# Patient Record
Sex: MALE | Race: WHITE | NOT HISPANIC OR LATINO | Employment: UNEMPLOYED | URBAN - NONMETROPOLITAN AREA
[De-identification: names, ages, dates, MRNs, and addresses within clinical notes are randomized per-mention and may not be internally consistent; named-entity substitution may affect disease eponyms.]

---

## 2023-09-10 ENCOUNTER — HOSPITAL ENCOUNTER (EMERGENCY)
Facility: HOSPITAL | Age: 58
Discharge: HOME | End: 2023-09-10
Attending: EMERGENCY MEDICINE | Admitting: EMERGENCY MEDICINE
Payer: MEDICARE

## 2023-09-10 VITALS
DIASTOLIC BLOOD PRESSURE: 67 MMHG | OXYGEN SATURATION: 98 % | SYSTOLIC BLOOD PRESSURE: 149 MMHG | BODY MASS INDEX: 24.33 KG/M2 | HEART RATE: 79 BPM | WEIGHT: 155 LBS | TEMPERATURE: 98 F | HEIGHT: 67 IN | RESPIRATION RATE: 16 BRPM

## 2023-09-10 DIAGNOSIS — K04.7 DENTAL INFECTION: Primary | ICD-10-CM

## 2023-09-10 PROCEDURE — 96372 THER/PROPH/DIAG INJ SC/IM: CPT

## 2023-09-10 PROCEDURE — 99282 EMERGENCY DEPT VISIT SF MDM: CPT

## 2023-09-10 PROCEDURE — 99284 EMERGENCY DEPT VISIT MOD MDM: CPT | Performed by: PHYSICIAN ASSISTANT

## 2023-09-10 RX ORDER — KETOROLAC TROMETHAMINE 30 MG/ML
15 INJECTION, SOLUTION INTRAMUSCULAR; INTRAVENOUS ONCE
Status: COMPLETED | OUTPATIENT
Start: 2023-09-10 | End: 2023-09-10

## 2023-09-10 RX ORDER — PENICILLIN V POTASSIUM 250 MG/1
500 TABLET ORAL ONCE
Status: COMPLETED | OUTPATIENT
Start: 2023-09-10 | End: 2023-09-10

## 2023-09-10 RX ORDER — PENICILLIN V POTASSIUM 500 MG/1
500 TABLET ORAL 4 TIMES DAILY
Qty: 28 TABLET | Refills: 0 | Status: SHIPPED | OUTPATIENT
Start: 2023-09-10 | End: 2023-09-17

## 2023-09-10 RX ADMIN — KETOROLAC TROMETHAMINE 15 MG: 30 INJECTION, SOLUTION INTRAMUSCULAR at 11:51

## 2023-09-10 RX ADMIN — PENICILLIN V POTASSIUM 500 MG: 250 TABLET, FILM COATED ORAL at 11:51

## 2023-09-10 NOTE — DISCHARGE INSTRUCTIONS
Please take antibiotics as prescribed. Please follow-up with dentistry  Here is a list of  dental clinics that may be able to help you. Keep in mind that these clinics do not have to see you or any other patient. Also, these clinics are not connected to the [facility] or [facility] but if they agree to see you as a patient it is easy for them to call  Medical Records Department to have your records faxed to them. [facility]:  [address] [facility]   [address] [facility]   [id number]:   [address]   201 [facility], [address]   [phone]     [facility]:  [address]  [place] [address] [facility] [address]  [phone]    [address]:  11[address] [facility], [address]  [phone]    [address] [facility] [address] [facility] [address]:  [address] [place] [address] [address]  ([address]:  [address] [facility], [address]  [phone]    [facility]:  201 [facility], [address]  [phone]    [address]:  600 [facility] [address] [facility]  [phone]    [address] [facility] [address] [facility] [address]:  [address]  [phone] [address]  [address]  [address], [address]  [phone]    [address]:  2190 [facility] [address], [address] [place] [address]  Associates:  90 [facility], [address]  [phone]

## 2023-09-10 NOTE — ED PROVIDER NOTES
History  Chief Complaint   Patient presents with   • Dental Pain     51-year-old male presents the emergency department for evaluation of left upper dental pain for the last few weeks. Worsening. Reports history of same and dental abscess. No fevers or chills. No difficulty swallowing. No shortness of breath. Lives in [place] but here for several months does not follow with dentistry regularly          None       Past Medical History:   Diagnosis Date   • Anxiety        History reviewed. No pertinent surgical history. History reviewed. No pertinent family history. I have reviewed and agree with the history as documented. E-Cigarette/Vaping     E-Cigarette/Vaping Substances     Social History     Tobacco Use   • Smoking status: Never   • Smokeless tobacco: Never   Substance Use Topics   • Alcohol use: Not Currently   • Drug use: Not Currently     Types: Marijuana       Review of Systems   Constitutional: Negative for fever. HENT: Positive for dental problem. Respiratory: Negative for shortness of breath. Cardiovascular: Negative. Musculoskeletal: Negative. All other systems reviewed and are negative. Physical Exam  Physical Exam  Vitals and nursing note reviewed. Constitutional:       General: He is not in acute distress. Appearance: Normal appearance. He is not ill-appearing, toxic-appearing or diaphoretic. HENT:      Head: Normocephalic. Mouth/Throat:      Dentition: Dental tenderness and dental caries present. Comments: Multiple dental caries with previous dental extractions. Tenderness over the left upper gum with no palpable area of fluctuance. No posterior oropharynx swelling or abscess. Eyes:      Conjunctiva/sclera: Conjunctivae normal.   Pulmonary:      Effort: Pulmonary effort is normal.   Skin:     General: Skin is warm and dry. Findings: No erythema. Neurological:      General: No focal deficit present. Mental Status: He is alert. Vital Signs  ED Triage Vitals [09/10/23 1140]   Temperature Pulse Respirations Blood Pressure SpO2   98 °F (36.7 °C) 79 16 149/67 98 %      Temp Source Heart Rate Source Patient Position - Orthostatic VS BP Location FiO2 (%)   Tympanic Monitor Sitting Right arm --      Pain Score       9           Vitals:    09/10/23 1140   BP: 149/67   Pulse: 79   Patient Position - Orthostatic VS: Sitting         Visual Acuity      ED Medications  Medications   penicillin V potassium (VEETID) tablet 500 mg (500 mg Oral Given 9/10/23 1151)   ketorolac (TORADOL) injection 15 mg (15 mg Intramuscular Given 9/10/23 1151)       Diagnostic Studies  Results Reviewed     None                 No orders to display              Procedures  Procedures         ED Course                 SBIRT 20yo+    Flowsheet Row Most Recent Value   Initial Alcohol Screen: US AUDIT-C     1. How often do you have a drink containing alcohol? 0 Filed at: 09/10/2023 1140   2. How many drinks containing alcohol do you have on a typical day you are drinking? 0 Filed at: 09/10/2023 1140   3a. Male UNDER 65: How often do you have five or more drinks on one occasion? 0 Filed at: 09/10/2023 1140   3b. FEMALE Any Age, or MALE 65+: How often do you have 4 or more drinks on one occassion? 0 Filed at: 09/10/2023 1140   Audit-C Score 0 Filed at: 09/10/2023 1140   KATIE: How many times in the past year have you. .. Used an illegal drug or used a prescription medication for non-medical reasons? Never Filed at: 09/10/2023 1140                    Medical Decision Making  61-year-old male presented to the emergency department for evaluation of dental pain. Vitals and medical record reviewed. Patient at risk for dental abscess, dental carry, dental fracture, peritonsillar abscess. He is breathing without difficulty. No drooling. Speaking in full sentences. Tenderness over the left upper gum. No palpable area of fluctuance. No peritonsillar abscess identified. Multiple dental caries. Was started on Pen-VK. Recommend follow-up with dentistry. Return precautions discussed and verbalized understanding. Patient was clinically and hemodynamically stable for discharge    Dental infection: acute illness or injury  Risk  Prescription drug management. Disposition  Final diagnoses:   Dental infection     Time reflects when diagnosis was documented in both MDM as applicable and the Disposition within this note     Time User Action Codes Description Comment    9/10/2023 11:47 AM Dannie Isabel Add [K04.7] Dental infection       ED Disposition     ED Disposition   Discharge    Condition   Stable    Date/Time   Sun Sep 10, 2023 11:47 AM    Comment   Ephraim Gamboa discharge to home/self care. Follow-up Information     Follow up With Specialties Details Why [address]  [phone]            Discharge Medication List as of 9/10/2023 11:48 AM      START taking these medications    Details   penicillin V potassium (VEETID) 500 mg tablet Take 1 tablet (500 mg total) by mouth 4 (four) times a day for 7 days, Starting Sun 9/10/2023, Until Sun 9/17/2023, Normal             No discharge procedures on file.     PDMP Review     None          ED Provider  Electronically Signed by           Dannie Isabel PA-C  09/10/23 3712

## 2023-10-02 ENCOUNTER — HOSPITAL ENCOUNTER (EMERGENCY)
Facility: HOSPITAL | Age: 58
Discharge: HOME | End: 2023-10-02
Attending: EMERGENCY MEDICINE | Admitting: EMERGENCY MEDICINE
Payer: MEDICARE

## 2023-10-02 ENCOUNTER — APPOINTMENT (EMERGENCY)
Dept: RADIOLOGY | Facility: HOSPITAL | Age: 58
End: 2023-10-02
Payer: MEDICARE

## 2023-10-02 VITALS
HEIGHT: 67 IN | BODY MASS INDEX: 25.11 KG/M2 | WEIGHT: 160 LBS | OXYGEN SATURATION: 94 % | DIASTOLIC BLOOD PRESSURE: 69 MMHG | SYSTOLIC BLOOD PRESSURE: 136 MMHG | HEART RATE: 73 BPM | RESPIRATION RATE: 16 BRPM | TEMPERATURE: 96.8 F

## 2023-10-02 DIAGNOSIS — R07.9 CHEST PAIN: Primary | ICD-10-CM

## 2023-10-02 DIAGNOSIS — R73.9 HYPERGLYCEMIA: ICD-10-CM

## 2023-10-02 LAB
2HR DELTA HS TROPONIN: -1 NG/L
ALBUMIN SERPL BCP-MCNC: 4.5 G/DL (ref 3.5–5)
ALP SERPL-CCNC: 68 U/L (ref 34–104)
ALT SERPL W P-5'-P-CCNC: 21 U/L (ref 7–52)
ANION GAP SERPL CALCULATED.3IONS-SCNC: 12 MMOL/L
AST SERPL W P-5'-P-CCNC: 17 U/L (ref 13–39)
ATRIAL RATE: 75 BPM
BASOPHILS # BLD AUTO: 0.11 THOUSANDS/ÂΜL (ref 0–0.1)
BASOPHILS NFR BLD AUTO: 1 % (ref 0–1)
BILIRUB SERPL-MCNC: 0.46 MG/DL (ref 0.2–1)
BNP SERPL-MCNC: 31 PG/ML (ref 0–100)
BUN SERPL-MCNC: 13 MG/DL (ref 5–25)
CALCIUM SERPL-MCNC: 9.9 MG/DL (ref 8.4–10.2)
CARDIAC TROPONIN I PNL SERPL HS: 6 NG/L
CARDIAC TROPONIN I PNL SERPL HS: 7 NG/L
CHLORIDE SERPL-SCNC: 102 MMOL/L (ref 96–108)
CO2 SERPL-SCNC: 23 MMOL/L (ref 21–32)
CREAT SERPL-MCNC: 1 MG/DL (ref 0.6–1.3)
D DIMER PPP FEU-MCNC: <0.27 UG/ML FEU
EOSINOPHIL # BLD AUTO: 0.38 THOUSAND/ÂΜL (ref 0–0.61)
EOSINOPHIL NFR BLD AUTO: 4 % (ref 0–6)
ERYTHROCYTE [DISTWIDTH] IN BLOOD BY AUTOMATED COUNT: 13.1 % (ref 11.6–15.1)
GFR SERPL CREATININE-BSD FRML MDRD: 83 ML/MIN/1.73SQ M
GLUCOSE SERPL-MCNC: 196 MG/DL (ref 65–140)
GLUCOSE SERPL-MCNC: 364 MG/DL (ref 65–140)
HCT VFR BLD AUTO: 48.8 % (ref 36.5–49.3)
HGB BLD-MCNC: 16.4 G/DL (ref 12–17)
IMM GRANULOCYTES # BLD AUTO: 0.04 THOUSAND/UL (ref 0–0.2)
IMM GRANULOCYTES NFR BLD AUTO: 0 % (ref 0–2)
LYMPHOCYTES # BLD AUTO: 2.24 THOUSANDS/ÂΜL (ref 0.6–4.47)
LYMPHOCYTES NFR BLD AUTO: 25 % (ref 14–44)
MCH RBC QN AUTO: 27.8 PG (ref 26.8–34.3)
MCHC RBC AUTO-ENTMCNC: 33.6 G/DL (ref 31.4–37.4)
MCV RBC AUTO: 83 FL (ref 82–98)
MONOCYTES # BLD AUTO: 0.5 THOUSAND/ÂΜL (ref 0.17–1.22)
MONOCYTES NFR BLD AUTO: 6 % (ref 4–12)
NEUTROPHILS # BLD AUTO: 5.76 THOUSANDS/ÂΜL (ref 1.85–7.62)
NEUTS SEG NFR BLD AUTO: 64 % (ref 43–75)
NRBC BLD AUTO-RTO: 0 /100 WBCS
P AXIS: 77 DEGREES
PLATELET # BLD AUTO: 284 THOUSANDS/UL (ref 149–390)
PMV BLD AUTO: 11.2 FL (ref 8.9–12.7)
POTASSIUM SERPL-SCNC: 4 MMOL/L (ref 3.5–5.3)
PR INTERVAL: 150 MS
PROT SERPL-MCNC: 7.4 G/DL (ref 6.4–8.4)
QRS AXIS: 77 DEGREES
QRSD INTERVAL: 88 MS
QT INTERVAL: 362 MS
QTC INTERVAL: 404 MS
RBC # BLD AUTO: 5.89 MILLION/UL (ref 3.88–5.62)
SODIUM SERPL-SCNC: 137 MMOL/L (ref 135–147)
T WAVE AXIS: 15 DEGREES
VENTRICULAR RATE: 75 BPM
WBC # BLD AUTO: 9.03 THOUSAND/UL (ref 4.31–10.16)

## 2023-10-02 PROCEDURE — 71045 X-RAY EXAM CHEST 1 VIEW: CPT

## 2023-10-02 PROCEDURE — 96361 HYDRATE IV INFUSION ADD-ON: CPT

## 2023-10-02 PROCEDURE — 80053 COMPREHEN METABOLIC PANEL: CPT | Performed by: EMERGENCY MEDICINE

## 2023-10-02 PROCEDURE — 82948 REAGENT STRIP/BLOOD GLUCOSE: CPT

## 2023-10-02 PROCEDURE — 36415 COLL VENOUS BLD VENIPUNCTURE: CPT | Performed by: EMERGENCY MEDICINE

## 2023-10-02 PROCEDURE — 96374 THER/PROPH/DIAG INJ IV PUSH: CPT

## 2023-10-02 PROCEDURE — 83880 ASSAY OF NATRIURETIC PEPTIDE: CPT | Performed by: EMERGENCY MEDICINE

## 2023-10-02 PROCEDURE — 96375 TX/PRO/DX INJ NEW DRUG ADDON: CPT

## 2023-10-02 PROCEDURE — 85379 FIBRIN DEGRADATION QUANT: CPT | Performed by: EMERGENCY MEDICINE

## 2023-10-02 PROCEDURE — 84484 ASSAY OF TROPONIN QUANT: CPT | Performed by: EMERGENCY MEDICINE

## 2023-10-02 PROCEDURE — 93005 ELECTROCARDIOGRAM TRACING: CPT

## 2023-10-02 PROCEDURE — 99285 EMERGENCY DEPT VISIT HI MDM: CPT

## 2023-10-02 PROCEDURE — 99285 EMERGENCY DEPT VISIT HI MDM: CPT | Performed by: EMERGENCY MEDICINE

## 2023-10-02 PROCEDURE — 85025 COMPLETE CBC W/AUTO DIFF WBC: CPT | Performed by: EMERGENCY MEDICINE

## 2023-10-02 RX ORDER — LOVASTATIN 20 MG/1
20 TABLET ORAL DAILY
COMMUNITY

## 2023-10-02 RX ORDER — TRAZODONE HYDROCHLORIDE 50 MG/1
50 TABLET ORAL
COMMUNITY

## 2023-10-02 RX ORDER — FLUOXETINE HYDROCHLORIDE 40 MG/1
40 CAPSULE ORAL DAILY
COMMUNITY

## 2023-10-02 RX ORDER — NITROGLYCERIN 0.4 MG/1
0.4 TABLET SUBLINGUAL
Status: DISCONTINUED | OUTPATIENT
Start: 2023-10-02 | End: 2023-10-02 | Stop reason: HOSPADM

## 2023-10-02 RX ORDER — IBUPROFEN 400 MG/1
400 TABLET ORAL EVERY 8 HOURS PRN
COMMUNITY

## 2023-10-02 RX ORDER — INSULIN GLARGINE 100 [IU]/ML
35 INJECTION, SOLUTION SUBCUTANEOUS
COMMUNITY

## 2023-10-02 RX ORDER — ASPIRIN 81 MG/1
81 TABLET ORAL DAILY
COMMUNITY

## 2023-10-02 RX ORDER — ALBUTEROL SULFATE 90 UG/1
2 AEROSOL, METERED RESPIRATORY (INHALATION)
COMMUNITY

## 2023-10-02 RX ORDER — FLUTICASONE PROPIONATE 50 MCG
1 SPRAY, SUSPENSION (ML) NASAL DAILY
COMMUNITY

## 2023-10-02 RX ORDER — LISINOPRIL 2.5 MG/1
2.5 TABLET ORAL DAILY
COMMUNITY

## 2023-10-02 RX ORDER — ONDANSETRON 2 MG/ML
4 INJECTION INTRAMUSCULAR; INTRAVENOUS ONCE
Status: COMPLETED | OUTPATIENT
Start: 2023-10-02 | End: 2023-10-02

## 2023-10-02 RX ORDER — UMECLIDINIUM 62.5 UG/1
1 AEROSOL, POWDER ORAL DAILY
COMMUNITY

## 2023-10-02 RX ADMIN — INSULIN HUMAN 5 UNITS: 100 INJECTION, SOLUTION PARENTERAL at 13:23

## 2023-10-02 RX ADMIN — SODIUM CHLORIDE 1000 ML: 0.9 INJECTION, SOLUTION INTRAVENOUS at 12:53

## 2023-10-02 RX ADMIN — NITROGLYCERIN 0.4 MG: 0.4 TABLET SUBLINGUAL at 12:48

## 2023-10-02 RX ADMIN — ONDANSETRON 4 MG: 2 INJECTION INTRAMUSCULAR; INTRAVENOUS at 12:48

## 2023-10-02 NOTE — ED PROVIDER NOTES
History  Chief Complaint   Patient presents with   • Chest Pain     Mid sternal chest pain radiating across the top of his chest, hx of MI with stent placement, pain is constant pressure, pain to b/l arms and hands, c/o sob and diaphoresis with pain, c/o nausea, denies vomiting, pt. Using nitro for mild pain relief, denies injury      82-year-old male with past medical history pertinent for anxiety, history of MI with stent placement, Wannah user who presents to the emergency department for midsternal chest pain that radiates across the top of his chest.  Reports pain is constant with radiation to the bilateral arms and hands. Reports shortness of breath and diaphoresis as well with nausea. Denies any vomiting. Did use some nitro for mild pain relief. No injuries. States he took 2 nitro without any improvement. Reports pain is 8 out of 10 at this time. States that he had a stent put in his RCA in . Denies any previous thrombus otherwise. No PE or DVT history. No pain with breathing. No other concerns. Prior to Admission Medications   Prescriptions Last Dose Informant Patient Reported? Taking?    Empagliflozin (Jardiance) 10 MG TABS tablet   Yes Yes   FLUoxetine (PROzac) 40 MG capsule   Yes No   Sig: Take 40 mg by mouth daily   albuterol (PROVENTIL HFA,VENTOLIN HFA) 90 mcg/act inhaler   Yes No   Sig: Inhale 2 puffs   aspirin (ECOTRIN LOW STRENGTH) 81 mg EC tablet   Yes No   Sig: Take 81 mg by mouth daily   fluticasone (FLONASE) 50 mcg/act nasal spray   Yes No   Si spray daily   ibuprofen (MOTRIN) 400 mg tablet   Yes No   Sig: Take 400 mg by mouth every 8 (eight) hours as needed   insulin glargine (LANTUS) 100 units/mL subcutaneous injection   Yes No   Sig: Inject 35 Units under the skin   lisinopril (ZESTRIL) 2.5 mg tablet   Yes No   Sig: Take 2.5 mg by mouth daily   lovastatin (MEVACOR) 20 mg tablet   Yes No   Sig: Take 20 mg by mouth daily   metFORMIN (GLUCOPHAGE) 1000 MG tablet   Yes No Sig: Take 1,000 mg by mouth daily   traZODone (DESYREL) 50 mg tablet   Yes No   Sig: Take 50 mg by mouth   umeclidinium (Incruse Ellipta) 62.5 mcg/actuation AEPB inhaler   Yes No   Sig: Inhale 1 puff daily      Facility-Administered Medications: None       Past Medical History:   Diagnosis Date   • Anxiety    • Depression    • Diabetes mellitus (HCC)    • MI, old    • PTSD (post-traumatic stress disorder)        Past Surgical History:   Procedure Laterality Date   • CARPAL TUNNEL RELEASE Right    • CORONARY ANGIOPLASTY WITH STENT PLACEMENT     • ROTATOR CUFF REPAIR Right        History reviewed. No pertinent family history. I have reviewed and agree with the history as documented. E-Cigarette/Vaping     E-Cigarette/Vaping Substances   • Nicotine No    • THC No    • CBD No    • Flavoring No      Social History     Tobacco Use   • Smoking status: Never     Passive exposure: Never   • Smokeless tobacco: Never   Substance Use Topics   • Alcohol use: Not Currently   • Drug use: Not Currently     Types: Marijuana       Review of Systems   Constitutional: Negative for activity change, appetite change, chills, diaphoresis and fever. HENT: Negative for congestion, rhinorrhea and sore throat. Eyes: Negative for visual disturbance. Respiratory: Negative for chest tightness and shortness of breath. Cardiovascular: Positive for chest pain. Negative for palpitations and leg swelling. Gastrointestinal: Negative for abdominal pain, constipation, diarrhea, nausea and vomiting. Genitourinary: Negative for difficulty urinating and hematuria. Musculoskeletal: Negative for back pain and neck pain. Skin: Negative for color change and rash. Neurological: Negative for dizziness, weakness and headaches. Psychiatric/Behavioral: Negative for behavioral problems. Physical Exam  Physical Exam  Vitals and nursing note reviewed. Constitutional:       General: He is not in acute distress.      Appearance: He is well-developed. He is not diaphoretic. HENT:      Head: Normocephalic and atraumatic. Right Ear: External ear normal.      Left Ear: External ear normal.      Nose: Nose normal.   Eyes:      Pupils: Pupils are equal, round, and reactive to light. Cardiovascular:      Rate and Rhythm: Normal rate and regular rhythm. Heart sounds: Normal heart sounds. Pulmonary:      Effort: Pulmonary effort is normal. No respiratory distress. Breath sounds: Normal breath sounds. No wheezing or rales. Abdominal:      General: Bowel sounds are normal.      Palpations: Abdomen is soft. There is no mass. Tenderness: There is no abdominal tenderness. Musculoskeletal:         General: No tenderness or deformity. Normal range of motion. Cervical back: Normal range of motion and neck supple. Skin:     General: Skin is warm and dry. Capillary Refill: Capillary refill takes less than 2 seconds. Findings: No erythema or rash. Neurological:      Mental Status: He is alert. Motor: No abnormal muscle tone.    Psychiatric:         Behavior: Behavior normal.         Vital Signs  ED Triage Vitals [10/02/23 1215]   Temperature Pulse Respirations Blood Pressure SpO2   (!) 96.8 °F (36 °C) 79 20 136/67 98 %      Temp Source Heart Rate Source Patient Position - Orthostatic VS BP Location FiO2 (%)   Temporal Monitor Sitting Right arm --      Pain Score       8           Vitals:    10/02/23 1330 10/02/23 1415 10/02/23 1430 10/02/23 1515   BP: 122/67 130/73 119/60 116/60   Pulse: 72 80 70 75   Patient Position - Orthostatic VS:             Visual Acuity      ED Medications  Medications   nitroglycerin (NITROSTAT) SL tablet 0.4 mg (0.4 mg Sublingual Given 10/2/23 1248)   sodium chloride 0.9 % bolus 1,000 mL (1,000 mL Intravenous New Bag 10/2/23 1253)   ondansetron (ZOFRAN) injection 4 mg (4 mg Intravenous Given 10/2/23 1248)   insulin regular (HumuLIN R,NovoLIN R) injection 5 Units (5 Units Intravenous Given 10/2/23 1323)       Diagnostic Studies  Results Reviewed     Procedure Component Value Units Date/Time    HS Troponin I 2hr [[id number]]  (Normal) Collected: 10/02/23 1508    Lab Status: Final result Specimen: Blood from Arm, Left Updated: 10/02/23 1534     hs TnI 2hr 6 ng/L      Delta 2hr hsTnI -1 ng/L     HS Troponin I 4hr [[id number]]     Lab Status: No result Specimen: Blood     Fingerstick Glucose (POCT) [[id number]]  (Abnormal) Collected: 10/02/23 1355    Lab Status: Final result Updated: 10/02/23 1356     POC Glucose 196 mg/dl     D-dimer, quantitative [[id number]]  (Normal) Collected: 10/02/23 1239    Lab Status: Final result Specimen: Blood from Arm, Left Updated: 10/02/23 1318     D-Dimer, Quant <0.27 ug/ml FEU     Narrative: In the evaluation for possible pulmonary embolism, in the appropriate (Well's Score of 4 or less) patient, the age adjusted d-dimer cutoff for this patient can be calculated as:    Age x 0.01 (in ug/mL) for Age-adjusted D-dimer exclusion threshold for a patient over 50 years.     HS Troponin 0hr (reflex protocol) [[id number]]  (Normal) Collected: 10/02/23 1239    Lab Status: Final result Specimen: Blood from Arm, Left Updated: 10/02/23 1308     hs TnI 0hr 7 ng/L     B-Type Natriuretic Peptide(BNP) [[id number]]  (Normal) Collected: 10/02/23 1239    Lab Status: Final result Specimen: Blood from Arm, Left Updated: 10/02/23 1308     BNP 31 pg/mL     Comprehensive metabolic panel [[id number]]  (Abnormal) Collected: 10/02/23 1239    Lab Status: Final result Specimen: Blood from Arm, Left Updated: 10/02/23 1301     Sodium 137 mmol/L      Potassium 4.0 mmol/L      Chloride 102 mmol/L      CO2 23 mmol/L      ANION GAP 12 mmol/L      BUN 13 mg/dL      Creatinine 1.00 mg/dL      Glucose 364 mg/dL      Calcium 9.9 mg/dL      AST 17 U/L      ALT 21 U/L      Alkaline Phosphatase 68 U/L      Total Protein 7.4 g/dL      Albumin 4.5 g/dL      Total Bilirubin 0.46 mg/dL      eGFR 83 ml/min/1.73sq m Narrative:      National Kid[employer] Disease [employer] guidelines for Chronic Kidney Disease (CKD):   •  Stage 1 with normal or high GFR (GFR > 90 mL/min/1.73 square meters)  •  Stage 2 Mild CKD (GFR = 60-89 mL/min/1.73 square meters)  •  Stage 3A Moderate CKD (GFR = 45-59 mL/min/1.73 square meters)  •  Stage 3B Moderate CKD (GFR = 30-44 mL/min/1.73 square meters)  •  Stage 4 Severe CKD (GFR = 15-29 mL/min/1.73 square meters)  •  Stage 5 End Stage CKD (GFR <15 mL/min/1.73 square meters)  Note: GFR calculation is accurate only with a steady state creatinine    CBC and differential [[id number]]  (Abnormal) Collected: 10/02/23 1239    Lab Status: Final result Specimen: Blood from Arm, Left Updated: 10/02/23 1245     WBC 9.03 Thousand/uL      RBC 5.89 Million/uL      Hemoglobin 16.4 g/dL      Hematocrit 48.8 %      MCV 83 fL      MCH 27.8 pg      MCHC 33.6 g/dL      RDW 13.1 %      MPV 11.2 fL      Platelets 002 Thousands/uL      nRBC 0 /100 WBCs      Neutrophils Relative 64 %      Immat GRANS % 0 %      Lymphocytes Relative 25 %      Monocytes Relative 6 %      Eosinophils Relative 4 %      Basophils Relative 1 %      Neutrophils Absolute 5.76 Thousands/µL      Immature Grans Absolute 0.04 Thousand/uL      Lymphocytes Absolute 2.24 Thousands/µL      Monocytes Absolute 0.50 Thousand/µL      Eosinophils Absolute 0.38 Thousand/µL      Basophils Absolute 0.11 Thousands/µL                  XR chest 1 view portable   Final Result by Iris Garcia MD (10/02 1302)      No acute cardiopulmonary disease.                Workstation performed: [id number]                    Procedures  ECG 12 Lead Documentation Only    Date/Time: 10/2/2023 12:16 PM    Performed by: Jacqueline Martinez MD  Authorized by: Jacqueline Martinez MD    ECG reviewed by me, the ED Provider: yes    Patient location:  ED  Previous ECG:     Previous ECG:  Compared to current    Similarity:  No change  Interpretation:     Interpretation: normal    Rate:     ECG rate: 75    ECG rate assessment: normal    Rhythm:     Rhythm: sinus rhythm    Ectopy:     Ectopy: none    QRS:     QRS axis:  Normal  Conduction:     Conduction: normal    ST segments:     ST segments:  Normal  T waves:     T waves: normal               ED Course            RESULTS:  Results Reviewed     Procedure Component Value Units Date/Time    HS Troponin I 2hr [[id number]]  (Normal) Collected: 10/02/23 1508    Lab Status: Final result Specimen: Blood from Arm, Left Updated: 10/02/23 1534     hs TnI 2hr 6 ng/L      Delta 2hr hsTnI -1 ng/L     HS Troponin I 4hr [[id number]]     Lab Status: No result Specimen: Blood     Fingerstick Glucose (POCT) [[id number]]  (Abnormal) Collected: 10/02/23 1355    Lab Status: Final result Updated: 10/02/23 1356     POC Glucose 196 mg/dl     D-dimer, quantitative [[id number]]  (Normal) Collected: 10/02/23 1239    Lab Status: Final result Specimen: Blood from Arm, Left Updated: 10/02/23 1318     D-Dimer, Quant <0.27 ug/ml FEU     Narrative: In the evaluation for possible pulmonary embolism, in the appropriate (Well's Score of 4 or less) patient, the age adjusted d-dimer cutoff for this patient can be calculated as:    Age x 0.01 (in ug/mL) for Age-adjusted D-dimer exclusion threshold for a patient over 50 years.     HS Troponin 0hr (reflex protocol) [[id number]]  (Normal) Collected: 10/02/23 1239    Lab Status: Final result Specimen: Blood from Arm, Left Updated: 10/02/23 1308     hs TnI 0hr 7 ng/L     B-Type Natriuretic Peptide(BNP) [[id number]]  (Normal) Collected: 10/02/23 1239    Lab Status: Final result Specimen: Blood from Arm, Left Updated: 10/02/23 1308     BNP 31 pg/mL     Comprehensive metabolic panel [[id number]]  (Abnormal) Collected: 10/02/23 1239    Lab Status: Final result Specimen: Blood from Arm, Left Updated: 10/02/23 1301     Sodium 137 mmol/L      Potassium 4.0 mmol/L      Chloride 102 mmol/L      CO2 23 mmol/L      ANION GAP 12 mmol/L      BUN 13 mg/dL Creatinine 1.00 mg/dL      Glucose 364 mg/dL      Calcium 9.9 mg/dL      AST 17 U/L      ALT 21 U/L      Alkaline Phosphatase 68 U/L      Total Protein 7.4 g/dL      Albumin 4.5 g/dL      Total Bilirubin 0.46 mg/dL      eGFR 83 ml/min/1.73sq m     Narrative:      [facility] guidelines for Chronic Kidney Disease (CKD):   •  Stage 1 with normal or high GFR (GFR > 90 mL/min/1.73 square meters)  •  Stage 2 Mild CKD (GFR = 60-89 mL/min/1.73 square meters)  •  Stage 3A Moderate CKD (GFR = 45-59 mL/min/1.73 square meters)  •  Stage 3B Moderate CKD (GFR = 30-44 mL/min/1.73 square meters)  •  Stage 4 Severe CKD (GFR = 15-29 mL/min/1.73 square meters)  •  Stage 5 End Stage CKD (GFR <15 mL/min/1.73 square meters)  Note: GFR calculation is accurate only with a steady state creatinine    CBC and differential [[id number]]  (Abnormal) Collected: 10/02/23 1239    Lab Status: Final result Specimen: Blood from Arm, Left Updated: 10/02/23 1245     WBC 9.03 Thousand/uL      RBC 5.89 Million/uL      Hemoglobin 16.4 g/dL      Hematocrit 48.8 %      MCV 83 fL      MCH 27.8 pg      MCHC 33.6 g/dL      RDW 13.1 %      MPV 11.2 fL      Platelets 138 Thousands/uL      nRBC 0 /100 WBCs      Neutrophils Relative 64 %      Immat GRANS % 0 %      Lymphocytes Relative 25 %      Monocytes Relative 6 %      Eosinophils Relative 4 %      Basophils Relative 1 %      Neutrophils Absolute 5.76 Thousands/µL      Immature Grans Absolute 0.04 Thousand/uL      Lymphocytes Absolute 2.24 Thousands/µL      Monocytes Absolute 0.50 Thousand/µL      Eosinophils Absolute 0.38 Thousand/µL      Basophils Absolute 0.11 Thousands/µL           XR chest 1 view portable   Final Result      No acute cardiopulmonary disease.                Workstation performed: [id number]             Vitals:    10/02/23 1330 10/02/23 1415 10/02/23 1430 10/02/23 1515   BP: 122/67 130/73 119/60 116/60   TempSrc:       Pulse: 72 80 70 75   Resp:       Patient Position - Orthostatic VS:       Temp:             Medical Decision Making  82-year-old male with past medical history pertinent for anxiety, history of MI with stent placement, Wannah user who presents to the emergency department for midsternal chest pain that radiates across the top of his chest.  Reports pain is constant with radiation to the bilateral arms and hands. Reports shortness of breath and diaphoresis as well with nausea. Denies any vomiting. Did use some nitro for mild pain relief. No injuries. Vital signs are nonconcerning. Patient had exam as above. EKG obtained on arrival showed no acute signs of ischemia. Lab work obtained along with chest x-ray. Results returned showing troponin of 7, second troponin of 6, BNP of 31, and D-dimer of <0.27. Otherwise lab work was unremarkable except for mildly elevated glucose which was treated with insulin and IV fluids. Improved on reassessment. Chest x-ray returned showing no acute concerns. Patient's heart score was noted to be 3. Patient did report increased stress lately and may have anxiety causing his symptoms as well. Patient was chest pain-free and advised to follow-up with his cardiologist/primary care physician. Patient was agreeable to outpatient care. Understands return precautions. Amount and/or Complexity of Data Reviewed  Labs: ordered. Decision-making details documented in ED Course. Radiology: ordered and independent interpretation performed. Decision-making details documented in ED Course. ECG/medicine tests: ordered and independent interpretation performed. Decision-making details documented in ED Course. Risk  OTC drugs. Prescription drug management.           Disposition  Final diagnoses:   Chest pain   Hyperglycemia     Time reflects when diagnosis was documented in both MDM as applicable and the Disposition within this note     Time User Action Codes Description Comment    10/2/2023 12:17 PM Kathleen Villalobos Add [R07.9] Chest pain     10/2/2023  1:05 PM Charan Martinez Add [R73.9] Hyperglycemia       ED Disposition     ED Disposition   Discharge    Condition   Stable    Date/Time   Mon Oct 2, 2023 12:17 PM    Comment   Royal Mcghee discharge to home/self care. Follow-up Information     Follow up With Specialties Details Why [address] [facility], MD Internal Medicine Call   [address] [place] [address] [address]      Anai Chowdhury DO Family Medicine Call   [address]  [phone]            Patient's Medications   Discharge Prescriptions    No medications on file       No discharge procedures on file.     PDMP Review     None          ED Provider  Electronically Signed by           Charan Martinez MD  10/02/23 3174

## 2023-10-14 ENCOUNTER — HOSPITAL ENCOUNTER (EMERGENCY)
Facility: HOSPITAL | Age: 58
Discharge: HOME | End: 2023-10-14
Attending: STUDENT IN AN ORGANIZED HEALTH CARE EDUCATION/TRAINING PROGRAM
Payer: MEDICARE

## 2023-10-14 ENCOUNTER — APPOINTMENT (EMERGENCY)
Dept: RADIOLOGY | Facility: HOSPITAL | Age: 58
End: 2023-10-14
Payer: MEDICARE

## 2023-10-14 VITALS
OXYGEN SATURATION: 96 % | TEMPERATURE: 97.9 F | HEART RATE: 77 BPM | RESPIRATION RATE: 18 BRPM | SYSTOLIC BLOOD PRESSURE: 137 MMHG | DIASTOLIC BLOOD PRESSURE: 64 MMHG

## 2023-10-14 DIAGNOSIS — M25.512 ACUTE PAIN OF LEFT SHOULDER: Primary | ICD-10-CM

## 2023-10-14 DIAGNOSIS — M62.838 TRAPEZIUS MUSCLE SPASM: ICD-10-CM

## 2023-10-14 PROCEDURE — 99284 EMERGENCY DEPT VISIT MOD MDM: CPT | Performed by: STUDENT IN AN ORGANIZED HEALTH CARE EDUCATION/TRAINING PROGRAM

## 2023-10-14 PROCEDURE — 73030 X-RAY EXAM OF SHOULDER: CPT

## 2023-10-14 RX ORDER — KETOROLAC TROMETHAMINE 30 MG/ML
30 INJECTION, SOLUTION INTRAMUSCULAR; INTRAVENOUS ONCE
Status: COMPLETED | OUTPATIENT
Start: 2023-10-14 | End: 2023-10-14

## 2023-10-14 RX ORDER — ACETAMINOPHEN 325 MG/1
975 TABLET ORAL ONCE
Status: COMPLETED | OUTPATIENT
Start: 2023-10-14 | End: 2023-10-14

## 2023-10-14 RX ORDER — DIAZEPAM 5 MG/1
5 TABLET ORAL EVERY 6 HOURS PRN
Qty: 6 TABLET | Refills: 0 | Status: SHIPPED | OUTPATIENT
Start: 2023-10-14

## 2023-10-14 RX ORDER — DIAZEPAM 5 MG/1
5 TABLET ORAL ONCE
Status: COMPLETED | OUTPATIENT
Start: 2023-10-14 | End: 2023-10-14

## 2023-10-14 RX ORDER — LIDOCAINE 50 MG/G
1 PATCH TOPICAL ONCE
Status: DISCONTINUED | OUTPATIENT
Start: 2023-10-14 | End: 2023-10-14 | Stop reason: HOSPADM

## 2023-10-14 RX ADMIN — ACETAMINOPHEN 975 MG: 325 TABLET, FILM COATED ORAL at 12:47

## 2023-10-14 RX ADMIN — KETOROLAC TROMETHAMINE 30 MG: 30 INJECTION, SOLUTION INTRAMUSCULAR at 12:47

## 2023-10-14 RX ADMIN — LIDOCAINE 5% 1 PATCH: 700 PATCH TOPICAL at 12:46

## 2023-10-14 RX ADMIN — DIAZEPAM 5 MG: 5 TABLET ORAL at 12:56

## 2023-10-14 NOTE — ED PROVIDER NOTES
History  Chief Complaint   Patient presents with    Shoulder Injury     Pt states he fell down a hill yesterday and hurt left shoulder. Decreased ROM due to pain. States tingling down left arm. Denies hitting head or LOC. On aspirin. History provided by:  Patient  Shoulder Injury  Location:  Shoulder  Shoulder location:  L shoulder  Injury: yes    Time since incident:  1 day  Mechanism of injury: fall    Mechanism of injury comment:  S/p fall while walking on the [address] [place] [address]. Denies head strike. No LOC. Isolated injury to the left shoulder/trapezius. Taking NSAIDs without relief. Pain details:     Quality:  Burning    Radiates to:  L shoulder and L upper arm    Severity:  Moderate    Onset quality:  Gradual    Duration:  1 day    Timing:  Constant    Progression:  Unchanged  Dislocation: no    Prior injury to area:  No  Relieved by:  Nothing  Worsened by: Movement  Ineffective treatments:  NSAIDs  Associated symptoms: decreased range of motion, numbness and tingling    Associated symptoms: no back pain, no fatigue, no muscle weakness, no neck pain, no stiffness and no swelling        Prior to Admission Medications   Prescriptions Last Dose Informant Patient Reported? Taking?    Empagliflozin (Jardiance) 10 MG TABS tablet   Yes No   FLUoxetine (PROzac) 40 MG capsule   Yes No   Sig: Take 40 mg by mouth daily   albuterol (PROVENTIL HFA,VENTOLIN HFA) 90 mcg/act inhaler   Yes No   Sig: Inhale 2 puffs   aspirin (ECOTRIN LOW STRENGTH) 81 mg EC tablet   Yes No   Sig: Take 81 mg by mouth daily   fluticasone (FLONASE) 50 mcg/act nasal spray   Yes No   Si spray daily   ibuprofen (MOTRIN) 400 mg tablet   Yes No   Sig: Take 400 mg by mouth every 8 (eight) hours as needed   insulin glargine (LANTUS) 100 units/mL subcutaneous injection   Yes No   Sig: Inject 35 Units under the skin   lisinopril (ZESTRIL) 2.5 mg tablet   Yes No   Sig: Take 2.5 mg by mouth daily   lovastatin (MEVACOR) 20 mg tablet   Yes No   Sig: Take 20 mg by mouth daily   metFORMIN (GLUCOPHAGE) 1000 MG tablet   Yes No   Sig: Take 1,000 mg by mouth daily   traZODone (DESYREL) 50 mg tablet   Yes No   Sig: Take 50 mg by mouth   umeclidinium (Incruse Ellipta) 62.5 mcg/actuation AEPB inhaler   Yes No   Sig: Inhale 1 puff daily      Facility-Administered Medications: None       Past Medical History:   Diagnosis Date    Anxiety     Coronary artery disease     Depression     Diabetes mellitus (HCC)     MI, old     PTSD (post-traumatic stress disorder)        Past Surgical History:   Procedure Laterality Date    CARPAL TUNNEL RELEASE Right     CORONARY ANGIOPLASTY WITH STENT PLACEMENT      ROTATOR CUFF REPAIR Right        History reviewed. No pertinent family history. I have reviewed and agree with the history as documented. E-Cigarette/Vaping    E-Cigarette Use Never User      E-Cigarette/Vaping Substances    Nicotine No     THC No     CBD No     Flavoring No      Social History     Tobacco Use    Smoking status: Never     Passive exposure: Never    Smokeless tobacco: Never   Vaping Use    Vaping Use: Never used   Substance Use Topics    Alcohol use: Not Currently    Drug use: Not Currently     Types: Marijuana       Review of Systems   Constitutional:  Negative for activity change, appetite change and fatigue. HENT:  Negative for dental problem, ear pain, facial swelling and nosebleeds. Eyes:  Negative for photophobia, pain, redness and visual disturbance. Respiratory:  Negative for cough, chest tightness and shortness of breath. Cardiovascular:  Negative for chest pain and palpitations. Gastrointestinal:  Negative for abdominal pain, nausea and vomiting. Genitourinary:  Negative for flank pain. Musculoskeletal:  Positive for arthralgias. Negative for back pain, joint swelling, neck pain and stiffness. Skin:  Negative for color change, pallor, rash and wound. Neurological:  Positive for numbness.  Negative for dizziness, syncope, weakness, light-headedness and headaches. All other systems reviewed and are negative. Physical Exam  Physical Exam  Vitals and nursing note reviewed. Constitutional:       General: He is not in acute distress. Appearance: He is not ill-appearing or toxic-appearing. HENT:      Head: Normocephalic and atraumatic. Right Ear: External ear normal.      Left Ear: External ear normal.   Eyes:      General: No scleral icterus. Right eye: No discharge. Left eye: No discharge. Extraocular Movements: Extraocular movements intact. Conjunctiva/sclera: Conjunctivae normal.   Neck:      Comments: No midline cervical tenderness. Normal range of motion of the neck. There is tenderness to palpation/hypertonicity of the left trapezius. Cardiovascular:      Rate and Rhythm: Normal rate and regular rhythm. Pulses: Normal pulses. Heart sounds: Normal heart sounds. No murmur heard. Pulmonary:      Effort: Pulmonary effort is normal. No respiratory distress. Breath sounds: Normal breath sounds. No stridor. No wheezing, rhonchi or rales. Chest:      Chest wall: No tenderness. Abdominal:      General: Bowel sounds are normal.      Palpations: Abdomen is soft. Tenderness: There is no abdominal tenderness. There is no guarding or rebound. Musculoskeletal:         General: Tenderness present. No swelling. Cervical back: Normal range of motion and neck supple. Tenderness present. Comments: Tenderness to palpation/hypertonicity along the left trapezius. Decreased range of motion of the left glenohumeral joint secondary to pain. No gross deformity noted. Skin:     General: Skin is warm and dry. Capillary Refill: Capillary refill takes less than 2 seconds. Coloration: Skin is not jaundiced or pale. Findings: No bruising, erythema, lesion or rash. Neurological:      General: No focal deficit present.       Mental Status: He is alert and oriented to person, place, and time. Cranial Nerves: No cranial nerve deficit. Sensory: No sensory deficit. Motor: No weakness. Psychiatric:         Mood and Affect: Mood normal.         Behavior: Behavior normal.         Thought Content: Thought content normal.         Judgment: Judgment normal.       Vital Signs  ED Triage Vitals [10/14/23 1202]   Temperature Pulse Respirations Blood Pressure SpO2   97.9 °F (36.6 °C) 77 18 137/64 96 %      Temp Source Heart Rate Source Patient Position - Orthostatic VS BP Location FiO2 (%)   Temporal Monitor -- -- --      Pain Score       10 - Worst Possible Pain           Vitals:    10/14/23 1202   BP: 137/64   Pulse: 77         Visual Acuity      ED Medications  Medications   lidocaine (LIDODERM) 5 % patch 1 patch (1 patch Topical Medication Applied 10/14/23 1246)   ketorolac (TORADOL) injection 30 mg (30 mg Intramuscular Given 10/14/23 1247)   acetaminophen (TYLENOL) tablet 975 mg (975 mg Oral Given 10/14/23 1247)   diazepam (VALIUM) tablet 5 mg (5 mg Oral Given 10/14/23 1256)       Diagnostic Studies  Results Reviewed       None                   XR shoulder 2+ views LEFT   ED Interpretation by Paul Bobby DO (10/14 1246)   No acute osseous abnormalities noted                 Procedures  Procedures         ED Course  ED Course as of 10/14/23 1311   Sat Oct 14, 2023   1246 XR without acute osseous abnormalities. SBIRT 22yo+      Flowsheet Row Most Recent Value   Initial Alcohol Screen: US AUDIT-C     1. How often do you have a drink containing alcohol? 0 Filed at: 10/14/2023 1204   2. How many drinks containing alcohol do you have on a typical day you are drinking? 0 Filed at: 10/14/2023 1204   3a. Male UNDER 65: How often do you have five or more drinks on one occasion? 0 Filed at: 10/14/2023 1204   Audit-C Score 0 Filed at: 10/14/2023 1204   KATIE: How many times in the past year have you. ..     Used an illegal drug or used a prescription medication for non-medical reasons? Never Filed at: 10/14/2023 1204                      Medical Decision Making  The differential diagnoses include but are not limited to shoulder dislocation, scapula fracture, rotator cuff injury, proximal humerus fracture  Vital signs reviewed. Shoulder XRs without acute findings. Pain improved with ED treatments. Sling provided for comfort. PRN Valium prescribed for muscle spasms. Recommendations discussed with the patient. All questions addressed. Stable for discharge. Problems Addressed:  Acute pain of left shoulder: acute illness or injury  Trapezius muscle spasm: acute illness or injury    Amount and/or Complexity of Data Reviewed  Radiology: ordered and independent interpretation performed. Decision-making details documented in ED Course. Risk  OTC drugs. Prescription drug management. Disposition  Final diagnoses:   Acute pain of left shoulder   Trapezius muscle spasm     Time reflects when diagnosis was documented in both MDM as applicable and the Disposition within this note       Time User Action Codes Description Comment    10/14/2023  1:03 PM Nupur Briseno [M25.512] Acute pain of left shoulder     10/14/2023  1:03 PM Nupur Briseno [H97.082] Trapezius muscle spasm           ED Disposition       ED Disposition   Discharge    Condition   Stable    Date/Time   Sat Oct 14, 2023 1308    [address] [place] [address] discharge to home/self care. Follow-up Information    None         Patient's Medications   Discharge Prescriptions    DIAZEPAM (VALIUM) 5 MG TABLET    Take 1 tablet (5 mg total) by mouth every 6 (six) hours as needed for muscle spasms       Start Date: 10/14/2023End Date: --       Order Dose: 5 mg       Quantity: 6 tablet    Refills: 0       No discharge procedures on file.     PDMP Review       None            ED Provider  Electronically Signed by             Dickie Hamman, DO  10/14/23 7298

## 2023-10-14 NOTE — DISCHARGE INSTRUCTIONS
The Xray that was obtained did not show any significant abnormalities. Ibuprofen 600 mg every 6 hours and Tylenol 1000 mg every 6 hours recommended for pain. Keep the lidocaine patch applied for total of 12 hours then remove. Do not reapply another lidocaine patch for another 12 hours. Salonpas or Aspercreme lidocaine patches are the recommended brands. Wear the sling for comfort. Do not constantly wear sling as prolonged use will increase your risk of developing a frozen shoulder. You are being prescribed a short course of Valium. Please take as directed. Do not drive/drink alcohol/operate heavy machinery while taking this medication as it may cause drowsiness. Follow-up with your primary care provider. Return to the emergency department for any concerning signs or symptoms.

## 2023-12-11 DIAGNOSIS — F32.5 MAJOR DEPRESSIVE DISORDER, SINGLE EPISODE, IN FULL REMISSION (HCC): ICD-10-CM

## 2023-12-11 DIAGNOSIS — I35.0 NONRHEUMATIC AORTIC (VALVE) STENOSIS: ICD-10-CM

## 2023-12-11 DIAGNOSIS — I25.10 ATHEROSCLEROTIC HEART DISEASE OF NATIVE CORONARY ARTERY WITHOUT ANGINA PECTORIS: ICD-10-CM

## 2023-12-11 DIAGNOSIS — E78.5 HYPERLIPIDEMIA, UNSPECIFIED: ICD-10-CM

## 2023-12-11 DIAGNOSIS — F43.10 POST-TRAUMATIC STRESS DISORDER, UNSPECIFIED: ICD-10-CM

## 2023-12-11 DIAGNOSIS — I10 ESSENTIAL (PRIMARY) HYPERTENSION: ICD-10-CM

## 2024-01-05 ENCOUNTER — HOSPITAL ENCOUNTER (OUTPATIENT)
Dept: NON INVASIVE DIAGNOSTICS | Facility: HOSPITAL | Age: 59
Discharge: HOME | End: 2024-01-05
Attending: INTERNAL MEDICINE
Payer: COMMERCIAL

## 2024-01-05 VITALS
HEIGHT: 67 IN | WEIGHT: 160 LBS | BODY MASS INDEX: 25.11 KG/M2 | HEART RATE: 70 BPM | SYSTOLIC BLOOD PRESSURE: 137 MMHG | DIASTOLIC BLOOD PRESSURE: 64 MMHG

## 2024-01-05 DIAGNOSIS — I35.0 NONRHEUMATIC AORTIC (VALVE) STENOSIS: ICD-10-CM

## 2024-01-05 DIAGNOSIS — E78.5 HYPERLIPIDEMIA, UNSPECIFIED: ICD-10-CM

## 2024-01-05 DIAGNOSIS — F32.5 MAJOR DEPRESSIVE DISORDER, SINGLE EPISODE, IN FULL REMISSION (HCC): ICD-10-CM

## 2024-01-05 DIAGNOSIS — I25.10 ATHEROSCLEROTIC HEART DISEASE OF NATIVE CORONARY ARTERY WITHOUT ANGINA PECTORIS: ICD-10-CM

## 2024-01-05 DIAGNOSIS — I10 ESSENTIAL (PRIMARY) HYPERTENSION: ICD-10-CM

## 2024-01-05 DIAGNOSIS — F43.10 POST-TRAUMATIC STRESS DISORDER, UNSPECIFIED: ICD-10-CM

## 2024-01-05 PROCEDURE — 93306 TTE W/DOPPLER COMPLETE: CPT

## 2024-01-05 PROCEDURE — 93306 TTE W/DOPPLER COMPLETE: CPT | Performed by: STUDENT IN AN ORGANIZED HEALTH CARE EDUCATION/TRAINING PROGRAM

## 2024-01-06 LAB
AORTIC ROOT: 3.5 CM
AORTIC VALVE MEAN VELOCITY: 26.6 M/S
APICAL FOUR CHAMBER EJECTION FRACTION: 80 %
AV AREA BY CONTINUOUS VTI: 0.7 CM2
AV AREA PEAK VELOCITY: 0.7 CM2
AV LVOT MEAN GRADIENT: 2 MMHG
AV LVOT PEAK GRADIENT: 3 MMHG
AV MEAN GRADIENT: 29 MMHG
AV PEAK GRADIENT: 46 MMHG
AV VALVE AREA: 0.75 CM2
AV VELOCITY RATIO: 0.25
DOP CALC AO PEAK VEL: 3.4 M/S
DOP CALC AO VTI: 74 CM
DOP CALC LVOT AREA: 2.83 CM2
DOP CALC LVOT CARDIAC INDEX: 2.48 L/MIN/M2
DOP CALC LVOT CARDIAC OUTPUT: 4.56 L/MIN
DOP CALC LVOT DIAMETER: 1.9 CM
DOP CALC LVOT PEAK VEL VTI: 19.62 CM
DOP CALC LVOT PEAK VEL: 0.85 M/S
DOP CALC LVOT STROKE INDEX: 31 ML/M2
DOP CALC LVOT STROKE VOLUME: 55.6 CM3
E WAVE DECELERATION TIME: 186 MS
E/A RATIO: 0.81
FRACTIONAL SHORTENING: 31 (ref 28–44)
INTERVENTRICULAR SEPTUM IN DIASTOLE (PARASTERNAL SHORT AXIS VIEW): 1.1 CM
INTERVENTRICULAR SEPTUM: 1.1 CM (ref 0.6–1.1)
LAAS-AP2: 13.1 CM2
LAAS-AP4: 9.2 CM2
LEFT ATRIUM SIZE: 2.9 CM
LEFT ATRIUM VOLUME (MOD BIPLANE): 22 ML
LEFT ATRIUM VOLUME INDEX (MOD BIPLANE): 12 ML/M2
LEFT INTERNAL DIMENSION IN SYSTOLE: 2.7 CM (ref 2.1–4)
LEFT VENTRICLE DIASTOLIC VOLUME (MOD BIPLANE): 60 ML
LEFT VENTRICLE SYSTOLIC VOLUME (MOD BIPLANE): 14 ML
LEFT VENTRICULAR INTERNAL DIMENSION IN DIASTOLE: 3.9 CM (ref 3.5–6)
LEFT VENTRICULAR POSTERIOR WALL IN END DIASTOLE: 1.1 CM
LEFT VENTRICULAR STROKE VOLUME: 38 ML
LV EF: 76 %
LVSV (TEICH): 38 ML
MV E'TISSUE VEL-LAT: 11 CM/S
MV E'TISSUE VEL-SEP: 7 CM/S
MV PEAK A VEL: 1.08 M/S
MV PEAK E VEL: 87 CM/S
MV STENOSIS PRESSURE HALF TIME: 54 MS
MV VALVE AREA P 1/2 METHOD: 4.07
RIGHT ATRIUM AREA SYSTOLE A4C: 6.6 CM2
RIGHT VENTRICLE ID DIMENSION: 3.3 CM
SL CV LEFT ATRIUM LENGTH A2C: 4.8 CM
SL CV LV EF: 65
SL CV PED ECHO LEFT VENTRICLE DIASTOLIC VOLUME (MOD BIPLANE) 2D: 66 ML
SL CV PED ECHO LEFT VENTRICLE SYSTOLIC VOLUME (MOD BIPLANE) 2D: 28 ML
TRICUSPID ANNULAR PLANE SYSTOLIC EXCURSION: 2.1 CM

## 2024-05-08 ENCOUNTER — HOSPITAL ENCOUNTER (EMERGENCY)
Facility: HOSPITAL | Age: 59
Discharge: HOME | End: 2024-05-08
Attending: EMERGENCY MEDICINE
Payer: COMMERCIAL

## 2024-05-08 ENCOUNTER — APPOINTMENT (EMERGENCY)
Dept: RADIOLOGY | Facility: HOSPITAL | Age: 59
End: 2024-05-08
Payer: COMMERCIAL

## 2024-05-08 VITALS
OXYGEN SATURATION: 95 % | HEART RATE: 69 BPM | DIASTOLIC BLOOD PRESSURE: 62 MMHG | RESPIRATION RATE: 22 BRPM | SYSTOLIC BLOOD PRESSURE: 108 MMHG | TEMPERATURE: 95.6 F

## 2024-05-08 DIAGNOSIS — R07.89 ATYPICAL CHEST PAIN: Primary | ICD-10-CM

## 2024-05-08 LAB
2HR DELTA HS TROPONIN: 1 NG/L
ALBUMIN SERPL BCP-MCNC: 4.7 G/DL (ref 3.5–5)
ALP SERPL-CCNC: 60 U/L (ref 34–104)
ALT SERPL W P-5'-P-CCNC: 33 U/L (ref 7–52)
ANION GAP SERPL CALCULATED.3IONS-SCNC: 10 MMOL/L (ref 4–13)
AST SERPL W P-5'-P-CCNC: 29 U/L (ref 13–39)
ATRIAL RATE: 83 BPM
BASOPHILS # BLD AUTO: 0.13 THOUSANDS/ÂΜL (ref 0–0.1)
BASOPHILS NFR BLD AUTO: 1 % (ref 0–1)
BILIRUB SERPL-MCNC: 0.34 MG/DL (ref 0.2–1)
BNP SERPL-MCNC: 67 PG/ML (ref 0–100)
BUN SERPL-MCNC: 16 MG/DL (ref 5–25)
CALCIUM SERPL-MCNC: 10 MG/DL (ref 8.4–10.2)
CARDIAC TROPONIN I PNL SERPL HS: 5 NG/L
CARDIAC TROPONIN I PNL SERPL HS: 6 NG/L
CHLORIDE SERPL-SCNC: 101 MMOL/L (ref 96–108)
CO2 SERPL-SCNC: 25 MMOL/L (ref 21–32)
CREAT SERPL-MCNC: 0.97 MG/DL (ref 0.6–1.3)
EOSINOPHIL # BLD AUTO: 0.66 THOUSAND/ÂΜL (ref 0–0.61)
EOSINOPHIL NFR BLD AUTO: 6 % (ref 0–6)
ERYTHROCYTE [DISTWIDTH] IN BLOOD BY AUTOMATED COUNT: 14.4 % (ref 11.6–15.1)
GFR SERPL CREATININE-BSD FRML MDRD: 86 ML/MIN/1.73SQ M
GLUCOSE SERPL-MCNC: 123 MG/DL (ref 65–140)
HCT VFR BLD AUTO: 49.7 % (ref 36.5–49.3)
HGB BLD-MCNC: 16 G/DL (ref 12–17)
IMM GRANULOCYTES # BLD AUTO: 0.13 THOUSAND/UL (ref 0–0.2)
IMM GRANULOCYTES NFR BLD AUTO: 1 % (ref 0–2)
LIPASE SERPL-CCNC: 15 U/L (ref 11–82)
LYMPHOCYTES # BLD AUTO: 3.26 THOUSANDS/ÂΜL (ref 0.6–4.47)
LYMPHOCYTES NFR BLD AUTO: 29 % (ref 14–44)
MCH RBC QN AUTO: 26.5 PG (ref 26.8–34.3)
MCHC RBC AUTO-ENTMCNC: 32.2 G/DL (ref 31.4–37.4)
MCV RBC AUTO: 82 FL (ref 82–98)
MONOCYTES # BLD AUTO: 0.76 THOUSAND/ÂΜL (ref 0.17–1.22)
MONOCYTES NFR BLD AUTO: 7 % (ref 4–12)
NEUTROPHILS # BLD AUTO: 6.26 THOUSANDS/ÂΜL (ref 1.85–7.62)
NEUTS SEG NFR BLD AUTO: 56 % (ref 43–75)
NRBC BLD AUTO-RTO: 0 /100 WBCS
P AXIS: 61 DEGREES
PLATELET # BLD AUTO: 350 THOUSANDS/UL (ref 149–390)
PMV BLD AUTO: 10.8 FL (ref 8.9–12.7)
POTASSIUM SERPL-SCNC: 4.6 MMOL/L (ref 3.5–5.3)
PR INTERVAL: 156 MS
PROT SERPL-MCNC: 7.9 G/DL (ref 6.4–8.4)
QRS AXIS: 53 DEGREES
QRSD INTERVAL: 88 MS
QT INTERVAL: 356 MS
QTC INTERVAL: 418 MS
RBC # BLD AUTO: 6.03 MILLION/UL (ref 3.88–5.62)
SODIUM SERPL-SCNC: 136 MMOL/L (ref 135–147)
T WAVE AXIS: 39 DEGREES
VENTRICULAR RATE: 83 BPM
WBC # BLD AUTO: 11.2 THOUSAND/UL (ref 4.31–10.16)

## 2024-05-08 PROCEDURE — 80053 COMPREHEN METABOLIC PANEL: CPT | Performed by: EMERGENCY MEDICINE

## 2024-05-08 PROCEDURE — 71046 X-RAY EXAM CHEST 2 VIEWS: CPT

## 2024-05-08 PROCEDURE — 93005 ELECTROCARDIOGRAM TRACING: CPT

## 2024-05-08 PROCEDURE — 93010 ELECTROCARDIOGRAM REPORT: CPT | Performed by: INTERNAL MEDICINE

## 2024-05-08 PROCEDURE — 96372 THER/PROPH/DIAG INJ SC/IM: CPT

## 2024-05-08 PROCEDURE — 36415 COLL VENOUS BLD VENIPUNCTURE: CPT | Performed by: EMERGENCY MEDICINE

## 2024-05-08 PROCEDURE — 99285 EMERGENCY DEPT VISIT HI MDM: CPT | Performed by: EMERGENCY MEDICINE

## 2024-05-08 PROCEDURE — 83880 ASSAY OF NATRIURETIC PEPTIDE: CPT | Performed by: EMERGENCY MEDICINE

## 2024-05-08 PROCEDURE — 84484 ASSAY OF TROPONIN QUANT: CPT | Performed by: EMERGENCY MEDICINE

## 2024-05-08 PROCEDURE — 99285 EMERGENCY DEPT VISIT HI MDM: CPT

## 2024-05-08 PROCEDURE — 85025 COMPLETE CBC W/AUTO DIFF WBC: CPT | Performed by: EMERGENCY MEDICINE

## 2024-05-08 PROCEDURE — 83690 ASSAY OF LIPASE: CPT | Performed by: EMERGENCY MEDICINE

## 2024-05-08 RX ORDER — FENTANYL CITRATE 50 UG/ML
50 INJECTION, SOLUTION INTRAMUSCULAR; INTRAVENOUS ONCE
Status: COMPLETED | OUTPATIENT
Start: 2024-05-08 | End: 2024-05-08

## 2024-05-08 RX ADMIN — FENTANYL CITRATE 50 MCG: 50 INJECTION INTRAMUSCULAR; INTRAVENOUS at 13:23

## 2024-05-08 NOTE — ED PROVIDER NOTES
History  Chief Complaint   Patient presents with    Chest Pain     Patient c/o chest pain since last night. Patient has emotional support animal with him.      Patient is a 59-year-old male presenting to the emergency department complaining of chest pain that started yesterday, states anytime he exerts himself even just a small amount he gets some chest pain, he took 3 nitro yesterday for the pain and that resolved, he has a known history of CAD with previous stent placement, he did take full dose aspirin today and additional nitroglycerin, reports some associated shortness of breath, no cough or congestion        Prior to Admission Medications   Prescriptions Last Dose Informant Patient Reported? Taking?   Empagliflozin (Jardiance) 10 MG TABS tablet   Yes No   FLUoxetine (PROzac) 40 MG capsule   Yes No   Sig: Take 40 mg by mouth daily   albuterol (PROVENTIL HFA,VENTOLIN HFA) 90 mcg/act inhaler   Yes No   Sig: Inhale 2 puffs   aspirin (ECOTRIN LOW STRENGTH) 81 mg EC tablet   Yes No   Sig: Take 81 mg by mouth daily   diazepam (VALIUM) 5 mg tablet   No No   Sig: Take 1 tablet (5 mg total) by mouth every 6 (six) hours as needed for muscle spasms   fluticasone (FLONASE) 50 mcg/act nasal spray   Yes No   Si spray daily   ibuprofen (MOTRIN) 400 mg tablet   Yes No   Sig: Take 400 mg by mouth every 8 (eight) hours as needed   insulin glargine (LANTUS) 100 units/mL subcutaneous injection   Yes No   Sig: Inject 35 Units under the skin   lisinopril (ZESTRIL) 2.5 mg tablet   Yes No   Sig: Take 2.5 mg by mouth daily   lovastatin (MEVACOR) 20 mg tablet   Yes No   Sig: Take 20 mg by mouth daily   metFORMIN (GLUCOPHAGE) 1000 MG tablet   Yes No   Sig: Take 1,000 mg by mouth daily   traZODone (DESYREL) 50 mg tablet   Yes No   Sig: Take 50 mg by mouth   umeclidinium (Incruse Ellipta) 62.5 mcg/actuation AEPB inhaler   Yes No   Sig: Inhale 1 puff daily      Facility-Administered Medications: None       Past Medical History:    Diagnosis Date    Anxiety     Coronary artery disease     Depression     Diabetes mellitus (HCC)     MI, old     PTSD (post-traumatic stress disorder)        Past Surgical History:   Procedure Laterality Date    CARPAL TUNNEL RELEASE Right     CORONARY ANGIOPLASTY WITH STENT PLACEMENT      ROTATOR CUFF REPAIR Right        History reviewed. No pertinent family history.  I have reviewed and agree with the history as documented.    E-Cigarette/Vaping    E-Cigarette Use Never User      E-Cigarette/Vaping Substances    Nicotine No     THC No     CBD No     Flavoring No      Social History     Tobacco Use    Smoking status: Never     Passive exposure: Never    Smokeless tobacco: Never   Vaping Use    Vaping status: Never Used   Substance Use Topics    Alcohol use: Not Currently    Drug use: Not Currently     Types: Marijuana       Review of Systems   Constitutional: Negative.    HENT: Negative.     Eyes: Negative.    Respiratory: Negative.     Cardiovascular:  Positive for chest pain.   Gastrointestinal: Negative.    Endocrine: Negative.    Genitourinary: Negative.    Musculoskeletal: Negative.    Skin: Negative.    Allergic/Immunologic: Negative.    Neurological: Negative.    Hematological: Negative.    Psychiatric/Behavioral: Negative.         Physical Exam  Physical Exam  Constitutional:       Appearance: He is well-developed.   HENT:      Head: Normocephalic and atraumatic.   Eyes:      Conjunctiva/sclera: Conjunctivae normal.      Pupils: Pupils are equal, round, and reactive to light.   Cardiovascular:      Rate and Rhythm: Normal rate and regular rhythm.      Heart sounds: Normal heart sounds.   Pulmonary:      Effort: Pulmonary effort is normal.      Breath sounds: Normal breath sounds.   Abdominal:      Palpations: Abdomen is soft.   Musculoskeletal:         General: Normal range of motion.      Cervical back: Normal range of motion and neck supple.   Skin:     General: Skin is warm and dry.   Neurological:       Mental Status: He is alert and oriented to person, place, and time.         Vital Signs  ED Triage Vitals   Temperature Pulse Respirations Blood Pressure SpO2   05/08/24 1222 05/08/24 1222 05/08/24 1222 05/08/24 1222 05/08/24 1222   (!) 95.6 °F (35.3 °C) 77 18 145/75 95 %      Temp Source Heart Rate Source Patient Position - Orthostatic VS BP Location FiO2 (%)   05/08/24 1222 05/08/24 1222 05/08/24 1222 05/08/24 1222 --   Temporal Monitor Lying Left arm       Pain Score       05/08/24 1323       8           Vitals:    05/08/24 1400 05/08/24 1430 05/08/24 1500 05/08/24 1541   BP: 130/74 117/68 114/68 108/62   Pulse: 75 76 72 69   Patient Position - Orthostatic VS:    Lying         Visual Acuity      ED Medications  Medications   fentaNYL injection 50 mcg (50 mcg Intramuscular Given 5/8/24 1323)       Diagnostic Studies  Results Reviewed       Procedure Component Value Units Date/Time    HS Troponin I 2hr [[id number]]  (Normal) Collected: 05/08/24 1455    Lab Status: Final result Specimen: Blood from Arm, Left Updated: 05/08/24 1527     hs TnI 2hr 6 ng/L      Delta 2hr hsTnI 1 ng/L     B-Type Natriuretic Peptide(BNP) [[id number]]  (Normal) Collected: 05/08/24 1300    Lab Status: Final result Specimen: Blood from Arm, Right Updated: 05/08/24 1340     BNP 67 pg/mL     HS Troponin 0hr (reflex protocol) [[id number]]  (Normal) Collected: 05/08/24 1300    Lab Status: Final result Specimen: Blood from Arm, Right Updated: 05/08/24 1338     hs TnI 0hr 5 ng/L     Comprehensive metabolic panel [[id number]] Collected: 05/08/24 1300    Lab Status: Final result Specimen: Blood from Arm, Right Updated: 05/08/24 1331     Sodium 136 mmol/L      Potassium 4.6 mmol/L      Chloride 101 mmol/L      CO2 25 mmol/L      ANION GAP 10 mmol/L      BUN 16 mg/dL      Creatinine 0.97 mg/dL      Glucose 123 mg/dL      Calcium 10.0 mg/dL      AST 29 U/L      ALT 33 U/L      Alkaline Phosphatase 60 U/L      Total Protein 7.9 g/dL      Albumin 4.7 g/dL       Total Bilirubin 0.34 mg/dL      eGFR 86 ml/min/1.73sq m     Narrative:      National Kid[employer] Foundation guidelines for Chronic Kidney Disease (CKD):     Stage 1 with normal or high GFR (GFR > 90 mL/min/1.73 square meters)    Stage 2 Mild CKD (GFR = 60-89 mL/min/1.73 square meters)    Stage 3A Moderate CKD (GFR = 45-59 mL/min/1.73 square meters)    Stage 3B Moderate CKD (GFR = 30-44 mL/min/1.73 square meters)    Stage 4 Severe CKD (GFR = 15-29 mL/min/1.73 square meters)    Stage 5 End Stage CKD (GFR <15 mL/min/1.73 square meters)  Note: GFR calculation is accurate only with a steady state creatinine    Lipase [[id number]]  (Normal) Collected: 05/08/24 1300    Lab Status: Final result Specimen: Blood from Arm, Right Updated: 05/08/24 1331     Lipase 15 u/L     CBC and differential [[id number]]  (Abnormal) Collected: 05/08/24 1300    Lab Status: Final result Specimen: Blood from Arm, Right Updated: 05/08/24 1310     WBC 11.20 Thousand/uL      RBC 6.03 Million/uL      Hemoglobin 16.0 g/dL      Hematocrit 49.7 %      MCV 82 fL      MCH 26.5 pg      MCHC 32.2 g/dL      RDW 14.4 %      MPV 10.8 fL      Platelets 350 Thousands/uL      nRBC 0 /100 WBCs      Segmented % 56 %      Immature Grans % 1 %      Lymphocytes % 29 %      Monocytes % 7 %      Eosinophils Relative 6 %      Basophils Relative 1 %      Absolute Neutrophils 6.26 Thousands/µL      Absolute Immature Grans 0.13 Thousand/uL      Absolute Lymphocytes 3.26 Thousands/µL      Absolute Monocytes 0.76 Thousand/µL      Eosinophils Absolute 0.66 Thousand/µL      Basophils Absolute 0.13 Thousands/µL                    XR chest 2 views   ED Interpretation by Araceli Ruiz DO (05/08 1842)   No acute findings                 Procedures  ECG 12 Lead Documentation Only    Date/Time: 5/8/2024 1:37 PM    Performed by: Araceli Ruiz DO  Authorized by: Araceli Ruiz DO    Indications / Diagnosis:  Chest pain  ECG reviewed by me, the ED Provider: yes    Patient  location:  ED  Previous ECG:     Comparison to cardiac monitor: Yes    Interpretation:     Interpretation: normal    Rate:     ECG rate:  83    ECG rate assessment: normal    Rhythm:     Rhythm: sinus rhythm    Ectopy:     Ectopy: none    QRS:     QRS intervals:  Normal  Conduction:     Conduction: normal    ST segments:     ST segments:  Normal  T waves:     T waves: normal    Other findings:     Other findings: LAE             ED Course  ED Course as of 05/08/24 1842   Wed May 08, 2024   1842 HS Troponin I 2hr   1842 Lipase   1842 CBC and differential(!)   1842 B-Type Natriuretic Peptide(BNP)   1842 HS Troponin 0hr (reflex protocol)   1842 Comprehensive metabolic panel   1842 ECG 12 lead   1842 XR chest 2 views             HEART Risk Score      Flowsheet Row Most Recent Value   Heart Score Risk Calculator    History 1 Filed at: 05/08/2024 1338   ECG 0 Filed at: 05/08/2024 1338   Age 1 Filed at: 05/08/2024 1338   Risk Factors 2 Filed at: 05/08/2024 1338   Troponin 0 Filed at: 05/08/2024 1338   HEART Score 4 Filed at: 05/08/2024 1338                                        Medical Decision Making  Exam without evidence of volume overload so doubt heart failure.  EKG without signs of active ischemia.  Given the timing of pain to ER presentation single/delta troponin negative so doubt NSTEMI.  Presentation not consistent with acute PE, pneumothorax (CXR negative), thoracic aortic dissection, pericarditis, tamponade, pneumonia (no signs of infection, CXR negative), myocarditis.  Heart score low so plan to discharge patient home with PMD follow-up. (no recent illness, Trop negative)    Problems Addressed:  Atypical chest pain: acute illness or injury    Amount and/or Complexity of Data Reviewed  Labs: ordered.  Radiology: ordered and independent interpretation performed. Decision-making details documented in ED Course.  ECG/medicine tests: ordered and independent interpretation performed. Decision-making details  documented in ED Course.    Risk  Prescription drug management.             Disposition  Final diagnoses:   Atypical chest pain     Time reflects when diagnosis was documented in both MDM as applicable and the Disposition within this note       Time User Action Codes Description Comment    5/8/2024  3:36 PM Araceli Ruiz [R07.89] Atypical chest pain           ED Disposition       ED Disposition   Discharge    Condition   Stable    Date/Time   Wed May 8, 2024 1536    Comment   Larry Harper discharge to home/self care.                   Follow-up Information       Follow up With Specialties Details Why Contact Info    Efe Portillo, DO Internal Medicine In 2 days  [address] [facility] [address]  [phone]      Efe Portillo, DO Internal Medicine In 1 week  [address] [facility] [address]  [phone]              Discharge Medication List as of 5/8/2024  3:36 PM        CONTINUE these medications which have NOT CHANGED    Details   albuterol (PROVENTIL HFA,VENTOLIN HFA) 90 mcg/act inhaler Inhale 2 puffs, Historical Med      aspirin (ECOTRIN LOW STRENGTH) 81 mg EC tablet Take 81 mg by mouth daily, Historical Med      diazepam (VALIUM) 5 mg tablet Take 1 tablet (5 mg total) by mouth every 6 (six) hours as needed for muscle spasms, Starting Sat 10/14/2023, Normal      Empagliflozin (Jardiance) 10 MG TABS tablet Starting Wed 4/26/2023, Historical Med      FLUoxetine (PROzac) 40 MG capsule Take 40 mg by mouth daily, Historical Med      fluticasone (FLONASE) 50 mcg/act nasal spray 1 spray daily, Historical Med      ibuprofen (MOTRIN) 400 mg tablet Take 400 mg by mouth every 8 (eight) hours as needed, Historical Med      insulin glargine (LANTUS) 100 units/mL subcutaneous injection Inject 35 Units under the skin, Historical Med      lisinopril (ZESTRIL) 2.5 mg tablet Take 2.5 mg by mouth daily, Historical Med      lovastatin (MEVACOR) 20 mg tablet Take 20 mg by mouth daily, Historical Med       metFORMIN (GLUCOPHAGE) 1000 MG tablet Take 1,000 mg by mouth daily, Historical Med      traZODone (DESYREL) 50 mg tablet Take 50 mg by mouth, Historical Med      umeclidinium (Incruse Ellipta) 62.5 mcg/actuation AEPB inhaler Inhale 1 puff daily, Historical Med                 PDMP Review       None            ED Provider  Electronically Signed by             Araceli Ruiz DO  05/08/24 1782

## 2024-05-08 NOTE — ED NOTES
"While attempting to obtain EKG and vital during triage pt's service dog became excited and rowdy and almost tripped this RN and triage RN with fernanda. Pt was escorted to [facility] bed to be evaluated by ED provider due to high ED census. While attempting to obtain blood work pt's dog continued to be rowdy jumping up and down on bed. Pt's dog postured and barked at this RN multiple times. At this time the patient decided to return his dog to his car \"due to dog being rowdy.\" Nancy from infection control and [facility], ED APCM at bedside and speaking with patient. Pt verbalizes that should he be admitted he has family that can watch his dog. Pt made aware if he should need to step out to check on his service dog he can make his primary RN aware.     Naye Russo, JAMEY  05/08/24 1903    "

## 2024-05-13 ENCOUNTER — TELEPHONE (OUTPATIENT)
Dept: CARDIOLOGY CLINIC | Facility: CLINIC | Age: 59
End: 2024-05-13

## 2024-09-24 ENCOUNTER — HOSPITAL ENCOUNTER (EMERGENCY)
Facility: HOSPITAL | Age: 59
Discharge: HOME | End: 2024-09-24
Attending: EMERGENCY MEDICINE
Payer: COMMERCIAL

## 2024-09-24 VITALS
OXYGEN SATURATION: 96 % | RESPIRATION RATE: 18 BRPM | SYSTOLIC BLOOD PRESSURE: 121 MMHG | DIASTOLIC BLOOD PRESSURE: 72 MMHG | HEART RATE: 81 BPM | TEMPERATURE: 97.1 F

## 2024-09-24 DIAGNOSIS — R19.7 DIARRHEA: ICD-10-CM

## 2024-09-24 DIAGNOSIS — R73.9 HYPERGLYCEMIA: Primary | ICD-10-CM

## 2024-09-24 LAB
ALBUMIN SERPL BCG-MCNC: 4.6 G/DL (ref 3.5–5)
ALP SERPL-CCNC: 56 U/L (ref 34–104)
ALT SERPL W P-5'-P-CCNC: 18 U/L (ref 7–52)
ANION GAP SERPL CALCULATED.3IONS-SCNC: 10 MMOL/L (ref 4–13)
AST SERPL W P-5'-P-CCNC: 15 U/L (ref 13–39)
B-OH-BUTYR SERPL-MCNC: 0.12 MMOL/L (ref 0.02–0.27)
BACTERIA UR QL AUTO: NORMAL /HPF
BASE EX.OXY STD BLDV CALC-SCNC: 83 % (ref 60–80)
BASE EXCESS BLDV CALC-SCNC: -2.1 MMOL/L
BASOPHILS # BLD AUTO: 0.1 THOUSANDS/ΜL (ref 0–0.1)
BASOPHILS NFR BLD AUTO: 1 % (ref 0–1)
BILIRUB SERPL-MCNC: 0.35 MG/DL (ref 0.2–1)
BILIRUB UR QL STRIP: NEGATIVE
BUN SERPL-MCNC: 13 MG/DL (ref 5–25)
CALCIUM SERPL-MCNC: 9.5 MG/DL (ref 8.4–10.2)
CHLORIDE SERPL-SCNC: 102 MMOL/L (ref 96–108)
CLARITY UR: CLEAR
CO2 SERPL-SCNC: 22 MMOL/L (ref 21–32)
COLOR UR: YELLOW
CREAT SERPL-MCNC: 0.97 MG/DL (ref 0.6–1.3)
EOSINOPHIL # BLD AUTO: 0.22 THOUSAND/ΜL (ref 0–0.61)
EOSINOPHIL NFR BLD AUTO: 2 % (ref 0–6)
ERYTHROCYTE [DISTWIDTH] IN BLOOD BY AUTOMATED COUNT: 14.8 % (ref 11.6–15.1)
FLUAV AG UPPER RESP QL IA.RAPID: NEGATIVE
FLUBV AG UPPER RESP QL IA.RAPID: NEGATIVE
GFR SERPL CREATININE-BSD FRML MDRD: 86 ML/MIN/1.73SQ M
GLUCOSE SERPL-MCNC: 239 MG/DL (ref 65–140)
GLUCOSE SERPL-MCNC: 261 MG/DL (ref 65–140)
GLUCOSE UR STRIP-MCNC: ABNORMAL MG/DL
HCO3 BLDV-SCNC: 22.6 MMOL/L (ref 24–30)
HCT VFR BLD AUTO: 47.5 % (ref 36.5–49.3)
HGB BLD-MCNC: 15.5 G/DL (ref 12–17)
HGB UR QL STRIP.AUTO: NEGATIVE
IMM GRANULOCYTES # BLD AUTO: 0.06 THOUSAND/UL (ref 0–0.2)
IMM GRANULOCYTES NFR BLD AUTO: 1 % (ref 0–2)
KETONES UR STRIP-MCNC: NEGATIVE MG/DL
LEUKOCYTE ESTERASE UR QL STRIP: ABNORMAL
LYMPHOCYTES # BLD AUTO: 2.28 THOUSANDS/ΜL (ref 0.6–4.47)
LYMPHOCYTES NFR BLD AUTO: 21 % (ref 14–44)
MAGNESIUM SERPL-MCNC: 1.7 MG/DL (ref 1.9–2.7)
MCH RBC QN AUTO: 26.3 PG (ref 26.8–34.3)
MCHC RBC AUTO-ENTMCNC: 32.6 G/DL (ref 31.4–37.4)
MCV RBC AUTO: 81 FL (ref 82–98)
MONOCYTES # BLD AUTO: 0.67 THOUSAND/ΜL (ref 0.17–1.22)
MONOCYTES NFR BLD AUTO: 6 % (ref 4–12)
NEUTROPHILS # BLD AUTO: 7.72 THOUSANDS/ΜL (ref 1.85–7.62)
NEUTS SEG NFR BLD AUTO: 69 % (ref 43–75)
NITRITE UR QL STRIP: NEGATIVE
NON-SQ EPI CELLS URNS QL MICRO: NORMAL /HPF
NRBC BLD AUTO-RTO: 0 /100 WBCS
O2 CT BLDV-SCNC: 19.4 ML/DL
PCO2 BLDV: 39 MM HG (ref 42–50)
PH BLDV: 7.38 [PH] (ref 7.3–7.4)
PH UR STRIP.AUTO: 6 [PH]
PLATELET # BLD AUTO: 327 THOUSANDS/UL (ref 149–390)
PMV BLD AUTO: 10.9 FL (ref 8.9–12.7)
PO2 BLDV: 50.4 MM HG (ref 35–45)
POTASSIUM SERPL-SCNC: 4 MMOL/L (ref 3.5–5.3)
PROT SERPL-MCNC: 7.4 G/DL (ref 6.4–8.4)
PROT UR STRIP-MCNC: NEGATIVE MG/DL
RBC # BLD AUTO: 5.9 MILLION/UL (ref 3.88–5.62)
RBC #/AREA URNS AUTO: NORMAL /HPF
SARS-COV+SARS-COV-2 AG RESP QL IA.RAPID: NEGATIVE
SODIUM SERPL-SCNC: 134 MMOL/L (ref 135–147)
SP GR UR STRIP.AUTO: 1.01 (ref 1–1.03)
UROBILINOGEN UR QL STRIP.AUTO: 0.2 E.U./DL
WBC # BLD AUTO: 11.05 THOUSAND/UL (ref 4.31–10.16)
WBC #/AREA URNS AUTO: NORMAL /HPF

## 2024-09-24 PROCEDURE — 36415 COLL VENOUS BLD VENIPUNCTURE: CPT | Performed by: EMERGENCY MEDICINE

## 2024-09-24 PROCEDURE — 96375 TX/PRO/DX INJ NEW DRUG ADDON: CPT

## 2024-09-24 PROCEDURE — 81001 URINALYSIS AUTO W/SCOPE: CPT | Performed by: EMERGENCY MEDICINE

## 2024-09-24 PROCEDURE — 99284 EMERGENCY DEPT VISIT MOD MDM: CPT

## 2024-09-24 PROCEDURE — 80053 COMPREHEN METABOLIC PANEL: CPT | Performed by: EMERGENCY MEDICINE

## 2024-09-24 PROCEDURE — 83735 ASSAY OF MAGNESIUM: CPT | Performed by: EMERGENCY MEDICINE

## 2024-09-24 PROCEDURE — 87811 SARS-COV-2 COVID19 W/OPTIC: CPT | Performed by: EMERGENCY MEDICINE

## 2024-09-24 PROCEDURE — 82948 REAGENT STRIP/BLOOD GLUCOSE: CPT

## 2024-09-24 PROCEDURE — 85025 COMPLETE CBC W/AUTO DIFF WBC: CPT | Performed by: EMERGENCY MEDICINE

## 2024-09-24 PROCEDURE — 99284 EMERGENCY DEPT VISIT MOD MDM: CPT | Performed by: EMERGENCY MEDICINE

## 2024-09-24 PROCEDURE — 87804 INFLUENZA ASSAY W/OPTIC: CPT | Performed by: EMERGENCY MEDICINE

## 2024-09-24 PROCEDURE — 96374 THER/PROPH/DIAG INJ IV PUSH: CPT

## 2024-09-24 PROCEDURE — 96361 HYDRATE IV INFUSION ADD-ON: CPT

## 2024-09-24 PROCEDURE — 82805 BLOOD GASES W/O2 SATURATION: CPT | Performed by: EMERGENCY MEDICINE

## 2024-09-24 PROCEDURE — 82010 KETONE BODYS QUAN: CPT | Performed by: EMERGENCY MEDICINE

## 2024-09-24 RX ORDER — ONDANSETRON 2 MG/ML
4 INJECTION INTRAMUSCULAR; INTRAVENOUS ONCE
Status: COMPLETED | OUTPATIENT
Start: 2024-09-24 | End: 2024-09-24

## 2024-09-24 RX ORDER — FAMOTIDINE 10 MG/ML
20 INJECTION, SOLUTION INTRAVENOUS ONCE
Status: COMPLETED | OUTPATIENT
Start: 2024-09-24 | End: 2024-09-24

## 2024-09-24 RX ADMIN — SODIUM CHLORIDE 1000 ML: 0.9 INJECTION, SOLUTION INTRAVENOUS at 14:17

## 2024-09-24 RX ADMIN — ONDANSETRON 4 MG: 2 INJECTION INTRAMUSCULAR; INTRAVENOUS at 14:21

## 2024-09-24 RX ADMIN — FAMOTIDINE 20 MG: 10 INJECTION, SOLUTION INTRAVENOUS at 14:21

## 2024-09-24 NOTE — ED PROVIDER NOTES
1. Hyperglycemia    2. Diarrhea      ED Disposition       ED Disposition   Discharge    Condition   Stable    Date/Time   Tue Sep 24, 2024  3:00 PM    Comment   Larry Harper discharge to home/self care.                   Assessment & Plan       Medical Decision Making  Patient presents for acute hyperglycemia.  Considered DKA versus HHS, sepsis is possible etiologies of the patient's current presentation.  However given the current history and physical, including current glucose level and other lab values, presentation is most consistent with acute asymptomatic hyperglycemia.  Plan to treat supportively.  No indication for further work-up at this time.  Discharge home with diabetes education and recommendation for close follow-up with PCP.    Amount and/or Complexity of Data Reviewed  Labs: ordered.    Risk  Prescription drug management.                     Medications   sodium chloride 0.9 % bolus 1,000 mL (0 mL Intravenous Stopped 9/24/24 1504)   ondansetron (ZOFRAN) injection 4 mg (4 mg Intravenous Given 9/24/24 1421)   Famotidine (PF) (PEPCID) injection 20 mg (20 mg Intravenous Given 9/24/24 1421)       History of Present Illness       Patient is a 59-year-old male presenting to the emergency department complaining of hyperglycemia with glucometer reading high this morning, he drank some water prior to coming to the ED but did not take any other treatment, he reports having some diarrhea and nausea over the past few days as well, no fevers, no abdominal pain, no vomiting, no sick contacts, no questionable food intake        Review of Systems   Constitutional: Negative.    HENT: Negative.     Eyes: Negative.    Respiratory: Negative.     Cardiovascular: Negative.    Gastrointestinal:  Positive for diarrhea and nausea.   Endocrine: Negative.    Genitourinary: Negative.    Musculoskeletal: Negative.    Skin: Negative.    Allergic/Immunologic: Negative.    Neurological: Negative.    Hematological: Negative.     Psychiatric/Behavioral: Negative.             Objective     ED Triage Vitals [09/24/24 1355]   Temperature Pulse Blood Pressure Respirations SpO2 Patient Position - Orthostatic VS   (!) 97.1 °F (36.2 °C) 81 121/72 18 96 % Sitting      Temp Source Heart Rate Source BP Location FiO2 (%) Pain Score    Temporal Monitor Left arm -- --        Physical Exam  Constitutional:       Appearance: Normal appearance. He is well-developed.   HENT:      Head: Normocephalic and atraumatic.      Nose: Nose normal.      Mouth/Throat:      Mouth: Mucous membranes are moist.   Eyes:      Conjunctiva/sclera: Conjunctivae normal.      Pupils: Pupils are equal, round, and reactive to light.   Cardiovascular:      Rate and Rhythm: Normal rate.   Pulmonary:      Effort: Pulmonary effort is normal.   Abdominal:      Palpations: Abdomen is soft.      Tenderness: There is no abdominal tenderness.   Musculoskeletal:         General: Normal range of motion.      Cervical back: Normal range of motion and neck supple.   Skin:     General: Skin is warm and dry.   Neurological:      Mental Status: He is alert and oriented to person, place, and time.         Labs Reviewed   CBC AND DIFFERENTIAL - Abnormal       Result Value    WBC 11.05 (*)     RBC 5.90 (*)     Hemoglobin 15.5      Hematocrit 47.5      MCV 81 (*)     MCH 26.3 (*)     MCHC 32.6      RDW 14.8      MPV 10.9      Platelets 327      nRBC 0      Segmented % 69      Immature Grans % 1      Lymphocytes % 21      Monocytes % 6      Eosinophils Relative 2      Basophils Relative 1      Absolute Neutrophils 7.72 (*)     Absolute Immature Grans 0.06      Absolute Lymphocytes 2.28      Absolute Monocytes 0.67      Eosinophils Absolute 0.22      Basophils Absolute 0.10     COMPREHENSIVE METABOLIC PANEL - Abnormal    Sodium 134 (*)     Potassium 4.0      Chloride 102      CO2 22      ANION GAP 10      BUN 13      Creatinine 0.97      Glucose 239 (*)     Calcium 9.5      AST 15      ALT 18       Alkaline Phosphatase 56      Total Protein 7.4      Albumin 4.6      Total Bilirubin 0.35      eGFR 86      Narrative:     National Kidney Disease Foundation guidelines for Chronic Kidney Disease (CKD):     Stage 1 with normal or high GFR (GFR > 90 mL/min/1.73 square meters)    Stage 2 Mild CKD (GFR = 60-89 mL/min/1.73 square meters)    Stage 3A Moderate CKD (GFR = 45-59 mL/min/1.73 square meters)    Stage 3B Moderate CKD (GFR = 30-44 mL/min/1.73 square meters)    Stage 4 Severe CKD (GFR = 15-29 mL/min/1.73 square meters)    Stage 5 End Stage CKD (GFR <15 mL/min/1.73 square meters)  Note: GFR calculation is accurate only with a steady state creatinine   MAGNESIUM - Abnormal    Magnesium 1.7 (*)    UA W REFLEX TO MICROSCOPIC WITH REFLEX TO CULTURE - Abnormal    Color, UA Yellow      Clarity, UA Clear      Specific Gravity, UA 1.015      pH, UA 6.0      Leukocytes, UA   (*)     Value: Elevated glucose may cause decreased leukocyte values. See urine microscopic for UWBC result    Nitrite, UA Negative      Protein, UA Negative      Glucose, UA >=1000 (1%) (*)     Ketones, UA Negative      Urobilinogen, UA 0.2      Bilirubin, UA Negative      Occult Blood, UA Negative     BLOOD GAS, VENOUS - Abnormal    pH, Apollo 7.381      pCO2, Apollo 39.0 (*)     pO2, Apollo 50.4 (*)     HCO3, Apollo 22.6 (*)     Base Excess, Apollo -2.1      O2 Content, Apollo 19.4      O2 HGB, VENOUS 83.0 (*)    POCT GLUCOSE - Abnormal    POC Glucose 261 (*)    COVID-19/INFLUENZA A/B RAPID ANTIGEN (30 MIN.TAT) - Normal    SARS COV Rapid Antigen Negative      Influenza A Rapid Antigen Negative      Influenza B Rapid Antigen Negative      Narrative:     This test has been performed using the Apmetrix Sena 2 FLU+SARS Antigen test under the Emergency Use Authorization (EUA). This test has been validated by the  and verified by the performing laboratory. The Sena uses lateral flow immunofluorescent sandwich assay to detect SARS-COV, Influenza A and Influenza  B Antigen.     The Quidel Sena 2 SARS Antigen test does not differentiate between SARS-CoV and SARS-CoV-2.     Negative results are presumptive and may be confirmed with a molecular assay, if necessary, for patient management. Negative results do not rule out SARS-CoV-2 or influenza infection and should not be used as the sole basis for treatment or patient management decisions. A negative test result may occur if the level of antigen in a sample is below the limit of detection of this test.     Positive results are indicative of the presence of viral antigens, but do not rule out bacterial infection or co-infection with other viruses.     All test results should be used as an adjunct to clinical observations and other information available to the provider.    FOR PEDIATRIC PATIENTS - copy/paste COVID Guidelines URL to browser: [email or web address]   BETA HYDROXYBUTYRATE - Normal    Beta- Hydroxybutyrate 0.12     URINE MICROSCOPIC - Normal    RBC, UA None Seen      WBC, UA 0-1      Epithelial Cells Occasional      Bacteria, UA Occasional       No orders to display       Procedures    ED Medication and Procedure Management   Prior to Admission Medications   Prescriptions Last Dose Informant Patient Reported? Taking?   Empagliflozin (Jardiance) 10 MG TABS tablet   Yes No   FLUoxetine (PROzac) 40 MG capsule   Yes No   Sig: Take 40 mg by mouth daily   NovoLOG FlexPen 100 units/mL injection pen   Yes No   Sig: INJECT 12 UNITS BEFORE EACH MEAL SUBCUTANEOUSLY   albuterol (PROVENTIL HFA,VENTOLIN HFA) 90 mcg/act inhaler   Yes No   Sig: Inhale 2 puffs   aspirin (ECOTRIN LOW STRENGTH) 81 mg EC tablet   Yes No   Sig: Take 81 mg by mouth daily   atorvastatin (LIPITOR) 80 mg tablet   Yes No   Sig: Take 80 mg by mouth every morning   diazepam (VALIUM) 5 mg tablet   No No   Sig: Take 1 tablet (5 mg total) by mouth every 6 (six) hours as needed for muscle spasms   fluticasone  (FLONASE) 50 mcg/act nasal spray   Yes No   Si spray daily   ibuprofen (MOTRIN) 400 mg tablet   Yes No   Sig: Take 400 mg by mouth every 8 (eight) hours as needed   insulin glargine (LANTUS) 100 units/mL subcutaneous injection   Yes No   Sig: Inject 35 Units under the skin   lisinopril (ZESTRIL) 2.5 mg tablet   Yes No   Sig: Take 2.5 mg by mouth daily   lovastatin (MEVACOR) 20 mg tablet   Yes No   Sig: Take 20 mg by mouth daily   metFORMIN (GLUCOPHAGE) 1000 MG tablet   Yes No   Sig: Take 1,000 mg by mouth daily   metoprolol tartrate (LOPRESSOR) 25 mg tablet   Yes No   Sig: Take 12.5 mg by mouth 2 (two) times a day   traZODone (DESYREL) 50 mg tablet   Yes No   Sig: Take 50 mg by mouth   umeclidinium (Incruse Ellipta) 62.5 mcg/actuation AEPB inhaler   Yes No   Sig: Inhale 1 puff daily      Facility-Administered Medications: None     Discharge Medication List as of 2024  3:00 PM        CONTINUE these medications which have NOT CHANGED    Details   albuterol (PROVENTIL HFA,VENTOLIN HFA) 90 mcg/act inhaler Inhale 2 puffs, Historical Med      aspirin (ECOTRIN LOW STRENGTH) 81 mg EC tablet Take 81 mg by mouth daily, Historical Med      atorvastatin (LIPITOR) 80 mg tablet Take 80 mg by mouth every morning, Historical Med      diazepam (VALIUM) 5 mg tablet Take 1 tablet (5 mg total) by mouth every 6 (six) hours as needed for muscle spasms, Starting Sat 10/14/2023, Normal      Empagliflozin (Jardiance) 10 MG TABS tablet Starting Wed 2023, Historical Med      FLUoxetine (PROzac) 40 MG capsule Take 40 mg by mouth daily, Historical Med      fluticasone (FLONASE) 50 mcg/act nasal spray 1 spray daily, Historical Med      ibuprofen (MOTRIN) 400 mg tablet Take 400 mg by mouth every 8 (eight) hours as needed, Historical Med      insulin glargine (LANTUS) 100 units/mL subcutaneous injection Inject 35 Units under the skin, Historical Med      lisinopril (ZESTRIL) 2.5 mg tablet Take 2.5 mg by mouth daily, Historical Med       lovastatin (MEVACOR) 20 mg tablet Take 20 mg by mouth daily, Historical Med      metFORMIN (GLUCOPHAGE) 1000 MG tablet Take 1,000 mg by mouth daily, Historical Med      metoprolol tartrate (LOPRESSOR) 25 mg tablet Take 12.5 mg by mouth 2 (two) times a day, Starting Mon 1/29/2024, Historical Med      NovoLOG FlexPen 100 units/mL injection pen INJECT 12 UNITS BEFORE EACH MEAL SUBCUTANEOUSLY, Historical Med      traZODone (DESYREL) 50 mg tablet Take 50 mg by mouth, Historical Med      umeclidinium (Incruse Ellipta) 62.5 mcg/actuation AEPB inhaler Inhale 1 puff daily, Historical Med           No discharge procedures on file.     Araceli Ruiz, DO  09/24/24 2386

## 2024-09-30 ENCOUNTER — ANESTHESIA EVENT (OUTPATIENT)
Dept: GASTROENTEROLOGY | Facility: HOSPITAL | Age: 59
End: 2024-09-30
Payer: COMMERCIAL

## 2024-09-30 ENCOUNTER — ANESTHESIA (OUTPATIENT)
Dept: GASTROENTEROLOGY | Facility: HOSPITAL | Age: 59
End: 2024-09-30
Payer: COMMERCIAL

## 2024-09-30 ENCOUNTER — HOSPITAL ENCOUNTER (OUTPATIENT)
Dept: GASTROENTEROLOGY | Facility: HOSPITAL | Age: 59
Setting detail: OUTPATIENT SURGERY
Discharge: HOME | End: 2024-09-30
Attending: HOSPITALIST
Payer: COMMERCIAL

## 2024-09-30 VITALS
DIASTOLIC BLOOD PRESSURE: 58 MMHG | WEIGHT: 160 LBS | OXYGEN SATURATION: 96 % | BODY MASS INDEX: 25.11 KG/M2 | SYSTOLIC BLOOD PRESSURE: 115 MMHG | HEART RATE: 66 BPM | TEMPERATURE: 97.7 F | RESPIRATION RATE: 18 BRPM | HEIGHT: 67 IN

## 2024-09-30 DIAGNOSIS — R19.5 POSITIVE COLORECTAL CANCER SCREENING USING COLOGUARD TEST: ICD-10-CM

## 2024-09-30 LAB — GLUCOSE SERPL-MCNC: 125 MG/DL (ref 65–140)

## 2024-09-30 PROCEDURE — 82948 REAGENT STRIP/BLOOD GLUCOSE: CPT

## 2024-09-30 PROCEDURE — 88305 TISSUE EXAM BY PATHOLOGIST: CPT | Performed by: PATHOLOGY

## 2024-09-30 RX ORDER — PROPOFOL 10 MG/ML
INJECTION, EMULSION INTRAVENOUS AS NEEDED
Status: DISCONTINUED | OUTPATIENT
Start: 2024-09-30 | End: 2024-09-30

## 2024-09-30 RX ORDER — PHENYLEPHRINE HCL IN 0.9% NACL 1 MG/10 ML
SYRINGE (ML) INTRAVENOUS AS NEEDED
Status: DISCONTINUED | OUTPATIENT
Start: 2024-09-30 | End: 2024-09-30

## 2024-09-30 RX ORDER — SODIUM CHLORIDE, SODIUM LACTATE, POTASSIUM CHLORIDE, CALCIUM CHLORIDE 600; 310; 30; 20 MG/100ML; MG/100ML; MG/100ML; MG/100ML
INJECTION, SOLUTION INTRAVENOUS CONTINUOUS PRN
Status: DISCONTINUED | OUTPATIENT
Start: 2024-09-30 | End: 2024-09-30

## 2024-09-30 RX ORDER — LIDOCAINE HYDROCHLORIDE 10 MG/ML
INJECTION, SOLUTION EPIDURAL; INFILTRATION; INTRACAUDAL; PERINEURAL AS NEEDED
Status: DISCONTINUED | OUTPATIENT
Start: 2024-09-30 | End: 2024-09-30

## 2024-09-30 RX ADMIN — SODIUM CHLORIDE, SODIUM LACTATE, POTASSIUM CHLORIDE, AND CALCIUM CHLORIDE: .6; .31; .03; .02 INJECTION, SOLUTION INTRAVENOUS at 14:05

## 2024-09-30 RX ADMIN — Medication 100 MCG: at 14:07

## 2024-09-30 RX ADMIN — PROPOFOL 50 MG: 10 INJECTION, EMULSION INTRAVENOUS at 14:07

## 2024-09-30 RX ADMIN — Medication 40 MG: at 14:09

## 2024-09-30 RX ADMIN — PROPOFOL 50 MG: 10 INJECTION, EMULSION INTRAVENOUS at 14:05

## 2024-09-30 RX ADMIN — LIDOCAINE HYDROCHLORIDE 50 MG: 10 INJECTION, SOLUTION EPIDURAL; INFILTRATION; INTRACAUDAL; PERINEURAL at 14:05

## 2024-09-30 RX ADMIN — PROPOFOL 100 MCG/KG/MIN: 10 INJECTION, EMULSION INTRAVENOUS at 14:08

## 2024-09-30 RX ADMIN — Medication 100 MCG: at 14:21

## 2024-09-30 NOTE — H&P
Procedure(s):  Colonoscopy with indication(s) of positive colo guard         Vitals:    09/30/24 1250   BP: 120/58   Pulse: 68   Resp: 18   Temp: (!) 97.3 °F (36.3 °C)   SpO2: 96%       Physical Exam:  Physical Exam  HENT:      Nose: Nose normal.   Eyes:      Conjunctiva/sclera: Conjunctivae normal.   Cardiovascular:      Rate and Rhythm: Normal rate.   Pulmonary:      Effort: Pulmonary effort is normal.   Abdominal:      Palpations: Abdomen is soft.   Neurological:      General: No focal deficit present.      Mental Status: He is alert.   Psychiatric:         Mood and Affect: Mood normal.              Endoscopy Pre-Procedure Assessment:  Prior to the procedure, the patient is identified.  The patient's history, medications, and allergies have been reviewed.  The patient is competent.     Consent:    We have discussed the procedure in detail. We reviewed risks, benefits and alternative as well as potentional complications including and not limited to medication side effect , infection, bleeding, perforation and the potential need for surgery, ICU admission, CPR, as well as the need for blood product transfusion. Patient verbalized understanding and agreement. All patient questions were answered.     After reviewed the risks and benefits, the patient is deemed in satisfactory condition to undergo the procedure.  The anesthesia plan is to use monitored anesthesia care (MAC).      09/30/24

## 2024-09-30 NOTE — ANESTHESIA POSTPROCEDURE EVALUATION
Post-Op Assessment Note    CV Status:  Stable  Pain Score: 0    Pain management: adequate       Mental Status:  Alert and awake   Hydration Status:  Stable   PONV Controlled:  Controlled   Airway Patency:  Patent     Post Op Vitals Reviewed: Yes    No anethesia notable event occurred.    Staff: CRNA               /60 (09/30/24 1429)    Temp 97.7 °F (36.5 °C) (09/30/24 1429)    Pulse 75 (09/30/24 1429)   Resp   24   SpO2 95 % (09/30/24 1429)

## 2024-09-30 NOTE — ANESTHESIA PREPROCEDURE EVALUATION
"Procedure:  COLONOSCOPY    Relevant Problems   CARDIO   (+) Coronary artery disease   (+) MI, old      NEURO/PSYCH   (+) Anxiety   (+) Depression   (+) PTSD (post-traumatic stress disorder)      Endocrine   (+) Diabetes mellitus (HCC)        Physical Exam    Airway    Mallampati score: I  TM Distance: >3 FB  Neck ROM: full     Dental   No notable dental hx     Cardiovascular  Murmur    Pulmonary      Other Findings      Anesthesia Plan  ASA Score- 3     Anesthesia Type- IV sedation with anesthesia with ASA Monitors.         Additional Monitors:     Airway Plan:            Plan Factors-Exercise tolerance (METS): >4 METS.    Chart reviewed. EKG reviewed. Imaging results reviewed. Existing labs reviewed. Patient summary reviewed.    Patient is not a current smoker.  Patient did not smoke on day of surgery.            Induction- intravenous.    Postoperative Plan-     Perioperative Resuscitation Plan - Level 1 - Full Code.       Informed Consent- Anesthetic plan and risks discussed with patient.  I personally reviewed this patient with the CRNA. Discussed and agreed on the Anesthesia Plan with the CRNA..      VITALS  /58   Pulse 68   Temp (!) 97.3 °F (36.3 °C) (Temporal)   Resp 18   Ht 5' 7\" (1.702 m)   Wt 72.6 kg (160 lb)   SpO2 96%   BMI 25.06 kg/m²  BP Readings from Last 3 Encounters:   09/30/24 120/58   09/24/24 121/72   05/08/24 108/62        LABS  Results from Last 12 Months   Lab Units 09/24/24  1413 05/08/24  1300   WBC Thousand/uL 11.05* 11.20*   HEMOGLOBIN g/dL 15.5 16.0   HEMATOCRIT % 47.5 49.7*   PLATELETS Thousands/uL 327 350     Results from Last 12 Months   Lab Units 09/24/24  1413 05/08/24  1300 04/08/24  0854 12/28/23  1428   SODIUM mmol/L 134* 136 140  --    POTASSIUM mmol/L 4.0 4.6 4.5  --    CHLORIDE mmol/L 102 101 102  --    CO2 mmol/L 22 25 24  --    ANION GAP mmol/L 10 10 14  --    BUN mg/dL 13 16 13  --    CREATININE mg/dL 0.97 0.97 0.9  --    CALCIUM mg/dL 9.5 10.0 9.9  --  " "  GLUCOSE RANDOM mg/dL 239* 123 141*  --    MAGNESIUM mg/dL 1.7*  --   --   --    HEMOGLOBIN A1C %  --   --  7.8* 8.6*     No results for input(s): \"APTT\", \"INR\", \"PTT\" in the last 8784 hours.    ECG      ECHOCARDIOGRAPHY OR OTHER TESTING/IMAGING  Encounter Date: 05/08/24   ECG 12 lead   Result Value    Ventricular Rate 83    Atrial Rate 83    MN Interval 156    QRSD Interval 88    QT Interval 356    QTC Interval 418    P Axis 61    QRS Axis 53    T Wave Axis 39    Narrative    Normal sinus rhythm  Possible Left atrial enlargement  Borderline ECG  When compared with ECG of 02-OCT-2023 12:11,  No significant change was found  Confirmed by Prashanth Carty ([id number]) on 5/8/2024 1:30:18 PM     No results found for this or any previous visit.   History    Aortic stenosis,CAD, stent     Interpretation Summary         Left Ventricle: Left ventricular cavity size is normal. Wall thickness is mildly increased. There is concentric remodeling. The left ventricular ejection fraction is 65%. Systolic function is normal. Wall motion is normal. Diastolic function is normal.    Aortic Valve: The aortic valve is trileaflet. The leaflets are severely calcified. There is severely reduced mobility. There is mild regurgitation. There is severe stenosis. The aortic valve peak velocity is 3.4 m/s. The aortic valve mean gradient is 29.0 mmHg. The dimensionless velocity index is 0.25. The aortic valve area is 0.75 cm2.    Mitral Valve: There is mild annular calcification.     ------- ANESTHESIA RISK-BENEFIT DISCUSSION -------  BENEFITS OF A SPECIALIZED ANESTHESIA TEAM INCLUDE (NBK [id number], PMID 16941043):  (1) Reduce mortality and morbidity for major surgeries/procedures. (2) The team provides analgesia/sedation/amnesia/akinesia as safely as possible. (3) The team strives to reduce discomfort as safely as possible.    RISKS, AND PLANS TO MITIGATE RISKS, INCLUDE:    - Neurologic system: IntraOp awareness (Risk is ~1:1,000 - 1:14,000; PMID " [id number]), Stroke (Risk ~<0.1-2% for most cases; PMID 83698930), nerve injury, vision loss, and POCD.     - Airway and Pulmonary system: Dental or mouth injury, throat pain, critical hypoxia, pneumothorax, prolonged intubation, post-op respiratory compromise.  Airway/Intubation risks and prior data: No prior advanced airway notes in [facility] EMR  Major ARISCAT risk factors for pulmonary complications include: none, yielding a score of 0-1= Low risk, 1.6%.  - Cardiovascular system: Hypotension, arrhythmias, cardiac injury or arrest, blood clots, bleeding, infection, vascular injuries.  Sameer's RCRI score items: ICM, yielding an RCRI Score of 1= 0.6% risk of MACE. Actual risk is higher due to severe Aortic stenosis.  Are nathalie-op or intra-op beta blockers indicated? (PMID 63059730): no  - FEN/GI system: Aspiration risk (~0.5% per PMC [id number]) and PONV (10-80% per Apfel score) especially if the patient has not fasted.  ASA NPO guideline compliance?: Yes  - Medication risk assessment: Allergic reactions, excessive bleeding with anticoagulant use, overdoses, drug-drug interactions, injury to a fetus or  in pregnant or breastfeeding patients, nathalie-procedural sedation including while driving/operating machinery.  Recent relevant medications: See MAR or Med Review  Personal or family history of anesthesia complications: no  Pregnancy Status: N/A  - Estimate mortality risks associated with anesthesia based on ASA-PS (PMID [id number]): ASA-PS III: 1:3,500

## 2024-10-07 PROCEDURE — 88305 TISSUE EXAM BY PATHOLOGIST: CPT | Performed by: PATHOLOGY

## 2025-01-23 ENCOUNTER — HOSPITAL ENCOUNTER (EMERGENCY)
Facility: HOSPITAL | Age: 60
End: 2025-01-24
Attending: EMERGENCY MEDICINE | Admitting: EMERGENCY MEDICINE
Payer: MEDICARE

## 2025-01-23 ENCOUNTER — APPOINTMENT (EMERGENCY)
Dept: RADIOLOGY | Facility: HOSPITAL | Age: 60
End: 2025-01-23
Payer: MEDICARE

## 2025-01-23 DIAGNOSIS — R07.9 CHEST PAIN: ICD-10-CM

## 2025-01-23 DIAGNOSIS — R79.89 ELEVATED TROPONIN: Primary | ICD-10-CM

## 2025-01-23 LAB
2HR DELTA HS TROPONIN: 123 NG/L
4HR DELTA HS TROPONIN: 139 NG/L
ALBUMIN SERPL BCG-MCNC: 4.1 G/DL (ref 3.5–5)
ALP SERPL-CCNC: 51 U/L (ref 34–104)
ALT SERPL W P-5'-P-CCNC: 14 U/L (ref 7–52)
ANION GAP SERPL CALCULATED.3IONS-SCNC: 8 MMOL/L (ref 4–13)
APTT PPP: 27 SECONDS (ref 23–34)
AST SERPL W P-5'-P-CCNC: 15 U/L (ref 13–39)
BASOPHILS # BLD AUTO: 0.07 THOUSANDS/ΜL (ref 0–0.1)
BASOPHILS NFR BLD AUTO: 1 % (ref 0–1)
BILIRUB SERPL-MCNC: 0.36 MG/DL (ref 0.2–1)
BNP SERPL-MCNC: 129 PG/ML (ref 0–100)
BUN SERPL-MCNC: 16 MG/DL (ref 5–25)
CALCIUM SERPL-MCNC: 9.2 MG/DL (ref 8.4–10.2)
CARDIAC TROPONIN I PNL SERPL HS: 149 NG/L (ref ?–50)
CARDIAC TROPONIN I PNL SERPL HS: 272 NG/L (ref ?–50)
CARDIAC TROPONIN I PNL SERPL HS: 288 NG/L (ref ?–50)
CHLORIDE SERPL-SCNC: 107 MMOL/L (ref 96–108)
CO2 SERPL-SCNC: 25 MMOL/L (ref 21–32)
CREAT SERPL-MCNC: 0.78 MG/DL (ref 0.6–1.3)
D DIMER PPP FEU-MCNC: <0.27 UG/ML FEU
EOSINOPHIL # BLD AUTO: 0.31 THOUSAND/ΜL (ref 0–0.61)
EOSINOPHIL NFR BLD AUTO: 3 % (ref 0–6)
ERYTHROCYTE [DISTWIDTH] IN BLOOD BY AUTOMATED COUNT: 14.6 % (ref 11.6–15.1)
GFR SERPL CREATININE-BSD FRML MDRD: 99 ML/MIN/1.73SQ M
GLUCOSE SERPL-MCNC: 156 MG/DL (ref 65–140)
HCT VFR BLD AUTO: 42.5 % (ref 36.5–49.3)
HGB BLD-MCNC: 14.3 G/DL (ref 12–17)
IMM GRANULOCYTES # BLD AUTO: 0.02 THOUSAND/UL (ref 0–0.2)
IMM GRANULOCYTES NFR BLD AUTO: 0 % (ref 0–2)
INR PPP: 0.98 (ref 0.85–1.19)
LYMPHOCYTES # BLD AUTO: 2.18 THOUSANDS/ΜL (ref 0.6–4.47)
LYMPHOCYTES NFR BLD AUTO: 23 % (ref 14–44)
MCH RBC QN AUTO: 27.3 PG (ref 26.8–34.3)
MCHC RBC AUTO-ENTMCNC: 33.6 G/DL (ref 31.4–37.4)
MCV RBC AUTO: 81 FL (ref 82–98)
MONOCYTES # BLD AUTO: 0.79 THOUSAND/ΜL (ref 0.17–1.22)
MONOCYTES NFR BLD AUTO: 8 % (ref 4–12)
NEUTROPHILS # BLD AUTO: 6.08 THOUSANDS/ΜL (ref 1.85–7.62)
NEUTS SEG NFR BLD AUTO: 65 % (ref 43–75)
NRBC BLD AUTO-RTO: 0 /100 WBCS
PLATELET # BLD AUTO: 284 THOUSANDS/UL (ref 149–390)
PMV BLD AUTO: 11.1 FL (ref 8.9–12.7)
POTASSIUM SERPL-SCNC: 3.2 MMOL/L (ref 3.5–5.3)
PROT SERPL-MCNC: 6.6 G/DL (ref 6.4–8.4)
PROTHROMBIN TIME: 13.4 SECONDS (ref 12.3–15)
RBC # BLD AUTO: 5.23 MILLION/UL (ref 3.88–5.62)
SODIUM SERPL-SCNC: 140 MMOL/L (ref 135–147)
WBC # BLD AUTO: 9.45 THOUSAND/UL (ref 4.31–10.16)

## 2025-01-23 PROCEDURE — 99285 EMERGENCY DEPT VISIT HI MDM: CPT | Performed by: PHYSICIAN ASSISTANT

## 2025-01-23 PROCEDURE — 84484 ASSAY OF TROPONIN QUANT: CPT | Performed by: PHYSICIAN ASSISTANT

## 2025-01-23 PROCEDURE — 85379 FIBRIN DEGRADATION QUANT: CPT | Performed by: PHYSICIAN ASSISTANT

## 2025-01-23 PROCEDURE — 96375 TX/PRO/DX INJ NEW DRUG ADDON: CPT

## 2025-01-23 PROCEDURE — 83880 ASSAY OF NATRIURETIC PEPTIDE: CPT | Performed by: PHYSICIAN ASSISTANT

## 2025-01-23 PROCEDURE — 80053 COMPREHEN METABOLIC PANEL: CPT | Performed by: PHYSICIAN ASSISTANT

## 2025-01-23 PROCEDURE — 96366 THER/PROPH/DIAG IV INF ADDON: CPT

## 2025-01-23 PROCEDURE — 36415 COLL VENOUS BLD VENIPUNCTURE: CPT | Performed by: PHYSICIAN ASSISTANT

## 2025-01-23 PROCEDURE — 85025 COMPLETE CBC W/AUTO DIFF WBC: CPT | Performed by: PHYSICIAN ASSISTANT

## 2025-01-23 PROCEDURE — 85610 PROTHROMBIN TIME: CPT | Performed by: PHYSICIAN ASSISTANT

## 2025-01-23 PROCEDURE — 99285 EMERGENCY DEPT VISIT HI MDM: CPT

## 2025-01-23 PROCEDURE — 71045 X-RAY EXAM CHEST 1 VIEW: CPT

## 2025-01-23 PROCEDURE — 96365 THER/PROPH/DIAG IV INF INIT: CPT

## 2025-01-23 PROCEDURE — 85730 THROMBOPLASTIN TIME PARTIAL: CPT | Performed by: PHYSICIAN ASSISTANT

## 2025-01-23 PROCEDURE — 93005 ELECTROCARDIOGRAM TRACING: CPT

## 2025-01-23 RX ORDER — POTASSIUM CHLORIDE 1500 MG/1
40 TABLET, EXTENDED RELEASE ORAL ONCE
Status: COMPLETED | OUTPATIENT
Start: 2025-01-23 | End: 2025-01-23

## 2025-01-23 RX ORDER — HEPARIN SODIUM 1000 [USP'U]/ML
4000 INJECTION, SOLUTION INTRAVENOUS; SUBCUTANEOUS ONCE
Status: COMPLETED | OUTPATIENT
Start: 2025-01-23 | End: 2025-01-23

## 2025-01-23 RX ORDER — HEPARIN SODIUM 10000 [USP'U]/100ML
3-20 INJECTION, SOLUTION INTRAVENOUS
Status: DISCONTINUED | OUTPATIENT
Start: 2025-01-23 | End: 2025-01-24 | Stop reason: HOSPADM

## 2025-01-23 RX ORDER — ASPIRIN 81 MG/1
324 TABLET, CHEWABLE ORAL ONCE
Status: DISCONTINUED | OUTPATIENT
Start: 2025-01-23 | End: 2025-01-24 | Stop reason: HOSPADM

## 2025-01-23 RX ORDER — HEPARIN SODIUM 1000 [USP'U]/ML
4000 INJECTION, SOLUTION INTRAVENOUS; SUBCUTANEOUS EVERY 6 HOURS PRN
Status: DISCONTINUED | OUTPATIENT
Start: 2025-01-23 | End: 2025-01-24 | Stop reason: HOSPADM

## 2025-01-23 RX ORDER — HEPARIN SODIUM 1000 [USP'U]/ML
2000 INJECTION, SOLUTION INTRAVENOUS; SUBCUTANEOUS EVERY 6 HOURS PRN
Status: DISCONTINUED | OUTPATIENT
Start: 2025-01-23 | End: 2025-01-24 | Stop reason: HOSPADM

## 2025-01-23 RX ORDER — TRAZODONE HYDROCHLORIDE 50 MG/1
50 TABLET, FILM COATED ORAL ONCE
Status: COMPLETED | OUTPATIENT
Start: 2025-01-24 | End: 2025-01-24

## 2025-01-23 RX ADMIN — HEPARIN SODIUM 12 UNITS/KG/HR: 10000 INJECTION, SOLUTION INTRAVENOUS at 18:40

## 2025-01-23 RX ADMIN — HEPARIN SODIUM 4000 UNITS: 1000 INJECTION, SOLUTION INTRAVENOUS; SUBCUTANEOUS at 18:40

## 2025-01-23 RX ADMIN — POTASSIUM CHLORIDE 40 MEQ: 1500 TABLET, EXTENDED RELEASE ORAL at 16:53

## 2025-01-23 NOTE — ED PROVIDER NOTES
Time reflects when diagnosis was documented in both MDM as applicable and the Disposition within this note       Time User Action Codes Description Comment    1/23/2025  5:29 PM Nicky Pearson Add [R79.89] Elevated troponin     1/23/2025  5:29 PM Nicky Pearson Add [R07.9] Chest pain           ED Disposition       ED Disposition   Transfer to Another Facility-In Network    Condition   --    Date/Time   Thu Jan 23, 2025  5:29 PM    Comment   Larry Harper should be transferred out to [facility].               Assessment & Plan       Medical Decision Making  60-year-old male presents to the emergency department for evaluation of intermittent chest pain for the last several days.  Vitals and medical record reviewed. Patient is potentially at risk for, but not limited to, acute coronary syndrome, arrhythmia, myocardial infarction, pulmonary embolism, pneumothorax, infectious process of the lungs such as pneumonia, reactive airway disease, asthma, COPD exacerbation, cardiomyopathy, congestive heart failure or viral syndrome.  Laboratory findings and imaging was performed.  Discussion with cardiology due to elevated troponin levels.  Recommended transfer to cath accessible facility.  Patient requested transfer to [facility].     Problems Addressed:  Chest pain: acute illness or injury  Elevated troponin: acute illness or injury    Amount and/or Complexity of Data Reviewed  Labs: ordered. Decision-making details documented in ED Course.  Radiology: ordered.  ECG/medicine tests: ordered.  Discussion of management or test interpretation with external provider(s): Cardiology for recommendations     Risk  OTC drugs.  Prescription drug management.        ED Course as of 01/23/25 2252   Thu Jan 23, 2025   1637 WBC: 9.45   1637 Potassium(!): 3.2   1638 hs TnI 0hr(!): 149   1640 Patient reevaluated.  Patient has 0 out of 10 chest pain right now.  States he feels lightheaded when he sits up he sees black dots but no black  dots when he is laying down.   1641 Chest xray:   Lungs clear.     Trace left pleural effusion.     1646 Secure chat sent to cardiology    1646 D-Dimer, Quant: <0.27   1723 Cardiology recommends transfer to cath accessible facility    1725 I discussed all results with the patient and  recommendation with the patient.  Patient was agreeable.  Requested transfer to [facility].    1753 Accepted to [facility]   1952 hs TnI 2hr(!): 272   1954 Patient has a service dog at bedside. Attempting to make arraignments on care of the service dog while patient is at [facility]. SLA management involved. Patient adamant about taking the dog and stating he will leave if the dog can not come.    2218 Patient pending transfer.  Unable to transfer due to service dog.  Patient's attempting to contact local shelters to take the dog while he is inpatient.  Unable to transfer with the dog. Patient is aware of this. States he would rather leave AMA than leave his dog. Will continue to work to find someone to care for dog.    2234 Patient will call [facility] in the morning if he is still in our ED. If he can get transfer sooner one of our ED techs has told the patient she will drive to [facility] and watch the dog during his procedure. [facility] was made aware    2244 Patient states to me he has someone he can call while at [facility] to care for his dog if he is unable  [facility] aware   2251 Pending transfer to [facility] signed out to Dr. Paulino       Medications   aspirin chewable tablet 324 mg (324 mg Oral Not Given 1/23/25 1653)   heparin (porcine) 25,000 units in 0.45% NaCl 250 mL infusion (premix) (12 Units/kg/hr × 75 kg (Order-Specific) Intravenous New Bag 1/23/25 1840)   heparin (porcine) injection 4,000 Units (has no administration in time range)   heparin (porcine) injection 2,000 Units (has no administration in time range)   potassium chloride (Klor-Con M20) CR tablet 40 mEq (40 mEq Oral Given 1/23/25 1653)   heparin (porcine) injection 4,000 Units (4,000 Units  Intravenous Given 1/23/25 1840)       ED Risk Strat Scores   HEART Risk Score      Flowsheet Row Most Recent Value   Heart Score Risk Calculator    History 2 Filed at: 01/23/2025 1957   ECG 0 Filed at: 01/23/2025 1957   Age 1 Filed at: 01/23/2025 1957   Risk Factors 1 Filed at: 01/23/2025 1957   Troponin 2 Filed at: 01/23/2025 1957   HEART Score 6 Filed at: 01/23/2025 1957          HEART Risk Score      Flowsheet Row Most Recent Value   Heart Score Risk Calculator    History 2 Filed at: 01/23/2025 1957   ECG 0 Filed at: 01/23/2025 1957   Age 1 Filed at: 01/23/2025 1957   Risk Factors 1 Filed at: 01/23/2025 1957   Troponin 2 Filed at: 01/23/2025 1957   HEART Score 6 Filed at: 01/23/2025 1957                            SBIRT 20yo+      Flowsheet Row Most Recent Value   Initial Alcohol Screen: US AUDIT-C     1. How often do you have a drink containing alcohol? 0 Filed at: 01/23/2025 1554   2. How many drinks containing alcohol do you have on a typical day you are drinking?  0 Filed at: 01/23/2025 1554   3a. Male UNDER 65: How often do you have five or more drinks on one occasion? 0 Filed at: 01/23/2025 1554   Audit-C Score 0 Filed at: 01/23/2025 1554   KATIE: How many times in the past year have you...    Used an illegal drug or used a prescription medication for non-medical reasons? Never Filed at: 01/23/2025 1554                            History of Present Illness       Chief Complaint   Patient presents with    Chest Pain     C/o intermittent chest pain/SOB x3 days. Hx of stent placement on 4/13/21       Past Medical History:   Diagnosis Date    Anxiety     Coronary artery disease     Depression     Diabetes mellitus (HCC)     MI, old     PTSD (post-traumatic stress disorder)       Past Surgical History:   Procedure Laterality Date    CARPAL TUNNEL RELEASE Right     CORONARY ANGIOPLASTY WITH STENT PLACEMENT      ROTATOR CUFF REPAIR Right       History reviewed. No pertinent family history.   Social History      Tobacco Use    Smoking status: Never     Passive exposure: Never    Smokeless tobacco: Never   Vaping Use    Vaping status: Never Used   Substance Use Topics    Alcohol use: Not Currently    Drug use: Not Currently     Types: Marijuana      E-Cigarette/Vaping    E-Cigarette Use Never User       E-Cigarette/Vaping Substances    Nicotine No     THC No     CBD No     Flavoring No       I have reviewed and agree with the history as documented.     60-year-old male presents to the emergency department for evaluation of intermittent chest pain for the last 3 days.  Patient states he experiences mainly with exertion.  Also reports he experiences shortness of breath with exertion.  Denies any current chest pain at rest.  Patient reports past medical history of MI and stent placed in April 2021.  Patient states he has not been taking medications as prescribed due to loss of insurance.  Also offers multiple social issues at home.  Denies any leg swelling or palpitations.  He denies any nausea vomiting or diaphoresis. Notes occasional lightheadedness.         Review of Systems   Constitutional:  Negative for appetite change, fatigue and fever.   HENT: Negative.     Respiratory:  Positive for shortness of breath. Negative for wheezing and stridor.    Cardiovascular:  Positive for chest pain. Negative for palpitations and leg swelling.   Gastrointestinal: Negative.    Musculoskeletal: Negative.    Skin: Negative.    Neurological:  Positive for dizziness and light-headedness.   All other systems reviewed and are negative.          Objective       ED Triage Vitals   Temperature Pulse Blood Pressure Respirations SpO2 Patient Position - Orthostatic VS   01/23/25 1558 01/23/25 1554 01/23/25 1554 01/23/25 1554 01/23/25 1554 01/23/25 1554   97.7 °F (36.5 °C) 87 170/78 18 95 % Sitting      Temp Source Heart Rate Source BP Location FiO2 (%) Pain Score    01/23/25 1558 01/23/25 1554 01/23/25 1554 -- --    Temporal Monitor Right arm         Vitals      Date and Time Temp Pulse SpO2 Resp BP Pain Score FACES Pain Rating User   01/23/25 2215 -- 87 95 % 18 134/60 -- -- AR   01/23/25 2030 -- 84 94 % 20 141/59 -- -- AR   01/23/25 1930 -- 84 96 % 22 138/62 -- -- AR   01/23/25 1900 -- 90 96 % 19 151/67 -- -- AR   01/23/25 1845 -- 80 95 % 18 147/67 -- -- SS   01/23/25 1830 -- 84 97 % 22 146/67 -- -- SS   01/23/25 1815 -- 83 97 % 23 149/67 -- -- SS   01/23/25 1800 -- 78 96 % 21 141/64 -- -- SS   01/23/25 1745 -- 79 94 % 27 144/67 -- -- SS   01/23/25 1715 -- 93 95 % 20 132/63 -- -- SS   01/23/25 1700 -- 83 95 % 20 134/63 -- -- SS   01/23/25 1645 -- 86 96 % 14 126/62 -- -- SS   01/23/25 1615 -- 82 95 % 21 147/67 -- -- SS   01/23/25 1558 97.7 °F (36.5 °C) -- -- -- -- -- -- KW   01/23/25 1554 -- 87 95 % 18 170/78 -- -- KW            Physical Exam  Vitals and nursing note reviewed.   Constitutional:       General: He is not in acute distress.     Appearance: He is well-developed. He is not ill-appearing, toxic-appearing or diaphoretic.   HENT:      Head: Normocephalic.   Eyes:      Pupils: Pupils are equal, round, and reactive to light.   Cardiovascular:      Rate and Rhythm: Normal rate and regular rhythm.      Heart sounds: Normal heart sounds.   Pulmonary:      Effort: Pulmonary effort is normal.      Breath sounds: Normal breath sounds. No decreased breath sounds, wheezing, rhonchi or rales.   Chest:      Chest wall: No mass or tenderness.   Abdominal:      Palpations: Abdomen is soft.      Tenderness: There is no abdominal tenderness.   Musculoskeletal:         General: Normal range of motion.      Cervical back: Normal range of motion and neck supple.      Right lower leg: No tenderness. No edema.      Left lower leg: No tenderness. No edema.   Skin:     General: Skin is warm and dry.      Findings: No ecchymosis, erythema or rash.      Nails: There is no clubbing.   Neurological:      General: No focal deficit present.      Mental Status: He is alert and  oriented to person, place, and time.   Psychiatric:         Mood and Affect: Mood is anxious.         Results Reviewed       Procedure Component Value Units Date/Time    APTT [[id number]]     Lab Status: No result Specimen: Blood     HS Troponin I 4hr [[id number]]  (Abnormal) Collected: 01/23/25 2002    Lab Status: Final result Specimen: Blood from Arm, Left Updated: 01/23/25 2036     hs TnI 4hr 288 ng/L      Delta 4hr hsTnI 139 ng/L     HS Troponin I 2hr [[id number]]  (Abnormal) Collected: 01/23/25 1755    Lab Status: Final result Specimen: Blood from Arm, Left Updated: 01/23/25 1923     hs TnI 2hr 272 ng/L      Delta 2hr hsTnI 123 ng/L     B-Type Natriuretic Peptide(BNP) [[id number]]  (Abnormal) Collected: 01/23/25 1605    Lab Status: Final result Specimen: Blood from Arm, Left Updated: 01/23/25 1656      pg/mL     D-Dimer [[id number]]  (Normal) Collected: 01/23/25 1605    Lab Status: Final result Specimen: Blood from Arm, Left Updated: 01/23/25 1646     D-Dimer, Quant <0.27 ug/ml FEU     Narrative:      In the evaluation for possible pulmonary embolism, in the appropriate (Well's Score of 4 or less) patient, the age adjusted d-dimer cutoff for this patient can be calculated as:    Age x 0.01 (in ug/mL) for Age-adjusted D-dimer exclusion threshold for a patient over 50 years.    HS Troponin 0hr (reflex protocol) [[id number]]  (Abnormal) Collected: 01/23/25 1605    Lab Status: Final result Specimen: Blood from Arm, Left Updated: 01/23/25 1638     hs TnI 0hr 149 ng/L     Comprehensive metabolic panel [[id number]]  (Abnormal) Collected: 01/23/25 1605    Lab Status: Final result Specimen: Blood from Arm, Left Updated: 01/23/25 1634     Sodium 140 mmol/L      Potassium 3.2 mmol/L      Chloride 107 mmol/L      CO2 25 mmol/L      ANION GAP 8 mmol/L      BUN 16 mg/dL      Creatinine 0.78 mg/dL      Glucose 156 mg/dL      Calcium 9.2 mg/dL      AST 15 U/L      ALT 14 U/L      Alkaline Phosphatase 51 U/L      Total Protein  6.6 g/dL      Albumin 4.1 g/dL      Total Bilirubin 0.36 mg/dL      eGFR 99 ml/min/1.73sq m     Narrative:      National Kidney Disease Foundation guidelines for Chronic Kidney Disease (CKD):     Stage 1 with normal or high GFR (GFR > 90 mL/min/1.73 square meters)    Stage 2 Mild CKD (GFR = 60-89 mL/min/1.73 square meters)    Stage 3A Moderate CKD (GFR = 45-59 mL/min/1.73 square meters)    Stage 3B Moderate CKD (GFR = 30-44 mL/min/1.73 square meters)    Stage 4 Severe CKD (GFR = 15-29 mL/min/1.73 square meters)    Stage 5 End Stage CKD (GFR <15 mL/min/1.73 square meters)  Note: GFR calculation is accurate only with a steady state creatinine    Protime-INR [[id number]]  (Normal) Collected: 01/23/25 1605    Lab Status: Final result Specimen: Blood from Arm, Left Updated: 01/23/25 1625     Protime 13.4 seconds      INR 0.98    Narrative:      INR Therapeutic Range    Indication                                             INR Range      Atrial Fibrillation                                               2.0-3.0  Hypercoagulable State                                    2.0.2.3  Left Ventricular Asist Device                            2.0-3.0  Mechanical Heart Valve                                  -    Aortic(with afib, MI, embolism, HF, LA enlargement,    and/or coagulopathy)                                     2.0-3.0 (2.5-3.5)     Mitral                                                             2.5-3.5  Prosthetic/Bioprosthetic Heart Valve               2.0-3.0  Venous thromboembolism (VTE: VT, PE        2.0-3.0    APTT [[id number]]  (Normal) Collected: 01/23/25 1605    Lab Status: Final result Specimen: Blood from Arm, Left Updated: 01/23/25 1625     PTT 27 seconds     CBC and differential [[id number]]  (Abnormal) Collected: 01/23/25 1605    Lab Status: Final result Specimen: Blood from Arm, Left Updated: 01/23/25 1612     WBC 9.45 Thousand/uL      RBC 5.23 Million/uL      Hemoglobin 14.3 g/dL      Hematocrit 42.5 %       MCV 81 fL      MCH 27.3 pg      MCHC 33.6 g/dL      RDW 14.6 %      MPV 11.1 fL      Platelets 284 Thousands/uL      nRBC 0 /100 WBCs      Segmented % 65 %      Immature Grans % 0 %      Lymphocytes % 23 %      Monocytes % 8 %      Eosinophils Relative 3 %      Basophils Relative 1 %      Absolute Neutrophils 6.08 Thousands/µL      Absolute Immature Grans 0.02 Thousand/uL      Absolute Lymphocytes 2.18 Thousands/µL      Absolute Monocytes 0.79 Thousand/µL      Eosinophils Absolute 0.31 Thousand/µL      Basophils Absolute 0.07 Thousands/µL             XR chest 1 view portable   Final Interpretation by Jackie Naranjo MD (01/23 1638)      Lungs clear.      Trace left pleural effusion.            Workstation performed: [id number]             ECG 12 Lead Documentation Only    Date/Time: 1/23/2025 4:15 PM    Performed by: Nicky Pearson PA-C  Authorized by: Nicky Pearson PA-C    Indications / Diagnosis:  Chest pain  Patient location:  ED  Interpretation:     Interpretation: non-specific    Rate:     ECG rate:  79    ECG rate assessment: normal    Rhythm:     Rhythm: sinus rhythm    Ectopy:     Ectopy: none    QRS:     QRS axis:  Normal    QRS intervals:  Normal  ECG 12 Lead Documentation Only    Date/Time: 1/23/2025 6:00 PM    Performed by: Nicky Pearson PA-C  Authorized by: Nicky Pearson PA-C    Indications / Diagnosis:  Chest pain  Patient location:  ED  Interpretation:     Interpretation: non-specific    Rate:     ECG rate:  81    ECG rate assessment: normal    Rhythm:     Rhythm: sinus rhythm    Ectopy:     Ectopy: none    QRS:     QRS axis:  Normal    QRS intervals:  Normal  Conduction:     Conduction: normal        ED Medication and Procedure Management   Prior to Admission Medications   Prescriptions Last Dose Informant Patient Reported? Taking?   Empagliflozin (Jardiance) 10 MG TABS tablet   Yes No   FLUoxetine (PROzac) 40 MG capsule   Yes No   Sig: Take 40 mg by mouth daily   NovoLOG FlexPen 100  units/mL injection pen   Yes No   Sig: INJECT 12 UNITS BEFORE EACH MEAL SUBCUTANEOUSLY   albuterol (PROVENTIL HFA,VENTOLIN HFA) 90 mcg/act inhaler   Yes No   Sig: Inhale 2 puffs   aspirin (ECOTRIN LOW STRENGTH) 81 mg EC tablet   Yes No   Sig: Take 81 mg by mouth daily   atorvastatin (LIPITOR) 80 mg tablet   Yes No   Sig: Take 80 mg by mouth every morning   diazepam (VALIUM) 5 mg tablet   No No   Sig: Take 1 tablet (5 mg total) by mouth every 6 (six) hours as needed for muscle spasms   fluticasone (FLONASE) 50 mcg/act nasal spray   Yes No   Si spray daily   ibuprofen (MOTRIN) 400 mg tablet   Yes No   Sig: Take 400 mg by mouth every 8 (eight) hours as needed   insulin glargine (LANTUS) 100 units/mL subcutaneous injection   Yes No   Sig: Inject 35 Units under the skin   lisinopril (ZESTRIL) 2.5 mg tablet   Yes No   Sig: Take 2.5 mg by mouth daily   lovastatin (MEVACOR) 20 mg tablet   Yes No   Sig: Take 20 mg by mouth daily   metFORMIN (GLUCOPHAGE) 1000 MG tablet   Yes No   Sig: Take 1,000 mg by mouth daily   metoprolol tartrate (LOPRESSOR) 25 mg tablet   Yes No   Sig: Take 12.5 mg by mouth 2 (two) times a day   traZODone (DESYREL) 50 mg tablet   Yes No   Sig: Take 50 mg by mouth   umeclidinium (Incruse Ellipta) 62.5 mcg/actuation AEPB inhaler   Yes No   Sig: Inhale 1 puff daily      Facility-Administered Medications: None     Patient's Medications   Discharge Prescriptions    No medications on file     No discharge procedures on file.  ED SEPSIS DOCUMENTATION   Time reflects when diagnosis was documented in both MDM as applicable and the Disposition within this note       Time User Action Codes Description Comment    2025  5:29 PM Nicky Pearson Add [R79.89] Elevated troponin     2025  5:29 PM Nicky Pearson [R07.9] Chest pain                  Nicky Pearson PA-C  25 3025

## 2025-01-23 NOTE — EMTALA/ACUTE CARE TRANSFER
[facility] EMERGENCY DEPARTMENT  [address] [facility] [address]  Dept: [phone]      EMTALA TRANSFER CONSENT    NAME Larry Harper                                         1965                              MRN [id number]    I have been informed of my rights regarding examination, treatment, and transfer   by Dr. Araceli Ruiz DO    Benefits: Specialized equipment and/or services available at the receiving facility (Include comment)________________________    Risks: Potential for delay in receiving treatment, Potential deterioration of medical condition, Loss of IV, Increased discomfort during transfer, Possible worsening of condition or death during transfer      Consent for Transfer:  I acknowledge that my medical condition has been evaluated and explained to me by the emergency department physician or other qualified medical person and/or my attending physician, who has recommended that I be transferred to the service of  Accepting Physician: Dr. Valladares at Accepting Facility Name, City & State : [facility]. The above potential benefits of such transfer, the potential risks associated with such transfer, and the probable risks of not being transferred have been explained to me, and I fully understand them.  The doctor has explained that, in my case, the benefits of transfer outweigh the risks.  I agree to be transferred.    I authorize the performance of emergency medical procedures and treatments upon me in both transit and upon arrival at the receiving facility.  Additionally, I authorize the release of any and all medical records to the receiving facility and request they be transported with me, if possible.  I understand that the safest mode of transportation during a medical emergency is an ambulance and that the Hospital advocates the use of this mode of transport. Risks of traveling to the receiving facility by car, including absence of medical control, life sustaining  equipment, such as oxygen, and medical personnel has been explained to me and I fully understand them.    (LUKE CORRECT BOX BELOW)  [  ]  I consent to the stated transfer and to be transported by ambulance/helicopter.  [  ]  I consent to the stated transfer, but refuse transportation by ambulance and accept full responsibility for my transportation by car.  I understand the risks of non-ambulance transfers and I exonerate the Hospital and its staff from any deterioration in my condition that results from this refusal.    X___________________________________________    DATE  25  TIME________  Signature of patient or legally responsible individual signing on patient behalf           RELATIONSHIP TO PATIENT_________________________          Provider Certification    NAME Larry Harper                                         1965                              MRN [id number]    A medical screening exam was performed on the above named patient.  Based on the examination:    Condition Necessitating Transfer The primary encounter diagnosis was Elevated troponin. A diagnosis of Chest pain was also pertinent to this visit.    Patient Condition: The patient has been stabilized such that within reasonable medical probability, no material deterioration of the patient condition or the condition of the unborn child(rajani) is likely to result from the transfer    Reason for Transfer: Level of Care needed not available at this facility, Patient/Family request, No bed available at level of patient's needs    Transfer Requirements: Facility SLA   Space available and qualified personnel available for treatment as acknowledged by    Agreed to accept transfer and to provide appropriate medical treatment as acknowledged by       Dr. Valladares  Appropriate medical records of the examination and treatment of the patient are provided at the time of transfer   STAFF INITIAL WHEN COMPLETED _______  Transfer will be performed by  qualified personnel from    and appropriate transfer equipment as required, including the use of necessary and appropriate life support measures.    Provider Certification: I have examined the patient and explained the following risks and benefits of being transferred/refusing transfer to the patient/family:  General risk, such as traffic hazards, adverse weather conditions, rough terrain or turbulence, possible failure of equipment (including vehicle or aircraft), or consequences of actions of persons outside the control of the transport personnel, Unanticipated needs of medical equipment and personnel during transport, Risk of worsening condition, The possibility of a transport vehicle being unavailable      Based on these reasonable risks and benefits to the patient and/or the unborn child(rajani), and based upon the information available at the time of the patient’s examination, I certify that the medical benefits reasonably to be expected from the provision of appropriate medical treatments at another medical facility outweigh the increasing risks, if any, to the individual’s medical condition, and in the case of labor to the unborn child, from effecting the transfer.    X____________________________________________ DATE 01/23/25        TIME_______      ORIGINAL - SEND TO MEDICAL RECORDS   COPY - SEND WITH PATIENT DURING TRANSFER

## 2025-01-24 ENCOUNTER — HOSPITAL ENCOUNTER (INPATIENT)
Facility: HOSPITAL | Age: 60
LOS: 1 days | DRG: 216 | End: 2025-01-25
Attending: STUDENT IN AN ORGANIZED HEALTH CARE EDUCATION/TRAINING PROGRAM | Admitting: INTERNAL MEDICINE
Payer: MEDICARE

## 2025-01-24 ENCOUNTER — APPOINTMENT (INPATIENT)
Dept: NON INVASIVE DIAGNOSTICS | Facility: HOSPITAL | Age: 60
DRG: 216 | End: 2025-01-24
Payer: MEDICARE

## 2025-01-24 VITALS
WEIGHT: 171.52 LBS | SYSTOLIC BLOOD PRESSURE: 138 MMHG | HEART RATE: 74 BPM | BODY MASS INDEX: 26.86 KG/M2 | TEMPERATURE: 97.7 F | RESPIRATION RATE: 17 BRPM | DIASTOLIC BLOOD PRESSURE: 64 MMHG | OXYGEN SATURATION: 95 %

## 2025-01-24 DIAGNOSIS — I21.4 NSTEMI (NON-ST ELEVATED MYOCARDIAL INFARCTION) (HCC): Primary | ICD-10-CM

## 2025-01-24 PROBLEM — I35.0 SEVERE AORTIC STENOSIS: Status: ACTIVE | Noted: 2025-01-24

## 2025-01-24 LAB
ANION GAP SERPL CALCULATED.3IONS-SCNC: 8 MMOL/L (ref 4–13)
APTT PPP: 32 SECONDS (ref 23–34)
APTT PPP: 33 SECONDS (ref 23–34)
APTT PPP: 80 SECONDS (ref 23–34)
ATRIAL RATE: 77 BPM
ATRIAL RATE: 79 BPM
ATRIAL RATE: 81 BPM
BASOPHILS # BLD AUTO: 0.07 THOUSANDS/ΜL (ref 0–0.1)
BASOPHILS NFR BLD AUTO: 1 % (ref 0–1)
BUN SERPL-MCNC: 16 MG/DL (ref 5–25)
CALCIUM SERPL-MCNC: 8.9 MG/DL (ref 8.4–10.2)
CARDIAC TROPONIN I PNL SERPL HS: 142 NG/L (ref 8–18)
CHLORIDE SERPL-SCNC: 107 MMOL/L (ref 96–108)
CHOLEST SERPL-MCNC: 226 MG/DL (ref ?–200)
CO2 SERPL-SCNC: 24 MMOL/L (ref 21–32)
CREAT SERPL-MCNC: 0.73 MG/DL (ref 0.6–1.3)
EOSINOPHIL # BLD AUTO: 0.29 THOUSAND/ΜL (ref 0–0.61)
EOSINOPHIL NFR BLD AUTO: 4 % (ref 0–6)
ERYTHROCYTE [DISTWIDTH] IN BLOOD BY AUTOMATED COUNT: 14.3 % (ref 11.6–15.1)
EST. AVERAGE GLUCOSE BLD GHB EST-MCNC: 209 MG/DL
GFR SERPL CREATININE-BSD FRML MDRD: 102 ML/MIN/1.73SQ M
GLUCOSE SERPL-MCNC: 159 MG/DL (ref 65–140)
GLUCOSE SERPL-MCNC: 172 MG/DL (ref 65–140)
GLUCOSE SERPL-MCNC: 178 MG/DL (ref 65–140)
GLUCOSE SERPL-MCNC: 180 MG/DL (ref 65–140)
GLUCOSE SERPL-MCNC: 196 MG/DL (ref 65–140)
HBA1C MFR BLD: 8.9 %
HCT VFR BLD AUTO: 41.1 % (ref 36.5–49.3)
HDLC SERPL-MCNC: 41 MG/DL
HGB BLD-MCNC: 13.9 G/DL (ref 12–17)
IMM GRANULOCYTES # BLD AUTO: 0.02 THOUSAND/UL (ref 0–0.2)
IMM GRANULOCYTES NFR BLD AUTO: 0 % (ref 0–2)
LDLC SERPL CALC-MCNC: 163 MG/DL (ref 0–100)
LYMPHOCYTES # BLD AUTO: 2.26 THOUSANDS/ΜL (ref 0.6–4.47)
LYMPHOCYTES NFR BLD AUTO: 32 % (ref 14–44)
MAGNESIUM SERPL-MCNC: 1.9 MG/DL (ref 1.9–2.7)
MCH RBC QN AUTO: 27.4 PG (ref 26.8–34.3)
MCHC RBC AUTO-ENTMCNC: 33.8 G/DL (ref 31.4–37.4)
MCV RBC AUTO: 81 FL (ref 82–98)
MONOCYTES # BLD AUTO: 0.76 THOUSAND/ΜL (ref 0.17–1.22)
MONOCYTES NFR BLD AUTO: 11 % (ref 4–12)
NEUTROPHILS # BLD AUTO: 3.78 THOUSANDS/ΜL (ref 1.85–7.62)
NEUTS SEG NFR BLD AUTO: 52 % (ref 43–75)
NONHDLC SERPL-MCNC: 185 MG/DL
NRBC BLD AUTO-RTO: 0 /100 WBCS
P AXIS: 67 DEGREES
P AXIS: 73 DEGREES
P AXIS: 74 DEGREES
PLATELET # BLD AUTO: 243 THOUSANDS/UL (ref 149–390)
PMV BLD AUTO: 11.1 FL (ref 8.9–12.7)
POTASSIUM SERPL-SCNC: 3.4 MMOL/L (ref 3.5–5.3)
PR INTERVAL: 142 MS
PR INTERVAL: 152 MS
PR INTERVAL: 164 MS
QRS AXIS: 67 DEGREES
QRS AXIS: 69 DEGREES
QRS AXIS: 71 DEGREES
QRSD INTERVAL: 76 MS
QRSD INTERVAL: 78 MS
QRSD INTERVAL: 82 MS
QT INTERVAL: 372 MS
QT INTERVAL: 378 MS
QT INTERVAL: 398 MS
QTC INTERVAL: 426 MS
QTC INTERVAL: 439 MS
QTC INTERVAL: 450 MS
RBC # BLD AUTO: 5.08 MILLION/UL (ref 3.88–5.62)
SODIUM SERPL-SCNC: 139 MMOL/L (ref 135–147)
T WAVE AXIS: -52 DEGREES
T WAVE AXIS: 0 DEGREES
T WAVE AXIS: 35 DEGREES
TRIGL SERPL-MCNC: 108 MG/DL (ref ?–150)
VENTRICULAR RATE: 77 BPM
VENTRICULAR RATE: 79 BPM
VENTRICULAR RATE: 81 BPM
WBC # BLD AUTO: 7.18 THOUSAND/UL (ref 4.31–10.16)

## 2025-01-24 PROCEDURE — 96366 THER/PROPH/DIAG IV INF ADDON: CPT

## 2025-01-24 PROCEDURE — 80048 BASIC METABOLIC PNL TOTAL CA: CPT

## 2025-01-24 PROCEDURE — C1894 INTRO/SHEATH, NON-LASER: HCPCS | Performed by: INTERNAL MEDICINE

## 2025-01-24 PROCEDURE — 85025 COMPLETE CBC W/AUTO DIFF WBC: CPT

## 2025-01-24 PROCEDURE — 93005 ELECTROCARDIOGRAM TRACING: CPT

## 2025-01-24 PROCEDURE — 93458 L HRT ARTERY/VENTRICLE ANGIO: CPT | Performed by: INTERNAL MEDICINE

## 2025-01-24 PROCEDURE — 85730 THROMBOPLASTIN TIME PARTIAL: CPT | Performed by: INTERNAL MEDICINE

## 2025-01-24 PROCEDURE — 83735 ASSAY OF MAGNESIUM: CPT

## 2025-01-24 PROCEDURE — 36415 COLL VENOUS BLD VENIPUNCTURE: CPT | Performed by: EMERGENCY MEDICINE

## 2025-01-24 PROCEDURE — 99222 1ST HOSP IP/OBS MODERATE 55: CPT | Performed by: INTERNAL MEDICINE

## 2025-01-24 PROCEDURE — 85730 THROMBOPLASTIN TIME PARTIAL: CPT | Performed by: EMERGENCY MEDICINE

## 2025-01-24 PROCEDURE — 82948 REAGENT STRIP/BLOOD GLUCOSE: CPT

## 2025-01-24 PROCEDURE — B2111ZZ FLUOROSCOPY OF MULTIPLE CORONARY ARTERIES USING LOW OSMOLAR CONTRAST: ICD-10-PCS | Performed by: INTERNAL MEDICINE

## 2025-01-24 PROCEDURE — 93971 EXTREMITY STUDY: CPT

## 2025-01-24 PROCEDURE — C1769 GUIDE WIRE: HCPCS | Performed by: INTERNAL MEDICINE

## 2025-01-24 PROCEDURE — 84484 ASSAY OF TROPONIN QUANT: CPT

## 2025-01-24 PROCEDURE — 85730 THROMBOPLASTIN TIME PARTIAL: CPT

## 2025-01-24 PROCEDURE — 4A023N7 MEASUREMENT OF CARDIAC SAMPLING AND PRESSURE, LEFT HEART, PERCUTANEOUS APPROACH: ICD-10-PCS | Performed by: INTERNAL MEDICINE

## 2025-01-24 PROCEDURE — 93880 EXTRACRANIAL BILAT STUDY: CPT

## 2025-01-24 PROCEDURE — 83036 HEMOGLOBIN GLYCOSYLATED A1C: CPT

## 2025-01-24 PROCEDURE — 99152 MOD SED SAME PHYS/QHP 5/>YRS: CPT | Performed by: INTERNAL MEDICINE

## 2025-01-24 PROCEDURE — 99153 MOD SED SAME PHYS/QHP EA: CPT | Performed by: INTERNAL MEDICINE

## 2025-01-24 PROCEDURE — 80061 LIPID PANEL: CPT

## 2025-01-24 RX ORDER — DIPHENHYDRAMINE HYDROCHLORIDE 50 MG/ML
50 INJECTION INTRAMUSCULAR; INTRAVENOUS
Status: DISCONTINUED | OUTPATIENT
Start: 2025-01-24 | End: 2025-01-24 | Stop reason: HOSPADM

## 2025-01-24 RX ORDER — ATORVASTATIN CALCIUM 80 MG/1
80 TABLET, FILM COATED ORAL
Status: DISCONTINUED | OUTPATIENT
Start: 2025-01-24 | End: 2025-01-25 | Stop reason: HOSPADM

## 2025-01-24 RX ORDER — ASPIRIN 81 MG/1
243 TABLET, CHEWABLE ORAL ONCE
Status: COMPLETED | OUTPATIENT
Start: 2025-01-24 | End: 2025-01-24

## 2025-01-24 RX ORDER — ASPIRIN 81 MG/1
81 TABLET ORAL DAILY
Status: DISCONTINUED | OUTPATIENT
Start: 2025-01-24 | End: 2025-01-25 | Stop reason: HOSPADM

## 2025-01-24 RX ORDER — ONDANSETRON 2 MG/ML
4 INJECTION INTRAMUSCULAR; INTRAVENOUS EVERY 6 HOURS PRN
Status: DISCONTINUED | OUTPATIENT
Start: 2025-01-24 | End: 2025-01-25 | Stop reason: HOSPADM

## 2025-01-24 RX ORDER — INSULIN GLARGINE 100 [IU]/ML
30 INJECTION, SOLUTION SUBCUTANEOUS DAILY
Status: ON HOLD | COMMUNITY
Start: 2024-11-11 | End: 2025-02-06

## 2025-01-24 RX ORDER — LIDOCAINE HYDROCHLORIDE 10 MG/ML
INJECTION, SOLUTION EPIDURAL; INFILTRATION; INTRACAUDAL; PERINEURAL CODE/TRAUMA/SEDATION MEDICATION
Status: DISCONTINUED | OUTPATIENT
Start: 2025-01-24 | End: 2025-01-24 | Stop reason: HOSPADM

## 2025-01-24 RX ORDER — INSULIN LISPRO 100 [IU]/ML
1-6 INJECTION, SOLUTION INTRAVENOUS; SUBCUTANEOUS EVERY 6 HOURS SCHEDULED
Status: DISCONTINUED | OUTPATIENT
Start: 2025-01-24 | End: 2025-01-24

## 2025-01-24 RX ORDER — HEPARIN SODIUM 1000 [USP'U]/ML
INJECTION, SOLUTION INTRAVENOUS; SUBCUTANEOUS CODE/TRAUMA/SEDATION MEDICATION
Status: DISCONTINUED | OUTPATIENT
Start: 2025-01-24 | End: 2025-01-24 | Stop reason: HOSPADM

## 2025-01-24 RX ORDER — TRAZODONE HYDROCHLORIDE 50 MG/1
50 TABLET, FILM COATED ORAL
Status: DISCONTINUED | OUTPATIENT
Start: 2025-01-24 | End: 2025-01-25 | Stop reason: HOSPADM

## 2025-01-24 RX ORDER — SODIUM CHLORIDE 9 MG/ML
100 INJECTION, SOLUTION INTRAVENOUS CONTINUOUS
Status: DISCONTINUED | OUTPATIENT
Start: 2025-01-24 | End: 2025-01-25 | Stop reason: HOSPADM

## 2025-01-24 RX ORDER — ACETAMINOPHEN 325 MG/1
650 TABLET ORAL EVERY 6 HOURS PRN
Status: DISCONTINUED | OUTPATIENT
Start: 2025-01-24 | End: 2025-01-25 | Stop reason: HOSPADM

## 2025-01-24 RX ORDER — HEPARIN SODIUM 10000 [USP'U]/100ML
3-20 INJECTION, SOLUTION INTRAVENOUS
Status: DISCONTINUED | OUTPATIENT
Start: 2025-01-24 | End: 2025-01-25 | Stop reason: HOSPADM

## 2025-01-24 RX ORDER — INSULIN GLARGINE 100 [IU]/ML
30 INJECTION, SOLUTION SUBCUTANEOUS EVERY MORNING
Status: DISCONTINUED | OUTPATIENT
Start: 2025-01-24 | End: 2025-01-24

## 2025-01-24 RX ORDER — NITROGLYCERIN 20 MG/100ML
INJECTION INTRAVENOUS CODE/TRAUMA/SEDATION MEDICATION
Status: DISCONTINUED | OUTPATIENT
Start: 2025-01-24 | End: 2025-01-24 | Stop reason: HOSPADM

## 2025-01-24 RX ORDER — INSULIN LISPRO 100 [IU]/ML
12 INJECTION, SOLUTION INTRAVENOUS; SUBCUTANEOUS
Status: DISCONTINUED | OUTPATIENT
Start: 2025-01-24 | End: 2025-01-24

## 2025-01-24 RX ORDER — LISINOPRIL 2.5 MG/1
2.5 TABLET ORAL DAILY
Status: DISCONTINUED | OUTPATIENT
Start: 2025-01-24 | End: 2025-01-25 | Stop reason: HOSPADM

## 2025-01-24 RX ORDER — HEPARIN SODIUM 1000 [USP'U]/ML
2000 INJECTION, SOLUTION INTRAVENOUS; SUBCUTANEOUS EVERY 6 HOURS PRN
Status: DISCONTINUED | OUTPATIENT
Start: 2025-01-24 | End: 2025-01-25 | Stop reason: HOSPADM

## 2025-01-24 RX ORDER — HEPARIN SODIUM 1000 [USP'U]/ML
4000 INJECTION, SOLUTION INTRAVENOUS; SUBCUTANEOUS EVERY 6 HOURS PRN
Status: DISCONTINUED | OUTPATIENT
Start: 2025-01-24 | End: 2025-01-25 | Stop reason: HOSPADM

## 2025-01-24 RX ORDER — FENTANYL CITRATE 50 UG/ML
INJECTION, SOLUTION INTRAMUSCULAR; INTRAVENOUS CODE/TRAUMA/SEDATION MEDICATION
Status: DISCONTINUED | OUTPATIENT
Start: 2025-01-24 | End: 2025-01-24 | Stop reason: HOSPADM

## 2025-01-24 RX ORDER — ALBUTEROL SULFATE 90 UG/1
2 INHALANT RESPIRATORY (INHALATION) EVERY 4 HOURS PRN
Status: DISCONTINUED | OUTPATIENT
Start: 2025-01-24 | End: 2025-01-25 | Stop reason: HOSPADM

## 2025-01-24 RX ORDER — MIDAZOLAM HYDROCHLORIDE 2 MG/2ML
INJECTION, SOLUTION INTRAMUSCULAR; INTRAVENOUS CODE/TRAUMA/SEDATION MEDICATION
Status: DISCONTINUED | OUTPATIENT
Start: 2025-01-24 | End: 2025-01-24 | Stop reason: HOSPADM

## 2025-01-24 RX ORDER — CLOPIDOGREL BISULFATE 75 MG/1
600 TABLET ORAL ONCE
Status: DISCONTINUED | OUTPATIENT
Start: 2025-01-25 | End: 2025-01-24 | Stop reason: HOSPADM

## 2025-01-24 RX ORDER — NITROGLYCERIN 0.4 MG/1
0.4 TABLET SUBLINGUAL
Status: DISCONTINUED | OUTPATIENT
Start: 2025-01-24 | End: 2025-01-25 | Stop reason: HOSPADM

## 2025-01-24 RX ORDER — HYDROCORTISONE SODIUM SUCCINATE 100 MG/2ML
200 INJECTION INTRAMUSCULAR; INTRAVENOUS ONCE
Status: DISCONTINUED | OUTPATIENT
Start: 2025-01-24 | End: 2025-01-24 | Stop reason: HOSPADM

## 2025-01-24 RX ORDER — INSULIN LISPRO 100 [IU]/ML
1-6 INJECTION, SOLUTION INTRAVENOUS; SUBCUTANEOUS
Status: DISCONTINUED | OUTPATIENT
Start: 2025-01-24 | End: 2025-01-25 | Stop reason: HOSPADM

## 2025-01-24 RX ORDER — VERAPAMIL HCL 2.5 MG/ML
AMPUL (ML) INTRAVENOUS CODE/TRAUMA/SEDATION MEDICATION
Status: DISCONTINUED | OUTPATIENT
Start: 2025-01-24 | End: 2025-01-24 | Stop reason: HOSPADM

## 2025-01-24 RX ADMIN — HEPARIN SODIUM 12 UNITS/KG/HR: 10000 INJECTION, SOLUTION INTRAVENOUS at 21:23

## 2025-01-24 RX ADMIN — ASPIRIN 81 MG CHEWABLE TABLET 243 MG: 81 TABLET CHEWABLE at 10:29

## 2025-01-24 RX ADMIN — INSULIN LISPRO 1 UNITS: 100 INJECTION, SOLUTION INTRAVENOUS; SUBCUTANEOUS at 21:22

## 2025-01-24 RX ADMIN — Medication 12.5 MG: at 09:22

## 2025-01-24 RX ADMIN — ASPIRIN 81 MG: 81 TABLET, COATED ORAL at 09:22

## 2025-01-24 RX ADMIN — INSULIN LISPRO 1 UNITS: 100 INJECTION, SOLUTION INTRAVENOUS; SUBCUTANEOUS at 09:22

## 2025-01-24 RX ADMIN — FLUOXETINE HYDROCHLORIDE 40 MG: 20 CAPSULE ORAL at 09:22

## 2025-01-24 RX ADMIN — ATORVASTATIN CALCIUM 80 MG: 80 TABLET, FILM COATED ORAL at 17:18

## 2025-01-24 RX ADMIN — INSULIN LISPRO 2 UNITS: 100 INJECTION, SOLUTION INTRAVENOUS; SUBCUTANEOUS at 17:18

## 2025-01-24 RX ADMIN — SODIUM CHLORIDE 100 ML/HR: 0.9 INJECTION, SOLUTION INTRAVENOUS at 10:29

## 2025-01-24 RX ADMIN — Medication 12.5 MG: at 17:18

## 2025-01-24 RX ADMIN — HEPARIN SODIUM 12 UNITS/KG/HR: 10000 INJECTION, SOLUTION INTRAVENOUS at 05:07

## 2025-01-24 RX ADMIN — TRAZODONE HYDROCHLORIDE 50 MG: 50 TABLET ORAL at 23:37

## 2025-01-24 RX ADMIN — LISINOPRIL 2.5 MG: 2.5 TABLET ORAL at 09:22

## 2025-01-24 RX ADMIN — TRAZODONE HYDROCHLORIDE 50 MG: 50 TABLET ORAL at 00:40

## 2025-01-24 NOTE — ED NOTES
Pt able to find someone to assist in watching over his service dog for his catheterization at [facility]. Provider aware.      Lori Herrera RN  01/24/25 0000

## 2025-01-24 NOTE — ED NOTES
While this RN was at the bedside pt states he was having some chest pain similar to what he felt before. EKG completed at 0046 and provider made aware.     Lori Herrera RN  01/24/25 0057

## 2025-01-24 NOTE — ASSESSMENT & PLAN NOTE
TTE 1/5/2024 The aortic valve peak velocity is 3.4 m/s. The aortic valve mean gradient is 29.0 mmHg. The dimensionless velocity index is 0.25. The aortic valve area is 0.75 cm2.   Cardiac surgery consulted for evaluation of AVR  Repeat echocardiogram pending.

## 2025-01-24 NOTE — ED NOTES
Placed pt in an inpatient bed for comfort. Blankets and pillows provided. Call bell within reach. Pt requesting 50mg of trazodone which he takes nightly for sleep. Provider aware. Pt offers no complaints at this time and resting in bed in no distress.     Lori Herrera RN  01/23/25 8218

## 2025-01-24 NOTE — CASE MANAGEMENT
"     Case Management Assessment & Discharge Planning Note    Patient name Larry Harper  Location [facility] * MRN [id number]  : 1965 Date 2025       Current Admission Date: 2025  Current Admission Diagnosis:NSTEMI (non-ST elevated myocardial infarction) (HCC)   Patient Active Problem List    Diagnosis Date Noted Date Diagnosed    NSTEMI (non-ST elevated myocardial infarction) (HCC)      Coronary artery disease      Diabetes mellitus (HCC)      Anxiety      Depression      PTSD (post-traumatic stress disorder)        LOS (days): 0  Geometric Mean LOS (GMLOS) (days): 1.7  Days to GMLOS:1.4     OBJECTIVE:    Risk of Unplanned Readmission Score: 8.02         Current admission status: Inpatient       Preferred Pharmacy:   [facility]/pharmacy #[id number] [place], [place] - [address] [place] [id number]  Phone: [phone] Fax: [phone]    Primary Care Provider: Efe Portillo DO    Primary Insurance: MEDICARE  Secondary Insurance: [employer] [facility]    ASSESSMENT:  Active Health Care Proxies    There are no active Health Care Proxies on file.       Advance Directives  Does patient have a Health Care POA?: No  Was patient offered paperwork?: Yes (declined)  Does patient currently have a Health Care decision maker?: No (CM asked the pt if he had a  and he said, \"Nope, just myself.\")  Does patient have Advance Directives?: No  Was patient offered paperwork?: Yes (declined)  Primary Contact: CM asked the pt if he had a  and he said, \"Nope, just myself.\"         Readmission Root Cause  30 Day Readmission: No    Patient Information  Admitted from:: Home  Mental Status: Alert  During Assessment patient was accompanied by: Not accompanied during assessment  Assessment information provided by:: Patient  Primary Caregiver: Self  Support Systems: Self  County of Residence: [facility]  What city do you live in?: " [place]  Home entry access options. Select all that apply.: No steps to enter home  Type of Current Residence: Other (Comment) (private residence)  Living Arrangements: Lives Alone  Is patient a ?: Yes  [facility] with [facility] ( [facility])?: Yes  Is patient service connected?: Yes ([facility] office-  only gets meds)    Activities of Daily Living Prior to Admission  Functional Status: Independent  Completes ADLs independently?: Yes  Ambulates independently?: Yes  Does patient use assisted devices?: No  Does patient currently own DME?: No  Does patient have a history of Outpatient Therapy (PT/OT)?: Yes  Does the patient have a history of Short-Term Rehab?: No  Does patient have a history of HHC?: No  Does patient currently have HHC?: No         Patient Information Continued  Income Source: Employed (Does [employer])  Does patient have prescription coverage?: Yes (via [facility])  Does patient receive dialysis treatments?: No  Does patient have a history of substance abuse?: Yes, Currently using  Current substance use preference: Marijuana (He smokes Marijuana for his PTSD)  Historical substance use preference: Marijuana  History of Withdrawal Symptoms: Denies past symptoms  Is patient currently in treatment for substance abuse?: Not appropriate at this time to discuss.  Does patient have a history of Mental Health Diagnosis?: Yes (anxiety, PTSD)  Has patient received inpatient treatment related to mental health in the last 2 years?: No         Means of Transportation  Means of Transport to Appts:: Drives Self          DISCHARGE DETAILS:    Discharge planning discussed with:: pt  Freedom of Choice: No  Comments - Freedom of Choice: plans to go home  CM contacted family/caregiver?: No- see comments (alert and oriented and he said he has no contact)  Were Treatment Team discharge recommendations reviewed with patient/caregiver?: Yes  Did patient/caregiver verbalize understanding of patient care needs?:  Yes  Were patient/caregiver advised of the risks associated with not following Treatment Team discharge recommendations?: Yes    Contacts  Patient Contacts: pt  Relationship to Patient:: Other (Comment) (self)  Contact Method: In Person  Reason/Outcome: Continuity of Care, Discharge Planning    Requested Home Health Care         Is the patient interested in HHC at discharge?: No    DME Referral Provided  Referral made for DME?: No         Would you like to participate in our [facility] service program?  : No - Declined    Treatment Team Recommendation: Home  Discharge Destination Plan:: Home                                         Additional Comments: CM met with the pt and made him aware of the CM role.  Assessmetn was done.  Pt came from [facility] and will be having a CC today.  Pt lives alone and per patient he does not have any other contact.  He has been independent with all ADLS.  He drives, but he said his car is currently broken.  He works as a  for [employer].  Pt has a support dog that is present in the room.  CM noted with the pt that he had been without medications for a month and he had stated he had gotten all meds via [facility].  He stated that there was an issue with the [facility] insurance that he had corrected.  He said that he should be able to get his scripts filled via [facility] now.  Pt stated that he does not know how he will get home once he is discharged.  CM will addresss with the pt after he is cleared.  He verbalized understasnding.  CM to follow as needed.

## 2025-01-24 NOTE — ASSESSMENT & PLAN NOTE
Lab Results   Component Value Date    HGBA1C 8.9 (H) 01/24/2025       Recent Labs     01/24/25  2111 01/25/25  0846 01/25/25  1231 01/25/25  1607   POCGLU 172* 203* 186* 199*       Blood Sugar Average: Last 72 hrs:  Previously on long acting insulin however reports running out of medication  Resume Lantus at bedtime along with sliding scale insulin  Avoid hypoglycemia  Hold Oral regimen     PULMONARY PROGRESS NOTE      JAROCHO MORALES  MRN-[id number]    Patient is a 57y old  Female who presents with a chief complaint of dark stools (2017 11:01)      INTERVAL HPI/OVERNIGHT EVENTS:    Patient is awake and alert  Slowly improving with decreased SOB but with increased FiO2 requirement    MEDICATIONS  (STANDING):  azithromycin  IVPB  IV Intermittent   cefTRIAXone   IVPB  IV Intermittent   azithromycin  IVPB 500milliGRAM(s) IV Intermittent every 24 hours  cefTRIAXone   IVPB 1Gram(s) IV Intermittent every 24 hours  methylPREDNISolone sodium succinate Injectable 60milliGRAM(s) IV Push every 6 hours  heparin  Injectable 5000Unit(s) SubCutaneous every 12 hours  insulin glargine Injectable (LANTUS) 20Unit(s) SubCutaneous at bedtime  insulin lispro Injectable (HumaLOG) 15Unit(s) SubCutaneous three times a day before meals  dextrose 5%. 1000milliLiter(s) IV Continuous <Continuous>  dextrose 50% Injectable 12.5Gram(s) IV Push once  dextrose 50% Injectable 25Gram(s) IV Push once  dextrose 50% Injectable 25Gram(s) IV Push once  pantoprazole  Injectable 40milliGRAM(s) IV Push daily  sodium chloride 0.9%. 1000milliLiter(s) IV Continuous <Continuous>  sodium chloride 1Gram(s) Oral three times a day      MEDICATIONS  (PRN):  ondansetron Injectable 4milliGRAM(s) IV Push every 6 hours PRN Nausea  hydrALAZINE 25milliGRAM(s) Oral every 6 hours PRN Systolic blood pressure >160  ALBUTerol    0.083% 2.5milliGRAM(s) Nebulizer every 6 hours PRN wheeze  dextrose Gel 1Dose(s) Oral once PRN Blood Glucose LESS THAN 70 milliGRAM(s)/deciliter  glucagon  Injectable 1milliGRAM(s) IntraMuscular once PRN Glucose LESS THAN 70 milligrams/deciliter      Allergies    No Known Allergies    Intolerances        PAST MEDICAL & SURGICAL HISTORY:  DM (diabetes mellitus)  No significant past surgical history        REVIEW OF SYSTEMS:    CONSTITUTIONAL:  No distress    HEENT:  Eyes:  No diplopia or blurred vision. ENT:  No earache, sore throat or runny nose.    CARDIOVASCULAR:  No pressure, squeezing, tightness, heaviness or aching about the chest; no palpitations.    RESPIRATORY:  No cough, shortness of breath, PND or orthopnea. Mild SOBOE    GASTROINTESTINAL:  No nausea, vomiting or diarrhea.    GENITOURINARY:  No dysuria, frequency or urgency.    NEUROLOGIC:  No paresthesias, fasciculations, seizures or weakness.    PSYCHIATRIC:  No disorder of thought or mood.    Vital Signs Last 24 Hrs  T(C): 36.9, Max: 36.9 (02-10 @ 21:31)  T(F): 98.4, Max: 98.4 (02-10 @ 21:31)  HR: 86 (76 - 86)  BP: 120/60 (120/60 - 155/83)  BP(mean): --  RR: 16 (16 - 18)  SpO2: 96% (95% - 97%)    PHYSICAL EXAMINATION:    GENERAL: The patient is awake and alert in no apparent distress.     HEENT: Head is normocephalic and atraumatic. Extraocular muscles are intact. Mucous membranes are moist.    NECK: Supple.    LUNGS: Clear to auscultation without wheezing, rales or rhonchi; respirations unlabored    HEART: Regular rate and rhythm without murmur.    ABDOMEN: Soft, nontender, and nondistended.      EXTREMITIES: Without any cyanosis, clubbing, rash, lesions or edema.    NEUROLOGIC: Grossly intact.    LABS:                        10.0   12.28 )-----------( 576      ( 2017 07:14 )             29.2     2017 07:14    131    |  97     |  62.0   ----------------------------<  233    4.0     |  19.0   |  1.71     Ca    8.2        2017 07:14    TPro  6.1    /  Alb  2.1    /  TBili  0.6    /  DBili  x      /  AST  23     /  ALT  12     /  AlkPhos  82     2017 07:14      Urinalysis Basic - ( 2017 18:19 )    Color: Yellow / Appearance: Clear / S.020 / pH: x  Gluc: x / Ketone: Trace  / Bili: Negative / Urobili: Negative mg/dL   Blood: x / Protein: 500 mg/dL / Nitrite: Negative   Leuk Esterase: Small / RBC: 11-25 /HPF / WBC 6-10   Sq Epi: x / Non Sq Epi: Occasional / Bacteria: Few        CARDIAC MARKERS ( 10 Feb 2017 07:38 )  x     / x     / 65 U/L / x     / x        Serum Pro-Brain Natriuretic Peptide: 5278 pg/mL ( @ 14:37)    Procalcitonin, Serum: <0.23 ng/mL ( @ 19:52)    MICROBIOLOGY:    Culture - Sputum . (17 @ 10:27)    Gram Stain:   Moderate White blood cells  Moderate Yeast  Few Gram positive cocci in pairs    Specimen Source: .Sputum Sputum

## 2025-01-24 NOTE — ASSESSMENT & PLAN NOTE
Patient with PMHx of CAD, DM2, depression, and PTSD presented to the [facility] ED secondary to intermittent chest pain x 3 days. Pt reports associated SOB, diaphoresis, nausea, and dizziness with the episodes. He reports the pain subsides once he takes nitro.   [facility] ED discussed with cardiology who recommended transfer to cath capable facility  EKG demonstrating no acute ischemic changes per report  Trops 149->272->288, repeat in AM  Monitor on telemetry  ECHO pending   Hgba1c, flp pending  Continue Heparin gtt  Cardiology consult pending

## 2025-01-24 NOTE — ED NOTES
is at 0215 with [facility]. going to [facility] [id number]. Number for report is [phone]. Dr. Valladares accepting.      Francine Haines, JAMEY  01/24/25 9717

## 2025-01-24 NOTE — ED NOTES
[facility] aware of service dog accompanying pt. Facility is okay with the service dog staying with pt during his time there as long as he is able to find someone to watch her during his catheterization. Pt aware and expressed understanding. Pt making calls now for accommodations.      Lori Herrera RN  01/23/25 1952

## 2025-01-24 NOTE — H&P
H&P - Hospitalist   Name: Larry Harper 60 y.o. male I MRN: [id number]  Unit/Bed#: E4 -01 I Date of Admission: 1/24/2025   Date of Service: 1/24/2025 I Hospital Day: 0     Assessment & Plan  NSTEMI (non-ST elevated myocardial infarction) (HCC)  Patient with PMHx of CAD, DM2, depression, and PTSD presented to the [facility] ED secondary to intermittent chest pain x 3 days. Pt reports associated SOB, diaphoresis, nausea, and dizziness with the episodes. He reports the pain subsides once he takes nitro.   [facility] ED discussed with cardiology who recommended transfer to cath capable facility  EKG demonstrating no acute ischemic changes per report  Trops 149->272->288, repeat in AM  Monitor on telemetry  ECHO pending   Hgba1c, flp pending  Continue Heparin gtt  Cardiology consult pending  Coronary artery disease  CAD s/p PCI of RCA 4/2021 with KAEL   ccath 5/31/22 - patent RCA stent   Followed outpatient by [facility] cardiology, last visit 12/2023  Continue aspirin, statin, BB, ACE therapy  Diabetes mellitus (HCC)  Lab Results   Component Value Date    HGBA1C 7.8 (H) 04/08/2024       Recent Labs     01/24/25  0508   POCGLU 178*       Blood Sugar Average: Last 72 hrs:  Hold metformin and jardiance  (P) 178Pt reports previously on Lantus 35 units daily with 12 units of lispro tid w/ meals, however has been out of his medications for over a month and reports his blood sugars have been stable on checks at home without these  Will hold for now  Will continue w/ just SSI w/ accucheks  Adjust regimen as needed    Depression  Mood stable   Continue prozac  PTSD (post-traumatic stress disorder)  Pt with service dog in room       VTE Pharmacologic Prophylaxis: VTE Score: 3 Moderate Risk (Score 3-4) - Pharmacological DVT Prophylaxis Ordered: heparin.  Code Status: Level 1 - Full Code   Discussion with family: Patient declined call to .     Anticipated Length of Stay: Patient will be admitted on an inpatient basis with  "an anticipated length of stay of greater than 2 midnights secondary to NSTEMI.    History of Present Illness   Chief Complaint: \"I have been having chest pain that feels like the last time I needed a stent\"    Larry Harper is a 60 y.o. male who presents with NSTEMI.  Patient reports that for the last 3 days he has been having intermittent episodes of midsternal chest pain.  He reports that the chest pain also comes with associated shortness of breath, nausea, diaphoresis, and dizziness.  He reports that the chest pain typically lasts until he takes 1 nitro tablet, and then subsides quickly after that.  Patient denies any other complaints.  He does report that he has been out of most of his home medications for over a month secondary to his insurance being canceled.    Review of Systems   Constitutional:  Positive for diaphoresis. Negative for appetite change, chills, fatigue and fever.   HENT:  Negative for congestion, rhinorrhea and sore throat.    Eyes:  Negative for photophobia and visual disturbance.   Respiratory:  Positive for shortness of breath. Negative for cough and wheezing.    Cardiovascular:  Positive for chest pain. Negative for palpitations and leg swelling.   Gastrointestinal:  Positive for nausea. Negative for abdominal distention, abdominal pain, blood in stool, constipation, diarrhea and vomiting.   Genitourinary:  Negative for dysuria and hematuria.   Musculoskeletal:  Negative for arthralgias, back pain, myalgias and neck stiffness.   Skin:  Negative for color change and rash.   Neurological:  Positive for dizziness. Negative for tremors, seizures, syncope, facial asymmetry, speech difficulty, weakness, light-headedness, numbness and headaches.   Psychiatric/Behavioral:  Negative for agitation, behavioral problems and confusion. The patient is not nervous/anxious.    All other systems reviewed and are negative.      Historical Information   Past Medical History:   Diagnosis Date    Anxiety  "    Coronary artery disease     Depression     Diabetes mellitus (HCC)     MI, old     PTSD (post-traumatic stress disorder)      Past Surgical History:   Procedure Laterality Date    CARPAL TUNNEL RELEASE Right     CORONARY ANGIOPLASTY WITH STENT PLACEMENT      ROTATOR CUFF REPAIR Right      Social History     Tobacco Use    Smoking status: Never     Passive exposure: Never    Smokeless tobacco: Never   Vaping Use    Vaping status: Never Used   Substance and Sexual Activity    Alcohol use: Not Currently    Drug use: Not Currently     Types: Marijuana    Sexual activity: Not Currently     E-Cigarette/Vaping    E-Cigarette Use Never User      E-Cigarette/Vaping Substances    Nicotine No     THC No     CBD No     Flavoring No      History reviewed. No pertinent family history.  Social History:  Marital Status: Single   Patient Pre-hospital Living Situation: Home  Patient Pre-hospital Level of Mobility: walks  Patient Pre-hospital Diet Restrictions: diabetic, cardiac     Meds/Allergies   I have reviewed home medications with patient personally.  Prior to Admission medications    Medication Sig Start Date End Date Taking? Authorizing Provider   Lantus SoloStar 100 units/mL SOPN 30 Units in the morning 11/11/24  Yes Historical Provider, MD   albuterol (PROVENTIL HFA,VENTOLIN HFA) 90 mcg/act inhaler Inhale 2 puffs    Historical Provider, MD   aspirin (ECOTRIN LOW STRENGTH) 81 mg EC tablet Take 81 mg by mouth daily    Historical Provider, MD   atorvastatin (LIPITOR) 80 mg tablet Take 80 mg by mouth every morning    Historical Provider, MD   diazepam (VALIUM) 5 mg tablet Take 1 tablet (5 mg total) by mouth every 6 (six) hours as needed for muscle spasms 10/14/23   Rudi Casey,    Empagliflozin (Jardiance) 10 MG TABS tablet  4/26/23   Historical Provider, MD   FLUoxetine (PROzac) 40 MG capsule Take 40 mg by mouth daily    Historical Provider, MD   fluticasone (FLONASE) 50 mcg/act nasal spray 1 spray daily    Historical  Provider, MD   ibuprofen (MOTRIN) 400 mg tablet Take 400 mg by mouth every 8 (eight) hours as needed    Historical Provider, MD   insulin glargine (LANTUS) 100 units/mL subcutaneous injection Inject 35 Units under the skin    Historical Provider, MD   lisinopril (ZESTRIL) 2.5 mg tablet Take 2.5 mg by mouth daily    Historical Provider, MD   lovastatin (MEVACOR) 20 mg tablet Take 20 mg by mouth daily    Historical Provider, MD   metFORMIN (GLUCOPHAGE) 1000 MG tablet Take 1,000 mg by mouth daily    Historical Provider, MD   metoprolol tartrate (LOPRESSOR) 25 mg tablet Take 12.5 mg by mouth 2 (two) times a day 1/29/24   Historical Provider, MD   NovoLOG FlexPen 100 units/mL injection pen INJECT 12 UNITS BEFORE EACH MEAL SUBCUTANEOUSLY    Historical Provider, MD   traZODone (DESYREL) 50 mg tablet Take 50 mg by mouth    Historical Provider, MD   umeclidinium (Incruse Ellipta) 62.5 mcg/actuation AEPB inhaler Inhale 1 puff daily    Historical Provider, MD     Allergies   Allergen Reactions    Bee Venom Anaphylaxis    Shellfish Allergy - Food Allergy Anaphylaxis       Objective :  Temp:  [97.5 °F (36.4 °C)-97.7 °F (36.5 °C)] 97.5 °F (36.4 °C)  HR:  [69-93] 77  BP: (126-177)/(56-84) 177/84  Resp:  [14-27] 16  SpO2:  [94 %-97 %] 96 %  O2 Device: None (Room air)    Physical Exam  Vitals and nursing note reviewed.   Constitutional:       General: He is not in acute distress.     Appearance: He is well-developed. He is not ill-appearing.   HENT:      Head: Normocephalic and atraumatic.      Nose: Nose normal. No congestion.      Mouth/Throat:      Mouth: Mucous membranes are moist.      Pharynx: Oropharynx is clear.   Eyes:      Conjunctiva/sclera: Conjunctivae normal.   Cardiovascular:      Rate and Rhythm: Normal rate and regular rhythm.      Heart sounds: Murmur heard.      No friction rub. No gallop.   Pulmonary:      Effort: Pulmonary effort is normal. No respiratory distress.      Breath sounds: Wheezing present. No  rhonchi or rales.   Abdominal:      General: Bowel sounds are normal. There is no distension.      Palpations: Abdomen is soft.      Tenderness: There is no abdominal tenderness.   Musculoskeletal:         General: No swelling.      Cervical back: Neck supple.      Right lower leg: No edema.      Left lower leg: No edema.   Skin:     General: Skin is warm and dry.      Capillary Refill: Capillary refill takes less than 2 seconds.   Neurological:      General: No focal deficit present.      Mental Status: He is alert and oriented to person, place, and time. Mental status is at baseline.   Psychiatric:         Mood and Affect: Mood normal.         Behavior: Behavior normal.          Lines/Drains:            Lab Results: I have reviewed the following results:  Results from last 7 days   Lab Units 01/23/25  1605   WBC Thousand/uL 9.45   HEMOGLOBIN g/dL 14.3   HEMATOCRIT % 42.5   PLATELETS Thousands/uL 284   SEGS PCT % 65   LYMPHO PCT % 23   MONO PCT % 8   EOS PCT % 3     Results from last 7 days   Lab Units 01/23/25  1605   SODIUM mmol/L 140   POTASSIUM mmol/L 3.2*   CHLORIDE mmol/L 107   CO2 mmol/L 25   BUN mg/dL 16   CREATININE mg/dL 0.78   ANION GAP mmol/L 8   CALCIUM mg/dL 9.2   ALBUMIN g/dL 4.1   TOTAL BILIRUBIN mg/dL 0.36   ALK PHOS U/L 51   ALT U/L 14   AST U/L 15   GLUCOSE RANDOM mg/dL 156*     Results from last 7 days   Lab Units 01/23/25  1605   INR  0.98     Results from last 7 days   Lab Units 01/24/25  0508   POC GLUCOSE mg/dl 178*     Lab Results   Component Value Date    HGBA1C 7.8 (H) 04/08/2024    HGBA1C 8.6 (H) 12/28/2023    HGBA1C 9.8 (H) 09/12/2023           Imaging Results Review: I reviewed radiology reports from this admission including: chest xray.  Other Study Results Review: EKG was reviewed.     Administrative Statements   I have spent a total time of 65 minutes in caring for this patient on the day of the visit/encounter including Diagnostic results, Patient and family education, Impressions,  Counseling / Coordination of care, Documenting in the medical record, Reviewing / ordering tests, medicine, procedures  , Obtaining or reviewing history  , and Communicating with other healthcare professionals .    ** Please Note: This note has been constructed using a voice recognition system. **

## 2025-01-24 NOTE — CONSULTS
Consult - Cardiology   Larry Harper 60 y.o. male MRN: [id number]  Unit/Bed#: E4 -01 Encounter: [id number]        Reason For Consult: Chest discomfort, abnormal troponin                 Assessment:  Acute coronary syndrome   - HS Troponin 149, 272, 288, 142  CAD   - Hx PCI/KAEL-RCA 4/2021   - Cath 5/31/2022: Patent RCA stent  Valvular heart disease   - TTE 1/5/2024: LV normal size, mild concentric hypertrophy, EF 65%, normal wall motion and normal diastolic function.  Aortic valve severely calcified with severely reduced mobility and severe stenosis (velocity 3.4 m/s, mean gradient 29 mmHg, DVI 0.25, GRAYSON 0.75 cm² ).  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  Some mild T wave inversion present on arrival with increased troponin levels all concerning for ACS.  Presently pain-free          Catheterization advised for definitive assessment ~~> will proceed today (presently awaiting someone who will attend to his service dog while he is out of the room for procedure expecting they should arrive in advance of his scheduled procedure)  Continue heparin until angiography completed  Loaded with aspirin and Plavix  Continue high intensity statin   Repeat echocardiogram  Additional recommendations guided by the above  Eventual referral to CT surgery to  discuss mgmt of AS          History Of Present Illness:  Larry Harper is a 60-year-old with prior outpatient care by [facility] cardiologist Dr. Efe Painter.    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  He reports being typically active with ability to comfortably walk at least 2 blocks, up a grade, or ascend several flights of steps without difficulty.  2-3 days ago the patient began to develop some precordial chest discomfort.  With some inconsistency these were occurring at low levels of exertion and were improved with rest.  Yesterday morning while at rest the patient had an episode of chest discomfort which she described as a pressure across the anterior chest accompanied by nausea and diaphoresis.  This was reminiscent of prior angina for which she took a nitroglycerin tablet which brought him immediate improvement and relief not long thereafter.  Later in the day he was visiting a friend when he again, at rest, had similar symptoms.  As he had no nitroglycerin with him he called an ambulance and was taken to the [facility].  Initial troponin was 149.  He was initiated on heparin and his case discussed with the on-call cardiologist at that [facility] with recommendation for referral to a cath capable [facility] for which she was then sent to [facility] where the balance of his MI profile shows troponin levels of 272, 288, and 142.  Patient states he has been pain-free since his initial treatment in the ED.                  Past Medical History:        Past Medical History:   Diagnosis Date    Anxiety     Coronary artery disease     Depression     Diabetes mellitus (HCC)     MI, old     PTSD (post-traumatic stress disorder)       Past Surgical History:   Procedure Laterality Date    CARPAL TUNNEL RELEASE Right     CORONARY ANGIOPLASTY WITH STENT PLACEMENT      ROTATOR CUFF REPAIR Right         Allergy:        Allergies   Allergen  Reactions    Bee Venom Anaphylaxis    Shellfish Allergy - Food Allergy Anaphylaxis       Medications:       Prior to Admission medications    Medication Sig Start Date End Date Taking? Authorizing Provider   Lantus SoloStar 100 units/mL SOPN 30 Units in the morning 11/11/24  Yes Historical Provider, MD   albuterol (PROVENTIL HFA,VENTOLIN HFA) 90 mcg/act inhaler Inhale 2 puffs    Historical Provider, MD   aspirin (ECOTRIN LOW STRENGTH) 81 mg EC tablet Take 81 mg by mouth daily    Historical Provider, MD   atorvastatin (LIPITOR) 80 mg tablet Take 80 mg by mouth every morning    Historical Provider, MD   diazepam (VALIUM) 5 mg tablet Take 1 tablet (5 mg total) by mouth every 6 (six) hours as needed for muscle spasms 10/14/23   Rudi Casey,    Empagliflozin (Jardiance) 10 MG TABS tablet  4/26/23   Historical Provider, MD   FLUoxetine (PROzac) 40 MG capsule Take 40 mg by mouth daily    Historical Provider, MD   fluticasone (FLONASE) 50 mcg/act nasal spray 1 spray daily    Historical Provider, MD   ibuprofen (MOTRIN) 400 mg tablet Take 400 mg by mouth every 8 (eight) hours as needed    Historical Provider, MD   insulin glargine (LANTUS) 100 units/mL subcutaneous injection Inject 35 Units under the skin    Historical Provider, MD   lisinopril (ZESTRIL) 2.5 mg tablet Take 2.5 mg by mouth daily    Historical Provider, MD   lovastatin (MEVACOR) 20 mg tablet Take 20 mg by mouth daily    Historical Provider, MD   metFORMIN (GLUCOPHAGE) 1000 MG tablet Take 1,000 mg by mouth daily    Historical Provider, MD   metoprolol tartrate (LOPRESSOR) 25 mg tablet Take 12.5 mg by mouth 2 (two) times a day 1/29/24   Historical Provider, MD   NovoLOG FlexPen 100 units/mL injection pen INJECT 12 UNITS BEFORE EACH MEAL SUBCUTANEOUSLY    Historical Provider, MD   traZODone (DESYREL) 50 mg tablet Take 50 mg by mouth    Historical Provider, MD   umeclidinium (Incruse Ellipta) 62.5 mcg/actuation AEPB inhaler Inhale 1 puff daily    Historical  Provider, MD       Family History:     History reviewed. No pertinent family history.     Social History:       Social History     Socioeconomic History    Marital status: Single     Spouse name: None    Number of children: None    Years of education: None    Highest education level: None   Occupational History    None   Tobacco Use    Smoking status: Never     Passive exposure: Never    Smokeless tobacco: Never   Vaping Use    Vaping status: Never Used   Substance and Sexual Activity    Alcohol use: Not Currently    Drug use: Not Currently     Types: Marijuana    Sexual activity: Not Currently   Other Topics Concern    None   Social History Narrative    None     Social Drivers of Health     Financial Resource Strain: Low Risk  (9/12/2023)    Received from [facility]    Financial Resource Strain     Do you have any trouble paying for your medications, or do you think you might in the future?: No     Does your family have trouble paying for medicine? (Household - for ages 0-17 years): Not on file   Food Insecurity: Patient Declined (9/12/2023)    Received from [facility]    Hunger Vital Sign     Worried About Running Out of Food in the Last Year: Patient declined     Ran Out of Food in the Last Year: Patient declined   Transportation Needs: No Transportation Needs (9/12/2023)    Received from [facility]    Transportation Needs     READ ONLY Do you have trouble getting a ride to medical visits or work?: Never True     Does your family have a hard time getting a ride to doctors’ visits? (Household - for ages 0-17 years): Not on file     Has lack of transportation kept you from medical appointments, meetings, work, or from getting things needed for daily living? Check all that apply. (Adult - for ages 18 years and over): Not on file     Do you (or your family) have trouble finding or paying for a ride (transportation)? (Household - for ages 0-17 years): Not on file   Physical Activity: Not on file   Stress: Not  on file   Social Connections: Unknown (9/12/2024)    Received from [employer]     How often do you feel lonely or isolated from those around you? (Adult - for ages 18 years and over): Not on file   Intimate Partner Violence: Not on file   Housing Stability: Low Risk  (9/12/2023)    Received from [facility]    Housing Stability     Do you currently live in a shelter or have no steady place to sleep at night?: No     READ ONLY Do you think you are at risk of becoming homeless?: No     Does your family worry about paying for your home or becoming homeless? (Household - for ages 0-17 years): Not on file     Are you homeless or worried that you might be in the future? (Adult - for ages 18 years and over): Not on file     Are you (or your family) homeless or worried that you might be in the future? (Household - for ages 0-17 years): Not on file       ROS:  Symptoms per HPI  Smokes no tobacco.  Does smoke some marijuana to help mitigate PTSD symptoms  Drinks no alcohol  The remainder of the review of systems is negative    Exam:  General:  Alert, normally conversant, comfortable appearing  Head: Normocephalic, atraumatic.  Eyes:  EOMI. Pupils - equal, round, reactive to accomodation.  No icterus.  Normal Conjunctiva.   Oropharynx: Moist without lesion  Neck:  No gross bruit, JVD, thyromegaly, or lymphadenopathy  Heart:  Regular with controlled rate.  Holosystolic basilar CHARLES radiating to the mid-lower left sternal border   lungs:  Clear without rales/rhonchi/wheeze  Abdomen:  Soft and nontender with normal bowel sounds. No organomegaly or mass  Lower Limbs:  No edema  Pulses:  RLE - DP:  1-2+                LLE - DP:  1-2+  Musculoskeletal: Independent movement of limbs observed, Formal ROM and strength eval not performed  Neurologic:    Oriented to: person, place, situation.     Cranial Nerves: grossly intact - vision, smell, taste, and hearing were not tested.     Motor function: grossly normal,  symmetric   Sensation: Was not tested      Vitals:    01/24/25 0409 01/24/25 0440 01/24/25 0600 01/24/25 0805   BP: (!) 177/84   148/91   BP Location: Right arm   Right arm   Pulse: 77   76   Resp: 16   16   Temp: 97.5 °F (36.4 °C)   97.5 °F (36.4 °C)   TempSrc: Temporal   Temporal   SpO2: 96%   97%   Weight:  77.5 kg (170 lb 13.7 oz) 77.2 kg (170 lb 3.1 oz)            DATA:      ECG:                      -----------------------------------------------------------------------------------------------------------------------------------------------  Telemetry:   Sinus rhythm with controlled rates         -----------------------------------------------------------------------------------------------------------------------------------------------  Weights:    Wt Readings from Last 20 Encounters:   01/24/25 77.2 kg (170 lb 3.1 oz)   01/23/25 77.8 kg (171 lb 8.3 oz)   09/30/24 72.6 kg (160 lb)   09/17/24 72.6 kg (160 lb)   01/05/24 72.6 kg (160 lb)   10/02/23 72.6 kg (160 lb)   09/10/23 70.3 kg (155 lb)   , Body mass index is 26.66 kg/m².         Lab Studies:           Results from last 7 days   Lab Units 01/24/25  0512   TRIGLYCERIDES mg/dL 108   HDL mg/dL 41     Results from last 7 days   Lab Units 01/24/25  0512 01/23/25  1605   WBC Thousand/uL 7.18 9.45   HEMOGLOBIN g/dL 13.9 14.3   HEMATOCRIT % 41.1 42.5   PLATELETS Thousands/uL 243 284   ,   Results from last 7 days   Lab Units 01/24/25  0512 01/23/25  1605   POTASSIUM mmol/L 3.4* 3.2*   CHLORIDE mmol/L 107 107   CO2 mmol/L 24 25   BUN mg/dL 16 16   CREATININE mg/dL 0.73 0.78   CALCIUM mg/dL 8.9 9.2   ALK PHOS U/L  --  51   ALT U/L  --  14   AST U/L  --  15

## 2025-01-24 NOTE — ASSESSMENT & PLAN NOTE
CAD s/p PCI of RCA 4/2021 with KAEL   [facility] 5/31/22 - patent RCA stent   Followed outpatient by [facility] cardiology, last visit 12/2023  Continue aspirin, statin, BB, ACE therapy

## 2025-01-24 NOTE — ASSESSMENT & PLAN NOTE
Lab Results   Component Value Date    HGBA1C 7.8 (H) 04/08/2024       Recent Labs     01/24/25  0508   POCGLU 178*       Blood Sugar Average: Last 72 hrs:  Hold metformin and jardiance  (P) 178Pt reports previously on Lantus 35 units daily with 12 units of lispro tid w/ meals, however has been out of his medications for over a month and reports his blood sugars have been stable on checks at home without these  Will hold for now  Will continue w/ just SSI w/ accucheks  Adjust regimen as needed

## 2025-01-24 NOTE — ED NOTES
PTT 80 sec which is the therapeutic goal (60-90) in MAR. No change to heparin drip at this time.     Lori Herrera RN  01/24/25 3047

## 2025-01-24 NOTE — ASSESSMENT & PLAN NOTE
Presented initially to [facility] with chest pain and noted to have elevated troponin.    Underwent cardiac cath which showed. Mid LAD-1 lesion is 70% stenosed, Mid LAD-2 lesion is 95% stenosed and 3rd Mrg lesion is 70% stenosed.  Given severe symptomatic LAD disease in a diabetic with severe aortic stenosis, patient transferred to [facility] for CABG/AVR evaluation  Repeat echocardiogram pending.   Cardiology and cardiac surgery consulted  Continue aspirin, heparin infusion, beta-blocker and high intensity atorvastatin.

## 2025-01-24 NOTE — PLAN OF CARE
Problem: INFECTION - ADULT  Goal: Absence or prevention of progression during hospitalization  Description: INTERVENTIONS:  - Assess and monitor for signs and symptoms of infection  - Monitor lab/diagnostic results  - Monitor all insertion sites, i.e. indwelling lines, tubes, and drains  - Monitor endotracheal if appropriate and nasal secretions for changes in amount and color  - Arabi appropriate cooling/warming therapies per order  - Administer medications as ordered  - Instruct and encourage patient and family to use good hand hygiene technique  - Identify and instruct in appropriate isolation precautions for identified infection/condition  Outcome: Progressing     Problem: SAFETY ADULT  Goal: Patient will remain free of falls  Description: INTERVENTIONS:  - Educate patient/family on patient safety including physical limitations  - Instruct patient to call for assistance with activity   - Consult OT/PT to assist with strengthening/mobility   - Keep Call bell within reach  - Keep bed low and locked with side rails adjusted as appropriate  - Keep care items and personal belongings within reach  - Initiate and maintain comfort rounds  - Make Fall Risk Sign visible to staff  - Offer Toileting every 3 Hours, in advance of need  - Initiate/Maintain bed alarm  - Obtain necessary fall risk management equipment: alarm   - Apply yellow socks and bracelet for high fall risk patients  - Consider moving patient to room near nurses station  Outcome: Progressing     Problem: DISCHARGE PLANNING  Goal: Discharge to home or other facility with appropriate resources  Description: INTERVENTIONS:  - Identify barriers to discharge w/patient and caregiver  - Arrange for needed discharge resources and transportation as appropriate  - Identify discharge learning needs (meds, wound care, etc.)  - Arrange for interpretive services to assist at discharge as needed  - Refer to Case Management Department for coordinating discharge  planning if the patient needs post-hospital services based on physician/advanced practitioner order or complex needs related to functional status, cognitive ability, or social support system  Outcome: Progressing     Problem: Knowledge Deficit  Goal: Patient/family/caregiver demonstrates understanding of disease process, treatment plan, medications, and discharge instructions  Description: Complete learning assessment and assess knowledge base.  Interventions:  - Provide teaching at level of understanding  - Provide teaching via preferred learning methods  Outcome: Progressing     Problem: CARDIOVASCULAR - ADULT  Goal: Maintains optimal cardiac output and hemodynamic stability  Description: INTERVENTIONS:  - Monitor I/O, vital signs and rhythm  - Monitor for S/S and trends of decreased cardiac output  - Administer and titrate ordered vasoactive medications to optimize hemodynamic stability  - Assess quality of pulses, skin color and temperature  - Assess for signs of decreased coronary artery perfusion  - Instruct patient to report change in severity of symptoms  Outcome: Progressing     Problem: RESPIRATORY - ADULT  Goal: Achieves optimal ventilation and oxygenation  Description: INTERVENTIONS:  - Assess for changes in respiratory status  - Assess for changes in mentation and behavior  - Position to facilitate oxygenation and minimize respiratory effort  - Oxygen administered by appropriate delivery if ordered  - Initiate smoking cessation education as indicated  - Encourage broncho-pulmonary hygiene including cough, deep breathe, Incentive Spirometry  - Assess the need for suctioning and aspirate as needed  - Assess and instruct to report SOB or any respiratory difficulty  - Respiratory Therapy support as indicated  Outcome: Progressing

## 2025-01-25 ENCOUNTER — HOSPITAL ENCOUNTER (INPATIENT)
Facility: HOSPITAL | Age: 60
DRG: 216 | End: 2025-01-25
Attending: INTERNAL MEDICINE | Admitting: THORACIC SURGERY (CARDIOTHORACIC VASCULAR SURGERY)
Payer: MEDICARE

## 2025-01-25 VITALS
OXYGEN SATURATION: 97 % | RESPIRATION RATE: 17 BRPM | BODY MASS INDEX: 26.21 KG/M2 | HEART RATE: 70 BPM | DIASTOLIC BLOOD PRESSURE: 75 MMHG | SYSTOLIC BLOOD PRESSURE: 136 MMHG | TEMPERATURE: 97.3 F | WEIGHT: 167.33 LBS

## 2025-01-25 DIAGNOSIS — E08.00 DIABETES MELLITUS DUE TO UNDERLYING CONDITION WITH HYPEROSMOLARITY WITHOUT COMA, WITHOUT LONG-TERM CURRENT USE OF INSULIN (HCC): ICD-10-CM

## 2025-01-25 DIAGNOSIS — I21.4 NSTEMI (NON-ST ELEVATED MYOCARDIAL INFARCTION) (HCC): ICD-10-CM

## 2025-01-25 DIAGNOSIS — I25.10 CORONARY ARTERY DISEASE: ICD-10-CM

## 2025-01-25 DIAGNOSIS — I35.0 SEVERE AORTIC STENOSIS: Primary | ICD-10-CM

## 2025-01-25 DIAGNOSIS — I25.84 CORONARY ARTERY DISEASE DUE TO CALCIFIED CORONARY LESION: ICD-10-CM

## 2025-01-25 DIAGNOSIS — K02.9 CARIES: ICD-10-CM

## 2025-01-25 DIAGNOSIS — K08.9 POOR DENTITION: ICD-10-CM

## 2025-01-25 DIAGNOSIS — Z95.2 S/P AVR: ICD-10-CM

## 2025-01-25 DIAGNOSIS — I25.10 CORONARY ARTERY DISEASE DUE TO CALCIFIED CORONARY LESION: ICD-10-CM

## 2025-01-25 DIAGNOSIS — Z95.1 S/P CABG (CORONARY ARTERY BYPASS GRAFT): ICD-10-CM

## 2025-01-25 DIAGNOSIS — E11.69 TYPE 2 DIABETES MELLITUS WITH OTHER SPECIFIED COMPLICATION, WITH LONG-TERM CURRENT USE OF INSULIN (HCC): ICD-10-CM

## 2025-01-25 DIAGNOSIS — Z79.4 TYPE 2 DIABETES MELLITUS WITH OTHER SPECIFIED COMPLICATION, WITH LONG-TERM CURRENT USE OF INSULIN (HCC): ICD-10-CM

## 2025-01-25 DIAGNOSIS — I25.10 CORONARY ARTERY DISEASE INVOLVING NATIVE CORONARY ARTERY OF NATIVE HEART WITHOUT ANGINA PECTORIS: ICD-10-CM

## 2025-01-25 PROBLEM — I10 PRIMARY HYPERTENSION: Status: ACTIVE | Noted: 2025-01-25

## 2025-01-25 PROBLEM — E87.6 HYPOKALEMIA: Status: ACTIVE | Noted: 2025-01-25

## 2025-01-25 LAB
ALBUMIN SERPL BCG-MCNC: 3.7 G/DL (ref 3.5–5)
ALP SERPL-CCNC: 40 U/L (ref 34–104)
ALT SERPL W P-5'-P-CCNC: 13 U/L (ref 7–52)
ANION GAP SERPL CALCULATED.3IONS-SCNC: 7 MMOL/L (ref 4–13)
APTT PPP: 37 SECONDS (ref 23–34)
APTT PPP: 53 SECONDS (ref 23–34)
APTT PPP: 54 SECONDS (ref 23–34)
APTT PPP: 67 SECONDS (ref 23–34)
AST SERPL W P-5'-P-CCNC: 12 U/L (ref 13–39)
BILIRUB SERPL-MCNC: 0.36 MG/DL (ref 0.2–1)
BUN SERPL-MCNC: 11 MG/DL (ref 5–25)
CALCIUM SERPL-MCNC: 8.4 MG/DL (ref 8.4–10.2)
CHLORIDE SERPL-SCNC: 109 MMOL/L (ref 96–108)
CO2 SERPL-SCNC: 24 MMOL/L (ref 21–32)
CREAT SERPL-MCNC: 0.79 MG/DL (ref 0.6–1.3)
ERYTHROCYTE [DISTWIDTH] IN BLOOD BY AUTOMATED COUNT: 14.4 % (ref 11.6–15.1)
GFR SERPL CREATININE-BSD FRML MDRD: 98 ML/MIN/1.73SQ M
GLUCOSE SERPL-MCNC: 129 MG/DL (ref 65–140)
GLUCOSE SERPL-MCNC: 177 MG/DL (ref 65–140)
GLUCOSE SERPL-MCNC: 186 MG/DL (ref 65–140)
GLUCOSE SERPL-MCNC: 199 MG/DL (ref 65–140)
GLUCOSE SERPL-MCNC: 203 MG/DL (ref 65–140)
HCT VFR BLD AUTO: 38.7 % (ref 36.5–49.3)
HGB BLD-MCNC: 12.8 G/DL (ref 12–17)
MCH RBC QN AUTO: 26.8 PG (ref 26.8–34.3)
MCHC RBC AUTO-ENTMCNC: 33.1 G/DL (ref 31.4–37.4)
MCV RBC AUTO: 81 FL (ref 82–98)
PLATELET # BLD AUTO: 240 THOUSANDS/UL (ref 149–390)
PMV BLD AUTO: 10.6 FL (ref 8.9–12.7)
POTASSIUM SERPL-SCNC: 3.4 MMOL/L (ref 3.5–5.3)
PROT SERPL-MCNC: 5.7 G/DL (ref 6.4–8.4)
RBC # BLD AUTO: 4.77 MILLION/UL (ref 3.88–5.62)
SODIUM SERPL-SCNC: 140 MMOL/L (ref 135–147)
WBC # BLD AUTO: 7 THOUSAND/UL (ref 4.31–10.16)

## 2025-01-25 PROCEDURE — 99232 SBSQ HOSP IP/OBS MODERATE 35: CPT | Performed by: INTERNAL MEDICINE

## 2025-01-25 PROCEDURE — 93971 EXTREMITY STUDY: CPT | Performed by: SURGERY

## 2025-01-25 PROCEDURE — 85730 THROMBOPLASTIN TIME PARTIAL: CPT | Performed by: INTERNAL MEDICINE

## 2025-01-25 PROCEDURE — NC001 PR NO CHARGE: Performed by: INTERNAL MEDICINE

## 2025-01-25 PROCEDURE — 80053 COMPREHEN METABOLIC PANEL: CPT | Performed by: INTERNAL MEDICINE

## 2025-01-25 PROCEDURE — 85027 COMPLETE CBC AUTOMATED: CPT | Performed by: INTERNAL MEDICINE

## 2025-01-25 PROCEDURE — 82948 REAGENT STRIP/BLOOD GLUCOSE: CPT

## 2025-01-25 PROCEDURE — 93880 EXTRACRANIAL BILAT STUDY: CPT | Performed by: SURGERY

## 2025-01-25 PROCEDURE — 99223 1ST HOSP IP/OBS HIGH 75: CPT | Performed by: INTERNAL MEDICINE

## 2025-01-25 RX ORDER — INSULIN LISPRO 100 [IU]/ML
1-6 INJECTION, SOLUTION INTRAVENOUS; SUBCUTANEOUS
Status: CANCELLED | OUTPATIENT
Start: 2025-01-25

## 2025-01-25 RX ORDER — LISINOPRIL 2.5 MG/1
2.5 TABLET ORAL DAILY
Status: DISCONTINUED | OUTPATIENT
Start: 2025-01-26 | End: 2025-01-27

## 2025-01-25 RX ORDER — HEPARIN SODIUM 1000 [USP'U]/ML
4000 INJECTION, SOLUTION INTRAVENOUS; SUBCUTANEOUS EVERY 6 HOURS PRN
Status: CANCELLED | OUTPATIENT
Start: 2025-01-25

## 2025-01-25 RX ORDER — ASPIRIN 81 MG/1
81 TABLET ORAL DAILY
Status: CANCELLED | OUTPATIENT
Start: 2025-01-26

## 2025-01-25 RX ORDER — HEPARIN SODIUM 1000 [USP'U]/ML
2000 INJECTION, SOLUTION INTRAVENOUS; SUBCUTANEOUS EVERY 6 HOURS PRN
Status: DISCONTINUED | OUTPATIENT
Start: 2025-01-25 | End: 2025-01-30

## 2025-01-25 RX ORDER — ALBUTEROL SULFATE 90 UG/1
2 INHALANT RESPIRATORY (INHALATION) EVERY 4 HOURS PRN
Status: DISCONTINUED | OUTPATIENT
Start: 2025-01-25 | End: 2025-01-31

## 2025-01-25 RX ORDER — TRAZODONE HYDROCHLORIDE 50 MG/1
50 TABLET, FILM COATED ORAL
Status: DISCONTINUED | OUTPATIENT
Start: 2025-01-25 | End: 2025-01-31

## 2025-01-25 RX ORDER — POTASSIUM CHLORIDE 1500 MG/1
40 TABLET, EXTENDED RELEASE ORAL ONCE
Status: COMPLETED | OUTPATIENT
Start: 2025-01-25 | End: 2025-01-25

## 2025-01-25 RX ORDER — INSULIN LISPRO 100 [IU]/ML
1-6 INJECTION, SOLUTION INTRAVENOUS; SUBCUTANEOUS
Status: DISCONTINUED | OUTPATIENT
Start: 2025-01-25 | End: 2025-01-27

## 2025-01-25 RX ORDER — LISINOPRIL 2.5 MG/1
2.5 TABLET ORAL DAILY
Status: CANCELLED | OUTPATIENT
Start: 2025-01-26

## 2025-01-25 RX ORDER — ATORVASTATIN CALCIUM 80 MG/1
80 TABLET, FILM COATED ORAL
Status: DISCONTINUED | OUTPATIENT
Start: 2025-01-26 | End: 2025-01-31

## 2025-01-25 RX ORDER — NITROGLYCERIN 0.4 MG/1
0.4 TABLET SUBLINGUAL
Status: DISCONTINUED | OUTPATIENT
Start: 2025-01-25 | End: 2025-01-31

## 2025-01-25 RX ORDER — ONDANSETRON 2 MG/ML
4 INJECTION INTRAMUSCULAR; INTRAVENOUS EVERY 6 HOURS PRN
Status: CANCELLED | OUTPATIENT
Start: 2025-01-25

## 2025-01-25 RX ORDER — INSULIN GLARGINE 100 [IU]/ML
5 INJECTION, SOLUTION SUBCUTANEOUS
Status: DISCONTINUED | OUTPATIENT
Start: 2025-01-25 | End: 2025-01-26

## 2025-01-25 RX ORDER — HEPARIN SODIUM 1000 [USP'U]/ML
2000 INJECTION, SOLUTION INTRAVENOUS; SUBCUTANEOUS EVERY 6 HOURS PRN
Status: CANCELLED | OUTPATIENT
Start: 2025-01-25

## 2025-01-25 RX ORDER — ASPIRIN 81 MG/1
81 TABLET ORAL DAILY
Status: DISCONTINUED | OUTPATIENT
Start: 2025-01-26 | End: 2025-01-31

## 2025-01-25 RX ORDER — TRAZODONE HYDROCHLORIDE 50 MG/1
50 TABLET, FILM COATED ORAL
Status: CANCELLED | OUTPATIENT
Start: 2025-01-25

## 2025-01-25 RX ORDER — ACETAMINOPHEN 325 MG/1
650 TABLET ORAL EVERY 6 HOURS PRN
Status: DISCONTINUED | OUTPATIENT
Start: 2025-01-25 | End: 2025-01-31

## 2025-01-25 RX ORDER — HEPARIN SODIUM 10000 [USP'U]/100ML
3-20 INJECTION, SOLUTION INTRAVENOUS
Status: CANCELLED | OUTPATIENT
Start: 2025-01-25

## 2025-01-25 RX ORDER — NITROGLYCERIN 0.4 MG/1
0.4 TABLET SUBLINGUAL
Status: CANCELLED | OUTPATIENT
Start: 2025-01-25

## 2025-01-25 RX ORDER — ALBUTEROL SULFATE 90 UG/1
2 INHALANT RESPIRATORY (INHALATION) EVERY 4 HOURS PRN
Status: CANCELLED | OUTPATIENT
Start: 2025-01-25

## 2025-01-25 RX ORDER — ACETAMINOPHEN 325 MG/1
650 TABLET ORAL EVERY 6 HOURS PRN
Status: CANCELLED | OUTPATIENT
Start: 2025-01-25

## 2025-01-25 RX ORDER — ONDANSETRON 2 MG/ML
4 INJECTION INTRAMUSCULAR; INTRAVENOUS EVERY 6 HOURS PRN
Status: DISCONTINUED | OUTPATIENT
Start: 2025-01-25 | End: 2025-01-31

## 2025-01-25 RX ORDER — HEPARIN SODIUM 10000 [USP'U]/100ML
3-20 INJECTION, SOLUTION INTRAVENOUS
Status: DISCONTINUED | OUTPATIENT
Start: 2025-01-25 | End: 2025-01-30

## 2025-01-25 RX ORDER — HEPARIN SODIUM 1000 [USP'U]/ML
4000 INJECTION, SOLUTION INTRAVENOUS; SUBCUTANEOUS EVERY 6 HOURS PRN
Status: DISCONTINUED | OUTPATIENT
Start: 2025-01-25 | End: 2025-01-30

## 2025-01-25 RX ORDER — ATORVASTATIN CALCIUM 80 MG/1
80 TABLET, FILM COATED ORAL
Status: CANCELLED | OUTPATIENT
Start: 2025-01-26

## 2025-01-25 RX ADMIN — TRAZODONE HYDROCHLORIDE 50 MG: 50 TABLET ORAL at 22:34

## 2025-01-25 RX ADMIN — HEPARIN SODIUM 4000 UNITS: 1000 INJECTION, SOLUTION INTRAVENOUS; SUBCUTANEOUS at 05:47

## 2025-01-25 RX ADMIN — HEPARIN SODIUM 2000 UNITS: 1000 INJECTION INTRAVENOUS; SUBCUTANEOUS at 14:59

## 2025-01-25 RX ADMIN — ATORVASTATIN CALCIUM 80 MG: 80 TABLET, FILM COATED ORAL at 16:40

## 2025-01-25 RX ADMIN — LISINOPRIL 2.5 MG: 2.5 TABLET ORAL at 09:45

## 2025-01-25 RX ADMIN — UMECLIDINIUM 1 PUFF: 62.5 AEROSOL, POWDER ORAL at 09:45

## 2025-01-25 RX ADMIN — INSULIN LISPRO 2 UNITS: 100 INJECTION, SOLUTION INTRAVENOUS; SUBCUTANEOUS at 09:45

## 2025-01-25 RX ADMIN — INSULIN LISPRO 2 UNITS: 100 INJECTION, SOLUTION INTRAVENOUS; SUBCUTANEOUS at 22:34

## 2025-01-25 RX ADMIN — HEPARIN SODIUM 2000 UNITS: 1000 INJECTION INTRAVENOUS; SUBCUTANEOUS at 22:43

## 2025-01-25 RX ADMIN — POTASSIUM CHLORIDE 40 MEQ: 1500 TABLET, EXTENDED RELEASE ORAL at 16:41

## 2025-01-25 RX ADMIN — HEPARIN SODIUM 18 UNITS/KG/HR: 10000 INJECTION, SOLUTION INTRAVENOUS at 21:26

## 2025-01-25 RX ADMIN — ASPIRIN 81 MG: 81 TABLET, COATED ORAL at 09:46

## 2025-01-25 RX ADMIN — INSULIN LISPRO 1 UNITS: 100 INJECTION, SOLUTION INTRAVENOUS; SUBCUTANEOUS at 12:36

## 2025-01-25 RX ADMIN — Medication 12.5 MG: at 09:45

## 2025-01-25 RX ADMIN — Medication 12.5 MG: at 16:41

## 2025-01-25 RX ADMIN — FLUOXETINE HYDROCHLORIDE 40 MG: 20 CAPSULE ORAL at 09:45

## 2025-01-25 RX ADMIN — HEPARIN SODIUM 18 UNITS/KG/HR: 10000 INJECTION, SOLUTION INTRAVENOUS at 15:03

## 2025-01-25 RX ADMIN — SODIUM CHLORIDE 100 ML/HR: 0.9 INJECTION, SOLUTION INTRAVENOUS at 10:07

## 2025-01-25 RX ADMIN — INSULIN GLARGINE 5 UNITS: 100 INJECTION, SOLUTION SUBCUTANEOUS at 22:34

## 2025-01-25 RX ADMIN — INSULIN LISPRO 2 UNITS: 100 INJECTION, SOLUTION INTRAVENOUS; SUBCUTANEOUS at 16:41

## 2025-01-25 NOTE — PROGRESS NOTES
Cardiology Progress Note   MD James Wellington MD, [facility]  Kip Ambrose DO, [facility]  MD Julio Friedman DO, [facility]  Dale Sanabria DO, [facility]  ----------------------------------------------------------------  [address] [address]    Larry Harper 60 y.o. male MRN: [id number]  Unit/Bed#: E4 -01 Encounter: [id number]      ASSESSMENT:   Precordial chest pain  CAD  s/p KAEL-RCA, April 2021  s/p cath w/ 70% mLAD and 95% mLAD, 70% OM3 with patent KAEL of RCA, January 24, 2020  Severe aortic stenosis  w/ Vmax 3.4 m/s, mean PG 29 mmHg, DVI 0.25, GRAYSON 0.75 cm2, January 2024  Hypertension  LVEF 65%, mild LVH, normal diastolic function, severe AS, mild MAC, January 2024  Dyslipidemia  Type 2 diabetes mellitus  History of tobacco use but quit over 2 decades ago  Anxiety/depression    PLAN:  Patient understands that he has severe two-vessel disease with severe aortic stenosis  Reasonable for CT surgical evaluation at [facility]  Continue aspirin, high intensity statin, beta-blocker and lisinopril  Would avoid Plavix with potential need for surgical intervention  Echocardiogram pending to assess cardiac structure and function  Carotid Dopplers and venous mapping have been ordered to expedite CABG/AVR with studies completed, but results pending  Monitor on telemetry  Awaiting transfer to [facility] for CT surgical evaluation    Signed: Kip Ambrose DO, [facility], SADI, DONAVON, FACP      History of Present Illness:  Patient seen and examined.  Denies chest pain, pressure, tightness or squeezing.  Denies lightheadedness, dizziness or palpitations.  Denies lower extremity, thumping or paroxysmal nocturnal dyspnea.      Review of Systems:  Review of Systems   Constitutional: Negative for decreased appetite, fever, weight gain and weight loss.   HENT:  Negative for congestion and sore throat.    Eyes:  Negative for visual disturbance.    Cardiovascular:  Negative for chest pain, dyspnea on exertion, leg swelling, near-syncope and palpitations.   Respiratory:  Negative for cough and shortness of breath.    Hematologic/Lymphatic: Negative for bleeding problem.   Skin:  Negative for rash.   Musculoskeletal:  Negative for myalgias and neck pain.   Gastrointestinal:  Negative for abdominal pain and nausea.   Neurological:  Negative for light-headedness and weakness.   Psychiatric/Behavioral:  Negative for depression.        Allergies   Allergen Reactions    Bee Venom Anaphylaxis    Shellfish Allergy - Food Allergy Anaphylaxis       No current facility-administered medications on file prior to encounter.     Current Outpatient Medications on File Prior to Encounter   Medication Sig    Lantus SoloStar 100 units/mL SOPN 30 Units in the morning    albuterol (PROVENTIL HFA,VENTOLIN HFA) 90 mcg/act inhaler Inhale 2 puffs    aspirin (ECOTRIN LOW STRENGTH) 81 mg EC tablet Take 81 mg by mouth daily    atorvastatin (LIPITOR) 80 mg tablet Take 80 mg by mouth every morning    diazepam (VALIUM) 5 mg tablet Take 1 tablet (5 mg total) by mouth every 6 (six) hours as needed for muscle spasms    Empagliflozin (Jardiance) 10 MG TABS tablet     FLUoxetine (PROzac) 40 MG capsule Take 40 mg by mouth daily    fluticasone (FLONASE) 50 mcg/act nasal spray 1 spray daily    ibuprofen (MOTRIN) 400 mg tablet Take 400 mg by mouth every 8 (eight) hours as needed    insulin glargine (LANTUS) 100 units/mL subcutaneous injection Inject 35 Units under the skin    lisinopril (ZESTRIL) 2.5 mg tablet Take 2.5 mg by mouth daily    lovastatin (MEVACOR) 20 mg tablet Take 20 mg by mouth daily    metFORMIN (GLUCOPHAGE) 1000 MG tablet Take 1,000 mg by mouth daily    metoprolol tartrate (LOPRESSOR) 25 mg tablet Take 12.5 mg by mouth 2 (two) times a day    NovoLOG FlexPen 100 units/mL injection pen INJECT 12 UNITS BEFORE EACH MEAL SUBCUTANEOUSLY    traZODone (DESYREL) 50 mg tablet Take 50 mg by  mouth    umeclidinium (Incruse Ellipta) 62.5 mcg/actuation AEPB inhaler Inhale 1 puff daily        Current Facility-Administered Medications   Medication Dose Route Frequency Provider Last Rate    acetaminophen  650 mg Oral Q6H PRN Pola Leon PA-C      albuterol  2 puff Inhalation Q4H PRN Pola Leon PA-C      aspirin  81 mg Oral Daily Pola Leon PA-C      atorvastatin  80 mg Oral Daily With Dinner Pola Leon PA-C      FLUoxetine  40 mg Oral Daily Pola Leon PA-C      heparin (porcine)  3-20 Units/kg/hr (Order-Specific) Intravenous Titrated Pola Leon PA-C 16 Units/kg/hr (01/25/25 0546)    heparin (porcine)  2,000 Units Intravenous Q6H PRN Pola Leon PA-C      heparin (porcine)  4,000 Units Intravenous Q6H PRN Pola Leon PA-C      insulin lispro  1-6 Units Subcutaneous 4x Daily (AC & HS) Boaz Haro DO      lisinopril  2.5 mg Oral Daily Pola Leon PA-C      metoprolol tartrate  12.5 mg Oral BID Pola Leon PA-C      nitroglycerin  0.4 mg Sublingual Q5 Min PRN Pola Leon PA-C      ondansetron  4 mg Intravenous Q6H PRN Pola Leon PA-C      sodium chloride  100 mL/hr Intravenous Continuous Joseph Batres PA-C 100 mL/hr (01/24/25 1029)    traZODone  50 mg Oral HS Isabel Cohen PA-C      umeclidinium  1 puff Inhalation Daily Pola Leon PA-C         heparin (porcine), 3-20 Units/kg/hr (Order-Specific), Last Rate: 16 Units/kg/hr (01/25/25 0546)  sodium chloride, 100 mL/hr, Last Rate: 100 mL/hr (01/24/25 1029)        Vitals:    01/24/25 2130 01/24/25 2229 01/25/25 0253 01/25/25 0600   BP: 143/68 137/67 125/75    BP Location: Left arm Left arm Left arm    Pulse: 74 79 75    Resp: 16 16 16    Temp: (!) 97.1 °F (36.2 °C) (!) 97.3 °F (36.3 °C) 97.7 °F (36.5 °C)    TempSrc: Temporal Temporal Temporal    SpO2: 97% 95% 95%    Weight:    75.9 kg (167 lb 5.3 oz)     Body mass index is 26.21 kg/m².    No intake or output data in the 24  "hours ending 01/25/25 0744    Weight change: -1.6 kg (-3 lb 8.4 oz)    PHYSICAL EXAMINATION:  Gen: Awake, Alert, NAD  Head/eyes: AT/NC, pupils equal and round, Anicteric  ENT: mmm  Neck: Supple, No elevated JVP, trachea midline  Resp: CTA bilaterally no w/r/r  CV: RRR +S1, S2, No m/r/g  Abd: Soft, NT/ND + BS  Ext: no LE edema bilaterally; r radial site c/d/i  Neuro: Follows commands, moves all extermities  Psych: Appropriate affect, normal mood, pleasant attitude, non-combative  Skin: warm; no rash, erythema or venous stasis changes on exposed skin    Lab Results:  Results from last 7 days   Lab Units 01/25/25  0325   WBC Thousand/uL 7.00   HEMOGLOBIN g/dL 12.8   HEMATOCRIT % 38.7   PLATELETS Thousands/uL 240     Results from last 7 days   Lab Units 01/25/25  0325   POTASSIUM mmol/L 3.4*   CHLORIDE mmol/L 109*   CO2 mmol/L 24   BUN mg/dL 11   CREATININE mg/dL 0.79   CALCIUM mg/dL 8.4   ALK PHOS U/L 40   ALT U/L 13   AST U/L 12*     No results found for: \"TROPONINT\"      Results from last 7 days   Lab Units 01/24/25  0512 01/23/25 2002 01/23/25  1755 01/23/25  1605   HS TNI 0HR ng/L  --   --   --  149*   HS TNI 2HR ng/L  --   --  272*  --    HS TNI 4HR ng/L  --  288*  --   --    HS TNI RAND ng/L 142*  --   --   --      Results from last 7 days   Lab Units 01/23/25  1605   INR  0.98       Tele: SR w/ PVCs    This note was completed in part utilizing M-Modal Fluency Direct Software.  Grammatical errors, random word insertions, spelling mistakes, and incomplete sentences may be an occasional consequence of this system secondary to software limitations, ambient noise, and hardware issues.  If you have any questions or concerns about the content, text, or information contained within the body of this dictation, please contact the provider for clarification.      "

## 2025-01-25 NOTE — PROGRESS NOTES
Progress Note - Hospitalist   Name: Larry Harper 60 y.o. male I MRN: [id number]  Unit/Bed#: E4 -01 I Date of Admission: 1/24/2025   Date of Service: 1/25/2025 I Hospital Day: 1    Assessment & Plan  NSTEMI (non-ST elevated myocardial infarction) (HCC)  History of CAD status post PCI diabetes mellitus PTSD/depression who presented to the hospital with chest pain  Initially presented to [facility] noted to have elevated cardiac enzymes recommended transfer here for cardiac catheterization capable facility  Cardiac catheterization here with severe LAD disease and given AAS was recommended transfer to [facility] for CT surgery evaluation  Continue aspirin.  Clopidogrel discontinued.  On heparin infusion until okay with cardiology to discontinue  Echocardiogram pending.    Lab Results   Component Value Date    HSTNI0 149 (H) 01/23/2025    HSTNI2 272 (H) 01/23/2025    HSTNI4 288 (H) 01/23/2025    HSTNI 142 (H) 01/24/2025     Coronary artery disease  CAD with history of PCI 2021  See above.  Diabetes mellitus (HCC)  Lab Results   Component Value Date    HGBA1C 8.9 (H) 01/24/2025     Recent Labs     01/24/25  1642 01/24/25  2111 01/25/25  0846 01/25/25  1231   POCGLU 196* 172* 203* 186*     Previously on glargine 35 units twice daily with lispro but ran out of medications  Prior to admission on metformin and Jardiance on hold  Continue sliding scale insulin.  Depression  Depression with a history of PTSD.  Mood stable.  Continue fluoxetine  Severe aortic stenosis  History of severe AS last GRAYSON 0.75 cm².  Repeat echocardiogram ordered.  Patient being transferred to [place] for CABG/AAS evaluation    VTE Pharmacologic Prophylaxis: VTE Score: 3 Moderate Risk (Score 3-4) - Pharmacological DVT Prophylaxis Ordered: heparin drip.    Mobility:   Basic Mobility Inpatient Raw Score: 24  JH-HLM Goal: 8: Walk 250 feet or more  JH-HLM Achieved: 7: Walk 25 feet or more  JH-HLM Goal achieved. Continue to encourage appropriate  mobility.    Patient Centered Rounds: I have performed bedside rounds with nursing staff today.  Discussions with Specialists or Other Care Team Provider: Care management, patient access center    Education and Discussions with Family / Patient: Patient declined call to .     Current Length of Stay: 1 day(s)  Current Patient Status: Inpatient   Certification Statement: The patient will continue to require additional inpatient hospital stay due to transfer to [place]  Discharge Plan:     Code Status: Level 1 - Full Code    Subjective   Patient seen and examined.  No new complaints.  No further chest pain since arrival.    Objective   Vitals:   Temp (24hrs), Av.7 °F (36.5 °C), Min:97.1 °F (36.2 °C), Max:98.5 °F (36.9 °C)    Temp:  [97.1 °F (36.2 °C)-98.5 °F (36.9 °C)] 98.5 °F (36.9 °C)  HR:  [65-79] 65  Resp:  [16-18] 18  BP: (125-157)/(67-84) 129/70  SpO2:  [95 %-98 %] 98 %  Body mass index is 26.21 kg/m².     Input and Output Summary (last 24 hours):     Intake/Output Summary (Last 24 hours) at 2025 1526  Last data filed at 2025 1300  Gross per 24 hour   Intake 640 ml   Output --   Net 640 ml       Physical Exam  Vitals reviewed.   Constitutional:       General: He is not in acute distress.  HENT:      Head: Atraumatic.   Eyes:      General: No scleral icterus.  Cardiovascular:      Rate and Rhythm: Regular rhythm.      Heart sounds: Murmur heard.   Pulmonary:      Effort: Pulmonary effort is normal.      Breath sounds: Decreased breath sounds present. No wheezing.   Abdominal:      General: Bowel sounds are normal.      Palpations: Abdomen is soft.      Tenderness: There is no abdominal tenderness.   Musculoskeletal:         General: No swelling or tenderness.   Skin:     General: Skin is warm and dry.   Neurological:      General: No focal deficit present.      Mental Status: He is alert.   Psychiatric:         Mood and Affect: Mood normal.       Lines/Drains:  Invasive Devices        Peripheral Intravenous Line  Duration             Peripheral IV 01/23/25 Left Antecubital 2 days    Peripheral IV 01/24/25 Dorsal (posterior);Left Hand 1 day                      Telemetry:  Telemetry Orders (From admission, onward)               24 Hour Telemetry Monitoring  Continuous x 24 Hours (Telem)        Expiring   Question:  Reason for 24 Hour Telemetry  Answer:  PCI/EP study (including pacer and ICD implementation), Cardiac surgery, MI, abnormal cardiac cath, and chest pain- rule out MI                     Telemetry Reviewed: Normal Sinus Rhythm  Indication for Continued Telemetry Use:                Lab Results: I have reviewed the following results:   Results from last 7 days   Lab Units 01/25/25  0325 01/24/25  0512 01/23/25  1605   WBC Thousand/uL 7.00 7.18 9.45   HEMOGLOBIN g/dL 12.8 13.9 14.3   PLATELETS Thousands/uL 240 243 284   MCV fL 81* 81* 81*   INR   --   --  0.98     Results from last 7 days   Lab Units 01/25/25  0325 01/24/25  0512 01/23/25  1605   SODIUM mmol/L 140 139 140   POTASSIUM mmol/L 3.4* 3.4* 3.2*   CHLORIDE mmol/L 109* 107 107   CO2 mmol/L 24 24 25   ANION GAP mmol/L 7 8 8   BUN mg/dL 11 16 16   CREATININE mg/dL 0.79 0.73 0.78   CALCIUM mg/dL 8.4 8.9 9.2   ALBUMIN g/dL 3.7  --  4.1   TOTAL BILIRUBIN mg/dL 0.36  --  0.36   ALK PHOS U/L 40  --  51   ALT U/L 13  --  14   AST U/L 12*  --  15   EGFR ml/min/1.73sq m 98 102 99   GLUCOSE RANDOM mg/dL 129 180* 156*     Results from last 7 days   Lab Units 01/24/25  0512   MAGNESIUM mg/dL 1.9         Results from last 7 days   Lab Units 01/23/25 2002 01/23/25  1755 01/23/25  1605   HS TNI 0HR ng/L  --   --  149*   HS TNI 2HR ng/L  --  272*  --    HS TNI 4HR ng/L 288*  --   --               Results from last 7 days   Lab Units 01/25/25  1231 01/25/25  0846 01/24/25  2111 01/24/25  1642 01/24/25  0808 01/24/25  0508   POC GLUCOSE mg/dl 186* 203* 172* 196* 159* 178*     Results from last 7 days   Lab Units 01/24/25  0512   HEMOGLOBIN A1C %  8.9*           Recent Cultures (last 7 days):         Imaging:  Reviewed radiology reports from this admission including:    VAS carotid complete study  Result Date: 1/25/2025  CONCLUSION:  Impression: RIGHT: There is <50% stenosis noted in the internal carotid artery. Plaque is heterogenous and irregular. Vertebral artery flow is antegrade. There is no significant subclavian artery disease. LEFT: There is <50% stenosis noted in the internal carotid artery. Plaque is heterogenous and irregular. Vertebral artery flow is antegrade. There is no significant subclavian artery disease.  No previous study for comparison.  SIGNATURE: Electronically Signed by: ANGELITA LO MD on 2025-01-25 11:35:18 AM    Cardiac catheterization  Result Date: 1/24/2025  Narrative:   Mid LAD-1 lesion is 70% stenosed.   Mid LAD-2 lesion is 95% stenosed.   3rd Mrg lesion is 70% stenosed. Severe LAD disease.       Last 24 Hours Medication List:     Current Facility-Administered Medications:     acetaminophen (TYLENOL) tablet 650 mg, Q6H PRN    albuterol (PROVENTIL HFA,VENTOLIN HFA) inhaler 2 puff, Q4H PRN    aspirin (ECOTRIN LOW STRENGTH) EC tablet 81 mg, Daily    atorvastatin (LIPITOR) tablet 80 mg, Daily With Dinner    FLUoxetine (PROzac) capsule 40 mg, Daily    heparin (porcine) 25,000 units in 0.45% NaCl 250 mL infusion (premix), Titrated, Last Rate: 18 Units/kg/hr (01/25/25 1503)    heparin (porcine) injection 2,000 Units, Q6H PRN    heparin (porcine) injection 4,000 Units, Q6H PRN    insulin lispro (HumALOG/ADMELOG) 100 units/mL subcutaneous injection 1-6 Units, 4x Daily (AC & HS) **AND** Fingerstick Glucose (POCT), 4x Daily AC and at bedtime    lisinopril (ZESTRIL) tablet 2.5 mg, Daily    metoprolol tartrate (LOPRESSOR) partial tablet 12.5 mg, BID    nitroglycerin (NITROSTAT) SL tablet 0.4 mg, Q5 Min PRN    ondansetron (ZOFRAN) injection 4 mg, Q6H PRN    sodium chloride 0.9 % infusion, Continuous, Last Rate: 100 mL/hr (01/25/25 1007)     traZODone (DESYREL) tablet 50 mg, HS    umeclidinium 62.5 mcg/actuation inhaler AEPB 1 puff, Daily    Administrative Statements   Today, Patient Was Seen By: Boaz Haro, DO  I have spent a total time of 35 minutes in caring for this patient on the day of the visit/encounter including Documenting in the medical record, Reviewing / ordering tests, medicine, procedures  , and Communicating with other healthcare professionals .    **Please Note: This note may have been constructed using a voice recognition system.**

## 2025-01-25 NOTE — ASSESSMENT & PLAN NOTE
History of severe AS last GRAYSON 0.75 cm².  Repeat echocardiogram ordered.  Patient being transferred to [place] for CABG/AAS evaluation

## 2025-01-25 NOTE — PLAN OF CARE
Problem: INFECTION - ADULT  Goal: Absence or prevention of progression during hospitalization  Description: INTERVENTIONS:  - Assess and monitor for signs and symptoms of infection  - Monitor lab/diagnostic results  - Monitor all insertion sites, i.e. indwelling lines, tubes, and drains  - Monitor endotracheal if appropriate and nasal secretions for changes in amount and color  - [place] appropriate cooling/warming therapies per order  - Administer medications as ordered  - Instruct and encourage patient and family to use good hand hygiene technique  - Identify and instruct in appropriate isolation precautions for identified infection/condition  Outcome: Progressing     Problem: SAFETY ADULT  Goal: Patient will remain free of falls  Description: INTERVENTIONS:  - Educate patient/family on patient safety including physical limitations  - Instruct patient to call for assistance with activity   - Consult OT/PT to assist with strengthening/mobility   - Keep Call bell within reach  - Keep bed low and locked with side rails adjusted as appropriate  - Keep care items and personal belongings within reach  - Initiate and maintain comfort rounds  - Make Fall Risk Sign visible to staff  - Offer Toileting every  Hours, in advance of need  - Initiate/Maintain alarm  - Obtain necessary fall risk management equipme  - Apply yellow socks and bracelet for high fall risk patients  - Consider moving patient to room near nurses station  Outcome: Progressing     Problem: DISCHARGE PLANNING  Goal: Discharge to home or other facility with appropriate resources  Description: INTERVENTIONS:  - Identify barriers to discharge w/patient and caregiver  - Arrange for needed discharge resources and transportation as appropriate  - Identify discharge learning needs (meds, wound care, etc.)  - Arrange for interpretive services to assist at discharge as needed  - Refer to Case Management Department for coordinating discharge planning if the  patient needs post-hospital services based on physician/advanced practitioner order or complex needs related to functional status, cognitive ability, or social support system  Outcome: Progressing     Problem: Knowledge Deficit  Goal: Patient/family/caregiver demonstrates understanding of disease process, treatment plan, medications, and discharge instructions  Description: Complete learning assessment and assess knowledge base.  Interventions:  - Provide teaching at level of understanding  - Provide teaching via preferred learning methods  Outcome: Progressing     Problem: CARDIOVASCULAR - ADULT  Goal: Maintains optimal cardiac output and hemodynamic stability  Description: INTERVENTIONS:  - Monitor I/O, vital signs and rhythm  - Monitor for S/S and trends of decreased cardiac output  - Administer and titrate ordered vasoactive medications to optimize hemodynamic stability  - Assess quality of pulses, skin color and temperature  - Assess for signs of decreased coronary artery perfusion  - Instruct patient to report change in severity of symptoms  Outcome: Progressing     Problem: RESPIRATORY - ADULT  Goal: Achieves optimal ventilation and oxygenation  Description: INTERVENTIONS:  - Assess for changes in respiratory status  - Assess for changes in mentation and behavior  - Position to facilitate oxygenation and minimize respiratory effort  - Oxygen administered by appropriate delivery if ordered  - Initiate smoking cessation education as indicated  - Encourage broncho-pulmonary hygiene including cough, deep breathe, Incentive Spirometry  - Assess the need for suctioning and aspirate as needed  - Assess and instruct to report SOB or any respiratory difficulty  - Respiratory Therapy support as indicated  Outcome: Progressing

## 2025-01-25 NOTE — ASSESSMENT & PLAN NOTE
History of CAD status post PCI diabetes mellitus PTSD/depression who presented to the hospital with chest pain  Initially presented to [facility] noted to have elevated cardiac enzymes recommended transfer here for cardiac catheterization capable facility  Cardiac catheterization here with severe LAD disease and given AS was recommended transfer to [facility] for CT surgery evaluation  Continue aspirin.  Clopidogrel discontinued.  On heparin infusion until okay with cardiology to discontinue  Echocardiogram pending.    Lab Results   Component Value Date    HSTNI0 149 (H) 01/23/2025    HSTNI2 272 (H) 01/23/2025    HSTNI4 288 (H) 01/23/2025    HSTNI 142 (H) 01/24/2025

## 2025-01-25 NOTE — ASSESSMENT & PLAN NOTE
Replace and recheck tomorrow    Results from last 7 days   Lab Units 01/25/25  0325 01/24/25  0512 01/23/25  1605   POTASSIUM mmol/L 3.4* 3.4* 3.2*

## 2025-01-25 NOTE — ASSESSMENT & PLAN NOTE
Lab Results   Component Value Date    HGBA1C 8.9 (H) 01/24/2025     Recent Labs     01/24/25  2111 01/25/25  0846 01/25/25  1231 01/25/25  1607   POCGLU 172* 203* 186* 199*     Previously on glargine 35 units twice daily with lispro but ran out of medications  Prior to admission on metformin and Jardiance on hold  Continue sliding scale insulin.

## 2025-01-25 NOTE — ASSESSMENT & PLAN NOTE
History of severe AS last GRAYSON 0.75 cm².  Repeat echocardiogram ordered.  Patient being transferred to [place] for CABG/AAS evaluation

## 2025-01-25 NOTE — DISCHARGE SUMMARY
Discharge Summary - Hospitalist   Name: Larry Harper 60 y.o. male I MRN: [id number]  Unit/Bed#: E4 -01 I Date of Admission: 1/24/2025   Date of Service: 1/25/2025 I Hospital Day: 1    Assessment & Plan  NSTEMI (non-ST elevated myocardial infarction) (HCC)  History of CAD status post PCI diabetes mellitus PTSD/depression who presented to the hospital with chest pain  Initially presented to [facility] noted to have elevated cardiac enzymes recommended transfer here for cardiac catheterization capable facility  Cardiac catheterization here with severe LAD disease and given AS was recommended transfer to [facility] for CT surgery evaluation  Continue aspirin.  Clopidogrel discontinued.  On heparin infusion until okay with cardiology to discontinue  Echocardiogram pending.    Lab Results   Component Value Date    HSTNI0 149 (H) 01/23/2025    HSTNI2 272 (H) 01/23/2025    HSTNI4 288 (H) 01/23/2025    HSTNI 142 (H) 01/24/2025     Coronary artery disease  CAD with history of PCI 2021  See above.  Diabetes mellitus (HCC)  Lab Results   Component Value Date    HGBA1C 8.9 (H) 01/24/2025     Recent Labs     01/24/25  2111 01/25/25  0846 01/25/25  1231 01/25/25  1607   POCGLU 172* 203* 186* 199*     Previously on glargine 35 units twice daily with lispro but ran out of medications  Prior to admission on metformin and Jardiance on hold  Continue sliding scale insulin.  Depression  Depression with a history of PTSD.  Mood stable.  Continue fluoxetine  Severe aortic stenosis  History of severe AS last GRAYSON 0.75 cm².  Repeat echocardiogram ordered.  Patient being transferred to [place] for CABG/AAS evaluation  Hypokalemia  Replace and recheck tomorrow    Results from last 7 days   Lab Units 01/25/25  0325 01/24/25  0512 01/23/25  1605   POTASSIUM mmol/L 3.4* 3.4* 3.2*         Medical Problems       Resolved Problems  Date Reviewed: 1/25/2025   None        Discharging Physician / Practitioner: Boaz Haro DO  PCP:  Efe Portillo DO  Admission Date:   Admission Orders (From admission, onward)       Ordered        01/24/25 0406  INPATIENT ADMISSION  Once                        Discharge Date: 01/25/25    Consultations During Hospital Stay:  IP CONSULT TO CARDIOLOGY     Procedures Performed:   Cardiac catheterization  Result Date: 1/24/2025  Narrative:   Mid LAD-1 lesion is 70% stenosed.   Mid LAD-2 lesion is 95% stenosed.   3rd Mrg lesion is 70% stenosed. Severe LAD disease.     Images:   VAS carotid complete study  Result Date: 1/25/2025  CONCLUSION:  Impression: RIGHT: There is <50% stenosis noted in the internal carotid artery. Plaque is heterogenous and irregular. Vertebral artery flow is antegrade. There is no significant subclavian artery disease. LEFT: There is <50% stenosis noted in the internal carotid artery. Plaque is heterogenous and irregular. Vertebral artery flow is antegrade. There is no significant subclavian artery disease.  No previous study for comparison.  SIGNATURE: Electronically Signed by: ANGELITA LO MD on 2025-01-25 11:35:18 AM    XR chest 1 view portable  Result Date: 1/23/2025  Impression: Lungs clear. Trace left pleural effusion. Workstation performed: [id number]       Lab Results: I have reviewed the following results:  Results from last 7 days   Lab Units 01/25/25  0325 01/24/25  0512 01/23/25  1605   WBC Thousand/uL 7.00 7.18 9.45   HEMOGLOBIN g/dL 12.8 13.9 14.3   HEMATOCRIT % 38.7 41.1 42.5   MCV fL 81* 81* 81*   PLATELETS Thousands/uL 240 243 284   INR   --   --  0.98     Results from last 7 days   Lab Units 01/25/25  0325 01/24/25  0512 01/23/25  1605   SODIUM mmol/L 140 139 140   POTASSIUM mmol/L 3.4* 3.4* 3.2*   CHLORIDE mmol/L 109* 107 107   CO2 mmol/L 24 24 25   BUN mg/dL 11 16 16   CREATININE mg/dL 0.79 0.73 0.78   CALCIUM mg/dL 8.4 8.9 9.2   ALBUMIN g/dL 3.7  --  4.1   TOTAL BILIRUBIN mg/dL 0.36  --  0.36   ALK PHOS U/L 40  --  51   ALT U/L 13  --  14   AST U/L 12*  --  15   EGFR  ml/min/1.73sq m 98 102 99   GLUCOSE RANDOM mg/dL 129 180* 156*         Results from last 7 days   Lab Units 01/23/25 2002 01/23/25  1755 01/23/25  1605   HS TNI 0HR ng/L  --   --  149*   HS TNI 2HR ng/L  --  272*  --    HS TNI 4HR ng/L 288*  --   --           Results from last 7 days   Lab Units 01/23/25  1605   D-DIMER QUANTITATIVE ug/ml FEU <0.27     Results from last 7 days   Lab Units 01/25/25  1607 01/25/25  1231 01/25/25  0846 01/24/25  2111 01/24/25  1642 01/24/25  0808 01/24/25  0508   POC GLUCOSE mg/dl 199* 186* 203* 172* 196* 159* 178*     Results from last 7 days   Lab Units 01/24/25  0512   HEMOGLOBIN A1C % 8.9*             Results from last 7 days   Lab Units 01/24/25  0512   TRIGLYCERIDES mg/dL 108   CHOLESTEROL mg/dL 226*   LDL CALC mg/dL 163*   HDL mg/dL 41       Incidental Findings:      Test Results Pending at Discharge (will require follow up):      Reason for Admission:   Chest pain    Hospital Course:   Larry Harper is a 60 y.o. male patient who originally presented to [facility] 1/23 for chest pain.  Due to elevated cardiac enzymes the patient was transferred here for cardiac catheterization capable facility.  Unfortunately he was found to have severe LAD disease and given aortic stenosis recommendation was transferred to [place] for CT surgery evaluation.  He remains on heparin infusion.  He is chest pain-free.  He is being discharged to [facility] at this time.    Please see above list of diagnoses and related plan for additional information.     Condition at Discharge: stable     Discharge Day Visit / Exam:   Refer to progress note from earlier today    Discussion with Family: No family available for contact    Discharge instructions/Information to patient and family:   See after visit summary for information provided to patient and family.      Provisions for Follow-Up Care:  See after visit summary for information related to follow-up care and any pertinent home health  orders.      Mobility at time of Discharge:  Basic Mobility Inpatient Raw Score: 24  JH-HLM Goal: 8: Walk 250 feet or more  JH-HLM Achieved: 7: Walk 25 feet or more  JH-HLM Goal achieved. Continue to encourage appropriate mobility.    Disposition:   Acute [facility] Transfer to [facility]    Planned Readmission: Yes     Discharge Medications:  See after visit summary for reconciled discharge medications provided to patient and family.      Administrative Statements   I spent 35 minutes discharging the patient. This time was spent on the day of discharge. I had direct contact with the patient on the day of discharge. Greater than 50% of the total time was spent examining patient, answering all patient questions, arranging and discussing plan of care with patient as well as directly providing post-discharge instructions.  Additional time then spent on discharge activities.    **Please Note: This note may have been constructed using a voice recognition system**

## 2025-01-25 NOTE — ASSESSMENT & PLAN NOTE
Lab Results   Component Value Date    HGBA1C 8.9 (H) 01/24/2025     Recent Labs     01/24/25  1642 01/24/25  2111 01/25/25  0846 01/25/25  1231   POCGLU 196* 172* 203* 186*     Previously on glargine 35 units twice daily with lispro but ran out of medications  Prior to admission on metformin and Jardiance on hold  Continue sliding scale insulin.

## 2025-01-25 NOTE — ASSESSMENT & PLAN NOTE
History of CAD status post PCI diabetes mellitus PTSD/depression who presented to the hospital with chest pain  Initially presented to [facility] [place] noted to have elevated cardiac enzymes recommended transfer here for cardiac catheterization capable facility  Cardiac catheterization here with severe LAD disease and given AAS was recommended transfer to [facility] for CT surgery evaluation  Continue aspirin.  Clopidogrel discontinued.  On heparin infusion until okay with cardiology to discontinue  Echocardiogram pending.    Lab Results   Component Value Date    HSTNI0 149 (H) 01/23/2025    HSTNI2 272 (H) 01/23/2025    HSTNI4 288 (H) 01/23/2025    HSTNI 142 (H) 01/24/2025

## 2025-01-25 NOTE — PLAN OF CARE
Problem: INFECTION - ADULT  Goal: Absence or prevention of progression during hospitalization  Description: INTERVENTIONS:  - Assess and monitor for signs and symptoms of infection  - Monitor lab/diagnostic results  - Monitor all insertion sites, i.e. indwelling lines, tubes, and drains  - Monitor endotracheal if appropriate and nasal secretions for changes in amount and color  - [place] appropriate cooling/warming therapies per order  - Administer medications as ordered  - Instruct and encourage patient and family to use good hand hygiene technique  - Identify and instruct in appropriate isolation precautions for identified infection/condition  Outcome: Progressing     Problem: SAFETY ADULT  Goal: Patient will remain free of falls  Description: INTERVENTIONS:  - Educate patient/family on patient safety including physical limitations  - Instruct patient to call for assistance with activity   - Consult OT/PT to assist with strengthening/mobility   - Keep Call bell within reach  - Keep bed low and locked with side rails adjusted as appropriate  - Keep care items and personal belongings within reach  - Initiate and maintain comfort rounds  - Make Fall Risk Sign visible to staff  - Offer Toileting every 2 Hours, in advance of need  - Initiate/Maintain bed alarm  - Obtain necessary fall risk management equipment: alarm  - Apply yellow socks and bracelet for high fall risk patients  - Consider moving patient to room near nurses station  Outcome: Progressing     Problem: DISCHARGE PLANNING  Goal: Discharge to home or other facility with appropriate resources  Description: INTERVENTIONS:  - Identify barriers to discharge w/patient and caregiver  - Arrange for needed discharge resources and transportation as appropriate  - Identify discharge learning needs (meds, wound care, etc.)  - Arrange for interpretive services to assist at discharge as needed  - Refer to Case Management Department for coordinating discharge planning  if the patient needs post-hospital services based on physician/advanced practitioner order or complex needs related to functional status, cognitive ability, or social support system  Outcome: Progressing     Problem: Knowledge Deficit  Goal: Patient/family/caregiver demonstrates understanding of disease process, treatment plan, medications, and discharge instructions  Description: Complete learning assessment and assess knowledge base.  Interventions:  - Provide teaching at level of understanding  - Provide teaching via preferred learning methods  Outcome: Progressing     Problem: CARDIOVASCULAR - ADULT  Goal: Maintains optimal cardiac output and hemodynamic stability  Description: INTERVENTIONS:  - Monitor I/O, vital signs and rhythm  - Monitor for S/S and trends of decreased cardiac output  - Administer and titrate ordered vasoactive medications to optimize hemodynamic stability  - Assess quality of pulses, skin color and temperature  - Assess for signs of decreased coronary artery perfusion  - Instruct patient to report change in severity of symptoms  Outcome: Progressing     Problem: RESPIRATORY - ADULT  Goal: Achieves optimal ventilation and oxygenation  Description: INTERVENTIONS:  - Assess for changes in respiratory status  - Assess for changes in mentation and behavior  - Position to facilitate oxygenation and minimize respiratory effort  - Oxygen administered by appropriate delivery if ordered  - Initiate smoking cessation education as indicated  - Encourage broncho-pulmonary hygiene including cough, deep breathe, Incentive Spirometry  - Assess the need for suctioning and aspirate as needed  - Assess and instruct to report SOB or any respiratory difficulty  - Respiratory Therapy support as indicated  Outcome: Progressing

## 2025-01-26 ENCOUNTER — APPOINTMENT (INPATIENT)
Dept: RADIOLOGY | Facility: HOSPITAL | Age: 60
DRG: 216 | End: 2025-01-26
Payer: MEDICARE

## 2025-01-26 PROBLEM — J44.9 COPD (CHRONIC OBSTRUCTIVE PULMONARY DISEASE) (HCC): Status: ACTIVE | Noted: 2025-01-26

## 2025-01-26 PROBLEM — E78.5 DYSLIPIDEMIA: Status: ACTIVE | Noted: 2025-01-26

## 2025-01-26 LAB
ABO GROUP BLD: NORMAL
ANION GAP SERPL CALCULATED.3IONS-SCNC: 7 MMOL/L (ref 4–13)
APTT PPP: 52 SECONDS (ref 23–34)
APTT PPP: 78 SECONDS (ref 23–34)
APTT PPP: 95 SECONDS (ref 23–34)
BLD GP AB SCN SERPL QL: NEGATIVE
BUN SERPL-MCNC: 15 MG/DL (ref 5–25)
CALCIUM SERPL-MCNC: 8.8 MG/DL (ref 8.4–10.2)
CHLORIDE SERPL-SCNC: 109 MMOL/L (ref 96–108)
CHOLEST SERPL-MCNC: 204 MG/DL (ref ?–200)
CO2 SERPL-SCNC: 27 MMOL/L (ref 21–32)
CREAT SERPL-MCNC: 0.84 MG/DL (ref 0.6–1.3)
ERYTHROCYTE [DISTWIDTH] IN BLOOD BY AUTOMATED COUNT: 14.5 % (ref 11.6–15.1)
GFR SERPL CREATININE-BSD FRML MDRD: 96 ML/MIN/1.73SQ M
GLUCOSE SERPL-MCNC: 141 MG/DL (ref 65–140)
GLUCOSE SERPL-MCNC: 156 MG/DL (ref 65–140)
GLUCOSE SERPL-MCNC: 159 MG/DL (ref 65–140)
GLUCOSE SERPL-MCNC: 237 MG/DL (ref 65–140)
GLUCOSE SERPL-MCNC: 250 MG/DL (ref 65–140)
HCT VFR BLD AUTO: 39.2 % (ref 36.5–49.3)
HDLC SERPL-MCNC: 47 MG/DL
HGB BLD-MCNC: 12.9 G/DL (ref 12–17)
LDLC SERPL CALC-MCNC: 133 MG/DL (ref 0–100)
MAGNESIUM SERPL-MCNC: 1.8 MG/DL (ref 1.9–2.7)
MCH RBC QN AUTO: 27.3 PG (ref 26.8–34.3)
MCHC RBC AUTO-ENTMCNC: 32.9 G/DL (ref 31.4–37.4)
MCV RBC AUTO: 83 FL (ref 82–98)
NONHDLC SERPL-MCNC: 157 MG/DL
PHOSPHATE SERPL-MCNC: 3.3 MG/DL (ref 2.7–4.5)
PLATELET # BLD AUTO: 245 THOUSANDS/UL (ref 149–390)
PMV BLD AUTO: 11.7 FL (ref 8.9–12.7)
POTASSIUM SERPL-SCNC: 3.8 MMOL/L (ref 3.5–5.3)
RBC # BLD AUTO: 4.72 MILLION/UL (ref 3.88–5.62)
RH BLD: POSITIVE
SODIUM SERPL-SCNC: 143 MMOL/L (ref 135–147)
SPECIMEN EXPIRATION DATE: NORMAL
TRIGL SERPL-MCNC: 121 MG/DL (ref ?–150)
WBC # BLD AUTO: 7.11 THOUSAND/UL (ref 4.31–10.16)

## 2025-01-26 PROCEDURE — 85027 COMPLETE CBC AUTOMATED: CPT | Performed by: INTERNAL MEDICINE

## 2025-01-26 PROCEDURE — 86901 BLOOD TYPING SEROLOGIC RH(D): CPT | Performed by: PHYSICIAN ASSISTANT

## 2025-01-26 PROCEDURE — 85730 THROMBOPLASTIN TIME PARTIAL: CPT | Performed by: SURGERY

## 2025-01-26 PROCEDURE — 99232 SBSQ HOSP IP/OBS MODERATE 35: CPT | Performed by: STUDENT IN AN ORGANIZED HEALTH CARE EDUCATION/TRAINING PROGRAM

## 2025-01-26 PROCEDURE — 86850 RBC ANTIBODY SCREEN: CPT | Performed by: PHYSICIAN ASSISTANT

## 2025-01-26 PROCEDURE — 71250 CT THORAX DX C-: CPT

## 2025-01-26 PROCEDURE — 80061 LIPID PANEL: CPT | Performed by: PHYSICIAN ASSISTANT

## 2025-01-26 PROCEDURE — NC001 PR NO CHARGE: Performed by: INTERNAL MEDICINE

## 2025-01-26 PROCEDURE — 87081 CULTURE SCREEN ONLY: CPT | Performed by: PHYSICIAN ASSISTANT

## 2025-01-26 PROCEDURE — 80048 BASIC METABOLIC PNL TOTAL CA: CPT | Performed by: INTERNAL MEDICINE

## 2025-01-26 PROCEDURE — 93005 ELECTROCARDIOGRAM TRACING: CPT

## 2025-01-26 PROCEDURE — 82948 REAGENT STRIP/BLOOD GLUCOSE: CPT

## 2025-01-26 PROCEDURE — 86900 BLOOD TYPING SEROLOGIC ABO: CPT | Performed by: PHYSICIAN ASSISTANT

## 2025-01-26 PROCEDURE — 85730 THROMBOPLASTIN TIME PARTIAL: CPT | Performed by: INTERNAL MEDICINE

## 2025-01-26 PROCEDURE — 84100 ASSAY OF PHOSPHORUS: CPT | Performed by: INTERNAL MEDICINE

## 2025-01-26 PROCEDURE — 99232 SBSQ HOSP IP/OBS MODERATE 35: CPT | Performed by: INTERNAL MEDICINE

## 2025-01-26 PROCEDURE — 85730 THROMBOPLASTIN TIME PARTIAL: CPT | Performed by: STUDENT IN AN ORGANIZED HEALTH CARE EDUCATION/TRAINING PROGRAM

## 2025-01-26 PROCEDURE — 99223 1ST HOSP IP/OBS HIGH 75: CPT | Performed by: THORACIC SURGERY (CARDIOTHORACIC VASCULAR SURGERY)

## 2025-01-26 PROCEDURE — 83735 ASSAY OF MAGNESIUM: CPT | Performed by: INTERNAL MEDICINE

## 2025-01-26 RX ORDER — CHLORHEXIDINE GLUCONATE ORAL RINSE 1.2 MG/ML
15 SOLUTION DENTAL ONCE
Status: COMPLETED | OUTPATIENT
Start: 2025-01-27 | End: 2025-01-27

## 2025-01-26 RX ORDER — INSULIN GLARGINE 100 [IU]/ML
10 INJECTION, SOLUTION SUBCUTANEOUS
Status: DISCONTINUED | OUTPATIENT
Start: 2025-01-26 | End: 2025-01-28

## 2025-01-26 RX ADMIN — FLUOXETINE HYDROCHLORIDE 40 MG: 20 CAPSULE ORAL at 09:05

## 2025-01-26 RX ADMIN — INSULIN LISPRO 3 UNITS: 100 INJECTION, SOLUTION INTRAVENOUS; SUBCUTANEOUS at 22:19

## 2025-01-26 RX ADMIN — NITROGLYCERIN 0.4 MG: 0.4 TABLET SUBLINGUAL at 19:51

## 2025-01-26 RX ADMIN — INSULIN GLARGINE 10 UNITS: 100 INJECTION, SOLUTION SUBCUTANEOUS at 22:20

## 2025-01-26 RX ADMIN — INSULIN LISPRO 3 UNITS: 100 INJECTION, SOLUTION INTRAVENOUS; SUBCUTANEOUS at 12:17

## 2025-01-26 RX ADMIN — ACETAMINOPHEN 650 MG: 325 TABLET, FILM COATED ORAL at 09:10

## 2025-01-26 RX ADMIN — LISINOPRIL 2.5 MG: 2.5 TABLET ORAL at 09:07

## 2025-01-26 RX ADMIN — Medication 12.5 MG: at 17:36

## 2025-01-26 RX ADMIN — ASPIRIN 81 MG: 81 TABLET, COATED ORAL at 09:05

## 2025-01-26 RX ADMIN — ATORVASTATIN CALCIUM 80 MG: 80 TABLET, FILM COATED ORAL at 17:36

## 2025-01-26 RX ADMIN — Medication 12.5 MG: at 09:09

## 2025-01-26 RX ADMIN — INSULIN LISPRO 1 UNITS: 100 INJECTION, SOLUTION INTRAVENOUS; SUBCUTANEOUS at 09:06

## 2025-01-26 RX ADMIN — TRAZODONE HYDROCHLORIDE 50 MG: 50 TABLET ORAL at 22:20

## 2025-01-26 RX ADMIN — HEPARIN SODIUM 2000 UNITS: 1000 INJECTION INTRAVENOUS; SUBCUTANEOUS at 14:26

## 2025-01-26 RX ADMIN — HEPARIN SODIUM 20 UNITS/KG/HR: 10000 INJECTION, SOLUTION INTRAVENOUS at 14:06

## 2025-01-26 NOTE — CONSULTS
Consultation - Cardiac Surgery   Larry Harper 60 y.o. male MRN: [id number]  Unit/Bed#: [facility] 425-01 Encounter: [id number]    Physician Requesting Consult: Nolvia Ybarra DO    Reason for Consult / Principal Problem: Severe AS, MVCAD    Inpatient consult to Cardiac Surgery  Consult performed by: Carolyn Strauss PA-C  Consult ordered by: Jordon Hood MD        History of Present Illness: Larry Harper is a 60 y.o. male with a PMH of AS, DMII, PTSD (uses marijuana), HTN, CAD, H/o MI s/p KAEL RCA , Depression, FH CAD, COPD who presented to the [facility] with chest pain. He is from [place] and has been in [place] for about 2 weeks because he attended his brother's  who recently passed from acute MI. For the 3 days prior to admission he developed intermittent episodes of midstenal chest pain. The pain is associated with SOB, nausea, diaphoresis and dizziness. He was taking Nitro SL tabs with relief. The chest pain continued which prompted him to be evaluated in the ED. Trop peaks in the 200s, ruling him in for an MSTEMI. ECG showed non specific ST changes. ECHO from 24 showed EF 65%, normal wall motion, severe AS (velocity 3.4 m/s, mean gradient 29 mmHg, DVI 0.25, GRAYSON 0.75 cm² ) and mild mitral annular calcification. He was transferred to [facility] and underwent cardiac cath which revealed 2v CAD. He was then transferred to [facility] [place] for CTS evaluation for AVR/CABG.    Upon examination today, he relays the above story to me. No CP/SOB since admission. Denies  palpitations,  LE edema b/l,  PND, numbness/tinlging/paresthesias in UE or LE bilaterally, HA, lightheadeness,  presyncopal symptoms, hx syncopal events, N/V/D, hemoptysis, hematemesis, hematochezia, melena. Denies hx stroke, hx cancer w/ chest wall radiation, hx blood loss anemia, hx varicose veins or vein stripping.    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 Does not use an assist device for ambulation. Drives. . Denies current use of tobacco (former use), no ETOH, and occasionally marijuana use for PTSD. Allergies include bees and shellfish. He sees a dentist regularly and has no active issues.         Past Medical History:  Past Medical History:   Diagnosis Date    Anxiety     Coronary artery disease     Depression     Diabetes mellitus (HCC)     MI, old     PTSD (post-traumatic stress disorder)          Past Surgical History:   Past Surgical History:   Procedure Laterality Date    CARPAL TUNNEL RELEASE Right     CORONARY ANGIOPLASTY WITH STENT PLACEMENT      ROTATOR CUFF REPAIR Right          Family History:  History reviewed. No pertinent family history.      Social History:  Social History     Substance and Sexual Activity   Alcohol Use Not Currently     Social History     Substance and Sexual Activity   Drug Use Not Currently    Types: Marijuana     Social History     Tobacco Use   Smoking Status Never    Passive exposure: Never   Smokeless Tobacco Never     Marital Status: Single      Home Medications:   Prior to Admission medications    Medication Sig Start Date End Date Taking? Authorizing Provider   albuterol (PROVENTIL HFA,VENTOLIN HFA) 90 mcg/act inhaler Inhale 2 puffs    Historical Provider, MD   aspirin (ECOTRIN LOW STRENGTH) 81 mg EC tablet Take 81 mg by mouth daily    Historical Provider, MD   atorvastatin (LIPITOR) 80 mg tablet Take 80 mg by mouth every morning    Historical Provider, MD   diazepam (VALIUM) 5 mg tablet Take 1 tablet (5 mg total) by mouth every 6 (six) hours as needed for muscle spasms 10/14/23   Rudi Casey,    Empagliflozin (Jardiance) 10 MG TABS tablet  4/26/23   Historical Provider, MD   FLUoxetine (PROzac) 40 MG capsule Take 40 mg by mouth daily    Historical Provider, MD   fluticasone (FLONASE) 50 mcg/act nasal spray 1 spray daily    Historical Provider, MD   ibuprofen (MOTRIN) 400 mg  tablet Take 400 mg by mouth every 8 (eight) hours as needed  Patient not taking: Reported on 1/25/2025    Historical Provider, MD   insulin glargine (LANTUS) 100 units/mL subcutaneous injection Inject 35 Units under the skin    Historical Provider, MD   Lantus SoloStar 100 units/mL SOPN 30 Units in the morning 11/11/24   Historical Provider, MD   lisinopril (ZESTRIL) 2.5 mg tablet Take 2.5 mg by mouth daily    Historical Provider, MD   lovastatin (MEVACOR) 20 mg tablet Take 20 mg by mouth daily    Historical Provider, MD   metFORMIN (GLUCOPHAGE) 1000 MG tablet Take 1,000 mg by mouth daily    Historical Provider, MD   metoprolol tartrate (LOPRESSOR) 25 mg tablet Take 12.5 mg by mouth 2 (two) times a day 1/29/24   Historical Provider, MD   NovoLOG FlexPen 100 units/mL injection pen INJECT 12 UNITS BEFORE EACH MEAL SUBCUTANEOUSLY    Historical Provider, MD   traZODone (DESYREL) 50 mg tablet Take 50 mg by mouth    Historical Provider, MD   umeclidinium (Incruse Ellipta) 62.5 mcg/actuation AEPB inhaler Inhale 1 puff daily  Patient not taking: Reported on 1/25/2025    Historical Provider, MD       Inpatient Medications:  Scheduled Meds:   Current Facility-Administered Medications   Medication Dose Route Frequency Provider Last Rate    acetaminophen  650 mg Oral Q6H PRN Jordon Hood MD      albuterol  2 puff Inhalation Q4H PRN Jordon Hood MD      aspirin  81 mg Oral Daily Jordon Hood MD      atorvastatin  80 mg Oral Daily With Dinner Jordon Hood MD      FLUoxetine  40 mg Oral Daily Jordon Hood MD      heparin (porcine)  3-20 Units/kg/hr (Order-Specific) Intravenous Titrated Jordon Hood MD 18 Units/kg/hr (01/26/25 0607)    heparin (porcine)  2,000 Units Intravenous Q6H PRN Jordon Hood MD      heparin (porcine)  4,000 Units Intravenous Q6H PRN Jordon Hood MD      insulin glargine  5 Units Subcutaneous HS Jordon Hood MD      insulin lispro  1-6 Units Subcutaneous 4x Daily (AC & HS) Jordon Hood MD       lisinopril  2.5 mg Oral Daily Jordon Hood MD      metoprolol tartrate  12.5 mg Oral BID Jordon Hood MD      nitroglycerin  0.4 mg Sublingual Q5 Min PRN Jordon Hood MD      ondansetron  4 mg Intravenous Q6H PRN Jordon Hood MD      traZODone  50 mg Oral HS Jordon Hood MD      umeclidinium  1 puff Inhalation Daily Jordon Hood MD       Continuous Infusions: heparin (porcine), 3-20 Units/kg/hr (Order-Specific), Last Rate: 18 Units/kg/hr (01/26/25 0607)      PRN Meds:  acetaminophen, 650 mg, Q6H PRN  albuterol, 2 puff, Q4H PRN  heparin (porcine), 2,000 Units, Q6H PRN  heparin (porcine), 4,000 Units, Q6H PRN  nitroglycerin, 0.4 mg, Q5 Min PRN  ondansetron, 4 mg, Q6H PRN        Allergies:  Allergies   Allergen Reactions    Bee Venom Anaphylaxis    Shellfish Allergy - Food Allergy Anaphylaxis       Review of Systems:  Review of Systems   Constitutional:  Negative for chills and fever.   HENT:  Negative for ear pain and sore throat.    Eyes:  Negative for pain and visual disturbance.   Respiratory:  Positive for shortness of breath. Negative for cough.    Cardiovascular:  Positive for chest pain. Negative for palpitations.   Gastrointestinal:  Negative for abdominal pain, constipation, diarrhea, nausea and vomiting.   Genitourinary:  Negative for dysuria and hematuria.   Musculoskeletal:  Negative for arthralgias and back pain.   Skin:  Negative for color change and rash.   Neurological:  Negative for seizures and syncope.   All other systems reviewed and are negative.      Vital Signs:     Vitals:    01/26/25 0533 01/26/25 0738 01/26/25 0907 01/26/25 0907   BP:  115/54 137/65 137/65   BP Location:  Right arm     Pulse:  76  80   Resp:  17     Temp:  97.9 °F (36.6 °C)     TempSrc:  Oral     SpO2:  96%  97%   Weight: 75.6 kg (166 lb 10.7 oz)      Height:         Invasive Devices       Peripheral Intravenous Line  Duration             Peripheral IV 01/23/25 Left Antecubital 3 days    Peripheral IV 01/24/25 Dorsal  (posterior);Left Hand 2 days                    Physical Exam:  Physical Exam  Vitals reviewed.   Constitutional:       General: He is not in acute distress.     Appearance: Normal appearance. He is normal weight.   HENT:      Head: Normocephalic and atraumatic.      Mouth/Throat:      Mouth: Mucous membranes are moist.      Pharynx: Oropharynx is clear.   Eyes:      Extraocular Movements: Extraocular movements intact.      Conjunctiva/sclera: Conjunctivae normal.      Pupils: Pupils are equal, round, and reactive to light.   Cardiovascular:      Rate and Rhythm: Normal rate and regular rhythm.      Heart sounds: Murmur heard.   Pulmonary:      Effort: Pulmonary effort is normal.      Breath sounds: Wheezing and rhonchi present.   Abdominal:      General: Abdomen is flat. Bowel sounds are normal.      Palpations: Abdomen is soft.   Musculoskeletal:         General: No swelling. Normal range of motion.      Cervical back: Normal range of motion and neck supple.   Skin:     General: Skin is warm and dry.   Neurological:      General: No focal deficit present.      Mental Status: He is alert and oriented to person, place, and time.   Psychiatric:         Mood and Affect: Mood normal.         Behavior: Behavior normal.         Lab Results:     Results from last 7 days   Lab Units 01/26/25  0433 01/25/25  0325 01/24/25  0512   WBC Thousand/uL 7.11 7.00 7.18   HEMOGLOBIN g/dL 12.9 12.8 13.9   HEMATOCRIT % 39.2 38.7 41.1   PLATELETS Thousands/uL 245 240 243     Results from last 7 days   Lab Units 01/26/25  0433 01/25/25  0325 01/24/25  0512   POTASSIUM mmol/L 3.8 3.4* 3.4*   CHLORIDE mmol/L 109* 109* 107   CO2 mmol/L 27 24 24   BUN mg/dL 15 11 16   CREATININE mg/dL 0.84 0.79 0.73   CALCIUM mg/dL 8.8 8.4 8.9     Results from last 7 days   Lab Units 01/26/25  0433 01/25/25  2159 01/25/25  1207 01/24/25  0042 01/23/25  1605   INR   --   --   --   --  0.98   PTT seconds 95* 53* 54*   < > 27    < > = values in this interval  "not displayed.     Lab Results   Component Value Date    HGBA1C 8.9 (H) 01/24/2025     No results found for: \"CKTOTAL\", \"CKMB\", \"CKMBINDEX\", \"TROPONINI\"    Imaging Studies:     Cardiac Catheterization:     Mid LAD-1 lesion is 70% stenosed.    Mid LAD-2 lesion is 95% stenosed.    3rd Mrg lesion is 70% stenosed.     Severe LAD disease.     Echocardiogram: repeat ordered    Carotid US: <50% b/l, no subclavian dx, VAFA    Vein mapping: adequate B/L    Results Review Statement: I personally reviewed the following image studies in PACS and associated radiology reports: as above. My interpretation of the radiology images/reports is: as above.    Assessment:  Principal Problem:    NSTEMI (non-ST elevated myocardial infarction) ([facility])  Active Problems:    CAD (coronary artery disease)    Diabetes mellitus (HCC)    PTSD (post-traumatic stress disorder)    Severe aortic stenosis    Primary hypertension    Dyslipidemia    Severe coronary artery disease; Ongoing CABG workup  Severe aortic stenosis; Ongoing AVR workup    Plan:  Risks and benefits of aortic valve replacement and coronary artery bypass grafting were discussed in detail today with the patient.  They understand and wish to proceed with further workup and ultimately surgical intervention. He agrees to stay in the area until he undergoes surgery and has family to help him for post op recovery. We have ordered routine preoperative laboratory and vascular studies. Have also ordered CT Chest and PFTs. Panorex also ordered. Pending the results of these tests, they will be scheduled for surgery, date and surgeon TBD.    Larry Harper was comfortable with our recommendations, and their questions were answered to their satisfaction.  We will continue to evaluate the patient daily with further recommendations as work up is completed.  Thank you for allowing us to participate in the care of this patient.     SIGNATURE: Carolyn Strauss PA-C  DATE: January 26, 2025  TIME: " 11:17 AM    * This note was completed in part utilizing [employer] direct voice recognition software.   Grammatical errors, random word insertion, spelling mistakes, and incomplete sentences may be an occasional consequence of the system secondary to software limitations, ambient noise and hardware issues. At the time of dictation, efforts were made to edit, clarify and /or correct errors. Please read the chart carefully and recognize, using context, where substitutions have occurred.  If you have any questions or concerns about the context, text or information contained within the body of this dictation, please contact myself, the provider, for further clarification.

## 2025-01-26 NOTE — ASSESSMENT & PLAN NOTE
Lab Results   Component Value Date    HGBA1C 8.9 (H) 01/24/2025   Poorly controlled.  Management per primary team

## 2025-01-26 NOTE — PROGRESS NOTES
Progress Note - Hospitalist   Name: Larry Harper 60 y.o. male I MRN: [id number]  [facility] I Date of Admission: 1/25/2025   Date of Service: 1/26/2025 I Hospital Day: 1    Assessment & Plan  NSTEMI (non-ST elevated myocardial infarction) (HCC)  Presented initially to [facility] with chest pain and noted to have elevated troponin.    Underwent cardiac cath which showed. Mid LAD-1 lesion is 70% stenosed, Mid LAD-2 lesion is 95% stenosed and 3rd Mrg lesion is 70% stenosed.  Given severe symptomatic LAD disease in a diabetic with severe aortic stenosis, patient transferred to [facility] for CABG/AVR evaluation  Repeat echocardiogram pending.   Cardiology and cardiac surgery consulted  Continue aspirin, heparin infusion, beta-blocker and high intensity atorvastatin.  Diabetes mellitus ([facility])  Lab Results   Component Value Date    HGBA1C 8.9 (H) 01/24/2025       Recent Labs     01/25/25  1607 01/25/25  2137 01/26/25  0737 01/26/25  1207   POCGLU 199* 177* 156* 250*       Blood Sugar Average: Last 72 hrs:  (P) [id number]  Previously on long acting insulin however reports running out of medication  Increase Lantus 10 units at bedtime with correctional scale  Avoid hypoglycemia  Hold Oral regimen  PTSD (post-traumatic stress disorder)  Continue fluoxetine  Uses cannabis outpatient  Severe aortic stenosis  TTE 1/5/2024 The aortic valve peak velocity is 3.4 m/s. The aortic valve mean gradient is 29.0 mmHg. The dimensionless velocity index is 0.25. The aortic valve area is 0.75 cm2.   Cardiac surgery consulted for evaluation of AVR  Repeat echocardiogram pending.   Primary hypertension  Blood pressure reviewed and stable  Continue lisinopril 2.5 mg daily and Lopressor 12.5 mg twice daily  CAD (coronary artery disease)  S/p KAEL to RCA in 4/2021  Follows cardiology in [place]  Dyslipidemia  Continue statin  COPD (chronic obstructive pulmonary disease) ([facility])  Self-reported  No acute  exacerbation  Continue inhalers    VTE Pharmacologic Prophylaxis:   Moderate Risk (Score 3-4) - Pharmacological DVT Prophylaxis Ordered: heparin drip.    Mobility:   Basic Mobility Inpatient Raw Score: 24  JH-HLM Goal: 8: Walk 250 feet or more  JH-HLM Achieved: 1: Laying in bed  JH-HLM Goal NOT achieved. Continue with multidisciplinary rounding and encourage appropriate mobility to improve upon JH-HLM goals.    Patient Centered Rounds: I performed bedside rounds with nursing staff today.   Discussions with Specialists or Other Care Team Provider: Nursing    Education and Discussions with Family / Patient: Patient declined call to .     Current Length of Stay: 1 day(s)  Current Patient Status: Inpatient   Certification Statement: The patient will continue to require additional inpatient hospital stay due to awaiting CABG/AVR evaluation  Discharge Plan:  TBD    Code Status: Level 1 - Full Code    Subjective   Patient assessed at bedside.  Complains of cough.  Denies any chest pain or shortness of breath.    Objective :  Temp:  [97.3 °F (36.3 °C)-98.8 °F (37.1 °C)] 98.4 °F (36.9 °C)  HR:  [61-80] 68  BP: (115-147)/(54-75) 117/64  Resp:  [17-18] 17  SpO2:  [94 %-97 %] 96 %  O2 Device: None (Room air)    Body mass index is 26.1 kg/m².     Input and Output Summary (last 24 hours):     Intake/Output Summary (Last 24 hours) at 1/26/2025 1533  Last data filed at 1/26/2025 1300  Gross per 24 hour   Intake 480 ml   Output 0 ml   Net 480 ml       Physical Exam  Vitals reviewed.   Constitutional:       General: He is not in acute distress.     Appearance: Normal appearance. He is not ill-appearing.   HENT:      Head: Normocephalic and atraumatic.   Cardiovascular:      Rate and Rhythm: Normal rate and regular rhythm.      Heart sounds: Murmur heard.   Pulmonary:      Effort: Pulmonary effort is normal. No respiratory distress.      Breath sounds: Wheezing present. No rales.   Abdominal:      General: Bowel sounds  are normal.      Tenderness: There is no abdominal tenderness.   Musculoskeletal:      Right lower leg: No edema.      Left lower leg: No edema.   Neurological:      Mental Status: He is alert and oriented to person, place, and time.         Lines/Drains:        Telemetry:  Telemetry Orders (From admission, onward)               24 Hour Telemetry Monitoring  Continuous x 24 Hours (Telem)        Expiring   Question:  Reason for 24 Hour Telemetry  Answer:  PCI/EP study (including pacer and ICD implementation), Cardiac surgery, MI, abnormal cardiac cath, and chest pain- rule out MI                     Telemetry Reviewed:   Indication for Continued Telemetry Use: Awaiting PCI/EP Study/CABG               Lab Results: I have reviewed the following results:   Results from last 7 days   Lab Units 01/26/25  0433 01/25/25  0325 01/24/25  0512   WBC Thousand/uL 7.11   < > 7.18   HEMOGLOBIN g/dL 12.9   < > 13.9   HEMATOCRIT % 39.2   < > 41.1   PLATELETS Thousands/uL 245   < > 243   SEGS PCT %  --   --  52   LYMPHO PCT %  --   --  32   MONO PCT %  --   --  11   EOS PCT %  --   --  4    < > = values in this interval not displayed.     Results from last 7 days   Lab Units 01/26/25  0433 01/25/25  0325   SODIUM mmol/L 143 140   POTASSIUM mmol/L 3.8 3.4*   CHLORIDE mmol/L 109* 109*   CO2 mmol/L 27 24   BUN mg/dL 15 11   CREATININE mg/dL 0.84 0.79   ANION GAP mmol/L 7 7   CALCIUM mg/dL 8.8 8.4   ALBUMIN g/dL  --  3.7   TOTAL BILIRUBIN mg/dL  --  0.36   ALK PHOS U/L  --  40   ALT U/L  --  13   AST U/L  --  12*   GLUCOSE RANDOM mg/dL 159* 129     Results from last 7 days   Lab Units 01/23/25  1605   INR  0.98     Results from last 7 days   Lab Units 01/26/25  1207 01/26/25  0737 01/25/25  2137 01/25/25  1607 01/25/25  1231 01/25/25  0846 01/24/25  2111 01/24/25  1642 01/24/25  0808 01/24/25  0508   POC GLUCOSE mg/dl 250* 156* 177* 199* 186* 203* 172* 196* 159* 178*     Results from last 7 days   Lab Units 01/24/25  0512   HEMOGLOBIN  A1C % 8.9*           Recent Cultures (last 7 days):         Imaging Results Review: I reviewed radiology reports from this admission including: chest xray.  Other Study Results Review: EKG was reviewed.     Last 24 Hours Medication List:     Current Facility-Administered Medications:     acetaminophen (TYLENOL) tablet 650 mg, Q6H PRN    albuterol (PROVENTIL HFA,VENTOLIN HFA) inhaler 2 puff, Q4H PRN    aspirin (ECOTRIN LOW STRENGTH) EC tablet 81 mg, Daily    atorvastatin (LIPITOR) tablet 80 mg, Daily With Dinner    [START ON 1/27/2025] chlorhexidine (PERIDEX) 0.12 % oral rinse 15 mL, Once    FLUoxetine (PROzac) capsule 40 mg, Daily    heparin (porcine) 25,000 units in 0.45% NaCl 250 mL infusion (premix), Titrated, Last Rate: 20 Units/kg/hr (01/26/25 1406)    heparin (porcine) injection 2,000 Units, Q6H PRN    heparin (porcine) injection 4,000 Units, Q6H PRN    insulin glargine (LANTUS) subcutaneous injection 5 Units 0.05 mL, HS    insulin lispro (HumALOG/ADMELOG) 100 units/mL subcutaneous injection 1-6 Units, 4x Daily (AC & HS) **AND** Fingerstick Glucose (POCT), 4x Daily AC and at bedtime    lisinopril (ZESTRIL) tablet 2.5 mg, Daily    metoprolol tartrate (LOPRESSOR) partial tablet 12.5 mg, BID    [START ON 1/27/2025] mupirocin (BACTROBAN) 2 % nasal ointment 1 Application, Once    nitroglycerin (NITROSTAT) SL tablet 0.4 mg, Q5 Min PRN    ondansetron (ZOFRAN) injection 4 mg, Q6H PRN    traZODone (DESYREL) tablet 50 mg, HS    umeclidinium 62.5 mcg/actuation inhaler AEPB 1 puff, Daily    Administrative Statements   Today, Patient Was Seen By: Nolvia Ybarra, DO  I have spent a total time of 30 minutes in caring for this patient on the day of the visit/encounter including Counseling / Coordination of care, Documenting in the medical record, Reviewing / ordering tests, medicine, procedures  , Obtaining or reviewing history  , and Communicating with other healthcare professionals .    **Please Note: This note may have  been constructed using a voice recognition system.**

## 2025-01-26 NOTE — PLAN OF CARE
Problem: PAIN - ADULT  Goal: Verbalizes/displays adequate comfort level or baseline comfort level  Description: Interventions:  - Encourage patient to monitor pain and request assistance  - Assess pain using appropriate pain scale  - Administer analgesics based on type and severity of pain and evaluate response  - Implement non-pharmacological measures as appropriate and evaluate response  - Consider cultural and social influences on pain and pain management  - Notify physician/advanced practitioner if interventions unsuccessful or patient reports new pain  Outcome: Progressing     Problem: INFECTION - ADULT  Goal: Absence or prevention of progression during hospitalization  Description: INTERVENTIONS:  - Assess and monitor for signs and symptoms of infection  - Monitor lab/diagnostic results  - Monitor all insertion sites, i.e. indwelling lines, tubes, and drains  - Monitor endotracheal if appropriate and nasal secretions for changes in amount and color  - [place] appropriate cooling/warming therapies per order  - Administer medications as ordered  - Instruct and encourage patient and family to use good hand hygiene technique  - Identify and instruct in appropriate isolation precautions for identified infection/condition  Outcome: Progressing  Goal: Absence of fever/infection during neutropenic period  Description: INTERVENTIONS:  - Monitor WBC    Outcome: Progressing     Problem: SAFETY ADULT  Goal: Patient will remain free of falls  Description: INTERVENTIONS:  - Educate patient/family on patient safety including physical limitations  - Instruct patient to call for assistance with activity   - Consult OT/PT to assist with strengthening/mobility   - Keep Call bell within reach  - Keep bed low and locked with side rails adjusted as appropriate  - Keep care items and personal belongings within reach  - Initiate and maintain comfort rounds  - Make Fall Risk Sign visible to staff  - Offer Toileting every 2 Hours,  in advance of need  - Initiate/Maintain alarm  - Obtain necessary fall risk management equipment:   - Apply yellow socks and bracelet for high fall risk patients  - Consider moving patient to room near nurses station  Outcome: Progressing  Goal: Maintain or return to baseline ADL function  Description: INTERVENTIONS:  -  Assess patient's ability to carry out ADLs; assess patient's baseline for ADL function and identify physical deficits which impact ability to perform ADLs (bathing, care of mouth/teeth, toileting, grooming, dressing, etc.)  - Assess/evaluate cause of self-care deficits   - Assess range of motion  - Assess patient's mobility; develop plan if impaired  - Assess patient's need for assistive devices and provide as appropriate  - Encourage maximum independence but intervene and supervise when necessary  - Involve family in performance of ADLs  - Assess for home care needs following discharge   - Consider OT consult to assist with ADL evaluation and planning for discharge  - Provide patient education as appropriate  Outcome: Progressing  Goal: Maintains/Returns to pre admission functional level  Description: INTERVENTIONS:  - Perform AM-PAC 6 Click Basic Mobility/ Daily Activity assessment daily.  - Set and communicate daily mobility goal to care team and patient/family/caregiver.   - Collaborate with rehabilitation services on mobility goals if consulted  - Perform Range of Motion 4 times a day.  - Reposition patient every 2 hours.  - Dangle patient 4 times a day  - Stand patient 4 times a day  - Ambulate patient 4 times a day  - Out of bed to chair 3 times a day   - Out of bed for meals 3 times a day  - Out of bed for toileting  - Record patient progress and toleration of activity level   Outcome: Progressing     Problem: DISCHARGE PLANNING  Goal: Discharge to home or other facility with appropriate resources  Description: INTERVENTIONS:  - Identify barriers to discharge w/patient and caregiver  -  Arrange for needed discharge resources and transportation as appropriate  - Identify discharge learning needs (meds, wound care, etc.)  - Arrange for interpretive services to assist at discharge as needed  - Refer to Case Management Department for coordinating discharge planning if the patient needs post-hospital services based on physician/advanced practitioner order or complex needs related to functional status, cognitive ability, or social support system  Outcome: Progressing     Problem: Knowledge Deficit  Goal: Patient/family/caregiver demonstrates understanding of disease process, treatment plan, medications, and discharge instructions  Description: Complete learning assessment and assess knowledge base.  Interventions:  - Provide teaching at level of understanding  - Provide teaching via preferred learning methods  Outcome: Progressing

## 2025-01-26 NOTE — CONSULTS
Please see full consult completed by Joseph Batres PA-C and Dr. Ambrose at [facility] on 1/24/25

## 2025-01-26 NOTE — PROGRESS NOTES
Progress Note - Cardiology   Name: Larry Harper 60 y.o. male I MRN: [id number]  Unit/Bed#: [facility] 425-01 I Date of Admission: 2025   Date of Service: 2025 I Hospital Day: 1     Assessment & Plan  NSTEMI (non-ST elevated myocardial infarction) (HCC)  Presented to [facility] with development of chest discomfort while attending his brother's , symptoms similar to prior angina which resolved with nitroglycerin.  HS troponin 149, 272, 288, 142  [facility]: Two mid LAD lesions, 70% and 95%.  70% OM 3 stenosis  Transferred to [facility] for CABG/AVR evaluation  On aspirin, high intensity statin, metoprolol tartrate 12.5 mg twice daily, and IV heparin  No further anginal symptoms  Severe aortic stenosis  TTE 2025 LVEF 65%, severe AS with mild AI.  Mean gradient 29 mmHg with GRAYSON 0.75 cm²  Known history of, follows with cardiologist in [place]  Generally has been asymptomatic and surgical intervention had not yet been pursued by primary cardiologist  CAD (coronary artery disease)  S/p KAEL to RCA, 2021  Follows with cardiologist in [place]  Primary hypertension  Stable, 24-hour average /67.  Last /54  Diabetes mellitus ([facility])  Lab Results   Component Value Date    HGBA1C 8.9 (H) 2025   Poorly controlled.  Management per primary team  Dyslipidemia  Total cholesterol 226, triglycerides 108, HDL 41,  on admission.  Started on atorvastatin 80 mg daily    Plan/Recommendations  Asymptomatic.  Continue medical therapy with IV heparin, aspirin, statin, and beta-blocker  Continue to monitor on telemetry  CT surgery consult pending    Subjective  Review of Systems   Constitutional: Negative for chills, malaise/fatigue and weight gain.   Cardiovascular:  Negative for chest pain, dyspnea on exertion, leg swelling, orthopnea, palpitations and syncope.   Respiratory:  Positive for cough (chronic). Negative for shortness of breath, sleep disturbances due to breathing and sputum production.   "  Endocrine: Negative.    Hematologic/Lymphatic: Negative.    Gastrointestinal:  Negative for bloating and nausea.   Genitourinary: Negative.    Neurological:  Positive for headaches. Negative for dizziness, light-headedness and weakness.   Psychiatric/Behavioral:  Negative for altered mental status.    All other systems reviewed and are negative.      Objective:   Vitals: Blood pressure 115/54, pulse 76, temperature 97.9 °F (36.6 °C), resp. rate 17, height 5' 7\" (1.702 m), weight 75.6 kg (166 lb 10.7 oz), SpO2 96%., Body mass index is 26.1 kg/m².,     Systolic (24hrs), Av , Min:115 , Max:157     Diastolic (24hrs), Av, Min:54, Max:82      Intake/Output Summary (Last 24 hours) at 2025 0824  Last data filed at 2025 2300  Gross per 24 hour   Intake 0 ml   Output 0 ml   Net 0 ml     Wt Readings from Last 3 Encounters:   25 75.6 kg (166 lb 10.7 oz)   25 75.9 kg (167 lb 5.3 oz)   25 77.8 kg (171 lb 8.3 oz)     Telemetry Review: No significant arrhythmias seen on telemetry review. NSR    Physical Exam  Vitals reviewed.   Constitutional:       General: He is not in acute distress.     Appearance: Normal appearance.   Neck:      Vascular: No hepatojugular reflux or JVD.   Cardiovascular:      Rate and Rhythm: Normal rate and regular rhythm.      Heart sounds: Normal heart sounds. No murmur heard.     No friction rub. No gallop.   Pulmonary:      Effort: Pulmonary effort is normal. No respiratory distress.      Breath sounds: Wheezing present. No rales.      Comments: Room air  Abdominal:      General: Bowel sounds are normal. There is no distension.      Palpations: Abdomen is soft.      Tenderness: There is no abdominal tenderness.   Musculoskeletal:         General: No tenderness. Normal range of motion.      Cervical back: Neck supple.      Right lower leg: No edema.      Left lower leg: No edema.   Skin:     General: Skin is warm and dry.      Capillary Refill: Capillary refill takes " "less than 2 seconds.      Findings: No erythema.   Neurological:      Mental Status: He is alert and oriented to person, place, and time.   Psychiatric:         Mood and Affect: Mood normal.         LABORATORY RESULTS      CBC with diff:   Results from last 7 days   Lab Units 01/26/25 0433 01/25/25 0325 01/24/25 0512 01/23/25  1605   WBC Thousand/uL 7.11 7.00 7.18 9.45   HEMOGLOBIN g/dL 12.9 12.8 13.9 14.3   HEMATOCRIT % 39.2 38.7 41.1 42.5   MCV fL 83 81* 81* 81*   PLATELETS Thousands/uL 245 240 243 284   RBC Million/uL 4.72 4.77 5.08 5.23   MCH pg 27.3 26.8 27.4 27.3   MCHC g/dL 32.9 33.1 33.8 33.6   RDW % 14.5 14.4 14.3 14.6   MPV fL 11.7 10.6 11.1 11.1   NRBC AUTO /100 WBCs  --   --  0 0       CMP:  Results from last 7 days   Lab Units 01/26/25 0433 01/25/25 0325 01/24/25 0512 01/23/25  1605   POTASSIUM mmol/L 3.8 3.4* 3.4* 3.2*   CHLORIDE mmol/L 109* 109* 107 107   CO2 mmol/L 27 24 24 25   BUN mg/dL 15 11 16 16   CREATININE mg/dL 0.84 0.79 0.73 0.78   CALCIUM mg/dL 8.8 8.4 8.9 9.2   AST U/L  --  12*  --  15   ALT U/L  --  13  --  14   ALK PHOS U/L  --  40  --  51   EGFR ml/min/1.73sq m 96 98 102 99       BMP:  Results from last 7 days   Lab Units 01/26/25 0433 01/25/25 0325 01/24/25 0512 01/23/25  1605   POTASSIUM mmol/L 3.8 3.4* 3.4* 3.2*   CHLORIDE mmol/L 109* 109* 107 107   CO2 mmol/L 27 24 24 25   BUN mg/dL 15 11 16 16   CREATININE mg/dL 0.84 0.79 0.73 0.78   CALCIUM mg/dL 8.8 8.4 8.9 9.2       Lab Results   Component Value Date    CREATININE 0.84 01/26/2025    CREATININE 0.79 01/25/2025    CREATININE 0.73 01/24/2025      Results from last 7 days   Lab Units 01/26/25  0433 01/24/25  0512   MAGNESIUM mg/dL 1.8* 1.9     Results from last 7 days   Lab Units 01/24/25  0512   HEMOGLOBIN A1C % 8.9*     Results from last 7 days   Lab Units 01/23/25  1605   INR  0.98     Lipid Profile:   No results found for: \"CHOL\"  Lab Results   Component Value Date    HDL 41 01/24/2025     Lab Results   Component Value " Date    LDLCALC 163 (H) 01/24/2025     Lab Results   Component Value Date    TRIG 108 01/24/2025     Meds/Allergies   all current active meds have been reviewed and current meds:   Current Facility-Administered Medications:     acetaminophen (TYLENOL) tablet 650 mg, Q6H PRN    albuterol (PROVENTIL HFA,VENTOLIN HFA) inhaler 2 puff, Q4H PRN    aspirin (ECOTRIN LOW STRENGTH) EC tablet 81 mg, Daily    atorvastatin (LIPITOR) tablet 80 mg, Daily With Dinner    FLUoxetine (PROzac) capsule 40 mg, Daily    heparin (porcine) 25,000 units in 0.45% NaCl 250 mL infusion (premix), Titrated, Last Rate: 18 Units/kg/hr (01/26/25 0607)    heparin (porcine) injection 2,000 Units, Q6H PRN    heparin (porcine) injection 4,000 Units, Q6H PRN    insulin glargine (LANTUS) subcutaneous injection 5 Units 0.05 mL, HS    insulin lispro (HumALOG/ADMELOG) 100 units/mL subcutaneous injection 1-6 Units, 4x Daily (AC & HS) **AND** Fingerstick Glucose (POCT), 4x Daily AC and at bedtime    lisinopril (ZESTRIL) tablet 2.5 mg, Daily    metoprolol tartrate (LOPRESSOR) partial tablet 12.5 mg, BID    nitroglycerin (NITROSTAT) SL tablet 0.4 mg, Q5 Min PRN    ondansetron (ZOFRAN) injection 4 mg, Q6H PRN    traZODone (DESYREL) tablet 50 mg, HS    umeclidinium 62.5 mcg/actuation inhaler AEPB 1 puff, Daily    Medications Prior to Admission:     albuterol (PROVENTIL HFA,VENTOLIN HFA) 90 mcg/act inhaler    aspirin (ECOTRIN LOW STRENGTH) 81 mg EC tablet    atorvastatin (LIPITOR) 80 mg tablet    diazepam (VALIUM) 5 mg tablet    Empagliflozin (Jardiance) 10 MG TABS tablet    FLUoxetine (PROzac) 40 MG capsule    fluticasone (FLONASE) 50 mcg/act nasal spray    ibuprofen (MOTRIN) 400 mg tablet    insulin glargine (LANTUS) 100 units/mL subcutaneous injection    Lantus SoloStar 100 units/mL SOPN    lisinopril (ZESTRIL) 2.5 mg tablet    lovastatin (MEVACOR) 20 mg tablet    metFORMIN (GLUCOPHAGE) 1000 MG tablet    metoprolol tartrate (LOPRESSOR) 25 mg tablet    NovoLOG  FlexPen 100 units/mL injection pen    traZODone (DESYREL) 50 mg tablet    umeclidinium (Incruse Ellipta) 62.5 mcg/actuation AEPB inhaler    heparin (porcine), 3-20 Units/kg/hr (Order-Specific), Last Rate: 18 Units/kg/hr (01/26/25 0607)        Counseling / Coordination of Care  Total floor / unit time spent today 20 minutes.  Greater than 50% of total time was spent with the patient and / or family counseling and / or coordination of care.      ** Please Note: Dragon 360 Dictation voice to text software may have been used in the creation of this document. **

## 2025-01-26 NOTE — ASSESSMENT & PLAN NOTE
Total cholesterol 226, triglycerides 108, HDL 41,  on admission.  Started on atorvastatin 80 mg daily

## 2025-01-26 NOTE — ASSESSMENT & PLAN NOTE
Presented to [facility] with development of chest discomfort while attending his brother's , symptoms similar to prior angina which resolved with nitroglycerin.  HS troponin 149, 272, 288, 142  [facility]: Two mid LAD lesions, 70% and 95%.  70% OM 3 stenosis  Transferred to [facility] for CABG/AVR evaluation  On aspirin, high intensity statin, metoprolol tartrate 12.5 mg twice daily, and IV heparin  No further anginal symptoms

## 2025-01-26 NOTE — ASSESSMENT & PLAN NOTE
Presented initially to [facility] with chest pain and noted to have elevated troponin.    Underwent cardiac cath which showed. Mid LAD-1 lesion is 70% stenosed, Mid LAD-2 lesion is 95% stenosed and 3rd Mrg lesion is 70% stenosed.  Given severe symptomatic LAD disease in a diabetic with severe aortic stenosis, patient transferred to [facility] for CABG/AVR evaluation  Repeat echocardiogram pending.   Cardiology and cardiac surgery consulted  Continue aspirin, heparin infusion, beta-blocker and high intensity atorvastatin.

## 2025-01-26 NOTE — ASSESSMENT & PLAN NOTE
Lab Results   Component Value Date    HGBA1C 8.9 (H) 01/24/2025       Recent Labs     01/25/25  1607 01/25/25  2137 01/26/25  0737 01/26/25  1207   POCGLU 199* 177* 156* 250*       Blood Sugar Average: Last 72 hrs:  (P) [id number]  Previously on long acting insulin however reports running out of medication  Increase Lantus 10 units at bedtime with correctional scale  Avoid hypoglycemia  Hold Oral regimen

## 2025-01-26 NOTE — ASSESSMENT & PLAN NOTE
Blood pressure reviewed and stable  Continue lisinopril 2.5 mg daily and Lopressor 12.5 mg twice daily

## 2025-01-26 NOTE — H&P
H&P - Hospitalist   Name: Larry Harper 60 y.o. male I MRN: [id number]  Unit/Bed#: [facility] 425-01 I Date of Admission: (Not on file)   Date of Service: 1/25/2025 I Hospital Day: 0     Assessment & Plan  NSTEMI (non-ST elevated myocardial infarction) (HCC)  Presented initially to [facility] with chest pain and noted to have elevated troponin.    Underwent cardiac cath which showed. Mid LAD-1 lesion is 70% stenosed, Mid LAD-2 lesion is 95% stenosed and 3rd Mrg lesion is 70% stenosed.  Given severe symptomatic LAD disease in a diabetic with severe aortic stenosis, patient transferred to [facility] for CABG/AVR evaluation  Repeat echocardiogram pending.   Cardiology and cardiac surgery consulted  Continue aspirin, heparin infusion, beta-blocker and high intensity atorvastatin.  Diabetes mellitus ([facility])  Lab Results   Component Value Date    HGBA1C 8.9 (H) 01/24/2025       Recent Labs     01/24/25  2111 01/25/25  0846 01/25/25  1231 01/25/25  1607   POCGLU 172* 203* 186* 199*       Blood Sugar Average: Last 72 hrs:  Previously on long acting insulin however reports running out of medication  Resume Lantus at bedtime along with sliding scale insulin  Avoid hypoglycemia  Hold Oral regimen    PTSD (post-traumatic stress disorder)  Continue fluoxetine  Severe aortic stenosis  TTE 1/5/2024 The aortic valve peak velocity is 3.4 m/s. The aortic valve mean gradient is 29.0 mmHg. The dimensionless velocity index is 0.25. The aortic valve area is 0.75 cm2.   Cardiac surgery consulted for evaluation of AVR  Repeat echocardiogram pending.   Primary hypertension  Blood pressure reviewed.  Continue lisinopril 2.5 mg daily and Lopressor 12.5 mg twice daily      VTE Pharmacologic Prophylaxis:   Moderate Risk (Score 3-4) - Pharmacological DVT Prophylaxis Ordered: heparin drip.  Code Status: Level 1 - Full Code       Anticipated Length of Stay: Patient will be admitted on an inpatient basis with an anticipated length of stay of greater  than 2 midnights secondary to NSTEMI.    History of Present Illness   Chief Complaint: chest pain    Larry Harper is a 60 y.o. male with a PMH of CAD, severe aortic stenosis, type 2 diabetes mellitus, PTSD and hypertension who presented with chest pain and noted to have elevated troponin normal.  He underwent cardiac catheterization at [facility] and noted to have severe LAD disease and given his severe aortic stenosis he is recommended to be transferred to [facility] for cardiac surgery evaluation.  Patient seen and examined.  No acute complaints at this time    Review of Systems   Constitutional:  Negative for chills and fever.   HENT:  Negative for ear pain and sore throat.    Eyes:  Negative for pain and visual disturbance.   Respiratory:  Negative for cough and shortness of breath.    Cardiovascular:  Negative for chest pain and palpitations.   Gastrointestinal:  Negative for abdominal pain and vomiting.   Genitourinary:  Negative for dysuria and hematuria.   Musculoskeletal:  Negative for arthralgias and back pain.   Skin:  Negative for color change and rash.   Neurological:  Negative for seizures and syncope.   All other systems reviewed and are negative.      Historical Information   Past Medical History:   Diagnosis Date    Anxiety     Coronary artery disease     Depression     Diabetes mellitus (HCC)     MI, old     PTSD (post-traumatic stress disorder)      Past Surgical History:   Procedure Laterality Date    CARPAL TUNNEL RELEASE Right     CORONARY ANGIOPLASTY WITH STENT PLACEMENT      ROTATOR CUFF REPAIR Right      Social History     Tobacco Use    Smoking status: Never     Passive exposure: Never    Smokeless tobacco: Never   Vaping Use    Vaping status: Never Used   Substance and Sexual Activity    Alcohol use: Not Currently    Drug use: Not Currently     Types: Marijuana    Sexual activity: Not Currently     E-Cigarette/Vaping    E-Cigarette Use Never User      E-Cigarette/Vaping  Substances    Nicotine No     THC No     CBD No     Flavoring No      Family history non-contributory  Social History:  Marital Status: Single       Meds/Allergies   I have reviewed home medications with patient personally.  Prior to Admission medications    Medication Sig Start Date End Date Taking? Authorizing Provider   albuterol (PROVENTIL HFA,VENTOLIN HFA) 90 mcg/act inhaler Inhale 2 puffs    Historical Provider, MD   aspirin (ECOTRIN LOW STRENGTH) 81 mg EC tablet Take 81 mg by mouth daily    Historical Provider, MD   atorvastatin (LIPITOR) 80 mg tablet Take 80 mg by mouth every morning    Historical Provider, MD   diazepam (VALIUM) 5 mg tablet Take 1 tablet (5 mg total) by mouth every 6 (six) hours as needed for muscle spasms 10/14/23   Rudi Casey,    Empagliflozin (Jardiance) 10 MG TABS tablet  4/26/23   Historical Provider, MD   FLUoxetine (PROzac) 40 MG capsule Take 40 mg by mouth daily    Historical Provider, MD   fluticasone (FLONASE) 50 mcg/act nasal spray 1 spray daily    Historical Provider, MD   ibuprofen (MOTRIN) 400 mg tablet Take 400 mg by mouth every 8 (eight) hours as needed    Historical Provider, MD   insulin glargine (LANTUS) 100 units/mL subcutaneous injection Inject 35 Units under the skin    Historical Provider, MD   Lantus SoloStar 100 units/mL SOPN 30 Units in the morning 11/11/24   Historical Provider, MD   lisinopril (ZESTRIL) 2.5 mg tablet Take 2.5 mg by mouth daily    Historical Provider, MD   lovastatin (MEVACOR) 20 mg tablet Take 20 mg by mouth daily    Historical Provider, MD   metFORMIN (GLUCOPHAGE) 1000 MG tablet Take 1,000 mg by mouth daily    Historical Provider, MD   metoprolol tartrate (LOPRESSOR) 25 mg tablet Take 12.5 mg by mouth 2 (two) times a day 1/29/24   Historical Provider, MD   NovoLOG FlexPen 100 units/mL injection pen INJECT 12 UNITS BEFORE EACH MEAL SUBCUTANEOUSLY    Historical Provider, MD   traZODone (DESYREL) 50 mg tablet Take 50 mg by mouth     Historical Provider, MD   umeclidinium (Incruse Ellipta) 62.5 mcg/actuation AEPB inhaler Inhale 1 puff daily    Historical Provider, MD     Allergies   Allergen Reactions    Bee Venom Anaphylaxis    Shellfish Allergy - Food Allergy Anaphylaxis       Objective :  Temp:  [97.1 °F (36.2 °C)-98.5 °F (36.9 °C)] 97.3 °F (36.3 °C)  HR:  [65-79] 70  BP: (125-157)/(67-82) 136/75  Resp:  [16-18] 17  SpO2:  [95 %-98 %] 97 %  O2 Device: Nasal cannula  Nasal Cannula O2 Flow Rate (L/min):  [1.5 L/min] 1.5 L/min    Physical Exam  Vitals and nursing note reviewed.   Constitutional:       General: He is not in acute distress.     Appearance: He is well-developed.   HENT:      Head: Normocephalic and atraumatic.   Eyes:      Conjunctiva/sclera: Conjunctivae normal.   Cardiovascular:      Rate and Rhythm: Normal rate and regular rhythm.      Heart sounds: No murmur heard.  Pulmonary:      Effort: Pulmonary effort is normal. No respiratory distress.      Breath sounds: Normal breath sounds.   Abdominal:      Palpations: Abdomen is soft.      Tenderness: There is no abdominal tenderness.   Musculoskeletal:         General: No swelling.      Cervical back: Neck supple.   Skin:     General: Skin is warm and dry.      Capillary Refill: Capillary refill takes less than 2 seconds.   Neurological:      Mental Status: He is alert.   Psychiatric:         Mood and Affect: Mood normal.          Lines/Drains:            Lab Results: I have reviewed the following results:  Results from last 7 days   Lab Units 01/25/25  0325 01/24/25  0512   WBC Thousand/uL 7.00 7.18   HEMOGLOBIN g/dL 12.8 13.9   HEMATOCRIT % 38.7 41.1   PLATELETS Thousands/uL 240 243   SEGS PCT %  --  52   LYMPHO PCT %  --  32   MONO PCT %  --  11   EOS PCT %  --  4     Results from last 7 days   Lab Units 01/25/25  0325   SODIUM mmol/L 140   POTASSIUM mmol/L 3.4*   CHLORIDE mmol/L 109*   CO2 mmol/L 24   BUN mg/dL 11   CREATININE mg/dL 0.79   ANION GAP mmol/L 7   CALCIUM mg/dL 8.4    ALBUMIN g/dL 3.7   TOTAL BILIRUBIN mg/dL 0.36   ALK PHOS U/L 40   ALT U/L 13   AST U/L 12*   GLUCOSE RANDOM mg/dL 129     Results from last 7 days   Lab Units 01/23/25  1605   INR  0.98     Results from last 7 days   Lab Units 01/25/25  1607 01/25/25  1231 01/25/25  0846 01/24/25  2111 01/24/25  1642 01/24/25  0808 01/24/25  0508   POC GLUCOSE mg/dl 199* 186* 203* 172* 196* 159* 178*     Lab Results   Component Value Date    HGBA1C 8.9 (H) 01/24/2025    HGBA1C 7.8 (H) 04/08/2024    HGBA1C 8.6 (H) 12/28/2023           Imaging Results Review: I reviewed radiology reports from this admission including: Echocardiogram.  Other Study Results Review: EKG was reviewed.     Administrative Statements   I have spent a total time of 45 minutes in caring for this patient on the day of the visit/encounter including Diagnostic results, Documenting in the medical record, Reviewing / ordering tests, medicine, procedures  , Obtaining or reviewing history  , and Communicating with other healthcare professionals .    ** Please Note: This note has been constructed using a voice recognition system. **

## 2025-01-26 NOTE — ASSESSMENT & PLAN NOTE
TTE 1/5/2025 LVEF 65%, severe AS with mild AI.  Mean gradient 29 mmHg with GRAYSON 0.75 cm²  Known history of, follows with cardiologist in [place]  Generally has been asymptomatic and surgical intervention had not yet been pursued by primary cardiologist

## 2025-01-27 ENCOUNTER — APPOINTMENT (INPATIENT)
Dept: PULMONOLOGY | Facility: HOSPITAL | Age: 60
DRG: 216 | End: 2025-01-27
Payer: MEDICARE

## 2025-01-27 ENCOUNTER — APPOINTMENT (INPATIENT)
Dept: NON INVASIVE DIAGNOSTICS | Facility: HOSPITAL | Age: 60
DRG: 216 | End: 2025-01-27
Payer: MEDICARE

## 2025-01-27 LAB
ABO GROUP BLD: NORMAL
ANION GAP SERPL CALCULATED.3IONS-SCNC: 5 MMOL/L (ref 4–13)
AORTIC ROOT: 3 CM
AORTIC VALVE MEAN VELOCITY: 29.9 M/S
APTT PPP: 69 SECONDS (ref 23–34)
ASCENDING AORTA: 2.9 CM
ATRIAL RATE: 59 BPM
AV AREA BY CONTINUOUS VTI: 1 CM2
AV AREA PEAK VELOCITY: 1 CM2
AV LVOT MEAN GRADIENT: 4 MMHG
AV LVOT PEAK GRADIENT: 8 MMHG
AV MEAN PRESS GRAD SYS DOP V1V2: 39 MMHG
AV ORIFICE AREA US: 1.01 CM2
AV PEAK GRADIENT: 57 MMHG
AV REGURGITATION PRESSURE HALF TIME: 367 MS
AV VELOCITY RATIO: 0.36
BASOPHILS # BLD AUTO: 0.08 THOUSANDS/ΜL (ref 0–0.1)
BASOPHILS NFR BLD AUTO: 1 % (ref 0–1)
BSA FOR ECHO PROCEDURE: 1.85 M2
BUN SERPL-MCNC: 18 MG/DL (ref 5–25)
CALCIUM SERPL-MCNC: 9.5 MG/DL (ref 8.4–10.2)
CHLORIDE SERPL-SCNC: 103 MMOL/L (ref 96–108)
CO2 SERPL-SCNC: 30 MMOL/L (ref 21–32)
CREAT SERPL-MCNC: 0.96 MG/DL (ref 0.6–1.3)
DOP CALC AO VTI: 95.1 CM
DOP CALC LVOT AREA: 2.83
DOP CALC LVOT DIAMETER: 1.9 CM
DOP CALC LVOT PEAK VEL VTI: 33.8 CM
DOP CALC LVOT PEAK VEL: 1.37 M/S
DOP CALC LVOT STROKE INDEX: 51.9 ML/M2
DOP CALC LVOT STROKE VOLUME: 95.78
E WAVE DECELERATION TIME: 264 MS
E/A RATIO: 1.17
EOSINOPHIL # BLD AUTO: 0.44 THOUSAND/ΜL (ref 0–0.61)
EOSINOPHIL NFR BLD AUTO: 6 % (ref 0–6)
ERYTHROCYTE [DISTWIDTH] IN BLOOD BY AUTOMATED COUNT: 14.5 % (ref 11.6–15.1)
FRACTIONAL SHORTENING: 33 (ref 28–44)
GFR SERPL CREATININE-BSD FRML MDRD: 86 ML/MIN/1.73SQ M
GLUCOSE SERPL-MCNC: 124 MG/DL (ref 65–140)
GLUCOSE SERPL-MCNC: 151 MG/DL (ref 65–140)
GLUCOSE SERPL-MCNC: 162 MG/DL (ref 65–140)
GLUCOSE SERPL-MCNC: 198 MG/DL (ref 65–140)
GLUCOSE SERPL-MCNC: 281 MG/DL (ref 65–140)
HCT VFR BLD AUTO: 39.5 % (ref 36.5–49.3)
HGB BLD-MCNC: 13.2 G/DL (ref 12–17)
IMM GRANULOCYTES # BLD AUTO: 0.02 THOUSAND/UL (ref 0–0.2)
IMM GRANULOCYTES NFR BLD AUTO: 0 % (ref 0–2)
INTERVENTRICULAR SEPTUM IN DIASTOLE (PARASTERNAL SHORT AXIS VIEW): 1.4 CM
INTERVENTRICULAR SEPTUM: 1.4 CM (ref 0.6–1.1)
LA/AORTA RATIO 2D: 1.3
LAAS-AP2: 17.7 CM2
LAAS-AP4: 16.3 CM2
LEFT ATRIUM SIZE: 3.9 CM
LEFT ATRIUM VOLUME (MOD BIPLANE): 43 ML
LEFT ATRIUM VOLUME INDEX (MOD BIPLANE): 23.2 ML/M2
LEFT INTERNAL DIMENSION IN SYSTOLE: 2.7 CM (ref 2.1–4)
LEFT VENTRICULAR INTERNAL DIMENSION IN DIASTOLE: 4 CM (ref 3.5–6)
LEFT VENTRICULAR POSTERIOR WALL IN END DIASTOLE: 1.1 CM
LEFT VENTRICULAR STROKE VOLUME: 44 ML
LV EF US.2D.A4C+ESTIMATED: 61 %
LVSV (TEICH): 44 ML
LYMPHOCYTES # BLD AUTO: 2.62 THOUSANDS/ΜL (ref 0.6–4.47)
LYMPHOCYTES NFR BLD AUTO: 35 % (ref 14–44)
MCH RBC QN AUTO: 27.6 PG (ref 26.8–34.3)
MCHC RBC AUTO-ENTMCNC: 33.4 G/DL (ref 31.4–37.4)
MCV RBC AUTO: 83 FL (ref 82–98)
MONOCYTES # BLD AUTO: 0.78 THOUSAND/ΜL (ref 0.17–1.22)
MONOCYTES NFR BLD AUTO: 10 % (ref 4–12)
MRSA NOSE QL CULT: NORMAL
MV E'TISSUE VEL-SEP: 10 CM/S
MV PEAK A VEL: 1.03 M/S
MV PEAK E VEL: 121 CM/S
MV STENOSIS PRESSURE HALF TIME: 77 MS
MV VALVE AREA P 1/2 METHOD: 2.86
NEUTROPHILS # BLD AUTO: 3.59 THOUSANDS/ΜL (ref 1.85–7.62)
NEUTS SEG NFR BLD AUTO: 48 % (ref 43–75)
NRBC BLD AUTO-RTO: 0 /100 WBCS
P AXIS: 63 DEGREES
PLATELET # BLD AUTO: 250 THOUSANDS/UL (ref 149–390)
PMV BLD AUTO: 11.5 FL (ref 8.9–12.7)
POTASSIUM SERPL-SCNC: 4.2 MMOL/L (ref 3.5–5.3)
PR INTERVAL: 138 MS
QRS AXIS: 68 DEGREES
QRSD INTERVAL: 112 MS
QT INTERVAL: 444 MS
QTC INTERVAL: 440 MS
RBC # BLD AUTO: 4.78 MILLION/UL (ref 3.88–5.62)
RH BLD: POSITIVE
RIGHT VENTRICLE ID DIMENSION: 3.7 CM
SL CV AV PEAK GRADIENT RETROGRADE: 54 MMHG
SL CV LV EF: 60
SL CV PED ECHO LEFT VENTRICLE DIASTOLIC VOLUME (MOD BIPLANE) 2D: 71 ML
SL CV PED ECHO LEFT VENTRICLE SYSTOLIC VOLUME (MOD BIPLANE) 2D: 27 ML
SODIUM SERPL-SCNC: 138 MMOL/L (ref 135–147)
T WAVE AXIS: 32 DEGREES
TRICUSPID ANNULAR PLANE SYSTOLIC EXCURSION: 2.7 CM
VENTRICULAR RATE: 59 BPM
WBC # BLD AUTO: 7.53 THOUSAND/UL (ref 4.31–10.16)

## 2025-01-27 PROCEDURE — 93306 TTE W/DOPPLER COMPLETE: CPT

## 2025-01-27 PROCEDURE — 94729 DIFFUSING CAPACITY: CPT

## 2025-01-27 PROCEDURE — 94726 PLETHYSMOGRAPHY LUNG VOLUMES: CPT | Performed by: INTERNAL MEDICINE

## 2025-01-27 PROCEDURE — 85730 THROMBOPLASTIN TIME PARTIAL: CPT | Performed by: STUDENT IN AN ORGANIZED HEALTH CARE EDUCATION/TRAINING PROGRAM

## 2025-01-27 PROCEDURE — 93010 ELECTROCARDIOGRAM REPORT: CPT | Performed by: INTERNAL MEDICINE

## 2025-01-27 PROCEDURE — 94760 N-INVAS EAR/PLS OXIMETRY 1: CPT

## 2025-01-27 PROCEDURE — 99232 SBSQ HOSP IP/OBS MODERATE 35: CPT | Performed by: INTERNAL MEDICINE

## 2025-01-27 PROCEDURE — 94060 EVALUATION OF WHEEZING: CPT

## 2025-01-27 PROCEDURE — 99223 1ST HOSP IP/OBS HIGH 75: CPT | Performed by: INTERNAL MEDICINE

## 2025-01-27 PROCEDURE — 94060 EVALUATION OF WHEEZING: CPT | Performed by: INTERNAL MEDICINE

## 2025-01-27 PROCEDURE — 99233 SBSQ HOSP IP/OBS HIGH 50: CPT | Performed by: PHYSICIAN ASSISTANT

## 2025-01-27 PROCEDURE — 94729 DIFFUSING CAPACITY: CPT | Performed by: INTERNAL MEDICINE

## 2025-01-27 PROCEDURE — 94726 PLETHYSMOGRAPHY LUNG VOLUMES: CPT

## 2025-01-27 PROCEDURE — 82948 REAGENT STRIP/BLOOD GLUCOSE: CPT

## 2025-01-27 PROCEDURE — 93306 TTE W/DOPPLER COMPLETE: CPT | Performed by: INTERNAL MEDICINE

## 2025-01-27 PROCEDURE — 99232 SBSQ HOSP IP/OBS MODERATE 35: CPT | Performed by: STUDENT IN AN ORGANIZED HEALTH CARE EDUCATION/TRAINING PROGRAM

## 2025-01-27 PROCEDURE — 80048 BASIC METABOLIC PNL TOTAL CA: CPT | Performed by: STUDENT IN AN ORGANIZED HEALTH CARE EDUCATION/TRAINING PROGRAM

## 2025-01-27 PROCEDURE — 85025 COMPLETE CBC W/AUTO DIFF WBC: CPT | Performed by: STUDENT IN AN ORGANIZED HEALTH CARE EDUCATION/TRAINING PROGRAM

## 2025-01-27 RX ORDER — ALBUTEROL SULFATE 0.83 MG/ML
2.5 SOLUTION RESPIRATORY (INHALATION) ONCE
Status: COMPLETED | OUTPATIENT
Start: 2025-01-27 | End: 2025-01-27

## 2025-01-27 RX ORDER — INSULIN LISPRO 100 [IU]/ML
3 INJECTION, SOLUTION INTRAVENOUS; SUBCUTANEOUS
Status: DISCONTINUED | OUTPATIENT
Start: 2025-01-27 | End: 2025-01-28

## 2025-01-27 RX ORDER — INSULIN LISPRO 100 [IU]/ML
1-5 INJECTION, SOLUTION INTRAVENOUS; SUBCUTANEOUS
Status: DISCONTINUED | OUTPATIENT
Start: 2025-01-27 | End: 2025-01-31

## 2025-01-27 RX ORDER — GUAIFENESIN 600 MG/1
600 TABLET, EXTENDED RELEASE ORAL EVERY 12 HOURS SCHEDULED
Status: DISCONTINUED | OUTPATIENT
Start: 2025-01-27 | End: 2025-01-31

## 2025-01-27 RX ORDER — INSULIN LISPRO 100 [IU]/ML
1-6 INJECTION, SOLUTION INTRAVENOUS; SUBCUTANEOUS
Status: DISCONTINUED | OUTPATIENT
Start: 2025-01-28 | End: 2025-01-27

## 2025-01-27 RX ORDER — ALBUTEROL SULFATE 0.83 MG/ML
2.5 SOLUTION RESPIRATORY (INHALATION) EVERY 6 HOURS PRN
Status: DISCONTINUED | OUTPATIENT
Start: 2025-01-27 | End: 2025-01-27

## 2025-01-27 RX ADMIN — ALBUTEROL SULFATE 2 PUFF: 90 AEROSOL, METERED RESPIRATORY (INHALATION) at 08:57

## 2025-01-27 RX ADMIN — HEPARIN SODIUM 20 UNITS/KG/HR: 10000 INJECTION, SOLUTION INTRAVENOUS at 05:24

## 2025-01-27 RX ADMIN — ACETAMINOPHEN 650 MG: 325 TABLET, FILM COATED ORAL at 17:58

## 2025-01-27 RX ADMIN — Medication 12.5 MG: at 08:57

## 2025-01-27 RX ADMIN — ASPIRIN 81 MG: 81 TABLET, COATED ORAL at 08:56

## 2025-01-27 RX ADMIN — Medication 12.5 MG: at 17:51

## 2025-01-27 RX ADMIN — NITROGLYCERIN 0.5 INCH: 20 OINTMENT TOPICAL at 11:50

## 2025-01-27 RX ADMIN — INSULIN LISPRO 3 UNITS: 100 INJECTION, SOLUTION INTRAVENOUS; SUBCUTANEOUS at 17:52

## 2025-01-27 RX ADMIN — CHLORHEXIDINE GLUCONATE 15 ML: 1.2 SOLUTION ORAL at 01:00

## 2025-01-27 RX ADMIN — GUAIFENESIN 600 MG: 600 TABLET, EXTENDED RELEASE ORAL at 11:50

## 2025-01-27 RX ADMIN — INSULIN GLARGINE 10 UNITS: 100 INJECTION, SOLUTION SUBCUTANEOUS at 21:14

## 2025-01-27 RX ADMIN — MUPIROCIN 1 APPLICATION: 20 OINTMENT TOPICAL at 00:59

## 2025-01-27 RX ADMIN — NITROGLYCERIN 0.5 INCH: 20 OINTMENT TOPICAL at 17:52

## 2025-01-27 RX ADMIN — FLUOXETINE HYDROCHLORIDE 40 MG: 20 CAPSULE ORAL at 08:56

## 2025-01-27 RX ADMIN — INSULIN LISPRO 1 UNITS: 100 INJECTION, SOLUTION INTRAVENOUS; SUBCUTANEOUS at 08:57

## 2025-01-27 RX ADMIN — INSULIN LISPRO 1 UNITS: 100 INJECTION, SOLUTION INTRAVENOUS; SUBCUTANEOUS at 21:26

## 2025-01-27 RX ADMIN — LISINOPRIL 2.5 MG: 2.5 TABLET ORAL at 08:57

## 2025-01-27 RX ADMIN — INSULIN LISPRO 4 UNITS: 100 INJECTION, SOLUTION INTRAVENOUS; SUBCUTANEOUS at 12:26

## 2025-01-27 RX ADMIN — TRAZODONE HYDROCHLORIDE 50 MG: 50 TABLET ORAL at 21:14

## 2025-01-27 RX ADMIN — HEPARIN SODIUM 20 UNITS/KG/HR: 10000 INJECTION, SOLUTION INTRAVENOUS at 21:20

## 2025-01-27 RX ADMIN — ALBUTEROL SULFATE 2.5 MG: 2.5 SOLUTION RESPIRATORY (INHALATION) at 13:48

## 2025-01-27 RX ADMIN — GUAIFENESIN 600 MG: 600 TABLET, EXTENDED RELEASE ORAL at 21:14

## 2025-01-27 RX ADMIN — ATORVASTATIN CALCIUM 80 MG: 80 TABLET, FILM COATED ORAL at 17:51

## 2025-01-27 RX ADMIN — ALBUTEROL SULFATE 2 PUFF: 90 AEROSOL, METERED RESPIRATORY (INHALATION) at 12:27

## 2025-01-27 NOTE — PROGRESS NOTES
Progress Note - Cardiac Surgery   Larry Harper 60 y.o. male MRN: [id number]  Unit/Bed#: [facility] 425-01 Encounter: [id number]      24 Hour Events: CT chest completed, official read pending, but does show some plaque in ascending/root, emphysema changes/blebs. Vein mapping acceptable and carotid US < 50% bilaterally. Labs and vitals stable. Had brief CP last evening, no SOB, given SL nitro with relief.  On IV heparin    Medications:   Scheduled Meds:  Current Facility-Administered Medications   Medication Dose Route Frequency Provider Last Rate    acetaminophen  650 mg Oral Q6H PRN Jordon Hood MD      albuterol  2 puff Inhalation Q4H PRN Jordon Hood MD      aspirin  81 mg Oral Daily Jordon Hood MD      atorvastatin  80 mg Oral Daily With Dinner Jordon Hood MD      FLUoxetine  40 mg Oral Daily Jordon Hood MD      heparin (porcine)  3-20 Units/kg/hr (Order-Specific) Intravenous Titrated Jordon Hood MD 20 Units/kg/hr (01/27/25 0524)    heparin (porcine)  2,000 Units Intravenous Q6H PRN Jordon Hood MD      heparin (porcine)  4,000 Units Intravenous Q6H PRN Jordon Hood MD      insulin glargine  10 Units Subcutaneous HS Nolvia Ybarra DO      insulin lispro  1-6 Units Subcutaneous 4x Daily (AC & HS) Jordon Hood MD      lisinopril  2.5 mg Oral Daily Jordon Hood MD      metoprolol tartrate  12.5 mg Oral BID Jordon Hood MD      nitroglycerin  0.4 mg Sublingual Q5 Min PRN Jordon Hood MD      ondansetron  4 mg Intravenous Q6H PRN Jordon Hood MD      traZODone  50 mg Oral HS Jordon Hood MD      umeclidinium  1 puff Inhalation Daily Jordon Hood MD       Continuous Infusions:heparin (porcine), 3-20 Units/kg/hr (Order-Specific), Last Rate: 20 Units/kg/hr (01/27/25 0524)        Results:   Results from last 7 days   Lab Units 01/27/25  0105 01/26/25  0433 01/25/25  0325   WBC Thousand/uL 7.53 7.11 7.00   HEMOGLOBIN g/dL 13.2 12.9 12.8   HEMATOCRIT % 39.5 39.2 38.7   PLATELETS Thousands/uL 250 245  240     Results from last 7 days   Lab Units 01/27/25  0105 01/26/25  0433 01/25/25  0325   POTASSIUM mmol/L 4.2 3.8 3.4*   CHLORIDE mmol/L 103 109* 109*   CO2 mmol/L 30 27 24   BUN mg/dL 18 15 11   CREATININE mg/dL 0.96 0.84 0.79   CALCIUM mg/dL 9.5 8.8 8.4     Results from last 7 days   Lab Units 01/27/25  0105 01/26/25  1844 01/26/25  1231 01/24/25  0042 01/23/25  1605   INR   --   --   --   --  0.98   PTT seconds 69* 78* 52*   < > 27    < > = values in this interval not displayed.       Studies:     Cardiac Cath: Mid LAD 70% and 95%, OM3 70%     Echo: repeat pending     Echo 1/5: severe AS velocity 3.4 m/s, mean gradient 29 mmHg, DVI 0.25, GRAYSON 0.75 cm² EF 65%     Carotid US: <50% b/l, no subclavian dx, VAFA     Vein Mapping: adequate b/l thighs, smaller size below knees bilaterally     CXR: trace pleural eff     Panorex: ordered 1/26     CT Chest: Some plaque in ascending and root, emphysema changes/blebs. Official read pending     EKG: NSR, no specific ST changes     PFT: ordered 1/26     Results Review Statement: I personally reviewed the following image studies in PACS and associated radiology reports: EKG, cardiac cath, CT chest, Ultrasound(s), and Echocardiogram. My interpretation of the radiology images/reports is: same as above.    Vitals:   Vitals:    01/26/25 2300 01/27/25 0100 01/27/25 0600 01/27/25 0737   BP: 130/59   125/61   BP Location: Right arm   Right arm   Pulse: 72   78   Resp: 18   16   Temp: 97.8 °F (36.6 °C)   97.8 °F (36.6 °C)   TempSrc: Oral   Oral   SpO2: 95%   95%   Weight:  73.8 kg (162 lb 11.2 oz) 73.8 kg (162 lb 11.2 oz)    Height:           Physical Exam:    General: No acute distress, Alert, and Normal appearance  HEENT/NECK:  Normocephalic. Atraumatic.  No jugular venous distention.    Cardiac: Regular rate and rhythm and IV/VI  systolicmurmur  Pulmonary:   Coarse breath sounds bilaterally  Abdomen:  Non-tender, Non-distended, and Normal bowel sounds  Extremities: Extremities  warm/dry  Neuro: Alert and oriented X 3  Skin: Warm/Dry, without rashes or lesions.      Assessment:    Severe 2VCAD  Severe AS  NSTEMI  Preserved LV function on last echo  Smoker/COPD  Hx of CAD/MI s/p KAEL to RCA '21  DM2 on insulin, A1c 8.9      Plan:  Ongoing SAVR/CABG workup    Patient agreeable to proceed with surgery    Carotid ultrasound completed, and acceptable    Vein mapping completed, and acceptable    Continue IV heparin and medical therapy per cardiology    CT chest completed, shows some plaque in ascending/root, emphysema/bleb disease. Official read pending.     Repeat echo pending    PFTs pending    Stop ACEi in anticipation of surgery    Consult endo for uncontrolled DM2 on insulin in preop for surgery    Blood type and screen completed    Continue Mupirocin 2% nasal ointment q 12 hrs    Continue topical chlorhexidine bath and mouth rise    SAVR/CABG date/surgeon TBD on workup completed     SIGNATURE: James Amaya PA-C  DATE: January 27, 2025  TIME: 9:09 AM    * This note was completed in part utilizing [email or web address] direct voice recognition software.   Grammatical errors, random word insertion, spelling mistakes, and incomplete sentences may be an occasional consequence of the system secondary to software limitations, ambient noise and hardware issues. At the time of dictation, efforts were made to edit, clarify and /or correct errors. Please read the chart carefully and recognize, using context, where substitutions have occurred.  If you have any questions or concerns about the context, text or information contained within the body of this dictation, please contact myself, the provider, for further clarification.

## 2025-01-27 NOTE — PROGRESS NOTES
Progress Note - Cardiology   Name: Larry Harper 60 y.o. male I MRN: [id number]  [facility] I Date of Admission: 2025   Date of Service: 2025 I Hospital Day: 2     Assessment & Plan  NSTEMI (non-ST elevated myocardial infarction) (Formerly [facility])  Presented to [facility] with development of chest discomfort while attending his brother's , symptoms similar to prior angina which resolved with nitroglycerin.  HS troponin 149, 272, 288, 142  [facility]: Two mid LAD lesions, 70% and 95%.  70% OM 3 stenosis  Transferred to [facility] for CABG/AVR evaluation. W/U In progress   On aspirin, high intensity statin, metoprolol tartrate 12.5 mg twice daily, and IV heparin  Mild chest discomfort watching the football game yesterday relieved with a sublingual nitroglycerin. Will add NTP.  Severe aortic stenosis  TTE 2025  ( [facility] report) LVEF 65%, trileafet severe AS with mild AI.  Mean gradient 29 mmHg with GRAYSON 0.75 cm²  Known history of, follows with cardiologist in [place]  Generally has been asymptomatic and surgical intervention had not yet been pursued by primary cardiologist  Being evaluated for SAVR  CAD (coronary artery disease)  S/p KAEL to RCA, 2021  Follows with cardiologist in [place]  Primary hypertension  Well controlled  Diabetes mellitus (Formerly [facility])  Lab Results   Component Value Date    HGBA1C 8.9 (H) 2025   Poorly controlled.  Management per primary team  Dyslipidemia  Total cholesterol 226, triglycerides 108, HDL 41,  on admission.  Started on atorvastatin 80 mg daily  COPD (chronic obstructive pulmonary disease) (Formerly [facility])  CT chest report pending  Coarse respirations  Consider pulmonary evaluation         Subjective/Objective   Chief Complaint/subjective  mild chest discomfort yesterday relieved shortly after sublingual nitroglycerin.  Feels well this morning.  No shortness of breath.  Coarse respirations.        Vitals: /61 (BP Location: Right arm)   Pulse 78   Temp  "97.8 °F (36.6 °C) (Oral)   Resp 16   Ht 5' 7\" (1.702 m)   Wt 73.8 kg (162 lb 11.2 oz)   SpO2 95%   BMI 25.48 kg/m²     Vitals:    01/27/25 0100 01/27/25 0600   Weight: 73.8 kg (162 lb 11.2 oz) 73.8 kg (162 lb 11.2 oz)     Orthostatic Blood Pressures      Flowsheet Row Most Recent Value   Blood Pressure 125/61 filed at 01/27/2025 0737   Patient Position - Orthostatic VS Lying filed at 01/27/2025 0737              Intake/Output Summary (Last 24 hours) at 1/27/2025 0949  Last data filed at 1/26/2025 1830  Gross per 24 hour   Intake 702 ml   Output --   Net 702 ml       Invasive Devices       Peripheral Intravenous Line  Duration             Peripheral IV 01/24/25 Dorsal (posterior);Left Hand 3 days                      Current Facility-Administered Medications:     acetaminophen (TYLENOL) tablet 650 mg, Q6H PRN    albuterol (PROVENTIL HFA,VENTOLIN HFA) inhaler 2 puff, Q4H PRN    aspirin (ECOTRIN LOW STRENGTH) EC tablet 81 mg, Daily    atorvastatin (LIPITOR) tablet 80 mg, Daily With Dinner    FLUoxetine (PROzac) capsule 40 mg, Daily    heparin (porcine) 25,000 units in 0.45% NaCl 250 mL infusion (premix), Titrated, Last Rate: 20 Units/kg/hr (01/27/25 0524)    heparin (porcine) injection 2,000 Units, Q6H PRN    heparin (porcine) injection 4,000 Units, Q6H PRN    insulin glargine (LANTUS) subcutaneous injection 10 Units 0.1 mL, HS    insulin lispro (HumALOG/ADMELOG) 100 units/mL subcutaneous injection 1-6 Units, 4x Daily (AC & HS) **AND** Fingerstick Glucose (POCT), 4x Daily AC and at bedtime    metoprolol tartrate (LOPRESSOR) partial tablet 12.5 mg, BID    nitroglycerin (NITROSTAT) SL tablet 0.4 mg, Q5 Min PRN    ondansetron (ZOFRAN) injection 4 mg, Q6H PRN    traZODone (DESYREL) tablet 50 mg, HS    umeclidinium 62.5 mcg/actuation inhaler AEPB 1 puff, Daily      Physical Exam: /61 (BP Location: Right arm)   Pulse 78   Temp 97.8 °F (36.6 °C) (Oral)   Resp 16   Ht 5' 7\" (1.702 m)   Wt 73.8 kg (162 lb 11.2 " oz)   SpO2 95%   BMI 25.48 kg/m²     General Appearance:    Alert, cooperative, no distress, appears stated age   Head:    Normocephalic, no scleral icterus   Eyes:    PERRL   Nose:   Nares normal, septum midline, no drainage    Throat:   Lips, mucosa, and tongue normal   Neck:   Supple, symmetrical, trachea midline,              Lungs:     Coarse to auscultation bilaterally, respirations unlabored   Chest Wall:    No tenderness or deformity    Heart:    Regular rate and rhythm, S1 and S2 normal, CHARLES   Abdomen:     Soft, non-tender, bowel sounds active all four quadrants,     no masses, no organomegaly   Extremities:   Extremities normal, atraumatic, no cyanosis or edema   Pulses:   2+ and symmetric all extremities   Skin:   Skin color, texture, turgor normal, no rashes or lesions   Neurologic:   Alert and oriented to person place and time, no focal deficits                 Lab Results:   Recent Results (from the past 72 hours)   Fingerstick Glucose (POCT)    Collection Time: 01/24/25  4:42 PM   Result Value Ref Range    POC Glucose 196 (H) 65 - 140 mg/dl   APTT    Collection Time: 01/24/25  8:24 PM   Result Value Ref Range    PTT 33 23 - 34 seconds   Fingerstick Glucose (POCT)    Collection Time: 01/24/25  9:11 PM   Result Value Ref Range    POC Glucose 172 (H) 65 - 140 mg/dl   CBC    Collection Time: 01/25/25  3:25 AM   Result Value Ref Range    WBC 7.00 4.31 - 10.16 Thousand/uL    RBC 4.77 3.88 - 5.62 Million/uL    Hemoglobin 12.8 12.0 - 17.0 g/dL    Hematocrit 38.7 36.5 - 49.3 %    MCV 81 (L) 82 - 98 fL    MCH 26.8 26.8 - 34.3 pg    MCHC 33.1 31.4 - 37.4 g/dL    RDW 14.4 11.6 - 15.1 %    Platelets 240 149 - 390 Thousands/uL    MPV 10.6 8.9 - 12.7 fL   Comprehensive metabolic panel    Collection Time: 01/25/25  3:25 AM   Result Value Ref Range    Sodium 140 135 - 147 mmol/L    Potassium 3.4 (L) 3.5 - 5.3 mmol/L    Chloride 109 (H) 96 - 108 mmol/L    CO2 24 21 - 32 mmol/L    ANION GAP 7 4 - 13 mmol/L    BUN 11 5  - 25 mg/dL    Creatinine 0.79 0.60 - 1.30 mg/dL    Glucose 129 65 - 140 mg/dL    Calcium 8.4 8.4 - 10.2 mg/dL    AST 12 (L) 13 - 39 U/L    ALT 13 7 - 52 U/L    Alkaline Phosphatase 40 34 - 104 U/L    Total Protein 5.7 (L) 6.4 - 8.4 g/dL    Albumin 3.7 3.5 - 5.0 g/dL    Total Bilirubin 0.36 0.20 - 1.00 mg/dL    eGFR 98 ml/min/1.73sq m   APTT    Collection Time: 01/25/25  3:25 AM   Result Value Ref Range    PTT 37 (H) 23 - 34 seconds   Fingerstick Glucose (POCT)    Collection Time: 01/25/25  8:46 AM   Result Value Ref Range    POC Glucose 203 (H) 65 - 140 mg/dl   APTT    Collection Time: 01/25/25 10:05 AM   Result Value Ref Range    PTT 67 (H) 23 - 34 seconds   APTT    Collection Time: 01/25/25 12:07 PM   Result Value Ref Range    PTT 54 (H) 23 - 34 seconds   Fingerstick Glucose (POCT)    Collection Time: 01/25/25 12:31 PM   Result Value Ref Range    POC Glucose 186 (H) 65 - 140 mg/dl   Fingerstick Glucose (POCT)    Collection Time: 01/25/25  4:07 PM   Result Value Ref Range    POC Glucose 199 (H) 65 - 140 mg/dl   Fingerstick Glucose (POCT)    Collection Time: 01/25/25  9:37 PM   Result Value Ref Range    POC Glucose 177 (H) 65 - 140 mg/dl   APTT    Collection Time: 01/25/25  9:59 PM   Result Value Ref Range    PTT 53 (H) 23 - 34 seconds   APTT    Collection Time: 01/26/25  4:33 AM   Result Value Ref Range    PTT 95 (H) 23 - 34 seconds   Basic metabolic panel    Collection Time: 01/26/25  4:33 AM   Result Value Ref Range    Sodium 143 135 - 147 mmol/L    Potassium 3.8 3.5 - 5.3 mmol/L    Chloride 109 (H) 96 - 108 mmol/L    CO2 27 21 - 32 mmol/L    ANION GAP 7 4 - 13 mmol/L    BUN 15 5 - 25 mg/dL    Creatinine 0.84 0.60 - 1.30 mg/dL    Glucose 159 (H) 65 - 140 mg/dL    Calcium 8.8 8.4 - 10.2 mg/dL    eGFR 96 ml/min/1.73sq m   CBC    Collection Time: 01/26/25  4:33 AM   Result Value Ref Range    WBC 7.11 4.31 - 10.16 Thousand/uL    RBC 4.72 3.88 - 5.62 Million/uL    Hemoglobin 12.9 12.0 - 17.0 g/dL    Hematocrit 39.2  36.5 - 49.3 %    MCV 83 82 - 98 fL    MCH 27.3 26.8 - 34.3 pg    MCHC 32.9 31.4 - 37.4 g/dL    RDW 14.5 11.6 - 15.1 %    Platelets 245 149 - 390 Thousands/uL    MPV 11.7 8.9 - 12.7 fL   Phosphorus    Collection Time: 01/26/25  4:33 AM   Result Value Ref Range    Phosphorus 3.3 2.7 - 4.5 mg/dL   Magnesium    Collection Time: 01/26/25  4:33 AM   Result Value Ref Range    Magnesium 1.8 (L) 1.9 - 2.7 mg/dL   Fingerstick Glucose (POCT)    Collection Time: 01/26/25  7:37 AM   Result Value Ref Range    POC Glucose 156 (H) 65 - 140 mg/dl   Fingerstick Glucose (POCT)    Collection Time: 01/26/25 12:07 PM   Result Value Ref Range    POC Glucose 250 (H) 65 - 140 mg/dl   APTT    Collection Time: 01/26/25 12:31 PM   Result Value Ref Range    PTT 52 (H) 23 - 34 seconds   Fingerstick Glucose (POCT)    Collection Time: 01/26/25  5:09 PM   Result Value Ref Range    POC Glucose 141 (H) 65 - 140 mg/dl   Type and screen    Collection Time: 01/26/25  6:44 PM   Result Value Ref Range    ABO Grouping A     Rh Factor Positive     Antibody Screen Negative     Specimen Expiration Date [id number]    Lipid panel    Collection Time: 01/26/25  6:44 PM   Result Value Ref Range    Cholesterol 204 (H) See Comment mg/dL    Triglycerides 121 See Comment mg/dL    HDL, Direct 47 >=40 mg/dL    LDL Calculated 133 (H) 0 - 100 mg/dL    Non-HDL-Chol (CHOL-HDL) 157 mg/dl   APTT    Collection Time: 01/26/25  6:44 PM   Result Value Ref Range    PTT 78 (H) 23 - 34 seconds   ECG 12 lead    Collection Time: 01/26/25  7:47 PM   Result Value Ref Range    Ventricular Rate 59 BPM    Atrial Rate 59 BPM    [place] Interval 138 ms    QRSD Interval 112 ms    QT Interval 444 ms    QTC Interval 440 ms    P [place] 63 degrees    QRS Axis 68 degrees    T Wave Axis 32 degrees   Fingerstick Glucose (POCT)    Collection Time: 01/26/25  9:07 PM   Result Value Ref Range    POC Glucose 237 (H) 65 - 140 mg/dl   ABORh Recheck - Contact Blood Bank Prior to Collection    Collection Time:  01/27/25  1:05 AM   Result Value Ref Range    ABO Grouping A     Rh Factor Positive    APTT    Collection Time: 01/27/25  1:05 AM   Result Value Ref Range    PTT 69 (H) 23 - 34 seconds   Basic metabolic panel    Collection Time: 01/27/25  1:05 AM   Result Value Ref Range    Sodium 138 135 - 147 mmol/L    Potassium 4.2 3.5 - 5.3 mmol/L    Chloride 103 96 - 108 mmol/L    CO2 30 21 - 32 mmol/L    ANION GAP 5 4 - 13 mmol/L    BUN 18 5 - 25 mg/dL    Creatinine 0.96 0.60 - 1.30 mg/dL    Glucose 151 (H) 65 - 140 mg/dL    Calcium 9.5 8.4 - 10.2 mg/dL    eGFR 86 ml/min/1.73sq m   CBC and differential    Collection Time: 01/27/25  1:05 AM   Result Value Ref Range    WBC 7.53 4.31 - 10.16 Thousand/uL    RBC 4.78 3.88 - 5.62 Million/uL    Hemoglobin 13.2 12.0 - 17.0 g/dL    Hematocrit 39.5 36.5 - 49.3 %    MCV 83 82 - 98 fL    MCH 27.6 26.8 - 34.3 pg    MCHC 33.4 31.4 - 37.4 g/dL    RDW 14.5 11.6 - 15.1 %    MPV 11.5 8.9 - 12.7 fL    Platelets 250 149 - 390 Thousands/uL    nRBC 0 /100 WBCs    Segmented % 48 43 - 75 %    Immature Grans % 0 0 - 2 %    Lymphocytes % 35 14 - 44 %    Monocytes % 10 4 - 12 %    Eosinophils Relative 6 0 - 6 %    Basophils Relative 1 0 - 1 %    Absolute Neutrophils 3.59 1.85 - 7.62 Thousands/µL    Absolute Immature Grans 0.02 0.00 - 0.20 Thousand/uL    Absolute Lymphocytes 2.62 0.60 - 4.47 Thousands/µL    Absolute Monocytes 0.78 0.17 - 1.22 Thousand/µL    Eosinophils Absolute 0.44 0.00 - 0.61 Thousand/µL    Basophils Absolute 0.08 0.00 - 0.10 Thousands/µL   Fingerstick Glucose (POCT)    Collection Time: 01/27/25  7:36 AM   Result Value Ref Range    POC Glucose 162 (H) 65 - 140 mg/dl     Imaging: I have personally reviewed pertinent reports.    Tele- nsr    Counseling / Coordination of Care  Total time spent today 25 minutes. Greater than 50% of total time was spent with the patient and / or family counseling and / or coordination of care.

## 2025-01-27 NOTE — ASSESSMENT & PLAN NOTE
Lab Results   Component Value Date    HGBA1C 8.9 (H) 01/24/2025       Recent Labs     01/26/25  2107 01/27/25  0736 01/27/25  1137 01/27/25  1716   POCGLU 237* 162* 281* 124       Blood Sugar Average: Last 72 hrs:  (P) 191  Previously on long acting insulin however reports running out of medication  Continue Lantus 10 units at bedtime and lispro 3 units 3 times daily with meals  Endocrinology following  Avoid hypoglycemia  Hold Oral regimen

## 2025-01-27 NOTE — CONSULTS
Consultation - Endocrinology   Name: Larry Harper 60 y.o. male I MRN: [id number]  Unit/Bed#: [facility] 425-01 I Date of Admission: 1/25/2025   Date of Service: 1/27/2025 I Hospital Day: 2   Inpatient consult to Endocrinology  Consult performed by: Godfrey Echevarria MD  Consult ordered by: James Amaya PA-C        Physician Requesting Evaluation: Nolvia Ybarra DO   Reason for Evaluation / Principal Problem: Type 2 diabetes mellitus    Assessment & Plan  Diabetes mellitus (HCC)  Lab Results   Component Value Date    HGBA1C 8.9 (H) 01/24/2025     Recent Labs     01/26/25  1207 01/26/25  1709 01/26/25  2107 01/27/25  0736   POCGLU 250* 141* 237* 162*     Blood Sugar Average: Last 72 hrs:  (P) [id number]  Type 2 diabetes mellitus with hyperglycemia as evidenced by A1c of 8.9%.  Home regimen: Lantus 35 units in the morning, metformin 1000 mg twice daily, NovoLog 5 units before meals, Jardiance 10 mg daily  Current regimen: Lantus 10 units nightly, correctional scale insulin algorithm 3.  Blood sugars reviewed-fasting sugars appear to be at goal on current regimen, however he was noted to have postprandial hyperglycemia yesterday before lunch and at bedtime.    Plan:  Recommend continuing Lantus 10 units nightly  Recommend starting Humalog 3 units before meals  Recommend correctional scale insulin algorithm  2 before meals and 1 at bedtime.  Continue Accu-Cheks before meals and at bedtime, adjust insulin as needed  Monitor for hypoglycemia, treat per protocol  Goal blood sugar while in the [id number]-156-861 mg/dL  Discharge recommendations pending clinical course  Endocrinology will continue following    NSTEMI (non-ST elevated myocardial infarction) (HCC)  Patient admitted with NSTEMI, pending workup for CABG  Management per primary team      History of Present Illness   Larry Harper is a 60 y.o. male with a past medical history of type 2 diabetes mellitus, CAD, hypertension, hyperlipidemia, severe  aortic stenosis, PTSD who is seen today in consultation.  Patient presented on 2025 to [facility] with chest pain which he developed while attending his brother's .  Underwent cardiac catheterization and noted to have severe LAD disease.  Was transferred to [facility] on 2025 for evaluation by cardiothoracic surgery.  Patient resides in [place] and follows up with a nurse practitioner, notes he has not been seen in a while.  At home he takes Lantus 35 units in the morning, metformin 1000 mg twice daily, NovoLog 5 units before meals, Jardiance 10 mg daily  Currently he is on Lantus 10 units nightly, correctional scale insulin algorithm 3 before meals and at bedtime.  He used to wear a continuous glucose monitor in the past, however states that he had to stop due to insurance issues.  Currently checks sugars 4 times a day with Accu-Chek glucometer.  Notes that in the morning blood sugars range 110-120 mg/dL, before meals they are usually in the 110-130 mg/dL range, however has noted occasional glycemic excursions in the setting of dietary indiscretions.  Endorses occasional episodes of hypoglycemia, particularly if he skips a meal.  Endorses shakiness, particularly if his blood sugars start approaching 80 mg/dL, with resolution of symptoms when he eats a peanut butter sandwich.  Denies recent hyperglycemia, notes polyuria and polydipsia and profound fatigue at the time of initial diabetes diagnosis.  Diabetes is complicated by CAD, retinopathy, denies neuropathy or nephropathy.  At the time of my evaluation, patient reports feeling well, reporting the chest pain has resolved.  His appetite has been good, and he notes trying to eat healthier and ordering salads.  Denies nausea, vomiting, signs or symptoms of hypoglycemia.    Review of Systems   Constitutional:  Negative for appetite change and unexpected weight change.   HENT:  Negative for congestion.    Eyes:  Negative for visual disturbance.    Respiratory:  Negative for cough and shortness of breath.    Cardiovascular:  Positive for chest pain (previously, now resolved). Negative for palpitations and leg swelling.   Gastrointestinal:  Negative for abdominal pain, constipation, diarrhea, nausea and vomiting.   Endocrine: Negative for polydipsia and polyuria.   Genitourinary:  Negative for frequency.   Musculoskeletal:  Negative for myalgias.   Skin:  Negative for rash.   Neurological:  Negative for dizziness, weakness, light-headedness, numbness and headaches.   Psychiatric/Behavioral:  Negative for sleep disturbance.    All other systems reviewed and are negative.    I have reviewed the patient's PMH, PSH, Social History, Family History, Meds, and Allergies  Historical Information   Past Medical History:   Diagnosis Date    Anxiety     Coronary artery disease     Depression     Diabetes mellitus (HCC)     MI, old     PTSD (post-traumatic stress disorder)      Past Surgical History:   Procedure Laterality Date    CARPAL TUNNEL RELEASE Right     CORONARY ANGIOPLASTY WITH STENT PLACEMENT      ROTATOR CUFF REPAIR Right      Social History     Tobacco Use    Smoking status: Never     Passive exposure: Never    Smokeless tobacco: Never   Vaping Use    Vaping status: Never Used   Substance and Sexual Activity    Alcohol use: Not Currently    Drug use: Not Currently     Types: Marijuana    Sexual activity: Not Currently     E-Cigarette/Vaping    E-Cigarette Use Never User      E-Cigarette/Vaping Substances    Nicotine No     THC No     CBD No     Flavoring No      History reviewed. No pertinent family history.  Social History     Tobacco Use    Smoking status: Never     Passive exposure: Never    Smokeless tobacco: Never   Vaping Use    Vaping status: Never Used   Substance and Sexual Activity    Alcohol use: Not Currently    Drug use: Not Currently     Types: Marijuana    Sexual activity: Not Currently       Current Facility-Administered Medications:      acetaminophen (TYLENOL) tablet 650 mg, Q6H PRN    albuterol (PROVENTIL HFA,VENTOLIN HFA) inhaler 2 puff, Q4H PRN    aspirin (ECOTRIN LOW STRENGTH) EC tablet 81 mg, Daily    atorvastatin (LIPITOR) tablet 80 mg, Daily With Dinner    FLUoxetine (PROzac) capsule 40 mg, Daily    heparin (porcine) 25,000 units in 0.45% NaCl 250 mL infusion (premix), Titrated, Last Rate: 20 Units/kg/hr (25 0524)    heparin (porcine) injection 2,000 Units, Q6H PRN    heparin (porcine) injection 4,000 Units, Q6H PRN    insulin glargine (LANTUS) subcutaneous injection 10 Units 0.1 mL, HS    insulin lispro (HumALOG/ADMELOG) 100 units/mL subcutaneous injection 1-6 Units, 4x Daily (AC & HS) **AND** Fingerstick Glucose (POCT), 4x Daily AC and at bedtime    metoprolol tartrate (LOPRESSOR) partial tablet 12.5 mg, BID    nitroglycerin (NITROSTAT) SL tablet 0.4 mg, Q5 Min PRN    ondansetron (ZOFRAN) injection 4 mg, Q6H PRN    traZODone (DESYREL) tablet 50 mg, HS    umeclidinium 62.5 mcg/actuation inhaler AEPB 1 puff, Daily  Prior to Admission Medications   Prescriptions Last Dose Informant Patient Reported? Taking?   Empagliflozin (Jardiance) 10 MG TABS tablet   Yes No   FLUoxetine (PROzac) 40 MG capsule   Yes No   Sig: Take 40 mg by mouth daily   Lantus SoloStar 100 units/mL SOPN   Yes No   Si Units in the morning   NovoLOG FlexPen 100 units/mL injection pen   Yes No   Sig: INJECT 12 UNITS BEFORE EACH MEAL SUBCUTANEOUSLY   albuterol (PROVENTIL HFA,VENTOLIN HFA) 90 mcg/act inhaler   Yes No   Sig: Inhale 2 puffs   aspirin (ECOTRIN LOW STRENGTH) 81 mg EC tablet   Yes No   Sig: Take 81 mg by mouth daily   atorvastatin (LIPITOR) 80 mg tablet   Yes No   Sig: Take 80 mg by mouth every morning   diazepam (VALIUM) 5 mg tablet   No No   Sig: Take 1 tablet (5 mg total) by mouth every 6 (six) hours as needed for muscle spasms   fluticasone (FLONASE) 50 mcg/act nasal spray   Yes No   Si spray daily   ibuprofen (MOTRIN) 400 mg tablet Not Taking   Yes No   Sig: Take 400 mg by mouth every 8 (eight) hours as needed   Patient not taking: Reported on 1/25/2025   insulin glargine (LANTUS) 100 units/mL subcutaneous injection   Yes No   Sig: Inject 35 Units under the skin   lisinopril (ZESTRIL) 2.5 mg tablet   Yes No   Sig: Take 2.5 mg by mouth daily   lovastatin (MEVACOR) 20 mg tablet   Yes No   Sig: Take 20 mg by mouth daily   metFORMIN (GLUCOPHAGE) 1000 MG tablet   Yes No   Sig: Take 1,000 mg by mouth daily   metoprolol tartrate (LOPRESSOR) 25 mg tablet   Yes No   Sig: Take 12.5 mg by mouth 2 (two) times a day   traZODone (DESYREL) 50 mg tablet   Yes No   Sig: Take 50 mg by mouth   umeclidinium (Incruse Ellipta) 62.5 mcg/actuation AEPB inhaler Not Taking  Yes No   Sig: Inhale 1 puff daily   Patient not taking: Reported on 1/25/2025      Facility-Administered Medications: None     Bee venom and Shellfish allergy - food allergy    Objective :  Temp:  [97.8 °F (36.6 °C)-98.6 °F (37 °C)] 97.8 °F (36.6 °C)  HR:  [58-78] 78  BP: (117-140)/(59-80) 125/61  Resp:  [16-18] 16  SpO2:  [95 %-97 %] 95 %  O2 Device: None (Room air)    Physical Exam  Vitals and nursing note reviewed.   Constitutional:       General: He is not in acute distress.     Appearance: Normal appearance. He is not ill-appearing, toxic-appearing or diaphoretic.   HENT:      Head: Normocephalic and atraumatic.      Nose: Nose normal.      Mouth/Throat:      Mouth: Mucous membranes are moist.      Pharynx: Oropharynx is clear.   Eyes:      General: No scleral icterus.        Right eye: No discharge.         Left eye: No discharge.      Extraocular Movements: Extraocular movements intact.      Conjunctiva/sclera: Conjunctivae normal.   Cardiovascular:      Rate and Rhythm: Normal rate and regular rhythm.      Pulses: Normal pulses.      Heart sounds: Murmur (systolic) heard.   Pulmonary:      Effort: Pulmonary effort is normal. No respiratory distress.      Breath sounds: Rhonchi (bilateral) present. No  wheezing or rales.   Abdominal:      General: Abdomen is flat. Bowel sounds are normal. There is no distension.      Palpations: Abdomen is soft.      Tenderness: There is no abdominal tenderness. There is no guarding or rebound.   Musculoskeletal:         General: Normal range of motion.      Cervical back: Normal range of motion and neck supple.      Right lower leg: No edema.      Left lower leg: No edema.   Skin:     General: Skin is warm and dry.      Coloration: Skin is not jaundiced or pale.   Neurological:      Mental Status: He is alert and oriented to person, place, and time. Mental status is at baseline.   Psychiatric:         Mood and Affect: Mood normal.         Behavior: Behavior normal.         Thought Content: Thought content normal.         Judgment: Judgment normal.         Lab Results: I have reviewed the following results:CBC/BMP:   .     01/27/25  0105   WBC 7.53   HGB 13.2   HCT 39.5      SODIUM 138   K 4.2      CO2 30   BUN 18   CREATININE 0.96   GLUC 151*    , Creatinine Clearance: Estimated Creatinine Clearance: 78.4 mL/min (by C-G formula based on SCr of 0.96 mg/dL)., LFTs: No new results in last 24 hours. , Troponin,BNP:No new results in last 24 hours. , TSH:       Imaging Results Review: I reviewed radiology reports from this admission including: Carotid duplex and CT chest.  Other Study Results Review: EKG was reviewed.

## 2025-01-27 NOTE — ASSESSMENT & PLAN NOTE
TTE 1/5/2025  ( [facility] report) LVEF 65%, trileafet severe AS with mild AI.  Mean gradient 29 mmHg with GRAYSON 0.75 cm²  Known history of, follows with cardiologist in [place]  Generally has been asymptomatic and surgical intervention had not yet been pursued by primary cardiologist  Being evaluated for SAVR

## 2025-01-27 NOTE — ASSESSMENT & PLAN NOTE
Lab Results   Component Value Date    HGBA1C 8.9 (H) 01/24/2025     Recent Labs     01/26/25  1207 01/26/25  1709 01/26/25  2107 01/27/25  0736   POCGLU 250* 141* 237* 162*     Blood Sugar Average: Last 72 hrs:  (P) [id number]  Type 2 diabetes mellitus with hyperglycemia as evidenced by A1c of 8.9%.  Home regimen: Lantus 35 units in the morning, metformin 1000 mg twice daily, NovoLog 5 units before meals, Jardiance 10 mg daily  Current regimen: Lantus 10 units nightly, correctional scale insulin algorithm 3.  Blood sugars reviewed-fasting sugars appear to be at goal on current regimen, however he was noted to have postprandial hyperglycemia yesterday before lunch and at bedtime.    Plan:  Recommend continuing Lantus 10 units nightly  Recommend starting Humalog 3 units before meals  Recommend correctional scale insulin algorithm  2 before meals and 1 at bedtime.  Continue Accu-Cheks before meals and at bedtime, adjust insulin as needed  Monitor for hypoglycemia, treat per protocol  Goal blood sugar while in the [id number] mg/dL  Discharge recommendations pending clinical course  Endocrinology will continue following

## 2025-01-27 NOTE — ASSESSMENT & PLAN NOTE
Presented initially to [facility] with chest pain and noted to have elevated troponin.    Underwent cardiac cath which showed. Mid LAD-1 lesion is 70% stenosed, Mid LAD-2 lesion is 95% stenosed and 3rd Mrg lesion is 70% stenosed.  Given severe symptomatic LAD disease in a diabetic with severe aortic stenosis, patient transferred to [facility] for CABG/AVR evaluation  Cardiology and cardiac surgery consulted  Continue aspirin, heparin infusion, beta-blocker and high intensity atorvastatin.  Ongoing AVR/CABG workup

## 2025-01-27 NOTE — PROGRESS NOTES
Progress Note - Hospitalist   Name: Larry Harper 60 y.o. male I MRN: [id number]  [facility] I Date of Admission: 1/25/2025   Date of Service: 1/27/2025 I Hospital Day: 2    Assessment & Plan  NSTEMI (non-ST elevated myocardial infarction) (HCC)  Presented initially to [facility] with chest pain and noted to have elevated troponin.    Underwent cardiac cath which showed. Mid LAD-1 lesion is 70% stenosed, Mid LAD-2 lesion is 95% stenosed and 3rd Mrg lesion is 70% stenosed.  Given severe symptomatic LAD disease in a diabetic with severe aortic stenosis, patient transferred to [facility] for CABG/AVR evaluation  Cardiology and cardiac surgery consulted  Continue aspirin, heparin infusion, beta-blocker and high intensity atorvastatin.  Ongoing AVR/CABG workup  Diabetes mellitus (HCC)  Lab Results   Component Value Date    HGBA1C 8.9 (H) 01/24/2025       Recent Labs     01/26/25  2107 01/27/25  0736 01/27/25  1137 01/27/25  1716   POCGLU 237* 162* 281* 124       Blood Sugar Average: Last 72 hrs:  (P) 191  Previously on long acting insulin however reports running out of medication  Continue Lantus 10 units at bedtime and lispro 3 units 3 times daily with meals  Endocrinology following  Avoid hypoglycemia  Hold Oral regimen  PTSD (post-traumatic stress disorder)  Continue fluoxetine  Uses cannabis outpatient  Severe aortic stenosis  TTE 1/5/2024 The aortic valve peak velocity is 3.4 m/s. The aortic valve mean gradient is 29.0 mmHg. The dimensionless velocity index is 0.25. The aortic valve area is 0.75 cm2.   Cardiac surgery consulted for evaluation of AVR  TTE 1/27/2025: Severe stenosis.  AV mean gradient 39 mmHg.  Velocity index 0.36.  Aortic valve area 1.01 cm².  Plan for surgical AVR  Primary hypertension  Blood pressure reviewed and stable  Continue lisinopril 2.5 mg daily and Lopressor 12.5 mg twice daily  CAD (coronary artery disease)  S/p KAEL to RCA in 4/2021  Follows cardiology in  [place]  Dyslipidemia  Continue statin  COPD (chronic obstructive pulmonary disease) ([facility])  Self-reported  No acute exacerbation  Continue inhalers and nebulizer treatment ordered    VTE Pharmacologic Prophylaxis:   Moderate Risk (Score 3-4) - Pharmacological DVT Prophylaxis Ordered: heparin drip.    Mobility:   Basic Mobility Inpatient Raw Score: 18  JH-HLM Goal: 6: Walk 10 steps or more  JH-HLM Achieved: 8: Walk 250 feet ot more  JH-HLM Goal achieved. Continue to encourage appropriate mobility.    Patient Centered Rounds: I performed bedside rounds with nursing staff today.   Discussions with Specialists or Other Care Team Provider: Endocrinology    Education and Discussions with Family / Patient: Patient declined call to .     Current Length of Stay: 2 day(s)  Current Patient Status: Inpatient   Certification Statement: The patient will continue to require additional inpatient hospital stay due to CABG eval pending  Discharge Plan:  TBD    Code Status: Level 1 - Full Code    Subjective   Patient assessed at bedside.  Offers no complaints.    Objective :  Temp:  [97.8 °F (36.6 °C)-98.9 °F (37.2 °C)] 98.9 °F (37.2 °C)  HR:  [58-78] 63  BP: ()/(55-80) 120/55  Resp:  [16-18] 16  SpO2:  [95 %-97 %] 95 %  O2 Device: None (Room air)    Body mass index is 25.37 kg/m².     Input and Output Summary (last 24 hours):     Intake/Output Summary (Last 24 hours) at 1/27/2025 1730  Last data filed at 1/27/2025 1339  Gross per 24 hour   Intake 1802 ml   Output --   Net 1802 ml       Physical Exam  Vitals reviewed.   Constitutional:       General: He is not in acute distress.     Appearance: Normal appearance. He is not ill-appearing.   HENT:      Head: Normocephalic and atraumatic.   Cardiovascular:      Rate and Rhythm: Normal rate and regular rhythm.   Pulmonary:      Effort: Pulmonary effort is normal. No respiratory distress.      Breath sounds: Normal breath sounds. No wheezing.   Abdominal:      General:  Bowel sounds are normal.      Tenderness: There is no abdominal tenderness.   Musculoskeletal:      Right lower leg: No edema.      Left lower leg: No edema.   Neurological:      Mental Status: He is alert and oriented to person, place, and time.           Lines/Drains:        Telemetry:  Telemetry Orders (From admission, onward)               24 Hour Telemetry Monitoring  Continuous x 24 Hours (Telem)        Expiring   Question:  Reason for 24 Hour Telemetry  Answer:  PCI/EP study (including pacer and ICD implementation), Cardiac surgery, MI, abnormal cardiac cath, and chest pain- rule out MI                     Telemetry Reviewed:   Indication for Continued Telemetry Use: Awaiting PCI/EP Study/CABG               Lab Results: I have reviewed the following results:   Results from last 7 days   Lab Units 01/27/25  0105   WBC Thousand/uL 7.53   HEMOGLOBIN g/dL 13.2   HEMATOCRIT % 39.5   PLATELETS Thousands/uL 250   SEGS PCT % 48   LYMPHO PCT % 35   MONO PCT % 10   EOS PCT % 6     Results from last 7 days   Lab Units 01/27/25  0105 01/26/25  0433 01/25/25  0325   SODIUM mmol/L 138   < > 140   POTASSIUM mmol/L 4.2   < > 3.4*   CHLORIDE mmol/L 103   < > 109*   CO2 mmol/L 30   < > 24   BUN mg/dL 18   < > 11   CREATININE mg/dL 0.96   < > 0.79   ANION GAP mmol/L 5   < > 7   CALCIUM mg/dL 9.5   < > 8.4   ALBUMIN g/dL  --   --  3.7   TOTAL BILIRUBIN mg/dL  --   --  0.36   ALK PHOS U/L  --   --  40   ALT U/L  --   --  13   AST U/L  --   --  12*   GLUCOSE RANDOM mg/dL 151*   < > 129    < > = values in this interval not displayed.     Results from last 7 days   Lab Units 01/23/25  1605   INR  0.98     Results from last 7 days   Lab Units 01/27/25  1716 01/27/25  1137 01/27/25  0736 01/26/25  2107 01/26/25  1709 01/26/25  1207 01/26/25  0737 01/25/25  2137 01/25/25  1607 01/25/25  1231 01/25/25  0846 01/24/25  2111   POC GLUCOSE mg/dl 124 281* 162* 237* 141* 250* 156* 177* 199* 186* 203* 172*     Results from last 7 days   Lab  Units 01/24/25  0512   HEMOGLOBIN A1C % 8.9*           Recent Cultures (last 7 days):         Imaging Results Review: No pertinent imaging studies reviewed.  Other Study Results Review: No additional pertinent studies reviewed.    Last 24 Hours Medication List:     Current Facility-Administered Medications:     acetaminophen (TYLENOL) tablet 650 mg, Q6H PRN    albuterol (PROVENTIL HFA,VENTOLIN HFA) inhaler 2 puff, Q4H PRN    albuterol inhalation solution 2.5 mg, Q6H PRN    aspirin (ECOTRIN LOW STRENGTH) EC tablet 81 mg, Daily    atorvastatin (LIPITOR) tablet 80 mg, Daily With Dinner    FLUoxetine (PROzac) capsule 40 mg, Daily    guaiFENesin (MUCINEX) 12 hr tablet 600 mg, Q12H SONYA    heparin (porcine) 25,000 units in 0.45% NaCl 250 mL infusion (premix), Titrated, Last Rate: 20 Units/kg/hr (01/27/25 0524)    heparin (porcine) injection 2,000 Units, Q6H PRN    heparin (porcine) injection 4,000 Units, Q6H PRN    insulin glargine (LANTUS) subcutaneous injection 10 Units 0.1 mL, HS    insulin lispro (HumALOG/ADMELOG) 100 units/mL subcutaneous injection 1-5 Units, HS    insulin lispro (HumALOG/ADMELOG) 100 units/mL subcutaneous injection 1-5 Units, TID AC    insulin lispro (HumALOG/ADMELOG) 100 units/mL subcutaneous injection 3 Units, TID With Meals    metoprolol tartrate (LOPRESSOR) partial tablet 12.5 mg, BID    nitroglycerin (NITRO-BID) 2 % TD ointment 0.5 inch, BID    nitroglycerin (NITROSTAT) SL tablet 0.4 mg, Q5 Min PRN    ondansetron (ZOFRAN) injection 4 mg, Q6H PRN    traZODone (DESYREL) tablet 50 mg, HS    umeclidinium 62.5 mcg/actuation inhaler AEPB 1 puff, Daily    Administrative Statements   Today, Patient Was Seen By: Nolvia Ybarra, DO  I have spent a total time of 30 minutes in caring for this patient on the day of the visit/encounter including Counseling / Coordination of care, Documenting in the medical record, Reviewing / ordering tests, medicine, procedures  , Obtaining or reviewing history  , and  Communicating with other healthcare professionals .    **Please Note: This note may have been constructed using a voice recognition system.**

## 2025-01-27 NOTE — ASSESSMENT & PLAN NOTE
TTE 1/5/2024 The aortic valve peak velocity is 3.4 m/s. The aortic valve mean gradient is 29.0 mmHg. The dimensionless velocity index is 0.25. The aortic valve area is 0.75 cm2.   Cardiac surgery consulted for evaluation of AVR  TTE 1/27/2025: Severe stenosis.  AV mean gradient 39 mmHg.  Velocity index 0.36.  Aortic valve area 1.01 cm².  Plan for surgical AVR

## 2025-01-27 NOTE — ASSESSMENT & PLAN NOTE
Presented to [facility] with development of chest discomfort while attending his brother's , symptoms similar to prior angina which resolved with nitroglycerin.  HS troponin 149, 272, 288, 142  [facility]: Two mid LAD lesions, 70% and 95%.  70% OM 3 stenosis  Transferred to [facility] for CABG/AVR evaluation. W/U In progress   On aspirin, high intensity statin, metoprolol tartrate 12.5 mg twice daily, and IV heparin  Mild chest discomfort watching the football game yesterday relieved with a sublingual nitroglycerin. Will add NTP.
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
## 2025-01-27 NOTE — CASE MANAGEMENT
Case Management Discharge Planning Note    Patient name Larry Harper  Location [facility] 425/[facility] 425-01 MRN [id number]  : 1965 Date 2025       Current Admission Date: 2025  Current Admission Diagnosis:NSTEMI (non-ST elevated myocardial infarction) (HCC)   Patient Active Problem List    Diagnosis Date Noted Date Diagnosed    Dyslipidemia 2025     COPD (chronic obstructive pulmonary disease) ([facility]) 2025     Hypokalemia 2025     Primary hypertension 2025     Severe aortic stenosis 2025     NSTEMI (non-ST elevated myocardial infarction) ([facility])      CAD (coronary artery disease)      Diabetes mellitus (HCC)      Anxiety      Depression      PTSD (post-traumatic stress disorder)        LOS (days): 2  Geometric Mean LOS (GMLOS) (days): 1.7  Days to GMLOS:0     OBJECTIVE:  Risk of Unplanned Readmission Score: 11.55         Current admission status: Inpatient   Preferred Pharmacy:   [facility]/pharmacy #[id number] - [place] [id number]  Phone: [phone] Fax: [phone]    Primary Care Provider: Efe Portillo DO    Primary Insurance: MEDICARE  Secondary Insurance: [facility]    DISCHARGE DETAILS:    Patient's nurse informed CM that patient wanted to speak to CM.   Patient went to patient's room to follow up and complete open assessment. Patient have imaging done at bedside.   CM team will continue to follow for discharge planning needs.

## 2025-01-28 ENCOUNTER — APPOINTMENT (INPATIENT)
Dept: RADIOLOGY | Facility: HOSPITAL | Age: 60
DRG: 216 | End: 2025-01-28
Payer: MEDICARE

## 2025-01-28 LAB
ANION GAP SERPL CALCULATED.3IONS-SCNC: 7 MMOL/L (ref 4–13)
APTT PPP: 63 SECONDS (ref 23–34)
BASOPHILS # BLD AUTO: 0.08 THOUSANDS/ΜL (ref 0–0.1)
BASOPHILS NFR BLD AUTO: 1 % (ref 0–1)
BUN SERPL-MCNC: 14 MG/DL (ref 5–25)
CALCIUM SERPL-MCNC: 9.4 MG/DL (ref 8.4–10.2)
CHLORIDE SERPL-SCNC: 104 MMOL/L (ref 96–108)
CO2 SERPL-SCNC: 29 MMOL/L (ref 21–32)
CREAT SERPL-MCNC: 0.82 MG/DL (ref 0.6–1.3)
EOSINOPHIL # BLD AUTO: 0.44 THOUSAND/ΜL (ref 0–0.61)
EOSINOPHIL NFR BLD AUTO: 7 % (ref 0–6)
ERYTHROCYTE [DISTWIDTH] IN BLOOD BY AUTOMATED COUNT: 14.8 % (ref 11.6–15.1)
GFR SERPL CREATININE-BSD FRML MDRD: 97 ML/MIN/1.73SQ M
GLUCOSE SERPL-MCNC: 126 MG/DL (ref 65–140)
GLUCOSE SERPL-MCNC: 168 MG/DL (ref 65–140)
GLUCOSE SERPL-MCNC: 183 MG/DL (ref 65–140)
GLUCOSE SERPL-MCNC: 317 MG/DL (ref 65–140)
HCT VFR BLD AUTO: 41.8 % (ref 36.5–49.3)
HGB BLD-MCNC: 13.6 G/DL (ref 12–17)
IMM GRANULOCYTES # BLD AUTO: 0.02 THOUSAND/UL (ref 0–0.2)
IMM GRANULOCYTES NFR BLD AUTO: 0 % (ref 0–2)
LYMPHOCYTES # BLD AUTO: 2.2 THOUSANDS/ΜL (ref 0.6–4.47)
LYMPHOCYTES NFR BLD AUTO: 33 % (ref 14–44)
MCH RBC QN AUTO: 27.2 PG (ref 26.8–34.3)
MCHC RBC AUTO-ENTMCNC: 32.5 G/DL (ref 31.4–37.4)
MCV RBC AUTO: 84 FL (ref 82–98)
MONOCYTES # BLD AUTO: 0.71 THOUSAND/ΜL (ref 0.17–1.22)
MONOCYTES NFR BLD AUTO: 11 % (ref 4–12)
NEUTROPHILS # BLD AUTO: 3.13 THOUSANDS/ΜL (ref 1.85–7.62)
NEUTS SEG NFR BLD AUTO: 48 % (ref 43–75)
NRBC BLD AUTO-RTO: 0 /100 WBCS
PLATELET # BLD AUTO: 241 THOUSANDS/UL (ref 149–390)
PMV BLD AUTO: 11.4 FL (ref 8.9–12.7)
POTASSIUM SERPL-SCNC: 3.9 MMOL/L (ref 3.5–5.3)
RBC # BLD AUTO: 5 MILLION/UL (ref 3.88–5.62)
SODIUM SERPL-SCNC: 140 MMOL/L (ref 135–147)
WBC # BLD AUTO: 6.58 THOUSAND/UL (ref 4.31–10.16)

## 2025-01-28 PROCEDURE — 85730 THROMBOPLASTIN TIME PARTIAL: CPT | Performed by: STUDENT IN AN ORGANIZED HEALTH CARE EDUCATION/TRAINING PROGRAM

## 2025-01-28 PROCEDURE — 70355 PANORAMIC X-RAY OF JAWS: CPT

## 2025-01-28 PROCEDURE — 82948 REAGENT STRIP/BLOOD GLUCOSE: CPT

## 2025-01-28 PROCEDURE — 99232 SBSQ HOSP IP/OBS MODERATE 35: CPT | Performed by: INTERNAL MEDICINE

## 2025-01-28 PROCEDURE — 80048 BASIC METABOLIC PNL TOTAL CA: CPT | Performed by: STUDENT IN AN ORGANIZED HEALTH CARE EDUCATION/TRAINING PROGRAM

## 2025-01-28 PROCEDURE — 99233 SBSQ HOSP IP/OBS HIGH 50: CPT | Performed by: THORACIC SURGERY (CARDIOTHORACIC VASCULAR SURGERY)

## 2025-01-28 PROCEDURE — 85025 COMPLETE CBC W/AUTO DIFF WBC: CPT | Performed by: STUDENT IN AN ORGANIZED HEALTH CARE EDUCATION/TRAINING PROGRAM

## 2025-01-28 RX ORDER — INSULIN LISPRO 100 [IU]/ML
5 INJECTION, SOLUTION INTRAVENOUS; SUBCUTANEOUS
Status: DISCONTINUED | OUTPATIENT
Start: 2025-01-28 | End: 2025-01-30

## 2025-01-28 RX ORDER — INSULIN GLARGINE 100 [IU]/ML
15 INJECTION, SOLUTION SUBCUTANEOUS
Status: DISCONTINUED | OUTPATIENT
Start: 2025-01-28 | End: 2025-01-30

## 2025-01-28 RX ADMIN — INSULIN GLARGINE 15 UNITS: 100 INJECTION, SOLUTION SUBCUTANEOUS at 22:04

## 2025-01-28 RX ADMIN — INSULIN LISPRO 3 UNITS: 100 INJECTION, SOLUTION INTRAVENOUS; SUBCUTANEOUS at 12:30

## 2025-01-28 RX ADMIN — INSULIN LISPRO 1 UNITS: 100 INJECTION, SOLUTION INTRAVENOUS; SUBCUTANEOUS at 09:16

## 2025-01-28 RX ADMIN — INSULIN LISPRO 3 UNITS: 100 INJECTION, SOLUTION INTRAVENOUS; SUBCUTANEOUS at 12:29

## 2025-01-28 RX ADMIN — GUAIFENESIN 600 MG: 600 TABLET, EXTENDED RELEASE ORAL at 22:04

## 2025-01-28 RX ADMIN — NITROGLYCERIN 0.5 INCH: 20 OINTMENT TOPICAL at 09:16

## 2025-01-28 RX ADMIN — HEPARIN SODIUM 20 UNITS/KG/HR: 10000 INJECTION, SOLUTION INTRAVENOUS at 14:33

## 2025-01-28 RX ADMIN — ATORVASTATIN CALCIUM 80 MG: 80 TABLET, FILM COATED ORAL at 17:43

## 2025-01-28 RX ADMIN — NITROGLYCERIN 0.5 INCH: 20 OINTMENT TOPICAL at 17:43

## 2025-01-28 RX ADMIN — TRAZODONE HYDROCHLORIDE 50 MG: 50 TABLET ORAL at 23:22

## 2025-01-28 RX ADMIN — FLUOXETINE HYDROCHLORIDE 40 MG: 20 CAPSULE ORAL at 09:15

## 2025-01-28 RX ADMIN — Medication 12.5 MG: at 17:43

## 2025-01-28 RX ADMIN — ASPIRIN 81 MG: 81 TABLET, COATED ORAL at 09:15

## 2025-01-28 RX ADMIN — GUAIFENESIN 600 MG: 600 TABLET, EXTENDED RELEASE ORAL at 09:15

## 2025-01-28 RX ADMIN — INSULIN LISPRO 5 UNITS: 100 INJECTION, SOLUTION INTRAVENOUS; SUBCUTANEOUS at 17:43

## 2025-01-28 RX ADMIN — Medication 12.5 MG: at 09:15

## 2025-01-28 RX ADMIN — INSULIN LISPRO 3 UNITS: 100 INJECTION, SOLUTION INTRAVENOUS; SUBCUTANEOUS at 09:16

## 2025-01-28 NOTE — PROGRESS NOTES
Progress Note - Endocrinology   Name: Larry Harper 60 y.o. male I MRN: [id number]  Unit/Bed#: [facility] 425-01 I Date of Admission: 1/25/2025   Date of Service: 1/28/2025 I Hospital Day: 3     Assessment & Plan  Diabetes mellitus (HCC)  Lab Results   Component Value Date    HGBA1C 8.9 (H) 01/24/2025     Recent Labs     01/27/25  1716 01/27/25  2122 01/28/25  0732 01/28/25  1109   POCGLU 124 198* 168* 317*     Blood Sugar Average: Last 72 hrs:  (P) 201  Type 2 diabetes mellitus with hyperglycemia as evidenced by A1c of 8.9%.  Home regimen: Lantus 35 units in the morning, metformin 1000 mg twice daily, NovoLog 5 units before meals, Jardiance 10 mg daily  Current regimen: Lantus 10 units nightly, correctional scale insulin algorithm 3.  Blood sugars reviewed-fasting sugars appear to be at goal on current regimen, however he was noted to have postprandial hyperglycemia yesterday before lunch and at bedtime.    Plan:  Increase Lantus to 15 units nightly  Increase Humalog to 5 units before meals  Encouraged him to monitor his simple carbohydrates and substitute regular Cheerios for honey nut Cheerios.  Recommend correctional scale insulin algorithm  2 before meals and 1 at bedtime.  Continue Accu-Cheks before meals and at bedtime, adjust insulin as needed  Monitor for hypoglycemia, treat per protocol  Goal blood sugar while in the [id number] mg/dL  Discharge recommendations pending clinical course  Endocrinology will continue following    NSTEMI (non-ST elevated myocardial infarction) (HCC)  CABG workup is in progress  Management per primary team      Subjective : Feels well and has no complaints.  He had a good breakfast which contained a lot of simple carbohydrates.  This led to hyperglycemia after breakfast.    Objective :  Temp:  [97.5 °F (36.4 °C)-98.9 °F (37.2 °C)] 98.2 °F (36.8 °C)  HR:  [59-77] 67  BP: ()/(43-76) 110/61  Resp:  [17-18] 18  SpO2:  [95 %-96 %] 95 %  O2 Device: None (Room air)    Physical  Exam  Constitutional:       General: He is not in acute distress.     Appearance: He is well-developed. He is not diaphoretic.   HENT:      Head: Normocephalic and atraumatic.   Eyes:      General:         Right eye: No discharge.         Left eye: No discharge.   Pulmonary:      Effort: Pulmonary effort is normal.   Musculoskeletal:         General: Normal range of motion.      Cervical back: Normal range of motion.   Neurological:      General: No focal deficit present.      Mental Status: He is alert and oriented to person, place, and time.      Cranial Nerves: No cranial nerve deficit.   Psychiatric:         Mood and Affect: Mood normal.         Behavior: Behavior normal.         Lab Results: I have reviewed the following results: Glucometer readings

## 2025-01-28 NOTE — ASSESSMENT & PLAN NOTE
Lab Results   Component Value Date    HGBA1C 8.9 (H) 01/24/2025       Recent Labs     01/27/25  1716 01/27/25  2122 01/28/25  0732 01/28/25  1109   POCGLU 124 198* 168* 317*       Blood Sugar Average: Last 72 hrs:  (P) 201  Previously on long acting insulin however reports running out of medication  Holding oral regimen while hospitalized  Diabetic diet while inpatient  Endocrinology consulted for perioperative insulin management

## 2025-01-28 NOTE — ASSESSMENT & PLAN NOTE
Lab Results   Component Value Date    HGBA1C 8.9 (H) 01/24/2025     Recent Labs     01/27/25  1716 01/27/25  2122 01/28/25  0732 01/28/25  1109   POCGLU 124 198* 168* 317*     Blood Sugar Average: Last 72 hrs:  (P) 201  Type 2 diabetes mellitus with hyperglycemia as evidenced by A1c of 8.9%.  Home regimen: Lantus 35 units in the morning, metformin 1000 mg twice daily, NovoLog 5 units before meals, Jardiance 10 mg daily  Current regimen: Lantus 10 units nightly, correctional scale insulin algorithm 3.  Blood sugars reviewed-fasting sugars appear to be at goal on current regimen, however he was noted to have postprandial hyperglycemia yesterday before lunch and at bedtime.    Plan:  Increase Lantus to 15 units nightly  Increase Humalog to 5 units before meals  Encouraged him to monitor his simple carbohydrates and substitute regular Cheerios for honey nut Cheerios.  Recommend correctional scale insulin algorithm  2 before meals and 1 at bedtime.  Continue Accu-Cheks before meals and at bedtime, adjust insulin as needed  Monitor for hypoglycemia, treat per protocol  Goal blood sugar while in the [id number] mg/dL  Discharge recommendations pending clinical course  Endocrinology will continue following

## 2025-01-28 NOTE — PLAN OF CARE
Problem: PAIN - ADULT  Goal: Verbalizes/displays adequate comfort level or baseline comfort level  Description: Interventions:  - Encourage patient to monitor pain and request assistance  - Assess pain using appropriate pain scale  - Administer analgesics based on type and severity of pain and evaluate response  - Implement non-pharmacological measures as appropriate and evaluate response  - Consider cultural and social influences on pain and pain management  - Notify physician/advanced practitioner if interventions unsuccessful or patient reports new pain  Outcome: Progressing     Problem: INFECTION - ADULT  Goal: Absence or prevention of progression during hospitalization  Description: INTERVENTIONS:  - Assess and monitor for signs and symptoms of infection  - Monitor lab/diagnostic results  - Monitor all insertion sites, i.e. indwelling lines, tubes, and drains  - Monitor endotracheal if appropriate and nasal secretions for changes in amount and color  - Donahue appropriate cooling/warming therapies per order  - Administer medications as ordered  - Instruct and encourage patient and family to use good hand hygiene technique  - Identify and instruct in appropriate isolation precautions for identified infection/condition  Outcome: Progressing  Goal: Absence of fever/infection during neutropenic period  Description: INTERVENTIONS:  - Monitor WBC    Outcome: Progressing     Problem: SAFETY ADULT  Goal: Patient will remain free of falls  Description: INTERVENTIONS:  - Educate patient/family on patient safety including physical limitations  - Instruct patient to call for assistance with activity   - Consult OT/PT to assist with strengthening/mobility   - Keep Call bell within reach  - Keep bed low and locked with side rails adjusted as appropriate  - Keep care items and personal belongings within reach  - Initiate and maintain comfort rounds  - Make Fall Risk Sign visible to staff  - Offer Toileting every  Hours,  in advance of need  - Initiate/Maintain alarm  - Obtain necessary fall risk management equipment:   - Apply yellow socks and bracelet for high fall risk patients  - Consider moving patient to room near nurses station  Outcome: Progressing  Goal: Maintain or return to baseline ADL function  Description: INTERVENTIONS:  -  Assess patient's ability to carry out ADLs; assess patient's baseline for ADL function and identify physical deficits which impact ability to perform ADLs (bathing, care of mouth/teeth, toileting, grooming, dressing, etc.)  - Assess/evaluate cause of self-care deficits   - Assess range of motion  - Assess patient's mobility; develop plan if impaired  - Assess patient's need for assistive devices and provide as appropriate  - Encourage maximum independence but intervene and supervise when necessary  - Involve family in performance of ADLs  - Assess for home care needs following discharge   - Consider OT consult to assist with ADL evaluation and planning for discharge  - Provide patient education as appropriate  Outcome: Progressing  Goal: Maintains/Returns to pre admission functional level  Description: INTERVENTIONS:  - Perform AM-PAC 6 Click Basic Mobility/ Daily Activity assessment daily.  - Set and communicate daily mobility goal to care team and patient/family/caregiver.   - Collaborate with rehabilitation services on mobility goals if consulted  - Perform Range of Motion  times a day.  - Reposition patient every  hours.  - Dangle patient  times a day  - Stand patient  times a day  - Ambulate patient  times a day  - Out of bed to chair  times a day   - Out of bed for meals  times a day  - Out of bed for toileting  - Record patient progress and toleration of activity level   Outcome: Progressing     Problem: DISCHARGE PLANNING  Goal: Discharge to home or other facility with appropriate resources  Description: INTERVENTIONS:  - Identify barriers to discharge w/patient and caregiver  - Arrange for  needed discharge resources and transportation as appropriate  - Identify discharge learning needs (meds, wound care, etc.)  - Arrange for interpretive services to assist at discharge as needed  - Refer to Case Management Department for coordinating discharge planning if the patient needs post-hospital services based on physician/advanced practitioner order or complex needs related to functional status, cognitive ability, or social support system  Outcome: Progressing     Problem: Knowledge Deficit  Goal: Patient/family/caregiver demonstrates understanding of disease process, treatment plan, medications, and discharge instructions  Description: Complete learning assessment and assess knowledge base.  Interventions:  - Provide teaching at level of understanding  - Provide teaching via preferred learning methods  Outcome: Progressing

## 2025-01-28 NOTE — ASSESSMENT & PLAN NOTE
Presented initially to [facility] with chest pain and noted to have elevated troponin.    Underwent cardiac cath which showed. Mid LAD-1 lesion is 70% stenosed, Mid LAD-2 lesion is 95% stenosed and 3rd Mrg lesion is 70% stenosed.  Given severe symptomatic LAD disease in a diabetic with severe aortic stenosis, patient transferred to \[facility]\"" for CABG/SAVR evaluation  Continue aspirin, heparin infusion, beta-blocker and high intensity atorvastatin.  Awaiting OR day
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
## 2025-01-28 NOTE — CASE MANAGEMENT
Case Management Discharge Planning Note    Patient name Larry Harper  [facility] MRN [id number]  : 1965 Date 2025       Current Admission Date: 2025  Current Admission Diagnosis:NSTEMI (non-ST elevated myocardial infarction) (HCC)   Patient Active Problem List    Diagnosis Date Noted Date Diagnosed    Dyslipidemia 2025     COPD (chronic obstructive pulmonary disease) (Former[facility]) 2025     Hypokalemia 2025     Primary hypertension 2025     Severe aortic stenosis 2025     NSTEMI (non-ST elevated myocardial infarction) (Former[facility])      CAD (coronary artery disease)      Diabetes mellitus (HCC)      Anxiety      Depression      PTSD (post-traumatic stress disorder)        LOS (days): 3  Geometric Mean LOS (GMLOS) (days): 1.7  Days to GMLOS:-0.9     OBJECTIVE:  Risk of Unplanned Readmission Score: 12.07         Current admission status: Inpatient   Preferred Pharmacy:   [facility]/pharmacy #[id number] [place] [id number]  Phone: [phone] Fax: [phone]    Primary Care Provider: Efe Portillo DO    Primary Insurance: MEDICARE  Secondary Insurance: [facility]    DISCHARGE DETAILS:    Patient's nurse informed CM that patient wanted to speak to CM about his service dog. Patient's landlord was supposed to be caring for patient's dog while he was in the hospital. He reports that his landlord is threatening to turn off heat where the dog is staying and is requesting resources. CM explained that local SPCA could be called. Patient informed that he called the SPCA and they informed him to call the State Police. Patient informed CM that he called the State Police and his  and that it was being handled.     Patient also informed CM that he wants a place stay with her is service dog when he is ready for discharge. CM explained CM will continue to follow his case and see  what recommendations are after surgery.     CM received message from Network  that patient's landlord, Addie, was calling to speak to CM.   Patient informed that he called his  about Addie and that she should not be contacted.

## 2025-01-28 NOTE — PROGRESS NOTES
Progress Note - Cardiac Surgery   Larry Harper 60 y.o. male MRN: [id number]  Unit/Bed#: [facility] 425-01 Encounter: [id number]      24 Hour Events: Endocrinology consult completed; Humalog and Lantus started. Awaiting Parorex (not yet completed).     Medications:   Scheduled Meds:  Current Facility-Administered Medications   Medication Dose Route Frequency Provider Last Rate    acetaminophen  650 mg Oral Q6H PRN Jordon Hood MD      albuterol  2 puff Inhalation Q4H PRN Jordon Hood MD      aspirin  81 mg Oral Daily Jordon Hood MD      atorvastatin  80 mg Oral Daily With Dinner Jordon Hood MD      FLUoxetine  40 mg Oral Daily Jordon Hood MD      guaiFENesin  600 mg Oral Q12H SONYA Nolvia Ybarra DO      heparin (porcine)  3-20 Units/kg/hr (Order-Specific) Intravenous Titrated Jordon Hood MD 20 Units/kg/hr (01/27/25 2120)    heparin (porcine)  2,000 Units Intravenous Q6H PRN Jordon Hood MD      heparin (porcine)  4,000 Units Intravenous Q6H PRN Jordon Hood MD      insulin glargine  10 Units Subcutaneous HS Nolvia Ybarra DO      insulin lispro  1-5 Units Subcutaneous HS Godfrey Echevarria MD      insulin lispro  1-5 Units Subcutaneous TID AC Godfrey Echevarria MD      insulin lispro  3 Units Subcutaneous TID With Meals Godfrey Echevarria MD      metoprolol tartrate  12.5 mg Oral BID Jordon Hood MD      nitroglycerin  0.5 inch Topical BID TEE Olson      nitroglycerin  0.4 mg Sublingual Q5 Min PRN Jordon Hood MD      ondansetron  4 mg Intravenous Q6H PRN Jordon Hood MD      traZODone  50 mg Oral HS Jordon Hood MD      umeclidinium  1 puff Inhalation Daily Jordon Hood MD       Continuous Infusions:heparin (porcine), 3-20 Units/kg/hr (Order-Specific), Last Rate: 20 Units/kg/hr (01/27/25 2120)        Results:   Results from last 7 days   Lab Units 01/28/25  0518 01/27/25  0105 01/26/25  0433   WBC Thousand/uL 6.58 7.53 7.11   HEMOGLOBIN g/dL 13.6 13.2 12.9   HEMATOCRIT % 41.8  39.5 39.2   PLATELETS Thousands/uL 241 250 245     Results from last 7 days   Lab Units 01/28/25  0518 01/27/25  0105 01/26/25  0433   POTASSIUM mmol/L 3.9 4.2 3.8   CHLORIDE mmol/L 104 103 109*   CO2 mmol/L 29 30 27   BUN mg/dL 14 18 15   CREATININE mg/dL 0.82 0.96 0.84   CALCIUM mg/dL 9.4 9.5 8.8     Results from last 7 days   Lab Units 01/28/25  0518 01/27/25  0105 01/26/25  1844 01/24/25  0042 01/23/25  1605   INR   --   --   --   --  0.98   PTT seconds 63* 69* 78*   < > 27    < > = values in this interval not displayed.       Studies:     Cardiac Cath: Mid LAD 70% and 95%, OM3 70%     Echo: repeat pending     Echo 1/5: severe AS velocity 3.4 m/s, mean gradient 29 mmHg, DVI 0.25, GRAYSON 0.75 cm² EF 65%     Carotid US: <50% b/l, no subclavian dx, VAFA     Vein Mapping: adequate b/l thighs, smaller size below knees bilaterally     CXR: trace pleural eff     Panorex: ordered 1/26     CT Chest: Some plaque in ascending and root, emphysema changes/blebs. Official read pending     EKG: NSR, no specific ST changes     PFT: ordered 1/26     Results Review Statement: I personally reviewed the following image studies in PACS and associated radiology reports: EKG, cardiac cath, CT chest, Ultrasound(s), and Echocardiogram. My interpretation of the radiology images/reports is: same as above.    Vitals:   Vitals:    01/27/25 2302 01/28/25 0225 01/28/25 0600 01/28/25 0733   BP: (!) 95/43 108/59  114/60   BP Location:  Right arm     Pulse:  64  77   Resp:  18  17   Temp: 97.6 °F (36.4 °C)   97.5 °F (36.4 °C)   TempSrc:       SpO2:  95%  96%   Weight:   76.3 kg (168 lb 3.2 oz)    Height:         Physical Exam:    General: No acute distress, Alert, and Normal appearance  HEENT/NECK:  Normocephalic. Atraumatic.  No jugular venous distention.    Extremities: Extremities warm/dry  Neuro: Alert and oriented X 3, Sensation is grossly intact, and No focal deficits  Skin: Warm/Dry, without rashes or lesions.      Assessment:    Severe  2VCAD  Severe AS  NSTEMI  Preserved LV function on last echo  Smoker/COPD  Hx of CAD/MI s/p KAEL to RCA '21  DM2 on insulin, A1c 8.9      Plan:  Ongoing SAVR/CABG workup    Patient agreeable to proceed with surgery    Carotid ultrasound completed, and acceptable    Vein mapping completed, and acceptable    Continue IV heparin and medical therapy per cardiology    CT chest completed, shows some plaque in ascending/root, emphysema/bleb disease.     Repeat echo Completed; EF 60%. Severe AS.     PFTs pending    Stop ACEi in anticipation of surgery    Consult endo for uncontrolled DM2 on insulin in preop for surgery    Blood type and screen completed    Continue Mupirocin 2% nasal ointment q 12 hrs    Continue topical chlorhexidine bath and mouth rise    SAVR/CABG date/surgeon TBD on workup completed     SIGNATURE: Joseph Sanchez PA-C  DATE: January 28, 2025  TIME: 8:33 AM    * This note was completed in part utilizing m[employer] direct voice recognition software.   Grammatical errors, random word insertion, spelling mistakes, and incomplete sentences may be an occasional consequence of the system secondary to software limitations, ambient noise and hardware issues. At the time of dictation, efforts were made to edit, clarify and /or correct errors. Please read the chart carefully and recognize, using context, where substitutions have occurred.  If you have any questions or concerns about the context, text or information contained within the body of this dictation, please contact myself, the provider, for further clarification.

## 2025-01-29 ENCOUNTER — ANESTHESIA EVENT (INPATIENT)
Dept: PERIOP | Facility: HOSPITAL | Age: 60
End: 2025-01-29
Payer: MEDICARE

## 2025-01-29 ENCOUNTER — APPOINTMENT (INPATIENT)
Dept: RADIOLOGY | Facility: HOSPITAL | Age: 60
DRG: 216 | End: 2025-01-29
Payer: MEDICARE

## 2025-01-29 ENCOUNTER — ANESTHESIA (INPATIENT)
Dept: PERIOP | Facility: HOSPITAL | Age: 60
End: 2025-01-29
Payer: MEDICARE

## 2025-01-29 PROBLEM — K05.6 PERIODONTAL DISEASE: Status: ACTIVE | Noted: 2025-01-29

## 2025-01-29 LAB
ANION GAP SERPL CALCULATED.3IONS-SCNC: 9 MMOL/L (ref 4–13)
APTT PPP: 72 SECONDS (ref 23–34)
BUN SERPL-MCNC: 14 MG/DL (ref 5–25)
CALCIUM SERPL-MCNC: 10 MG/DL (ref 8.4–10.2)
CHLORIDE SERPL-SCNC: 101 MMOL/L (ref 96–108)
CO2 SERPL-SCNC: 27 MMOL/L (ref 21–32)
CREAT SERPL-MCNC: 0.82 MG/DL (ref 0.6–1.3)
GFR SERPL CREATININE-BSD FRML MDRD: 97 ML/MIN/1.73SQ M
GLUCOSE SERPL-MCNC: 131 MG/DL (ref 65–140)
GLUCOSE SERPL-MCNC: 153 MG/DL (ref 65–140)
GLUCOSE SERPL-MCNC: 166 MG/DL (ref 65–140)
GLUCOSE SERPL-MCNC: 185 MG/DL (ref 65–140)
GLUCOSE SERPL-MCNC: 205 MG/DL (ref 65–140)
GLUCOSE SERPL-MCNC: 289 MG/DL (ref 65–140)
POTASSIUM SERPL-SCNC: 4.3 MMOL/L (ref 3.5–5.3)
SODIUM SERPL-SCNC: 137 MMOL/L (ref 135–147)

## 2025-01-29 PROCEDURE — 82948 REAGENT STRIP/BLOOD GLUCOSE: CPT

## 2025-01-29 PROCEDURE — 80048 BASIC METABOLIC PNL TOTAL CA: CPT | Performed by: PHYSICIAN ASSISTANT

## 2025-01-29 PROCEDURE — 0CDXXZ0 EXTRACTION OF LOWER TOOTH, SINGLE, EXTERNAL APPROACH: ICD-10-PCS | Performed by: DENTIST

## 2025-01-29 PROCEDURE — 99232 SBSQ HOSP IP/OBS MODERATE 35: CPT | Performed by: INTERNAL MEDICINE

## 2025-01-29 PROCEDURE — 99233 SBSQ HOSP IP/OBS HIGH 50: CPT | Performed by: PHYSICIAN ASSISTANT

## 2025-01-29 PROCEDURE — 85730 THROMBOPLASTIN TIME PARTIAL: CPT | Performed by: INTERNAL MEDICINE

## 2025-01-29 PROCEDURE — 0CDWXZ1 EXTRACTION OF UPPER TOOTH, MULTIPLE, EXTERNAL APPROACH: ICD-10-PCS | Performed by: DENTIST

## 2025-01-29 PROCEDURE — 75572 CT HRT W/3D IMAGE: CPT

## 2025-01-29 PROCEDURE — 74174 CTA ABD&PLVS W/CONTRAST: CPT

## 2025-01-29 RX ORDER — ONDANSETRON 2 MG/ML
4 INJECTION INTRAMUSCULAR; INTRAVENOUS ONCE AS NEEDED
Status: DISCONTINUED | OUTPATIENT
Start: 2025-01-29 | End: 2025-01-29 | Stop reason: HOSPADM

## 2025-01-29 RX ORDER — HYDROMORPHONE HCL/PF 1 MG/ML
0.5 SYRINGE (ML) INJECTION
Status: DISCONTINUED | OUTPATIENT
Start: 2025-01-29 | End: 2025-01-29 | Stop reason: HOSPADM

## 2025-01-29 RX ORDER — PROPOFOL 10 MG/ML
INJECTION, EMULSION INTRAVENOUS AS NEEDED
Status: DISCONTINUED | OUTPATIENT
Start: 2025-01-29 | End: 2025-01-29

## 2025-01-29 RX ORDER — MIDAZOLAM HYDROCHLORIDE 2 MG/2ML
INJECTION, SOLUTION INTRAMUSCULAR; INTRAVENOUS AS NEEDED
Status: DISCONTINUED | OUTPATIENT
Start: 2025-01-29 | End: 2025-01-29

## 2025-01-29 RX ORDER — LIDOCAINE HYDROCHLORIDE 10 MG/ML
INJECTION, SOLUTION EPIDURAL; INFILTRATION; INTRACAUDAL; PERINEURAL AS NEEDED
Status: DISCONTINUED | OUTPATIENT
Start: 2025-01-29 | End: 2025-01-29

## 2025-01-29 RX ORDER — SODIUM CHLORIDE, SODIUM LACTATE, POTASSIUM CHLORIDE, CALCIUM CHLORIDE 600; 310; 30; 20 MG/100ML; MG/100ML; MG/100ML; MG/100ML
INJECTION, SOLUTION INTRAVENOUS CONTINUOUS PRN
Status: DISCONTINUED | OUTPATIENT
Start: 2025-01-29 | End: 2025-01-29

## 2025-01-29 RX ORDER — LIDOCAINE HYDROCHLORIDE AND EPINEPHRINE 10; 10 MG/ML; UG/ML
INJECTION, SOLUTION INFILTRATION; PERINEURAL AS NEEDED
Status: DISCONTINUED | OUTPATIENT
Start: 2025-01-29 | End: 2025-01-29 | Stop reason: HOSPADM

## 2025-01-29 RX ORDER — HYDROMORPHONE HCL IN WATER/PF 6 MG/30 ML
0.2 PATIENT CONTROLLED ANALGESIA SYRINGE INTRAVENOUS EVERY 4 HOURS PRN
Status: DISCONTINUED | OUTPATIENT
Start: 2025-01-29 | End: 2025-01-30

## 2025-01-29 RX ORDER — ONDANSETRON 2 MG/ML
INJECTION INTRAMUSCULAR; INTRAVENOUS AS NEEDED
Status: DISCONTINUED | OUTPATIENT
Start: 2025-01-29 | End: 2025-01-29

## 2025-01-29 RX ORDER — BUPIVACAINE HYDROCHLORIDE AND EPINEPHRINE 5; 5 MG/ML; UG/ML
INJECTION, SOLUTION PERINEURAL AS NEEDED
Status: DISCONTINUED | OUTPATIENT
Start: 2025-01-29 | End: 2025-01-29 | Stop reason: HOSPADM

## 2025-01-29 RX ORDER — FENTANYL CITRATE/PF 50 MCG/ML
50 SYRINGE (ML) INJECTION
Status: DISCONTINUED | OUTPATIENT
Start: 2025-01-29 | End: 2025-01-29 | Stop reason: HOSPADM

## 2025-01-29 RX ORDER — SUCCINYLCHOLINE/SOD CL,ISO/PF 100 MG/5ML
SYRINGE (ML) INTRAVENOUS AS NEEDED
Status: DISCONTINUED | OUTPATIENT
Start: 2025-01-29 | End: 2025-01-29

## 2025-01-29 RX ORDER — HYDROMORPHONE HCL/PF 1 MG/ML
0.5 SYRINGE (ML) INJECTION ONCE
Refills: 0 | Status: COMPLETED | OUTPATIENT
Start: 2025-01-29 | End: 2025-01-29

## 2025-01-29 RX ORDER — DEXAMETHASONE SODIUM PHOSPHATE 10 MG/ML
INJECTION, SOLUTION INTRAMUSCULAR; INTRAVENOUS AS NEEDED
Status: DISCONTINUED | OUTPATIENT
Start: 2025-01-29 | End: 2025-01-29

## 2025-01-29 RX ORDER — ESMOLOL HYDROCHLORIDE 10 MG/ML
INJECTION INTRAVENOUS AS NEEDED
Status: DISCONTINUED | OUTPATIENT
Start: 2025-01-29 | End: 2025-01-29

## 2025-01-29 RX ORDER — FENTANYL CITRATE 50 UG/ML
INJECTION, SOLUTION INTRAMUSCULAR; INTRAVENOUS AS NEEDED
Status: DISCONTINUED | OUTPATIENT
Start: 2025-01-29 | End: 2025-01-29

## 2025-01-29 RX ORDER — OXYCODONE HYDROCHLORIDE 5 MG/1
5 TABLET ORAL EVERY 4 HOURS PRN
Refills: 0 | Status: DISCONTINUED | OUTPATIENT
Start: 2025-01-29 | End: 2025-01-30

## 2025-01-29 RX ORDER — ROCURONIUM BROMIDE 10 MG/ML
INJECTION, SOLUTION INTRAVENOUS AS NEEDED
Status: DISCONTINUED | OUTPATIENT
Start: 2025-01-29 | End: 2025-01-29

## 2025-01-29 RX ADMIN — FLUOXETINE HYDROCHLORIDE 40 MG: 20 CAPSULE ORAL at 08:32

## 2025-01-29 RX ADMIN — ACETAMINOPHEN 650 MG: 325 TABLET, FILM COATED ORAL at 19:34

## 2025-01-29 RX ADMIN — Medication 100 MG: at 17:23

## 2025-01-29 RX ADMIN — PHENYLEPHRINE HYDROCHLORIDE 100 MCG: 10 INJECTION INTRAVENOUS at 17:44

## 2025-01-29 RX ADMIN — HEPARIN SODIUM 20 UNITS/KG/HR: 10000 INJECTION, SOLUTION INTRAVENOUS at 23:16

## 2025-01-29 RX ADMIN — INSULIN LISPRO 1 UNITS: 100 INJECTION, SOLUTION INTRAVENOUS; SUBCUTANEOUS at 06:32

## 2025-01-29 RX ADMIN — ONDANSETRON 4 MG: 2 INJECTION INTRAMUSCULAR; INTRAVENOUS at 18:03

## 2025-01-29 RX ADMIN — PHENYLEPHRINE HYDROCHLORIDE 200 MCG: 10 INJECTION INTRAVENOUS at 17:46

## 2025-01-29 RX ADMIN — ALBUTEROL SULFATE 2 PUFF: 90 AEROSOL, METERED RESPIRATORY (INHALATION) at 11:04

## 2025-01-29 RX ADMIN — SUGAMMADEX 200 MG: 100 INJECTION, SOLUTION INTRAVENOUS at 18:07

## 2025-01-29 RX ADMIN — Medication 12.5 MG: at 23:47

## 2025-01-29 RX ADMIN — PROPOFOL 160 MG: 10 INJECTION, EMULSION INTRAVENOUS at 17:22

## 2025-01-29 RX ADMIN — DEXMEDETOMIDINE HYDROCHLORIDE 16 MCG: 100 INJECTION, SOLUTION INTRAVENOUS at 17:34

## 2025-01-29 RX ADMIN — ESMOLOL HYDROCHLORIDE 20 MG: 100 INJECTION, SOLUTION INTRAVENOUS at 18:11

## 2025-01-29 RX ADMIN — OXYCODONE HYDROCHLORIDE 5 MG: 5 TABLET ORAL at 20:03

## 2025-01-29 RX ADMIN — ROCURONIUM BROMIDE 20 MG: 10 INJECTION, SOLUTION INTRAVENOUS at 17:52

## 2025-01-29 RX ADMIN — UMECLIDINIUM 1 PUFF: 62.5 AEROSOL, POWDER ORAL at 16:15

## 2025-01-29 RX ADMIN — Medication 12.5 MG: at 08:32

## 2025-01-29 RX ADMIN — FENTANYL CITRATE 50 MCG: 50 INJECTION INTRAMUSCULAR; INTRAVENOUS at 17:24

## 2025-01-29 RX ADMIN — NITROGLYCERIN 0.5 INCH: 20 OINTMENT TOPICAL at 08:34

## 2025-01-29 RX ADMIN — DEXAMETHASONE SODIUM PHOSPHATE 5 MG: 10 INJECTION INTRAMUSCULAR; INTRAVENOUS at 18:03

## 2025-01-29 RX ADMIN — INSULIN LISPRO 1 UNITS: 100 INJECTION, SOLUTION INTRAVENOUS; SUBCUTANEOUS at 21:05

## 2025-01-29 RX ADMIN — INSULIN LISPRO 5 UNITS: 100 INJECTION, SOLUTION INTRAVENOUS; SUBCUTANEOUS at 08:32

## 2025-01-29 RX ADMIN — MIDAZOLAM 2 MG: 1 INJECTION INTRAMUSCULAR; INTRAVENOUS at 17:11

## 2025-01-29 RX ADMIN — DEXMEDETOMIDINE HYDROCHLORIDE 24 MCG: 100 INJECTION, SOLUTION INTRAVENOUS at 18:12

## 2025-01-29 RX ADMIN — LIDOCAINE HYDROCHLORIDE 50 MG: 10 INJECTION, SOLUTION EPIDURAL; INFILTRATION; INTRACAUDAL; PERINEURAL at 17:22

## 2025-01-29 RX ADMIN — ASPIRIN 81 MG: 81 TABLET, COATED ORAL at 08:32

## 2025-01-29 RX ADMIN — INSULIN GLARGINE 15 UNITS: 100 INJECTION, SOLUTION SUBCUTANEOUS at 21:05

## 2025-01-29 RX ADMIN — HYDROMORPHONE HYDROCHLORIDE 0.2 MG: 0.2 INJECTION, SOLUTION INTRAMUSCULAR; INTRAVENOUS; SUBCUTANEOUS at 23:40

## 2025-01-29 RX ADMIN — SODIUM CHLORIDE, SODIUM LACTATE, POTASSIUM CHLORIDE, AND CALCIUM CHLORIDE: .6; .31; .03; .02 INJECTION, SOLUTION INTRAVENOUS at 17:14

## 2025-01-29 RX ADMIN — FENTANYL CITRATE 50 MCG: 50 INJECTION INTRAMUSCULAR; INTRAVENOUS at 17:22

## 2025-01-29 RX ADMIN — GUAIFENESIN 600 MG: 600 TABLET, EXTENDED RELEASE ORAL at 08:32

## 2025-01-29 RX ADMIN — PHENYLEPHRINE HYDROCHLORIDE 200 MCG: 10 INJECTION INTRAVENOUS at 17:22

## 2025-01-29 RX ADMIN — HYDROMORPHONE HYDROCHLORIDE 0.5 MG: 1 INJECTION, SOLUTION INTRAMUSCULAR; INTRAVENOUS; SUBCUTANEOUS at 22:08

## 2025-01-29 RX ADMIN — HEPARIN SODIUM 20 UNITS/KG/HR: 10000 INJECTION, SOLUTION INTRAVENOUS at 07:22

## 2025-01-29 RX ADMIN — TRAZODONE HYDROCHLORIDE 50 MG: 50 TABLET ORAL at 23:12

## 2025-01-29 RX ADMIN — GUAIFENESIN 600 MG: 600 TABLET, EXTENDED RELEASE ORAL at 19:34

## 2025-01-29 RX ADMIN — IOHEXOL 100 ML: 350 INJECTION, SOLUTION INTRAVENOUS at 15:28

## 2025-01-29 RX ADMIN — ROCURONIUM BROMIDE 20 MG: 10 INJECTION, SOLUTION INTRAVENOUS at 17:31

## 2025-01-29 RX ADMIN — PROPOFOL 40 MG: 10 INJECTION, EMULSION INTRAVENOUS at 17:31

## 2025-01-29 NOTE — PROGRESS NOTES
Pastoral Care Progress Note          Chaplaincy Interventions Utilized:   Empowerment: Clarified, confirmed, or reviewed information from treatment team , Encouraged focus on present, and Encouraged self-care    Exploration: Explored hope and Explored spiritual needs & resources    Collaboration: Consulted with interdisciplinary team and Encouraged adherence to treatment plan     Relationship Building: Cultivated a relationship of care and support, Listened empathically, and Hospitality    Ritual:        01/29/25 1500   Clinical Encounter Type   Visited With Patient   Referral From    Referral To              Chaplaincy Outcomes Achieved:  Expressed gratitude     met with the patient in his room where he was in bed.  Patient seemed happy for a visit.  Patient talked about his dog, chrissie, whom he misses very much.   listened and provided support.       Spiritual Coping Strategies Utilized:        remains available.

## 2025-01-29 NOTE — ANESTHESIA POSTPROCEDURE EVALUATION
Post-Op Assessment Note    CV Status:  Stable    Pain management: adequate       Mental Status:  Alert and awake   Hydration Status:  Euvolemic   PONV Controlled:  Controlled   Airway Patency:  Patent     Post Op Vitals Reviewed: Yes    No anethesia notable event occurred.    Staff: CRNA           Last Filed PACU Vitals:  Vitals Value Taken Time   Temp 99 °F (37.2 °C) 01/29/25 1819   Pulse 78 01/29/25 1823   /55 01/29/25 1820   Resp 29 01/29/25 1823   SpO2 97 % 01/29/25 1823   Vitals shown include unfiled device data.

## 2025-01-29 NOTE — PROGRESS NOTES
Progress Note - Cardiology   Name: Larry Harper 60 y.o. male I MRN: [id number]  Unit/Bed#: PPHP-324-01 I Date of Admission: 2025   Date of Service: 2025 I Hospital Day: 4     Assessment & Plan  NSTEMI (non-ST elevated myocardial infarction) ([facility])  Presented to [facility] with development of chest discomfort while attending his brother's , symptoms similar to prior angina which resolved with nitroglycerin.  HS troponin 149, 272, 288, 142  [facility]: Two mid LAD lesions, 70% and 95%.  70% OM 3 stenosis  [facility] CABG/AVR evaluation. W/U In progress   On aspirin, high intensity statin, metoprolol tartrate 12.5 mg twice daily, and IV heparin  Mild chest discomfort watching the football game  night; relieved with a sublingual nitroglycerin. No further anginal symptoms with addition of nitropaste   Timing of surgery - Friday per patient  Severe aortic stenosis  TTE 25 LVEF 60%, trileafet severe AS with mild to moderate AI.  Mean gradient 39 mmHg with GRAYSON 1.01 cm²  Known history of AS, follows with a cardiologist in [place]  Generally has been asymptomatic and surgical intervention had not yet been pursued by primary cardiologist  Being evaluated for SAVR  CAD (coronary artery disease)  S/p KAEL to RCA, 2021  Follows with cardiologist in [place]  Primary hypertension  Well controlled  Diabetes mellitus ([facility])  Lab Results   Component Value Date    HGBA1C 8.9 (H) 2025   Poorly controlled.  Management per primary team  Dyslipidemia  Total cholesterol 226, triglycerides 108, HDL 41,  on admission.  Started on atorvastatin 80 mg daily  COPD (chronic obstructive pulmonary disease) ([facility])  CT chest with mild interstitial edema and moderate paraseptal emphysema      Subjective  Review of Systems   Constitutional: Negative for chills, malaise/fatigue and weight gain.   Cardiovascular:  Negative for chest pain, dyspnea on exertion, leg swelling, orthopnea,  "palpitations and syncope.   Respiratory:  Negative for cough, shortness of breath, sleep disturbances due to breathing and sputum production.    Endocrine: Negative.    Hematologic/Lymphatic: Negative.    Gastrointestinal:  Negative for bloating and nausea.   Genitourinary: Negative.    Neurological:  Negative for dizziness, light-headedness and weakness.   Psychiatric/Behavioral:  Negative for altered mental status.    All other systems reviewed and are negative.      Objective:   Vitals: Blood pressure 123/57, pulse 66, temperature 98 °F (36.7 °C), resp. rate 18, height 5' 7\" (1.702 m), weight 72.6 kg (160 lb 0.9 oz), SpO2 94%., Body mass index is 25.07 kg/m².,     Systolic (24hrs), Av , Min:111 , Max:133     Diastolic (24hrs), Av, Min:55, Max:63      Intake/Output Summary (Last 24 hours) at 2025 1309  Last data filed at 2025 1101  Gross per 24 hour   Intake 500 ml   Output 2225 ml   Net -1725 ml     Wt Readings from Last 3 Encounters:   25 72.6 kg (160 lb 0.9 oz)   25 75.9 kg (167 lb 5.3 oz)   25 77.8 kg (171 lb 8.3 oz)       Telemetry Review: NoSR    Physical Exam  Vitals reviewed.   Constitutional:       General: He is not in acute distress.  Neck:      Vascular: No hepatojugular reflux or JVD.   Cardiovascular:      Rate and Rhythm: Normal rate and regular rhythm.      Pulses: Normal pulses.      Heart sounds: Murmur heard.      Systolic murmur is present with a grade of 3/6.      No friction rub. No gallop.   Pulmonary:      Effort: Pulmonary effort is normal. No respiratory distress.      Breath sounds: No rales.   Abdominal:      General: Bowel sounds are normal. There is no distension.      Palpations: Abdomen is soft.      Tenderness: There is no abdominal tenderness.   Musculoskeletal:         General: No tenderness. Normal range of motion.      Cervical back: Neck supple.      Right lower leg: No edema.      Left lower leg: No edema.   Skin:     General: Skin is warm " and dry.      Findings: No erythema.   Neurological:      Mental Status: He is alert and oriented to person, place, and time.   Psychiatric:         Mood and Affect: Mood normal.         LABORATORY RESULTS      CBC with diff:   Results from last 7 days   Lab Units 01/28/25  0518 01/27/25  0105 01/26/25  0433 01/25/25  0325 01/24/25  0512 01/23/25  1605   WBC Thousand/uL 6.58 7.53 7.11 7.00 7.18 9.45   HEMOGLOBIN g/dL 13.6 13.2 12.9 12.8 13.9 14.3   HEMATOCRIT % 41.8 39.5 39.2 38.7 41.1 42.5   MCV fL 84 83 83 81* 81* 81*   PLATELETS Thousands/uL 241 250 245 240 243 284   RBC Million/uL 5.00 4.78 4.72 4.77 5.08 5.23   MCH pg 27.2 27.6 27.3 26.8 27.4 27.3   MCHC g/dL 32.5 33.4 32.9 33.1 33.8 33.6   RDW % 14.8 14.5 14.5 14.4 14.3 14.6   MPV fL 11.4 11.5 11.7 10.6 11.1 11.1   NRBC AUTO /100 WBCs 0 0  --   --  0 0       CMP:  Results from last 7 days   Lab Units 01/29/25  0857 01/28/25  0518 01/27/25 0105 01/26/25 0433 01/25/25  0325 01/24/25  0512 01/23/25  1605   POTASSIUM mmol/L 4.3 3.9 4.2 3.8 3.4* 3.4* 3.2*   CHLORIDE mmol/L 101 104 103 109* 109* 107 107   CO2 mmol/L 27 29 30 27 24 24 25   BUN mg/dL 14 14 18 15 11 16 16   CREATININE mg/dL 0.82 0.82 0.96 0.84 0.79 0.73 0.78   CALCIUM mg/dL 10.0 9.4 9.5 8.8 8.4 8.9 9.2   AST U/L  --   --   --   --  12*  --  15   ALT U/L  --   --   --   --  13  --  14   ALK PHOS U/L  --   --   --   --  40  --  51   EGFR ml/min/1.73sq m 97 97 86 96 98 102 99       BMP:  Results from last 7 days   Lab Units 01/29/25  0857 01/28/25  0518 01/27/25  0105 01/26/25  0433 01/25/25  0325 01/24/25  0512 01/23/25  1605   POTASSIUM mmol/L 4.3 3.9 4.2 3.8 3.4* 3.4* 3.2*   CHLORIDE mmol/L 101 104 103 109* 109* 107 107   CO2 mmol/L 27 29 30 27 24 24 25   BUN mg/dL 14 14 18 15 11 16 16   CREATININE mg/dL 0.82 0.82 0.96 0.84 0.79 0.73 0.78   CALCIUM mg/dL 10.0 9.4 9.5 8.8 8.4 8.9 9.2       Lab Results   Component Value Date    CREATININE 0.82 01/29/2025    CREATININE 0.82 01/28/2025    CREATININE 0.96  "01/27/2025       No results found for: \"NTBNP\"        Results from last 7 days   Lab Units 01/26/25  0433 01/24/25  0512   MAGNESIUM mg/dL 1.8* 1.9          Results from last 7 days   Lab Units 01/24/25  0512   HEMOGLOBIN A1C % 8.9*              Results from last 7 days   Lab Units 01/23/25  1605   INR  0.98       Lipid Profile:   No results found for: \"CHOL\"  Lab Results   Component Value Date    HDL 47 01/26/2025    HDL 41 01/24/2025     Lab Results   Component Value Date    LDLCALC 133 (H) 01/26/2025    LDLCALC 163 (H) 01/24/2025     Lab Results   Component Value Date    TRIG 121 01/26/2025    TRIG 108 01/24/2025     Meds/Allergies   all current active meds have been reviewed and current meds:   Current Facility-Administered Medications:     acetaminophen (TYLENOL) tablet 650 mg, Q6H PRN    albuterol (PROVENTIL HFA,VENTOLIN HFA) inhaler 2 puff, Q4H PRN    aspirin (ECOTRIN LOW STRENGTH) EC tablet 81 mg, Daily    atorvastatin (LIPITOR) tablet 80 mg, Daily With Dinner    FLUoxetine (PROzac) capsule 40 mg, Daily    guaiFENesin (MUCINEX) 12 hr tablet 600 mg, Q12H SONYA    heparin (porcine) 25,000 units in 0.45% NaCl 250 mL infusion (premix), Titrated, Last Rate: 20 Units/kg/hr (01/29/25 0722)    heparin (porcine) injection 2,000 Units, Q6H PRN    heparin (porcine) injection 4,000 Units, Q6H PRN    insulin glargine (LANTUS) subcutaneous injection 15 Units 0.15 mL, HS    insulin lispro (HumALOG/ADMELOG) 100 units/mL subcutaneous injection 1-5 Units, HS    insulin lispro (HumALOG/ADMELOG) 100 units/mL subcutaneous injection 1-5 Units, TID AC    insulin lispro (HumALOG/ADMELOG) 100 units/mL subcutaneous injection 5 Units, TID With Meals    metoprolol tartrate (LOPRESSOR) partial tablet 12.5 mg, BID    nitroglycerin (NITRO-BID) 2 % TD ointment 0.5 inch, BID    nitroglycerin (NITROSTAT) SL tablet 0.4 mg, Q5 Min PRN    ondansetron (ZOFRAN) injection 4 mg, Q6H PRN    traZODone (DESYREL) tablet 50 mg, HS    umeclidinium 62.5 " mcg/actuation inhaler AEPB 1 puff, Daily    Medications Prior to Admission:     albuterol (PROVENTIL HFA,VENTOLIN HFA) 90 mcg/act inhaler    aspirin (ECOTRIN LOW STRENGTH) 81 mg EC tablet    atorvastatin (LIPITOR) 80 mg tablet    diazepam (VALIUM) 5 mg tablet    Empagliflozin (Jardiance) 10 MG TABS tablet    FLUoxetine (PROzac) 40 MG capsule    fluticasone (FLONASE) 50 mcg/act nasal spray    ibuprofen (MOTRIN) 400 mg tablet    insulin glargine (LANTUS) 100 units/mL subcutaneous injection    Lantus SoloStar 100 units/mL SOPN    lisinopril (ZESTRIL) 2.5 mg tablet    lovastatin (MEVACOR) 20 mg tablet    metFORMIN (GLUCOPHAGE) 1000 MG tablet    metoprolol tartrate (LOPRESSOR) 25 mg tablet    NovoLOG FlexPen 100 units/mL injection pen    traZODone (DESYREL) 50 mg tablet    umeclidinium (Incruse Ellipta) 62.5 mcg/actuation AEPB inhaler    heparin (porcine), 3-20 Units/kg/hr (Order-Specific), Last Rate: 20 Units/kg/hr (01/29/25 0722)    Counseling / Coordination of Care  Total floor / unit time spent today 20 minutes.  Greater than 50% of total time was spent with the patient and / or family counseling and / or coordination of care.      ** Please Note: Dragon 360 Dictation voice to text software may have been used in the creation of this document. **

## 2025-01-29 NOTE — ASSESSMENT & PLAN NOTE
Presented initially to [facility] with chest pain and noted to have elevated troponin.    Underwent cardiac cath which showed. Mid LAD-1 lesion is 70% stenosed, Mid LAD-2 lesion is 95% stenosed and 3rd Mrg lesion is 70% stenosed.  Given severe symptomatic LAD disease in a diabetic with severe aortic stenosis, patient transferred to [facility] for CABG/SAVR evaluation  Continue aspirin, heparin infusion, beta-blocker and high intensity atorvastatin.  Tentaitive CABG and SAVR on Friday  Awaiting CTA of chest results

## 2025-01-29 NOTE — PROGRESS NOTES
Progress Note - Cardiac Surgery   Larry Harper 60 y.o. male MRN: [id number]  Unit/Bed#: PPHP-324-01 Encounter: [id number]    24 Hour Events: Spoke with [facility] this morning refused dental extractions, revisited with him this morning said he thought about it and will have extractions.   No events overnight, he is upset that he can't have him service dog here with him. The dog is visiting today.    Medications:   Scheduled Meds:  Current Facility-Administered Medications   Medication Dose Route Frequency Provider Last Rate    acetaminophen  650 mg Oral Q6H PRN Jordon Hood MD      albuterol  2 puff Inhalation Q4H PRN Jordon Hood MD      aspirin  81 mg Oral Daily Jordon Hood MD      atorvastatin  80 mg Oral Daily With Dinner Jordon Hood MD      FLUoxetine  40 mg Oral Daily Jordon Hood MD      guaiFENesin  600 mg Oral Q12H [facility] Nolvia Ybarra DO      heparin (porcine)  3-20 Units/kg/hr (Order-Specific) Intravenous Titrated Jordon Hood MD 20 Units/kg/hr (01/29/25 0722)    heparin (porcine)  2,000 Units Intravenous Q6H PRN Jordon Hood MD      heparin (porcine)  4,000 Units Intravenous Q6H PRN Jordon Hood MD      insulin glargine  15 Units Subcutaneous HS Julio Cesar Reeves MD      insulin lispro  1-5 Units Subcutaneous HS Godfrey Echevarria MD      insulin lispro  1-5 Units Subcutaneous TID AC Godfrey Echevarria MD      insulin lispro  5 Units Subcutaneous TID With Meals Julio Cesar Reeves MD      metoprolol tartrate  12.5 mg Oral BID Jordon Hood MD      nitroglycerin  0.5 inch Topical BID TEE Olson      nitroglycerin  0.4 mg Sublingual Q5 Min PRN Jordon Hood MD      ondansetron  4 mg Intravenous Q6H PRN Jordon Hood MD      traZODone  50 mg Oral HS Jordon Hood MD      umeclidinium  1 puff Inhalation Daily Jordon Hood MD       Continuous Infusions:heparin (porcine), 3-20 Units/kg/hr (Order-Specific), Last Rate: 20 Units/kg/hr (01/29/25 0722)        Results:   Results from last 7 days   Lab  Units 01/28/25  0518 01/27/25  0105 01/26/25  0433   WBC Thousand/uL 6.58 7.53 7.11   HEMOGLOBIN g/dL 13.6 13.2 12.9   HEMATOCRIT % 41.8 39.5 39.2   PLATELETS Thousands/uL 241 250 245     Results from last 7 days   Lab Units 01/29/25  0857 01/28/25  0518 01/27/25  0105   POTASSIUM mmol/L 4.3 3.9 4.2   CHLORIDE mmol/L 101 104 103   CO2 mmol/L 27 29 30   BUN mg/dL 14 14 18   CREATININE mg/dL 0.82 0.82 0.96   CALCIUM mg/dL 10.0 9.4 9.5     Results from last 7 days   Lab Units 01/29/25  0534 01/28/25  0518 01/27/25  0105 01/24/25  0042 01/23/25  1605   INR   --   --   --   --  0.98   PTT seconds 72* 63* 69*   < > 27    < > = values in this interval not displayed.       Studies:   Cardiac Cath: Mid LAD 70% and 95%, OM3 70%      Echo: LVEF 60%, RV normal, AV: mild to mod AI, severe AS mean gradient 39, DVI 0.36, GRAYSON 1.01    Carotid US: <50% b/l, no subclavian dx, VAFA      Vein Mapping: adequate b/l thighs, smaller size below knees bilaterally      Panorex: needs dental extractions per OM evaluation      CT Chest: pulmonary edema, moderate emphysema, normal ascending aorta size with some atherosclerotic calcifications of ascending aorta     PFT: FEV 2.00 (64% pred)    Above reviewed with patient     Vitals:   Vitals:    01/29/25 0207 01/29/25 0600 01/29/25 0637 01/29/25 0713   BP: 111/59   124/58   BP Location:    Left arm   Pulse: 63   72   Resp: 16   17   Temp: 97.8 °F (36.6 °C)   98 °F (36.7 °C)   TempSrc:    Oral   SpO2: 94%   96%   Weight:  72.6 kg (160 lb 0.9 oz) 72.6 kg (160 lb 0.9 oz)    Height:           Physical Exam:    General: No acute distress and Alert  HEENT/NECK:  Normocephalic. Atraumatic.  No jugular venous distention.    Cardiac: Regular rate and rhythm  Pulmonary:  Breath sounds clear bilaterally  Abdomen:  Non-tender and Non-distended  Extremities: Extremities warm/dry  Neuro: Alert and oriented X 3  Skin: Warm/Dry, without rashes or lesions.      Assessment:  Severe 2VCAD  Severe  AS  Smoker/COPD  Hx of CAD/MI s/p KAEL to RCA 2021  DM2 on insulin, A1c 8.9    Plan:  Planning AVR/CABG on Friday  Needs dental extractions with [facility]FS. He is amendable to getting extractions.   Awaiting CTA TAVR imaging.   Remaining preoperative testing completed.    Continue Mupirocin 2% nasal ointment q 12 hrs    Continue topical chlorhexidine bath and mouth rise    SIGNATURE: Araceli Clement PA-C  DATE: January 29, 2025  TIME: 9:53 AM    * This note was completed in part utilizing m-[employer] fluency direct voice recognition software.   Grammatical errors, random word insertion, spelling mistakes, and incomplete sentences may be an occasional consequence of the system secondary to software limitations, ambient noise and hardware issues. At the time of dictation, efforts were made to edit, clarify and /or correct errors. Please read the chart carefully and recognize, using context, where substitutions have occurred.  If you have any questions or concerns about the context, text or information contained within the body of this dictation, please contact myself, the provider, for further clarification.

## 2025-01-29 NOTE — ASSESSMENT & PLAN NOTE
Lab Results   Component Value Date    HGBA1C 8.9 (H) 01/24/2025     Recent Labs     01/28/25  1109 01/28/25  1713 01/29/25  0629 01/29/25  1114   POCGLU 317* 126 166* 153*     Blood Sugar Average: Last 72 hrs:  (P) [id number]  Type 2 diabetes mellitus with hyperglycemia as evidenced by A1c of 8.9%.  Home regimen: Lantus 35 units in the morning, metformin 1000 mg twice daily, NovoLog 5 units before meals, Jardiance 10 mg daily  Current regimen: Lantus 15 units nightly, Humalog 5 units before meals, correctional scale insulin algorithm 2 before meals and 1 at bedtime.  Blood sugars reviewed-fasting sugars appear to be at goal on current regimen, however he was noted to have postprandial hyperglycemia yesterday before lunch and at bedtime.    Plan:  Continue Lantus to 15 units nightly  Continue Humalog to 5 units before meals  Encouraged him to monitor his simple carbohydrates and substitute regular Cheerios for honey nut Cheerios.  Continue correctional scale insulin algorithm  2 before meals and 1 at bedtime.  Continue Accu-Cheks before meals and at bedtime, adjust insulin as needed  Monitor for hypoglycemia, treat per protocol  Goal blood sugar while in the [id number]-[id number] mg/dL  Discharge recommendations pending clinical course  Endocrinology will continue following

## 2025-01-29 NOTE — PROGRESS NOTES
Progress Note - Hospitalist   Name: Larry Harper 60 y.o. male I MRN: [id number]  Unit/Bed#: OR POOL I Date of Admission: 1/25/2025   Date of Service: 1/29/2025 I Hospital Day: 4    Assessment & Plan  NSTEMI (non-ST elevated myocardial infarction) (HCC)  Presented initially to [facility] with chest pain and noted to have elevated troponin.    Underwent cardiac cath which showed. Mid LAD-1 lesion is 70% stenosed, Mid LAD-2 lesion is 95% stenosed and 3rd Mrg lesion is 70% stenosed.  Given severe symptomatic LAD disease in a diabetic with severe aortic stenosis, patient transferred to [facility] for CABG/SAVR evaluation  Continue aspirin, heparin infusion, beta-blocker and high intensity atorvastatin.  Tentaitive CABG and SAVR on Friday  Awaiting CTA of chest results  Diabetes mellitus (HCC)  Lab Results   Component Value Date    HGBA1C 8.9 (H) 01/24/2025       Recent Labs     01/28/25  1713 01/29/25  0629 01/29/25  1114 01/29/25  1542   POCGLU 126 166* 153* 131       Blood Sugar Average: Last 72 hrs:  (P) [id number]  Previously on long acting insulin however reports running out of medication  Holding oral regimen while hospitalized  Diabetic diet while inpatient  Endocrinology consulted for perioperative insulin management  PTSD (post-traumatic stress disorder)  Continue fluoxetine  Uses cannabis outpatient  Severe aortic stenosis  TTE 1/5/2024 The aortic valve peak velocity is 3.4 m/s. The aortic valve mean gradient is 29.0 mmHg. The dimensionless velocity index is 0.25. The aortic valve area is 0.75 cm2.   Cardiac surgery consulted for evaluation of AVR  TTE 1/27/2025: Severe stenosis.  AV mean gradient 39 mmHg.  Velocity index 0.36.  Aortic valve area 1.01 cm².  Plan for surgical AVR  Primary hypertension  Blood pressure reviewed and stable  Continue metoprolol  CAD (coronary artery disease)  S/p KAEL to RCA in 4/2021  Follows cardiology in [place]  Dyslipidemia  Continue statin  COPD (chronic  obstructive pulmonary disease) (HCC)  Self-reported  No acute exacerbation  Continue inhalers and nebulizer treatment ordered  Periodontal disease  To OR today for dental extractions of affected teeth    VTE Pharmacologic Prophylaxis:   Moderate Risk (Score 3-4) - Pharmacological DVT Prophylaxis Ordered: heparin drip.    Mobility:   Basic Mobility Inpatient Raw Score: 24  JH-HLM Goal: 8: Walk 250 feet or more  JH-HLM Achieved: 7: Walk 25 feet or more  JH-HLM Goal achieved. Continue to encourage appropriate mobility.    Patient Centered Rounds: I performed bedside rounds with nursing staff today.   Discussions with Specialists or Other Care Team Provider: nurse, CM    Current Length of Stay: 4 day(s)  Current Patient Status: Inpatient   Certification Statement: The patient will continue to require additional inpatient hospital stay due to nurse, CM  Discharge Plan: Anticipate discharge in >72 hrs to home.    Code Status: Level 1 - Full Code    Subjective   Denies any new complaints.     Objective :  Temp:  [97.7 °F (36.5 °C)-98.6 °F (37 °C)] 97.9 °F (36.6 °C)  HR:  [60-72] 66  BP: (111-146)/(55-67) 146/67  Resp:  [16-18] 18  SpO2:  [94 %-96 %] 94 %  O2 Device: None (Room air)    Body mass index is 25.07 kg/m².     Input and Output Summary (last 24 hours):     Intake/Output Summary (Last 24 hours) at 1/29/2025 1746  Last data filed at 1/29/2025 1406  Gross per 24 hour   Intake 260 ml   Output 2600 ml   Net -2340 ml       Physical Exam  Constitutional:       Appearance: Normal appearance.   HENT:      Head: Normocephalic and atraumatic.      Nose: Nose normal.      Mouth/Throat:      Mouth: Mucous membranes are moist.      Pharynx: Oropharynx is clear.   Eyes:      Extraocular Movements: Extraocular movements intact.   Cardiovascular:      Rate and Rhythm: Normal rate and regular rhythm.   Pulmonary:      Effort: Pulmonary effort is normal.      Breath sounds: No wheezing or rales.   Musculoskeletal:      Right lower  leg: No edema.      Left lower leg: No edema.   Neurological:      Mental Status: He is alert and oriented to person, place, and time.   Psychiatric:         Mood and Affect: Mood normal.         Behavior: Behavior normal.           Lines/Drains:        Telemetry:  Telemetry Orders (From admission, onward)               24 Hour Telemetry Monitoring  Continuous x 24 Hours (Telem)        Expiring   Question:  Reason for 24 Hour Telemetry  Answer:  PCI/EP study (including pacer and ICD implementation), Cardiac surgery, MI, abnormal cardiac cath, and chest pain- rule out MI                     Telemetry Reviewed: Normal Sinus Rhythm  Indication for Continued Telemetry Use: Awaiting PCI/EP Study/CABG               Lab Results: I have reviewed the following results:   Results from last 7 days   Lab Units 01/28/25  0518   WBC Thousand/uL 6.58   HEMOGLOBIN g/dL 13.6   HEMATOCRIT % 41.8   PLATELETS Thousands/uL 241   SEGS PCT % 48   LYMPHO PCT % 33   MONO PCT % 11   EOS PCT % 7*     Results from last 7 days   Lab Units 01/29/25  0857 01/26/25  0433 01/25/25  0325   SODIUM mmol/L 137   < > 140   POTASSIUM mmol/L 4.3   < > 3.4*   CHLORIDE mmol/L 101   < > 109*   CO2 mmol/L 27   < > 24   BUN mg/dL 14   < > 11   CREATININE mg/dL 0.82   < > 0.79   ANION GAP mmol/L 9   < > 7   CALCIUM mg/dL 10.0   < > 8.4   ALBUMIN g/dL  --   --  3.7   TOTAL BILIRUBIN mg/dL  --   --  0.36   ALK PHOS U/L  --   --  40   ALT U/L  --   --  13   AST U/L  --   --  12*   GLUCOSE RANDOM mg/dL 289*   < > 129    < > = values in this interval not displayed.     Results from last 7 days   Lab Units 01/23/25  1605   INR  0.98     Results from last 7 days   Lab Units 01/29/25  1542 01/29/25  1114 01/29/25  0629 01/28/25  1713 01/28/25  1109 01/28/25  0732 01/27/25  2122 01/27/25  1716 01/27/25  1137 01/27/25  0736 01/26/25  2107 01/26/25  1709   POC GLUCOSE mg/dl 131 153* 166* 126 317* 168* 198* 124 281* 162* 237* 141*     Results from last 7 days   Lab Units  01/24/25  0512   HEMOGLOBIN A1C % 8.9*           Recent Cultures (last 7 days):         Imaging Results Review: I reviewed radiology reports from this admission including: chest xray.  Other Study Results Review: No additional pertinent studies reviewed.    Last 24 Hours Medication List:     Current Facility-Administered Medications:     [Transfer Hold] acetaminophen (TYLENOL) tablet 650 mg, Q6H PRN    [Transfer Hold] albuterol (PROVENTIL HFA,VENTOLIN HFA) inhaler 2 puff, Q4H PRN    [Transfer Hold] aspirin (ECOTRIN LOW STRENGTH) EC tablet 81 mg, Daily    [Transfer Hold] atorvastatin (LIPITOR) tablet 80 mg, Daily With Dinner    bupivacaine-epinephrine (MARCAINE/EPINEPHRINE) 0.5 %-1:[id number] injection, PRN    [Transfer Hold] FLUoxetine (PROzac) capsule 40 mg, Daily    [Transfer Hold] guaiFENesin (MUCINEX) 12 hr tablet 600 mg, Q12H SONYA    heparin (porcine) 25,000 units in 0.45% NaCl 250 mL infusion (premix), Titrated, Last Rate: 20 Units/kg/hr (01/29/25 1714)    [Transfer Hold] heparin (porcine) injection 2,000 Units, Q6H PRN    [Transfer Hold] heparin (porcine) injection 4,000 Units, Q6H PRN    [Transfer Hold] insulin glargine (LANTUS) subcutaneous injection 15 Units 0.15 mL, HS    [Transfer Hold] insulin lispro (HumALOG/ADMELOG) 100 units/mL subcutaneous injection 1-5 Units, HS    [Transfer Hold] insulin lispro (HumALOG/ADMELOG) 100 units/mL subcutaneous injection 1-5 Units, TID AC    [Transfer Hold] insulin lispro (HumALOG/ADMELOG) 100 units/mL subcutaneous injection 5 Units, TID With Meals    lidocaine-epinephrine (XYLOCAINE/EPINEPHRINE) 1 %-1:100,000 injection, PRN    [Transfer Hold] metoprolol tartrate (LOPRESSOR) partial tablet 12.5 mg, BID    [Transfer Hold] nitroglycerin (NITRO-BID) 2 % TD ointment 0.5 inch, BID    [Transfer Hold] nitroglycerin (NITROSTAT) SL tablet 0.4 mg, Q5 Min PRN    [Transfer Hold] ondansetron (ZOFRAN) injection 4 mg, Q6H PRN    [Transfer Hold] traZODone (DESYREL) tablet 50 mg, HS     [Transfer Hold] umeclidinium 62.5 mcg/actuation inhaler AEPB 1 puff, Daily    Facility-Administered Medications Ordered in Other Encounters:     dexmedeTOMIDine (Precedex) 400 mcg in sodium chloride 0.9 % 100 mL infusion, Continuous PRN    fentaNYL injection, PRN    lactated ringers infusion, Continuous PRN    lidocaine (PF) (XYLOCAINE-MPF) 1 % injection, PRN    midazolam (VERSED) injection, PRN    phenylephrine (KENNEDY-SYNEPHRINE) 50 mg (STANDARD CONCENTRATION) in sodium chloride 0.9% 250 mL, Continuous PRN    propofol (DIPRIVAN) 1000 mg in 100 mL infusion (premix), PRN    ROCuronium (ZEMURON) injection, PRN    Succinylcholine Chloride 100 mg/5 mL syringe, PRN    Administrative Statements   Today, Patient Was Seen By: Ricky Smith MD  I have spent a total time of 40 minutes in caring for this patient on the day of the visit/encounter including Diagnostic results, Instructions for management, Patient and family education, Impressions, Counseling / Coordination of care, Documenting in the medical record, Reviewing / ordering tests, medicine, procedures  , Obtaining or reviewing history  , and Communicating with other healthcare professionals .    **Please Note: This note may have been constructed using a voice recognition system.**

## 2025-01-29 NOTE — PLAN OF CARE
Problem: INFECTION - ADULT  Goal: Absence or prevention of progression during hospitalization  Description: INTERVENTIONS:  - Assess and monitor for signs and symptoms of infection  - Monitor lab/diagnostic results  - Monitor all insertion sites, i.e. indwelling lines, tubes, and drains  - Monitor endotracheal if appropriate and nasal secretions for changes in amount and color  - [place] appropriate cooling/warming therapies per order  - Administer medications as ordered  - Instruct and encourage patient and family to use good hand hygiene technique  - Identify and instruct in appropriate isolation precautions for identified infection/condition  Outcome: Progressing     Problem: SAFETY ADULT  Goal: Patient will remain free of falls  Description: INTERVENTIONS:  - Educate patient/family on patient safety including physical limitations  - Instruct patient to call for assistance with activity   - Consult OT/PT to assist with strengthening/mobility   - Keep Call bell within reach  - Keep bed low and locked with side rails adjusted as appropriate    Problem: Knowledge Deficit  Goal: Patient/family/caregiver demonstrates understanding of disease process, treatment plan, medications, and discharge instructions  Description: Complete learning assessment and assess knowledge base.  Interventions:  - Provide teaching at level of understanding  - Provide teaching via preferred learning methods  Outcome: Progressing

## 2025-01-29 NOTE — CONSULTS
Oral and Maxillofacial Surgery Consult    Pt seen 01/29/25 8:30AM    Assessment  60M w/ PMHx of T2DM, CAD, HTN, HLD, severe AS, PTSD. Pt initially presented with chest pain found to have elevated troponin and subsequent cardiac cath showed LAD disease. Now being evaluated for AVR and CABG. OMFS consulted for pre-op dental clearance. Clinical and radiographic exam show generalized periodontal disease. Ideally, pt would have extractions of teeth #2,3,12,14 prior to cardiac surgery, but pt refusing extractions at this time.    Plan:  - Recommend extraction of teeth #s 2,3,12,14. Pt refusing extractions at this time. Please contact OMFS if pt amenable to extractions  - Rest of management as per primary    D/w OMFS attg on call    Inpatient consult to Oral and Maxillofacial Surgery  Consult performed by: Hiram Aviles DDS  Consult ordered by: Harriet Gonzales PA-C         HPI: 60M w/ PMHx of T2DM, CAD, HTN, HLD, severe AS, PTSD. Pt initially presented with chest pain found to have elevated troponin and subsequent cardiac cath showed LAD disease. Now being evaluated for AVR and CABG. OMFS consulted for pre-op dental clearance.     PMH:   Past Medical History:   Diagnosis Date    Anxiety     Coronary artery disease     Depression     Diabetes mellitus (HCC)     MI, old     PTSD (post-traumatic stress disorder)         Allergies:   Allergies   Allergen Reactions    Bee Venom Anaphylaxis    Shellfish Allergy - Food Allergy Anaphylaxis       Meds:     Current Facility-Administered Medications:     acetaminophen (TYLENOL) tablet 650 mg, 650 mg, Oral, Q6H PRN, Jordon Hood MD, 650 mg at 01/27/25 1758    albuterol (PROVENTIL HFA,VENTOLIN HFA) inhaler 2 puff, 2 puff, Inhalation, Q4H PRN, Jordon Hood MD, 2 puff at 01/27/25 1227    aspirin (ECOTRIN LOW STRENGTH) EC tablet 81 mg, 81 mg, Oral, Daily, Jordon Hood MD, 81 mg at 01/29/25 0832    atorvastatin (LIPITOR) tablet 80 mg, 80 mg, Oral, Daily With Dinner, Jordon Hood MD,  80 mg at 01/28/25 1743    FLUoxetine (PROzac) capsule 40 mg, 40 mg, Oral, Daily, Jordon Hood MD, 40 mg at 01/29/25 0832    guaiFENesin (MUCINEX) 12 hr tablet 600 mg, 600 mg, Oral, Q12H SONYA, Nolvia Ybarra, , 600 mg at 01/29/25 0832    heparin (porcine) 25,000 units in 0.45% NaCl 250 mL infusion (premix), 3-20 Units/kg/hr (Order-Specific), Intravenous, Titrated, Jordon Hood MD, Last Rate: 15 mL/hr at 01/29/25 0722, 20 Units/kg/hr at 01/29/25 0722    heparin (porcine) injection 2,000 Units, 2,000 Units, Intravenous, Q6H PRN, Jordon Hood MD, 2,000 Units at 01/26/25 1426    heparin (porcine) injection 4,000 Units, 4,000 Units, Intravenous, Q6H PRN, Jordon Hood MD    insulin glargine (LANTUS) subcutaneous injection 15 Units 0.15 mL, 15 Units, Subcutaneous, HS, Julio Cesar Reeves MD, 15 Units at 01/28/25 2204    insulin lispro (HumALOG/ADMELOG) 100 units/mL subcutaneous injection 1-5 Units, 1-5 Units, Subcutaneous, HS, Godfrey Echevarria MD, 1 Units at 01/27/25 2126    insulin lispro (HumALOG/ADMELOG) 100 units/mL subcutaneous injection 1-5 Units, 1-5 Units, Subcutaneous, TID AC, Godfrey Echevarria MD, 1 Units at 01/29/25 0632    insulin lispro (HumALOG/ADMELOG) 100 units/mL subcutaneous injection 5 Units, 5 Units, Subcutaneous, TID With Meals, Julio Cesar Reeves MD, 5 Units at 01/29/25 0832    metoprolol tartrate (LOPRESSOR) partial tablet 12.5 mg, 12.5 mg, Oral, BID, Jordon Hood MD, 12.5 mg at 01/29/25 0832    nitroglycerin (NITRO-BID) 2 % TD ointment 0.5 inch, 0.5 inch, Topical, BID, TEE Olson, 0.5 inch at 01/29/25 0834    nitroglycerin (NITROSTAT) SL tablet 0.4 mg, 0.4 mg, Sublingual, Q5 Min PRN, Jordon Hood MD, 0.4 mg at 01/26/25 1951    ondansetron (ZOFRAN) injection 4 mg, 4 mg, Intravenous, Q6H PRN, Jordon Hood MD    traZODone (DESYREL) tablet 50 mg, 50 mg, Oral, HS, Jordon Hood MD, 50 mg at 01/28/25 0642    umeclidinium 62.5 mcg/actuation inhaler AEPB 1 puff, 1 puff, Inhalation, Daily,  Jordon Hood MD    PSH:   Past Surgical History:   Procedure Laterality Date    CARDIAC CATHETERIZATION Left 1/24/2025    Procedure: Cardiac Left Heart Cath;  Surgeon: Derick Hancock MD;  Location: [facility];  Service: Cardiology    CARPAL TUNNEL RELEASE Right     CORONARY ANGIOPLASTY WITH STENT PLACEMENT      ROTATOR CUFF REPAIR Right       History reviewed. No pertinent family history.     Review of Systems   Constitutional: Negative.    HENT: Negative.     Respiratory: Negative.     Cardiovascular: Negative.    Psychiatric/Behavioral: Negative.          Temp:  [97.7 °F (36.5 °C)-98.6 °F (37 °C)] 98 °F (36.7 °C)  HR:  [60-72] 72  Resp:  [16-18] 17  BP: (110-133)/(55-63) 124/58  SpO2:  [94 %-96 %] 96 %       Intake/Output Summary (Last 24 hours) at 1/29/2025 0847  Last data filed at 1/29/2025 0601  Gross per 24 hour   Intake 1020 ml   Output 1925 ml   Net -905 ml        Physical Exam:  GE: AAOx3. NAD.  HEENT:    Head: NCAT, no LAD, no lesions. Inferior border of the mandible is palpable.  Mouth: ~40mm ANGELIQUE. Generalized periodontal disease with grade 1 mobility of #3 and 14, and grade II mobility of #2 and 12. No signs of active infection.   Resp: no respiratory distress on RA  CVS: RRR    Imaging: Results Review Statement: I personally reviewed the following image studies/reports in PACS and discussed pertinent findings with Radiology: mandible panorex. My interpretation of the radiology images/reports is: generalized periodontal disease.    Counseling / Coordination of Care  Total floor / unit time spent today 30 minutes.  Greater than 50% of total time was spent with the patient and / or family counseling and / or coordination of care.  A description of the counseling / coordination of care: evaluation of chart and imaging, discussion with patient.

## 2025-01-29 NOTE — ASSESSMENT & PLAN NOTE
Lab Results   Component Value Date    HGBA1C 8.9 (H) 01/24/2025       Recent Labs     01/28/25  1713 01/29/25  0629 01/29/25  1114 01/29/25  1542   POCGLU 126 166* 153* 131       Blood Sugar Average: Last 72 hrs:  (P) [id number]  Previously on long acting insulin however reports running out of medication  Holding oral regimen while hospitalized  Diabetic diet while inpatient  Endocrinology consulted for perioperative insulin management

## 2025-01-29 NOTE — PROGRESS NOTES
Progress Note - Endocrinology   Name: Larry Harper 60 y.o. male I MRN: [id number]  Unit/Bed#: PPHP-324-01 I Date of Admission: 1/25/2025   Date of Service: 1/29/2025 I Hospital Day: 4    Assessment & Plan  Diabetes mellitus (HCC)  Lab Results   Component Value Date    HGBA1C 8.9 (H) 01/24/2025     Recent Labs     01/28/25  1109 01/28/25  1713 01/29/25  0629 01/29/25  1114   POCGLU 317* 126 166* 153*     Blood Sugar Average: Last 72 hrs:  (P) [id number]  Type 2 diabetes mellitus with hyperglycemia as evidenced by A1c of 8.9%.  Home regimen: Lantus 35 units in the morning, metformin 1000 mg twice daily, NovoLog 5 units before meals, Jardiance 10 mg daily  Current regimen: Lantus 15 units nightly, Humalog 5 units before meals, correctional scale insulin algorithm 2 before meals and 1 at bedtime.  Blood sugars reviewed-fasting sugars appear to be at goal on current regimen, however he was noted to have postprandial hyperglycemia yesterday before lunch and at bedtime.    Plan:  Continue Lantus to 15 units nightly  Continue Humalog to 5 units before meals  Encouraged him to monitor his simple carbohydrates and substitute regular Cheerios for honey nut Cheerios.  Continue correctional scale insulin algorithm  2 before meals and 1 at bedtime.  Continue Accu-Cheks before meals and at bedtime, adjust insulin as needed  Monitor for hypoglycemia, treat per protocol  Goal blood sugar while in the [id number] mg/dL  Discharge recommendations pending clinical course  Endocrinology will continue following    NSTEMI (non-ST elevated myocardial infarction) (HCC)  CABG workup is in progress  Management per primary team    24 Hour Events : No significant overnight events.  Plan for tooth extraction later today and possibly CABG on 1/31/2025.  Subjective : Patient seen and examined at the bedside, not in acute distress at the time of my evaluation.  Endorses good appetite, denies nausea, vomiting, signs or symptoms  of hypoglycemia.    Objective :  Temp:  [97.7 °F (36.5 °C)-98.6 °F (37 °C)] 98 °F (36.7 °C)  HR:  [60-72] 66  BP: (111-133)/(55-63) 123/57  Resp:  [16-18] 18  SpO2:  [94 %-96 %] 94 %  O2 Device: None (Room air)    Physical Exam  Vitals and nursing note reviewed.   Constitutional:       General: He is not in acute distress.     Appearance: Normal appearance. He is not ill-appearing, toxic-appearing or diaphoretic.   HENT:      Head: Normocephalic and atraumatic.      Nose: Nose normal.      Mouth/Throat:      Pharynx: Oropharynx is clear.   Eyes:      General: No scleral icterus.        Right eye: No discharge.         Left eye: No discharge.      Extraocular Movements: Extraocular movements intact.      Conjunctiva/sclera: Conjunctivae normal.   Pulmonary:      Effort: Pulmonary effort is normal. No respiratory distress.   Abdominal:      General: There is no distension.   Musculoskeletal:         General: Normal range of motion.      Cervical back: Normal range of motion and neck supple.   Skin:     General: Skin is warm and dry.      Coloration: Skin is not jaundiced or pale.   Neurological:      Mental Status: He is alert and oriented to person, place, and time. Mental status is at baseline.   Psychiatric:         Mood and Affect: Mood normal.         Behavior: Behavior normal.         Thought Content: Thought content normal.         Judgment: Judgment normal.         Lab Results: I have reviewed the following results:CBC/BMP:   .     01/29/25  0857   SODIUM 137   K 4.3      CO2 27   BUN 14   CREATININE 0.82   GLUC 289*    , Creatinine Clearance: Estimated Creatinine Clearance: 91.8 mL/min (by C-G formula based on SCr of 0.82 mg/dL)., LFTs: No new results in last 24 hours.     Imaging Results Review: No pertinent imaging studies reviewed.  Other Study Results Review: No additional pertinent studies reviewed.

## 2025-01-29 NOTE — ASSESSMENT & PLAN NOTE
TTE 1/28/25 LVEF 60%, trileafet severe AS with mild to moderate AI.  Mean gradient 39 mmHg with GRAYSON 1.01 cm²  Known history of AS, follows with a cardiologist in [place]  Generally has been asymptomatic and surgical intervention had not yet been pursued by primary cardiologist  Being evaluated for SAVR

## 2025-01-29 NOTE — ASSESSMENT & PLAN NOTE
Presented to [facility] with development of chest discomfort while attending his brother's , symptoms similar to prior angina which resolved with nitroglycerin.  HS troponin 149, 272, 288, 142  [facility]: Two mid LAD lesions, 70% and 95%.  70% OM 3 stenosis  Transferred to [facility] for CABG/AVR evaluation. W/U In progress   On aspirin, high intensity statin, metoprolol tartrate 12.5 mg twice daily, and IV heparin  Mild chest discomfort watching the football game  night; relieved with a sublingual nitroglycerin. No further anginal symptoms with addition of nitropaste   Timing of surgery - Friday per patient

## 2025-01-29 NOTE — OP NOTE
OPERATIVE REPORT  PATIENT NAME: Larry Harper    :  1965  MRN: [id number]  Pt Location:  OR ROOM 07    SURGERY DATE: 2025    Surgeons and Role:     * Tha Hernandez DMD - Primary    Preop Diagnosis:  Caries [K02.9]    Post-Op Diagnosis Codes:     * Caries [K02.9]    Procedure(s):  Extraction teeth numbers 2, 12, 14, 18, 31    Specimen(s):  No specimen    Estimated Blood Loss:   Minimal    Drains:  No drains    Anesthesia Type:   General    Operative Indications:  Caries [K02.9]      Operative Findings:  Carious teeth numbers 2, 12, 14, 18, 31      Complications:   None    Procedure and Technique:  The patient was greeted in the preoperative area. All the risks and benefits of the procedure were once again explained and the risks of sinus communication as well as lower chin and lip numbness were explained in detail all questions were answered. Consent had already been signed. Care was then handed back to the anesthesia team.    The patient was brought into the operating room by the anesthesia team and the patient was placed in a supine position where the patient remained for the rest of the case. Anesthesia was able to establish an orotracheal intubation without any complications. Care was then handed back to the OMFS team.    Patient was draped in sterile manner timeout was performed in which the patient was correctly identified by name medical record number as well as a site of the procedure be performed.     Once a timeout was completed oral cavity was thoroughly suctioned with the Yankauer suction the moist  packing was used it as a throat pack. Patient was given local anesthesia.  The dosages are in the OR record and can be reviewed should this be required.    A periosteal elevator was used to separate the gingiva from the teeth. Full thickness mucoperiosteal flaps elevated at all extraction sites.  Bone was removed around the teeth.  The teeth were sectioned.  Teeth#2, 12, 14, 18, 31 were then  extracted without complication.    Surgical sites were thoroughly curetted, bone filed, and irrigated with sterile saline. Closure with 3-0 chromic gut sutures. Next the oral cavity was thoroughly irrigated with sterile saline and suctioned with the Yankauer suction. The moist throat packing was removed and the oropharynx was suctioned.     I was present for the entire procedure.    Patient Disposition:  PACU , hemodynamically stable, and extubated and stable             SIGNATURE: Tha Hernandez DMD  DATE: January 29, 2025  TIME: 5:40 PM

## 2025-01-29 NOTE — ANESTHESIA PREPROCEDURE EVALUATION
Procedure:  EXTRACTION TOOTH (Bilateral: Mouth)  Recommend extraction of teeth #s 2,3,12,14.   Relevant Problems   CARDIO   (+) CAD (coronary artery disease)   (+) NSTEMI (non-ST elevated myocardial infarction) ([facility])   (+) Primary hypertension   (+) Severe aortic stenosis      NEURO/PSYCH   (+) Anxiety   (+) Depression   (+) PTSD (post-traumatic stress disorder)      PULMONARY   (+) COPD (chronic obstructive pulmonary disease) ([facility])   S/p KAEL to RCA, April 2021     Cardiac Cath: Mid LAD 70% and 95%, OM3 70%        History    NSTEMI, CAD, Aortic Stenosis, DM, Hypertension, COPD     Interpretation Summary         Left Ventricle: Left ventricular cavity size is normal. Wall thickness is mildly increased. There is concentric remodeling. The left ventricular ejection fraction is 60%. Systolic function is normal. Wall motion is normal. Diastolic function is normal.    Aortic Valve: The aortic valve is trileaflet. The leaflets are severely thickened. The leaflets are severely calcified. There is severely reduced mobility. There is mild to moderate regurgitation. There is severe stenosis. The aortic valve mean gradient is 39 mmHg. The dimensionless velocity index is 0.36. The aortic valve area is 1.01 cm2. The stroke volume index is 51.90 ml/m2.    Prior TTE study available for comparison. Prior study date: 1/5/2024. No significant changes noted compared to the prior study.    Recent labs personally reviewed:  Lab Results   Component Value Date    WBC 6.58 01/28/2025    HGB 13.6 01/28/2025     01/28/2025     Lab Results   Component Value Date    K 4.3 01/29/2025    BUN 14 01/29/2025    CREATININE 0.82 01/29/2025     Lab Results   Component Value Date    PTT 72 (H) 01/29/2025      Lab Results   Component Value Date    INR 0.98 01/23/2025       Lab Results   Component Value Date    HGBA1C 8.9 (H) 01/24/2025       Type and Screen:  A      Physical Exam    Airway    Mallampati score: II  TM Distance: >3 FB  Neck ROM:  full     Dental   No notable dental hx     Cardiovascular      Pulmonary      Other Findings        Anesthesia Plan  ASA Score- 3     Anesthesia Type- general with ASA Monitors.         Additional Monitors:     Airway Plan: ETT.           Plan Factors-Exercise tolerance (METS): >4 METS.    Chart reviewed. EKG reviewed. Imaging results reviewed. Existing labs reviewed. Patient summary reviewed.                  Induction- intravenous.    Postoperative Plan-     Perioperative Resuscitation Plan - Level 1 - Full Code.       Informed Consent- Anesthetic plan and risks discussed with patient.  I personally reviewed this patient with the CRNA. Discussed and agreed on the Anesthesia Plan with the CRNA..      NPO Status:  Vitals Value Taken Time   Date of last liquid 01/29/25 01/29/25 1635   Time of last liquid 0730 01/29/25 1635   Date of last solid 01/29/25 01/29/25 1635   Time of last solid 0730 01/29/25 1635

## 2025-01-30 ENCOUNTER — ANESTHESIA EVENT (INPATIENT)
Dept: PERIOP | Facility: HOSPITAL | Age: 60
DRG: 216 | End: 2025-01-30
Payer: MEDICARE

## 2025-01-30 LAB
ABO GROUP BLD: NORMAL
APTT PPP: 53 SECONDS (ref 23–34)
APTT PPP: 71 SECONDS (ref 23–34)
APTT PPP: 74 SECONDS (ref 23–34)
BLD GP AB SCN SERPL QL: NEGATIVE
GLUCOSE SERPL-MCNC: 134 MG/DL (ref 65–140)
GLUCOSE SERPL-MCNC: 277 MG/DL (ref 65–140)
GLUCOSE SERPL-MCNC: 291 MG/DL (ref 65–140)
GLUCOSE SERPL-MCNC: 327 MG/DL (ref 65–140)
RH BLD: POSITIVE
SPECIMEN EXPIRATION DATE: NORMAL

## 2025-01-30 PROCEDURE — 86850 RBC ANTIBODY SCREEN: CPT | Performed by: DENTIST

## 2025-01-30 PROCEDURE — 99232 SBSQ HOSP IP/OBS MODERATE 35: CPT | Performed by: INTERNAL MEDICINE

## 2025-01-30 PROCEDURE — NC001 PR NO CHARGE: Performed by: PHYSICIAN ASSISTANT

## 2025-01-30 PROCEDURE — 86900 BLOOD TYPING SEROLOGIC ABO: CPT | Performed by: DENTIST

## 2025-01-30 PROCEDURE — 82948 REAGENT STRIP/BLOOD GLUCOSE: CPT

## 2025-01-30 PROCEDURE — 99233 SBSQ HOSP IP/OBS HIGH 50: CPT | Performed by: INTERNAL MEDICINE

## 2025-01-30 PROCEDURE — 85730 THROMBOPLASTIN TIME PARTIAL: CPT | Performed by: INTERNAL MEDICINE

## 2025-01-30 PROCEDURE — 85730 THROMBOPLASTIN TIME PARTIAL: CPT | Performed by: DENTIST

## 2025-01-30 PROCEDURE — 86923 COMPATIBILITY TEST ELECTRIC: CPT

## 2025-01-30 PROCEDURE — 86901 BLOOD TYPING SEROLOGIC RH(D): CPT | Performed by: DENTIST

## 2025-01-30 RX ORDER — INSULIN GLARGINE 100 [IU]/ML
30 INJECTION, SOLUTION SUBCUTANEOUS
Status: DISCONTINUED | OUTPATIENT
Start: 2025-01-30 | End: 2025-01-30

## 2025-01-30 RX ORDER — HEPARIN SODIUM 1000 [USP'U]/ML
2000 INJECTION, SOLUTION INTRAVENOUS; SUBCUTANEOUS EVERY 6 HOURS PRN
Status: DISCONTINUED | OUTPATIENT
Start: 2025-01-30 | End: 2025-01-31

## 2025-01-30 RX ORDER — HEPARIN SODIUM 1000 [USP'U]/ML
4000 INJECTION, SOLUTION INTRAVENOUS; SUBCUTANEOUS ONCE
Status: DISCONTINUED | OUTPATIENT
Start: 2025-01-30 | End: 2025-01-31

## 2025-01-30 RX ORDER — OXYCODONE HYDROCHLORIDE 5 MG/1
5 TABLET ORAL EVERY 4 HOURS PRN
Refills: 0 | Status: DISCONTINUED | OUTPATIENT
Start: 2025-01-30 | End: 2025-01-31

## 2025-01-30 RX ORDER — INSULIN LISPRO 100 [IU]/ML
10 INJECTION, SOLUTION INTRAVENOUS; SUBCUTANEOUS
Status: DISCONTINUED | OUTPATIENT
Start: 2025-01-30 | End: 2025-01-31

## 2025-01-30 RX ORDER — LABETALOL HYDROCHLORIDE 5 MG/ML
10 INJECTION, SOLUTION INTRAVENOUS ONCE
Status: COMPLETED | OUTPATIENT
Start: 2025-01-30 | End: 2025-01-30

## 2025-01-30 RX ORDER — CEFAZOLIN SODIUM 2 G/50ML
2000 SOLUTION INTRAVENOUS ONCE
Status: CANCELLED | OUTPATIENT
Start: 2025-01-30 | End: 2025-01-30

## 2025-01-30 RX ORDER — HEPARIN SODIUM 10000 [USP'U]/100ML
3-20 INJECTION, SOLUTION INTRAVENOUS
Status: DISCONTINUED | OUTPATIENT
Start: 2025-01-30 | End: 2025-01-31

## 2025-01-30 RX ORDER — INSULIN GLARGINE 100 [IU]/ML
15 INJECTION, SOLUTION SUBCUTANEOUS
Status: DISCONTINUED | OUTPATIENT
Start: 2025-01-30 | End: 2025-01-31

## 2025-01-30 RX ORDER — HYDROMORPHONE HCL/PF 1 MG/ML
0.5 SYRINGE (ML) INJECTION EVERY 4 HOURS PRN
Status: DISCONTINUED | OUTPATIENT
Start: 2025-01-30 | End: 2025-01-31

## 2025-01-30 RX ORDER — HEPARIN SODIUM 1000 [USP'U]/ML
4000 INJECTION, SOLUTION INTRAVENOUS; SUBCUTANEOUS EVERY 6 HOURS PRN
Status: DISCONTINUED | OUTPATIENT
Start: 2025-01-30 | End: 2025-01-31

## 2025-01-30 RX ORDER — OXYCODONE HYDROCHLORIDE 10 MG/1
10 TABLET ORAL EVERY 4 HOURS PRN
Refills: 0 | Status: DISCONTINUED | OUTPATIENT
Start: 2025-01-30 | End: 2025-01-31

## 2025-01-30 RX ADMIN — OXYCODONE HYDROCHLORIDE 5 MG: 5 TABLET ORAL at 02:28

## 2025-01-30 RX ADMIN — HEPARIN SODIUM 22 UNITS/KG/HR: 10000 INJECTION, SOLUTION INTRAVENOUS at 14:45

## 2025-01-30 RX ADMIN — INSULIN GLARGINE 15 UNITS: 100 INJECTION, SOLUTION SUBCUTANEOUS at 21:07

## 2025-01-30 RX ADMIN — LABETALOL HYDROCHLORIDE 10 MG: 5 INJECTION, SOLUTION INTRAVENOUS at 02:32

## 2025-01-30 RX ADMIN — OXYCODONE HYDROCHLORIDE 5 MG: 5 TABLET ORAL at 12:42

## 2025-01-30 RX ADMIN — HYDROMORPHONE HYDROCHLORIDE 0.2 MG: 0.2 INJECTION, SOLUTION INTRAMUSCULAR; INTRAVENOUS; SUBCUTANEOUS at 04:45

## 2025-01-30 RX ADMIN — INSULIN LISPRO 10 UNITS: 100 INJECTION, SOLUTION INTRAVENOUS; SUBCUTANEOUS at 16:59

## 2025-01-30 RX ADMIN — OXYCODONE HYDROCHLORIDE 10 MG: 10 TABLET ORAL at 16:58

## 2025-01-30 RX ADMIN — ASPIRIN 81 MG: 81 TABLET, COATED ORAL at 08:21

## 2025-01-30 RX ADMIN — GUAIFENESIN 600 MG: 600 TABLET, EXTENDED RELEASE ORAL at 21:07

## 2025-01-30 RX ADMIN — Medication 12.5 MG: at 08:21

## 2025-01-30 RX ADMIN — HYDROMORPHONE HYDROCHLORIDE 0.5 MG: 1 INJECTION, SOLUTION INTRAMUSCULAR; INTRAVENOUS; SUBCUTANEOUS at 20:28

## 2025-01-30 RX ADMIN — HYDROMORPHONE HYDROCHLORIDE 0.2 MG: 0.2 INJECTION, SOLUTION INTRAMUSCULAR; INTRAVENOUS; SUBCUTANEOUS at 14:48

## 2025-01-30 RX ADMIN — UMECLIDINIUM 1 PUFF: 62.5 AEROSOL, POWDER ORAL at 08:19

## 2025-01-30 RX ADMIN — INSULIN LISPRO 3 UNITS: 100 INJECTION, SOLUTION INTRAVENOUS; SUBCUTANEOUS at 16:59

## 2025-01-30 RX ADMIN — TRAZODONE HYDROCHLORIDE 50 MG: 50 TABLET ORAL at 21:07

## 2025-01-30 RX ADMIN — ACETAMINOPHEN 650 MG: 325 TABLET, FILM COATED ORAL at 08:48

## 2025-01-30 RX ADMIN — INSULIN LISPRO 3 UNITS: 100 INJECTION, SOLUTION INTRAVENOUS; SUBCUTANEOUS at 11:40

## 2025-01-30 RX ADMIN — INSULIN LISPRO 3 UNITS: 100 INJECTION, SOLUTION INTRAVENOUS; SUBCUTANEOUS at 05:04

## 2025-01-30 RX ADMIN — HEPARIN SODIUM 2000 UNITS: 1000 INJECTION INTRAVENOUS; SUBCUTANEOUS at 06:10

## 2025-01-30 RX ADMIN — GUAIFENESIN 600 MG: 600 TABLET, EXTENDED RELEASE ORAL at 08:21

## 2025-01-30 RX ADMIN — FLUOXETINE HYDROCHLORIDE 40 MG: 20 CAPSULE ORAL at 08:21

## 2025-01-30 RX ADMIN — ATORVASTATIN CALCIUM 80 MG: 80 TABLET, FILM COATED ORAL at 16:59

## 2025-01-30 RX ADMIN — Medication 12.5 MG: at 17:00

## 2025-01-30 RX ADMIN — NITROGLYCERIN 0.5 INCH: 20 OINTMENT TOPICAL at 08:21

## 2025-01-30 RX ADMIN — NITROGLYCERIN 0.5 INCH: 20 OINTMENT TOPICAL at 17:00

## 2025-01-30 RX ADMIN — OXYCODONE HYDROCHLORIDE 5 MG: 5 TABLET ORAL at 08:39

## 2025-01-30 NOTE — ASSESSMENT & PLAN NOTE
Self-reported  No acute exacerbation  Continue inhalers and nebulizer treatment ordered  Emphysema present on CTA of the chest  PT is agreeable to stop smoking

## 2025-01-30 NOTE — PROGRESS NOTES
Progress Note - Cardiology   Name: Larry Harper 60 y.o. male I MRN: [id number]  Unit/Bed#: PPHP-324-01 I Date of Admission: 2025   Date of Service: 2025 I Hospital Day: 5     Assessment & Plan  NSTEMI (non-ST elevated myocardial infarction) ([facility])  Presented to [facility] with development of chest discomfort while attending his brother's , symptoms similar to prior angina. Resolved with nitroglycerin.  HS troponin 149, 272, 288, 142  [facility]: Two mid LAD lesions, 70% and 95%.  70% OM 3 stenosis  Transferred to \[facility]\"" for CABG/AVR evaluation.   On aspirin, high intensity statin, metoprolol tartrate 12.5 mg twice daily, IV heparin, and nitropaste  CABG/AVR  with Dr. Johnson  Severe aortic stenosis  TTE 25 LVEF 60%, trileafet severe AS with mild to moderate AI.  Mean gradient 39 mmHg with GRAYSON 1.01 cm²  Known history of AS, follows with a cardiologist in [place]  Generally has been asymptomatic and surgical intervention had not yet been pursued by primary cardiologist  OR   CAD (coronary artery disease)  S/p KAEL to RCA, 2021  Follows with cardiologist in [place]  Primary hypertension  Well controlled  Diabetes mellitus ([facility])  Lab Results   Component Value Date    HGBA1C 8.9 (H) 2025   Poorly controlled.  Management per primary team  Dyslipidemia  Total cholesterol 226, triglycerides 108, HDL 41,  on admission.  Started on atorvastatin 80 mg daily  COPD (chronic obstructive pulmonary disease) ([facility])  CT chest with mild interstitial edema and moderate paraseptal emphysema    Periodontal disease  S/p dental extractions    Subjective  Mouth sore from dental extractions. No chest pain or SOB  Review of Systems   Constitutional: Negative for chills, malaise/fatigue and weight gain.   Cardiovascular:  Negative for chest pain, dyspnea on exertion, leg swelling, orthopnea, palpitations and syncope.   Respiratory:  Negative for cough, shortness of breath, sleep  "disturbances due to breathing and sputum production.    Endocrine: Negative.    Hematologic/Lymphatic: Negative.    Gastrointestinal:  Negative for bloating and nausea.   Genitourinary: Negative.    Neurological:  Negative for dizziness, light-headedness and weakness.   Psychiatric/Behavioral:  Negative for altered mental status.    All other systems reviewed and are negative.      Objective:   Vitals: Blood pressure 137/70, pulse 79, temperature 98.6 °F (37 °C), resp. rate 16, height 5' 7\" (1.702 m), weight 72.7 kg (160 lb 4.4 oz), SpO2 93%., Body mass index is 25.1 kg/m².,     Systolic (24hrs), Av , Min:99 , Max:182     Diastolic (24hrs), Av, Min:55, Max:89        Intake/Output Summary (Last 24 hours) at 2025 1108  Last data filed at 2025 0601  Gross per 24 hour   Intake 1005 ml   Output 1500 ml   Net -495 ml     Wt Readings from Last 3 Encounters:   25 72.7 kg (160 lb 4.4 oz)   25 75.9 kg (167 lb 5.3 oz)   25 77.8 kg (171 lb 8.3 oz)     Telemetry Review: NSR    Physical Exam  Vitals reviewed.   Constitutional:       General: He is not in acute distress.  Neck:      Vascular: No hepatojugular reflux or JVD.   Cardiovascular:      Rate and Rhythm: Normal rate and regular rhythm.      Heart sounds: Murmur heard.      Systolic murmur is present with a grade of 3/6.      No friction rub. No gallop.   Pulmonary:      Effort: Pulmonary effort is normal. No respiratory distress.      Breath sounds: No rales.   Abdominal:      General: Bowel sounds are normal. There is no distension.      Palpations: Abdomen is soft.      Tenderness: There is no abdominal tenderness.   Musculoskeletal:         General: No tenderness. Normal range of motion.      Cervical back: Neck supple.      Right lower leg: No edema.      Left lower leg: No edema.   Skin:     General: Skin is warm and dry.      Capillary Refill: Capillary refill takes less than 2 seconds.      Findings: No erythema.   Neurological: " "     Mental Status: He is alert and oriented to person, place, and time.   Psychiatric:         Mood and Affect: Mood normal.         LABORATORY RESULTS      CBC with diff:   Results from last 7 days   Lab Units 01/28/25  0518 01/27/25 0105 01/26/25 0433 01/25/25 0325 01/24/25  0512 01/23/25  1605   WBC Thousand/uL 6.58 7.53 7.11 7.00 7.18 9.45   HEMOGLOBIN g/dL 13.6 13.2 12.9 12.8 13.9 14.3   HEMATOCRIT % 41.8 39.5 39.2 38.7 41.1 42.5   MCV fL 84 83 83 81* 81* 81*   PLATELETS Thousands/uL 241 250 245 240 243 284   RBC Million/uL 5.00 4.78 4.72 4.77 5.08 5.23   MCH pg 27.2 27.6 27.3 26.8 27.4 27.3   MCHC g/dL 32.5 33.4 32.9 33.1 33.8 33.6   RDW % 14.8 14.5 14.5 14.4 14.3 14.6   MPV fL 11.4 11.5 11.7 10.6 11.1 11.1   NRBC AUTO /100 WBCs 0 0  --   --  0 0     CMP:  Results from last 7 days   Lab Units 01/29/25  0857 01/28/25 0518 01/27/25 0105 01/26/25 0433 01/25/25  0325 01/24/25  0512 01/23/25  1605   POTASSIUM mmol/L 4.3 3.9 4.2 3.8 3.4* 3.4* 3.2*   CHLORIDE mmol/L 101 104 103 109* 109* 107 107   CO2 mmol/L 27 29 30 27 24 24 25   BUN mg/dL 14 14 18 15 11 16 16   CREATININE mg/dL 0.82 0.82 0.96 0.84 0.79 0.73 0.78   CALCIUM mg/dL 10.0 9.4 9.5 8.8 8.4 8.9 9.2   AST U/L  --   --   --   --  12*  --  15   ALT U/L  --   --   --   --  13  --  14   ALK PHOS U/L  --   --   --   --  40  --  51   EGFR ml/min/1.73sq m 97 97 86 96 98 102 99     BMP:  Results from last 7 days   Lab Units 01/29/25  0857 01/28/25  0518 01/27/25  0105 01/26/25  0433 01/25/25  0325 01/24/25  0512 01/23/25  1605   POTASSIUM mmol/L 4.3 3.9 4.2 3.8 3.4* 3.4* 3.2*   CHLORIDE mmol/L 101 104 103 109* 109* 107 107   CO2 mmol/L 27 29 30 27 24 24 25   BUN mg/dL 14 14 18 15 11 16 16   CREATININE mg/dL 0.82 0.82 0.96 0.84 0.79 0.73 0.78   CALCIUM mg/dL 10.0 9.4 9.5 8.8 8.4 8.9 9.2       Lab Results   Component Value Date    CREATININE 0.82 01/29/2025    CREATININE 0.82 01/28/2025    CREATININE 0.96 01/27/2025       No results found for: \"NTBNP\"      " "  Results from last 7 days   Lab Units 01/26/25  0433 01/24/25  0512   MAGNESIUM mg/dL 1.8* 1.9        Results from last 7 days   Lab Units 01/24/25  0512   HEMOGLOBIN A1C % 8.9*     Results from last 7 days   Lab Units 01/23/25  1605   INR  0.98     Lipid Profile:   No results found for: \"CHOL\"  Lab Results   Component Value Date    HDL 47 01/26/2025    HDL 41 01/24/2025     Lab Results   Component Value Date    LDLCALC 133 (H) 01/26/2025    LDLCALC 163 (H) 01/24/2025     Lab Results   Component Value Date    TRIG 121 01/26/2025    TRIG 108 01/24/2025       Meds/Allergies   all current active meds have been reviewed and current meds:   Current Facility-Administered Medications:     acetaminophen (TYLENOL) tablet 650 mg, Q6H PRN    albuterol (PROVENTIL HFA,VENTOLIN HFA) inhaler 2 puff, Q4H PRN    aspirin (ECOTRIN LOW STRENGTH) EC tablet 81 mg, Daily    atorvastatin (LIPITOR) tablet 80 mg, Daily With Dinner    FLUoxetine (PROzac) capsule 40 mg, Daily    guaiFENesin (MUCINEX) 12 hr tablet 600 mg, Q12H SONYA    heparin (porcine) 25,000 units in 0.45% NaCl 250 mL infusion (premix), Titrated, Last Rate: 22 Units/kg/hr (01/30/25 0610)    heparin (porcine) injection 2,000 Units, Q6H PRN    heparin (porcine) injection 4,000 Units, Q6H PRN    HYDROmorphone HCl (DILAUDID) injection 0.2 mg, Q4H PRN    insulin glargine (LANTUS) subcutaneous injection 15 Units 0.15 mL, HS    insulin lispro (HumALOG/ADMELOG) 100 units/mL subcutaneous injection 1-5 Units, HS    insulin lispro (HumALOG/ADMELOG) 100 units/mL subcutaneous injection 1-5 Units, TID AC    insulin lispro (HumALOG/ADMELOG) 100 units/mL subcutaneous injection 5 Units, TID With Meals    metoprolol tartrate (LOPRESSOR) partial tablet 12.5 mg, BID    nitroglycerin (NITRO-BID) 2 % TD ointment 0.5 inch, BID    nitroglycerin (NITROSTAT) SL tablet 0.4 mg, Q5 Min PRN    ondansetron (ZOFRAN) injection 4 mg, Q6H PRN    oxyCODONE (ROXICODONE) IR tablet 5 mg, Q4H PRN    oxyCODONE " (ROXICODONE) split tablet 2.5 mg, Q4H PRN    traZODone (DESYREL) tablet 50 mg, HS    umeclidinium 62.5 mcg/actuation inhaler AEPB 1 puff, Daily    Medications Prior to Admission:     albuterol (PROVENTIL HFA,VENTOLIN HFA) 90 mcg/act inhaler    aspirin (ECOTRIN LOW STRENGTH) 81 mg EC tablet    atorvastatin (LIPITOR) 80 mg tablet    diazepam (VALIUM) 5 mg tablet    Empagliflozin (Jardiance) 10 MG TABS tablet    FLUoxetine (PROzac) 40 MG capsule    fluticasone (FLONASE) 50 mcg/act nasal spray    ibuprofen (MOTRIN) 400 mg tablet    insulin glargine (LANTUS) 100 units/mL subcutaneous injection    Lantus SoloStar 100 units/mL SOPN    lisinopril (ZESTRIL) 2.5 mg tablet    lovastatin (MEVACOR) 20 mg tablet    metFORMIN (GLUCOPHAGE) 1000 MG tablet    metoprolol tartrate (LOPRESSOR) 25 mg tablet    NovoLOG FlexPen 100 units/mL injection pen    traZODone (DESYREL) 50 mg tablet    umeclidinium (Incruse Ellipta) 62.5 mcg/actuation AEPB inhaler    heparin (porcine), 3-20 Units/kg/hr (Order-Specific), Last Rate: 22 Units/kg/hr (01/30/25 0610)      Counseling / Coordination of Care  Total floor / unit time spent today 20 minutes.  Greater than 50% of total time was spent with the patient and / or family counseling and / or coordination of care.      ** Please Note: Dragon 360 Dictation voice to text software may have been used in the creation of this document. **

## 2025-01-30 NOTE — PLAN OF CARE
Problem: PAIN - ADULT  Goal: Verbalizes/displays adequate comfort level or baseline comfort level  Description: Interventions:  - Encourage patient to monitor pain and request assistance  - Assess pain using appropriate pain scale  - Administer analgesics based on type and severity of pain and evaluate response  - Implement non-pharmacological measures as appropriate and evaluate response  - Consider cultural and social influences on pain and pain management  - Notify physician/advanced practitioner if interventions unsuccessful or patient reports new pain  1/30/2025 0738 by Keisha Alfaro RN  Outcome: Progressing  1/30/2025 0737 by Keisha Alfaro, RN  Outcome: Progressing

## 2025-01-30 NOTE — ASSESSMENT & PLAN NOTE
TTE 1/28/25 LVEF 60%, trileafet severe AS with mild to moderate AI.  Mean gradient 39 mmHg with GRAYSON 1.01 cm²  Known history of AS, follows with a cardiologist in [place]  Generally has been asymptomatic and surgical intervention had not yet been pursued by primary cardiologist  OR Friday 1/31

## 2025-01-30 NOTE — ASSESSMENT & PLAN NOTE
Presented initially to [facility] with chest pain and noted to have elevated troponin.    Underwent cardiac cath which showed. Mid LAD-1 lesion is 70% stenosed, Mid LAD-2 lesion is 95% stenosed and 3rd Mrg lesion is 70% stenosed.  Given severe symptomatic LAD disease in a diabetic with severe aortic stenosis, patient transferred to [facility] for CABG/SAVR evaluation  Continue aspirin, heparin infusion, beta-blocker and high intensity atorvastatin.  Tentaitive CABG and SAVR tomorrow

## 2025-01-30 NOTE — ASSESSMENT & PLAN NOTE
Lab Results   Component Value Date    HGBA1C 8.9 (H) 01/24/2025       Recent Labs     01/29/25  1829 01/29/25  2032 01/30/25  0500 01/30/25  1034   POCGLU 185* 205* 327* 277*       Blood Sugar Average: Last 72 hrs:  (P) [id number]  Previously on long acting insulin however reports running out of medication  Holding oral regimen while hospitalized  Diabetic diet while inpatient  Endocrinology consulted for perioperative insulin management

## 2025-01-30 NOTE — PROGRESS NOTES
Progress Note - Cardiac Surgery   Larry Harper 60 y.o. male MRN: [id number]  Unit/Bed#: PPHP-324-01 Encounter: [id number]    24 Hour Events: Dental extractions completed yest. Denies CP/SOB. On IV heparin. No complaints except some pain from dental extractions. Vitals stable. CTA TAVR completed for surgical planning.     Medications:   Scheduled Meds:  Current Facility-Administered Medications   Medication Dose Route Frequency Provider Last Rate    acetaminophen  650 mg Oral Q6H PRN Tha David, DMD      albuterol  2 puff Inhalation Q4H PRN Tha David, DMD      aspirin  81 mg Oral Daily Tha David, DMD      atorvastatin  80 mg Oral Daily With Dinner Tha David, DMD      FLUoxetine  40 mg Oral Daily Tha David, DMD      guaiFENesin  600 mg Oral Q12H SONYA Tha David, DMD      heparin (porcine)  3-20 Units/kg/hr (Order-Specific) Intravenous Titrated Tha David, DMD 22 Units/kg/hr (01/30/25 0610)    heparin (porcine)  2,000 Units Intravenous Q6H PRN Tha David, DMD      heparin (porcine)  4,000 Units Intravenous Q6H PRN Tha David, DMD      HYDROmorphone  0.2 mg Intravenous Q4H PRN TEE Nicholas      insulin glargine  15 Units Subcutaneous HS Tha David, DMD      insulin lispro  1-5 Units Subcutaneous HS Tha David, DMD      insulin lispro  1-5 Units Subcutaneous TID AC Tha David, DMD      insulin lispro  5 Units Subcutaneous TID With Meals Tha David, DMD      metoprolol tartrate  12.5 mg Oral BID Tha David, DMD      nitroglycerin  0.5 inch Topical BID Tha David, DMD      nitroglycerin  0.4 mg Sublingual Q5 Min PRN Tha David, DMD      ondansetron  4 mg Intravenous Q6H PRN Tha David, DMD      oxyCODONE  5 mg Oral Q4H PRN Ilene Salinas MD      oxyCODONE  2.5 mg Oral Q4H PRN Ilene Salinas MD      traZODone  50 mg Oral HS Tha David, DMD      umeclidinium  1 puff Inhalation Daily Tha David, DMD       Continuous Infusions:heparin (porcine), 3-20 Units/kg/hr  (Order-Specific), Last Rate: 22 Units/kg/hr (01/30/25 0610)        Results:   Results from last 7 days   Lab Units 01/28/25  0518 01/27/25  0105 01/26/25  0433   WBC Thousand/uL 6.58 7.53 7.11   HEMOGLOBIN g/dL 13.6 13.2 12.9   HEMATOCRIT % 41.8 39.5 39.2   PLATELETS Thousands/uL 241 250 245     Results from last 7 days   Lab Units 01/29/25  0857 01/28/25  0518 01/27/25  0105   POTASSIUM mmol/L 4.3 3.9 4.2   CHLORIDE mmol/L 101 104 103   CO2 mmol/L 27 29 30   BUN mg/dL 14 14 18   CREATININE mg/dL 0.82 0.82 0.96   CALCIUM mg/dL 10.0 9.4 9.5     Results from last 7 days   Lab Units 01/30/25  0431 01/29/25  0534 01/28/25  0518 01/24/25  0042 01/23/25  1605   INR   --   --   --   --  0.98   PTT seconds 53* 72* 63*   < > 27    < > = values in this interval not displayed.       Studies:   Cardiac Cath: Mid LAD 70% and 95%, OM3 70%      Echo: LVEF 60%, RV normal, AV: mild to mod AI, severe AS mean gradient 39, DVI 0.36, GRAYSON 1.01    Carotid US: <50% b/l, no subclavian dx, VAFA      Vein Mapping: adequate b/l thighs, smaller size below knees bilaterally      Panorex: needs dental extractions per OMFS evaluation      CT Chest: pulmonary edema, moderate emphysema, normal ascending aorta size with some atherosclerotic calcifications of ascending aorta    TAVR CTA: 1/29  Central pulmonary artery is not abnormally enlarged.  Right atrium and right ventricle are normal in size.  Left atrial cavity is not abnormally dilated.  Left ventricular myocardium is normal.  No significant mitral annular calcification.  Coronary artery origins are in typical position.  Moderate coronary artery calcification.  Annulus, LVOT and aorta analysis:  Typical trileaflet aortic valve morphology.  Optimal CT aortic valve plane angles:  3 cusp view: GISSELL 6, cranial 3  Cusp overlap view LOBATO 20, cranial 16  Measurements:  Annulus diameter (max x min): 25 x 21 mm.  Annulus Area 37 mm2.  Annulus Perimeter 70 mm.  Annulus to left main coronary ostium:  16  mm.  Annulus to right main coronary ostium:  15 mm.  Sinus of Valsalva height: 25 mm.  Aortic sinus of Valsalva diameter (max x min): 31 x 30 mm.  LVOT minimal diameter: 19 mm.  Sino-tubular junction minimal diameter: 23 mm.  Ascending thoracic aorta maximal diameter: 32 mm.  Valve Calcification:  Aortic valve calcium score is 1247.  Volume of 554 mm3.  Thoracic Aorta:  Porcelain aorta = no.  Aortic root and ascending thoracic aorta free of significant calcification beyond the sino-tubular junction.  Aortic arch is normal in course and caliber with insignificant calcification.  Descending thoracic aorta is normal in course, caliber, and contour.  Abdominal aorta and pelvic artery measurements:  Abdominal aorta:  Minimal diameter above aortic bifurcation: 14 mm  Calcification: Mild calcification but moderate overall burden of atherosclerotic plaque  Tortuosity: none  Right iliofemoral segment:  Minimal diameter: 4 mm  Calcification: Mild calcification but moderate overall burden of atherosclerotic plaque  Tortuosity: none  Left iliofemoral segment:  Minimal diameter: 4 mm  Calcification: Mild calcification but moderate overall burden of atherosclerotic plaque  Tortuosity: none   Mild centrilobular and paraseptal emphysematous changes.     PFT: FEV 2.00 (64% pred)      Vitals:   Vitals:    01/30/25 0455 01/30/25 0725 01/30/25 0735 01/30/25 1059   BP:  (!) 180/88 140/70 137/70   BP Location:   Left arm    Pulse: 97 97  79   Resp:  17  16   Temp:  98.7 °F (37.1 °C)  98.6 °F (37 °C)   TempSrc:  Oral     SpO2: 94% 94%  93%   Weight: 72.7 kg (160 lb 4.4 oz)      Height:           Physical Exam:    General: No acute distress and Alert  HEENT/NECK:  Normocephalic. Atraumatic.  No jugular venous distention.    Cardiac: Regular rate and rhythm and III/VI  systolicmurmur  Pulmonary:  Breath sounds clear bilaterally  Abdomen:  Non-tender and Non-distended  Extremities: Extremities warm/dry  Neuro: Alert and oriented X 3 and No  focal deficits    Assessment:  Severe 2VCAD  Severe AS  Smoker/COPD  Hx of CAD/MI s/p KAEL to RCA 2021  DM2 on insulin, A1c 8.9    Plan:  Planning SAVR/CABG on Friday with Dr. Johnson    Consent obtained and on chart    Discussed with  regarding post op recovery plan on discharge since patient is from [place] but essentially homeless now    Dental extractions completed.     CTA TAVR imaging completed for surgical planning    Remaining preoperative testing completed.    Continue Mupirocin 2% nasal ointment q 12 hrs    Continue topical chlorhexidine bath and mouth rise    OR tomorrow for SAVR/CABG with Dr. Johnson  NPO after MN  4 PRBCs on held  Periop antibiotics and beta blocker ordered  Hold heparin at 0400 tomorrow    SIGNATURE: James Amaya PA-C  DATE: January 30, 2025  TIME: 11:26 AM    * This note was completed in part utilizing [employer] direct voice recognition software.   Grammatical errors, random word insertion, spelling mistakes, and incomplete sentences may be an occasional consequence of the system secondary to software limitations, ambient noise and hardware issues. At the time of dictation, efforts were made to edit, clarify and /or correct errors. Please read the chart carefully and recognize, using context, where substitutions have occurred.  If you have any questions or concerns about the context, text or information contained within the body of this dictation, please contact myself, the provider, for further clarification.

## 2025-01-30 NOTE — PROGRESS NOTES
Progress Note - Endocrinology   Name: Larry Harper 60 y.o. male I MRN: [id number]  Unit/Bed#: PPHP-324-01 I Date of Admission: 1/25/2025   Date of Service: 1/30/2025 I Hospital Day: 5    Assessment & Plan  Diabetes mellitus (HCC)  Lab Results   Component Value Date    HGBA1C 8.9 (H) 01/24/2025     Recent Labs     01/29/25  2032 01/30/25  0500 01/30/25  1034 01/30/25  1537   POCGLU 205* 327* 277* 291*     Blood Sugar Average: Last 72 hrs:  (P) 207.4  Type 2 diabetes mellitus with hyperglycemia as evidenced by A1c of 8.9%.  Home regimen: Lantus 35 units in the morning, metformin 1000 mg twice daily, NovoLog 5 units before meals, Jardiance 10 mg daily  Current regimen: Lantus 15 units nightly, Humalog 5 units before meals, correctional scale insulin algorithm 2 before meals and 1 at bedtime.  Blood sugars reviewed-did to have significant hyperglycemia yesterday evening and overnight, as well as throughout the day today.  He has been on a regular diet since after his procedure yesterday.    Plan:  Will change diet to allow carbohydrate intake to 75 g per meal.  Continue Lantus to 15 units nightly as I anticipate patient will be on insulin infusion in the morning for his CABG.  Continue Humalog to 5 units before meals.  Continue correctional scale insulin algorithm  2 before meals and 1 at bedtime.  Continue Accu-Cheks before meals and at bedtime, adjust insulin as needed  Monitor for hypoglycemia, treat per protocol  Goal blood sugar while in the [id number] mg/dL  Discharge recommendations pending clinical course  Endocrinology will continue following    NSTEMI (non-ST elevated myocardial infarction) ([facility])  CABG workup is in progress  Management per primary team    24 Hour Events : No significant overnight events.  Status post multiple tooth extraction yesterday.  Subjective : Patient seen and examined at the bedside, endorses significant pain in his jaw and gums.  Reports that he has not been able to eat  anything since 7 PM on 1/29/2025 due to severe pain in the setting of teeth extraction.  Denies nausea or vomiting.    Objective :  Temp:  [97.6 °F (36.4 °C)-99 °F (37.2 °C)] 98.7 °F (37.1 °C)  HR:  [71-97] 87  BP: ()/(55-89) 159/75  Resp:  [14-20] 16  SpO2:  [90 %-98 %] 93 %  O2 Device: None (Room air)  Nasal Cannula O2 Flow Rate (L/min):  [2 L/min] 2 L/min    Physical Exam  Vitals and nursing note reviewed.   Constitutional:       General: He is not in acute distress.     Appearance: Normal appearance. He is not ill-appearing, toxic-appearing or diaphoretic.   HENT:      Head: Normocephalic and atraumatic.      Nose: Nose normal.   Eyes:      General: No scleral icterus.        Right eye: No discharge.         Left eye: No discharge.      Extraocular Movements: Extraocular movements intact.      Conjunctiva/sclera: Conjunctivae normal.   Pulmonary:      Effort: Pulmonary effort is normal. No respiratory distress.   Abdominal:      General: There is no distension.   Musculoskeletal:         General: Normal range of motion.      Cervical back: Normal range of motion and neck supple.   Skin:     General: Skin is warm and dry.      Coloration: Skin is not jaundiced or pale.   Neurological:      Mental Status: He is alert and oriented to person, place, and time. Mental status is at baseline.   Psychiatric:         Mood and Affect: Mood normal.         Behavior: Behavior normal.         Thought Content: Thought content normal.         Judgment: Judgment normal.         Lab Results: I have reviewed the following results:CBC/BMP: No new results in last 24 hours. , Creatinine Clearance: Estimated Creatinine Clearance: 91.8 mL/min (by C-G formula based on SCr of 0.82 mg/dL)., LFTs: No new results in last 24 hours.     Imaging Results Review: No pertinent imaging studies reviewed.  Other Study Results Review: No additional pertinent studies reviewed.

## 2025-01-30 NOTE — PROGRESS NOTES
Progress Note - Hospitalist   Name: Larry Harper 60 y.o. male I MRN: [id number]  Unit/Bed#: PPHP-324-01 I Date of Admission: 1/25/2025   Date of Service: 1/30/2025 I Hospital Day: 5    Assessment & Plan  NSTEMI (non-ST elevated myocardial infarction) (HCC)  Presented initially to [facility] with chest pain and noted to have elevated troponin.    Underwent cardiac cath which showed. Mid LAD-1 lesion is 70% stenosed, Mid LAD-2 lesion is 95% stenosed and 3rd Mrg lesion is 70% stenosed.  Given severe symptomatic LAD disease in a diabetic with severe aortic stenosis, patient transferred to [facility] [place] [facility] CABG/SAVR evaluation  Continue aspirin, heparin infusion, beta-blocker and high intensity atorvastatin.  Tentaitive CABG and SAVR tomorrow  Diabetes mellitus (HCC)  Lab Results   Component Value Date    HGBA1C 8.9 (H) 01/24/2025       Recent Labs     01/29/25  1829 01/29/25  2032 01/30/25  0500 01/30/25  1034   POCGLU 185* 205* 327* 277*       Blood Sugar Average: Last 72 hrs:  (P) [id number]  Previously on long acting insulin however reports running out of medication  Holding oral regimen while hospitalized  Diabetic diet while inpatient  Endocrinology consulted for perioperative insulin management  PTSD (post-traumatic stress disorder)  Continue fluoxetine  Uses cannabis outpatient  Severe aortic stenosis  TTE 1/5/2024 The aortic valve peak velocity is 3.4 m/s. The aortic valve mean gradient is 29.0 mmHg. The dimensionless velocity index is 0.25. The aortic valve area is 0.75 cm2.   Cardiac surgery consulted for evaluation of AVR  TTE 1/27/2025: Severe stenosis.  AV mean gradient 39 mmHg.  Velocity index 0.36.  Aortic valve area 1.01 cm².  Plan for surgical AVR  Primary hypertension  Blood pressure reviewed and stable  Continue metoprolol  CAD (coronary artery disease)  S/p KAEL to RCA in 4/2021  Follows cardiology in [place]  CABG planned as mentioned above  Dyslipidemia  Continue statin  COPD (chronic  obstructive pulmonary disease) (HCC)  Self-reported  No acute exacerbation  Continue inhalers and nebulizer treatment ordered  Emphysema present on CTA of the chest  PT is agreeable to stop smoking  Periodontal disease  S/p dental extractions of 2, 12, 14, 18, 31   Adjust pain regimen today due to insufficient pain relief    VTE Pharmacologic Prophylaxis:   Moderate Risk (Score 3-4) - Pharmacological DVT Prophylaxis Ordered: heparin drip.    Mobility:   Basic Mobility Inpatient Raw Score: 24  JH-HLM Goal: 8: Walk 250 feet or more  JH-HLM Achieved: 7: Walk 25 feet or more  JH-HLM Goal NOT achieved. Continue with multidisciplinary rounding and encourage appropriate mobility to improve upon JH-HLM goals.    Patient Centered Rounds: I performed bedside rounds with nursing staff today.   Discussions with Specialists or Other Care Team Provider: nurse, CM      Current Length of Stay: 5 day(s)  Current Patient Status: Inpatient   Certification Statement: The patient will continue to require additional inpatient hospital stay due to surgery tomorrow  Discharge Plan: Anticipate discharge in >72 hrs to home.    Code Status: Level 1 - Full Code    Subjective   Reports significant dental pain and swelling    Objective :  Temp:  [97.6 °F (36.4 °C)-99 °F (37.2 °C)] 98.7 °F (37.1 °C)  HR:  [71-97] 87  BP: ()/(55-89) 159/75  Resp:  [14-20] 16  SpO2:  [90 %-98 %] 93 %  O2 Device: None (Room air)  Nasal Cannula O2 Flow Rate (L/min):  [2 L/min] 2 L/min    Body mass index is 25.1 kg/m².     Input and Output Summary (last 24 hours):     Intake/Output Summary (Last 24 hours) at 1/30/2025 1531  Last data filed at 1/30/2025 1435  Gross per 24 hour   Intake 1620 ml   Output 2075 ml   Net -455 ml       Physical Exam  Constitutional:       Appearance: Normal appearance.   HENT:      Head: Normocephalic and atraumatic.      Nose: Nose normal.      Mouth/Throat:      Pharynx: No oropharyngeal exudate.   Eyes:      Extraocular Movements:  Extraocular movements intact.   Cardiovascular:      Rate and Rhythm: Normal rate and regular rhythm.   Pulmonary:      Effort: Pulmonary effort is normal.   Musculoskeletal:      Right lower leg: No edema.      Left lower leg: No edema.   Skin:     General: Skin is warm and dry.   Neurological:      Mental Status: He is alert and oriented to person, place, and time.   Psychiatric:         Mood and Affect: Mood normal.         Behavior: Behavior normal.           Lines/Drains:        Telemetry:  Telemetry Orders (From admission, onward)               24 Hour Telemetry Monitoring  Continuous x 24 Hours (Telem)        Expiring   Question:  Reason for 24 Hour Telemetry  Answer:  PCI/EP study (including pacer and ICD implementation), Cardiac surgery, MI, abnormal cardiac cath, and chest pain- rule out MI                     Telemetry Reviewed: Normal Sinus Rhythm  Indication for Continued Telemetry Use: Awaiting PCI/EP Study/CABG               Lab Results: I have reviewed the following results:   Results from last 7 days   Lab Units 01/28/25  0518   WBC Thousand/uL 6.58   HEMOGLOBIN g/dL 13.6   HEMATOCRIT % 41.8   PLATELETS Thousands/uL 241   SEGS PCT % 48   LYMPHO PCT % 33   MONO PCT % 11   EOS PCT % 7*     Results from last 7 days   Lab Units 01/29/25  0857 01/26/25  0433 01/25/25  0325   SODIUM mmol/L 137   < > 140   POTASSIUM mmol/L 4.3   < > 3.4*   CHLORIDE mmol/L 101   < > 109*   CO2 mmol/L 27   < > 24   BUN mg/dL 14   < > 11   CREATININE mg/dL 0.82   < > 0.79   ANION GAP mmol/L 9   < > 7   CALCIUM mg/dL 10.0   < > 8.4   ALBUMIN g/dL  --   --  3.7   TOTAL BILIRUBIN mg/dL  --   --  0.36   ALK PHOS U/L  --   --  40   ALT U/L  --   --  13   AST U/L  --   --  12*   GLUCOSE RANDOM mg/dL 289*   < > 129    < > = values in this interval not displayed.     Results from last 7 days   Lab Units 01/23/25  1605   INR  0.98     Results from last 7 days   Lab Units 01/30/25  1034 01/30/25  0500 01/29/25  2032 01/29/25  1009  01/29/25  1542 01/29/25  1114 01/29/25  0629 01/28/25  1713 01/28/25  1109 01/28/25  0732 01/27/25  2122 01/27/25  1716   POC GLUCOSE mg/dl 277* 327* 205* 185* 131 153* 166* 126 317* 168* 198* 124     Results from last 7 days   Lab Units 01/24/25  0512   HEMOGLOBIN A1C % 8.9*           Recent Cultures (last 7 days):         Imaging Results Review: No pertinent imaging studies reviewed.  Other Study Results Review: No additional pertinent studies reviewed.    Last 24 Hours Medication List:     Current Facility-Administered Medications:     acetaminophen (TYLENOL) tablet 650 mg, Q6H PRN    albuterol (PROVENTIL HFA,VENTOLIN HFA) inhaler 2 puff, Q4H PRN    aspirin (ECOTRIN LOW STRENGTH) EC tablet 81 mg, Daily    atorvastatin (LIPITOR) tablet 80 mg, Daily With Dinner    FLUoxetine (PROzac) capsule 40 mg, Daily    guaiFENesin (MUCINEX) 12 hr tablet 600 mg, Q12H SONYA    heparin (porcine) 25,000 units in 0.45% NaCl 250 mL infusion (premix), Titrated, Last Rate: 22 Units/kg/hr (01/30/25 1445)    heparin (porcine) injection 2,000 Units, Q6H PRN    heparin (porcine) injection 4,000 Units, Once    heparin (porcine) injection 4,000 Units, Q6H PRN    HYDROmorphone (DILAUDID) injection 0.5 mg, Q4H PRN    insulin glargine (LANTUS) subcutaneous injection 15 Units 0.15 mL, HS    insulin lispro (HumALOG/ADMELOG) 100 units/mL subcutaneous injection 1-5 Units, HS    insulin lispro (HumALOG/ADMELOG) 100 units/mL subcutaneous injection 1-5 Units, TID AC    insulin lispro (HumALOG/ADMELOG) 100 units/mL subcutaneous injection 10 Units, TID With Meals    metoprolol tartrate (LOPRESSOR) partial tablet 12.5 mg, BID    nitroglycerin (NITRO-BID) 2 % TD ointment 0.5 inch, BID    nitroglycerin (NITROSTAT) SL tablet 0.4 mg, Q5 Min PRN    ondansetron (ZOFRAN) injection 4 mg, Q6H PRN    oxyCODONE (ROXICODONE) immediate release tablet 10 mg, Q4H PRN    oxyCODONE (ROXICODONE) IR tablet 5 mg, Q4H PRN    traZODone (DESYREL) tablet 50 mg, HS     umeclidinium 62.5 mcg/actuation inhaler AEPB 1 puff, Daily    Administrative Statements   Today, Patient Was Seen By: Ricky Smith MD  I have spent a total time of 40 minutes in caring for this patient on the day of the visit/encounter including Diagnostic results, Instructions for management, Patient and family education, Impressions, Counseling / Coordination of care, Documenting in the medical record, Reviewing / ordering tests, medicine, procedures  , Obtaining or reviewing history  , and Communicating with other healthcare professionals .    **Please Note: This note may have been constructed using a voice recognition system.**

## 2025-01-30 NOTE — ASSESSMENT & PLAN NOTE
Lab Results   Component Value Date    HGBA1C 8.9 (H) 01/24/2025     Recent Labs     01/29/25  2032 01/30/25  0500 01/30/25  1034 01/30/25  1537   POCGLU 205* 327* 277* 291*     Blood Sugar Average: Last 72 hrs:  (P) 207.4  Type 2 diabetes mellitus with hyperglycemia as evidenced by A1c of 8.9%.  Home regimen: Lantus 35 units in the morning, metformin 1000 mg twice daily, NovoLog 5 units before meals, Jardiance 10 mg daily  Current regimen: Lantus 15 units nightly, Humalog 5 units before meals, correctional scale insulin algorithm 2 before meals and 1 at bedtime.  Blood sugars reviewed-did to have significant hyperglycemia yesterday evening and overnight, as well as throughout the day today.  He has been on a regular diet since after his procedure yesterday.    Plan:  Will change diet to allow carbohydrate intake to 75 g per meal.  Continue Lantus to 15 units nightly as I anticipate patient will be on insulin infusion in the morning for his CABG.  Continue Humalog to 5 units before meals.  Continue correctional scale insulin algorithm  2 before meals and 1 at bedtime.  Continue Accu-Cheks before meals and at bedtime, adjust insulin as needed  Monitor for hypoglycemia, treat per protocol  Goal blood sugar while in the [id number] mg/dL  Discharge recommendations pending clinical course  Endocrinology will continue following

## 2025-01-30 NOTE — ASSESSMENT & PLAN NOTE
Presented to [facility] [address] with development of chest discomfort while attending his brother's , symptoms similar to prior angina. Resolved with nitroglycerin.  HS troponin 149, 272, 288, 142  [facility]: Two mid LAD lesions, 70% and 95%.  70% OM 3 stenosis  Transferred to [facility] for CABG/AVR evaluation.   On aspirin, high intensity statin, metoprolol tartrate 12.5 mg twice daily, IV heparin, and nitropaste  CABG/AVR  with Dr. Johnson

## 2025-01-30 NOTE — ASSESSMENT & PLAN NOTE
S/p dental extractions of 2, 12, 14, 18, 31   Adjust pain regimen today due to insufficient pain relief

## 2025-01-30 NOTE — CASE MANAGEMENT
Case Management Discharge Planning Note    Patient name Larry Harper  [facility] [id number]  : 1965 Date 2025       Current Admission Date: 2025  Current Admission Diagnosis:NSTEMI (non-ST elevated myocardial infarction) (HCC)   Patient Active Problem List    Diagnosis Date Noted Date Diagnosed    Periodontal disease 2025     Dyslipidemia 2025     COPD (chronic obstructive pulmonary disease) (formerly [facility]) 2025     Hypokalemia 2025     Primary hypertension 2025     Severe aortic stenosis 2025     NSTEMI (non-ST elevated myocardial infarction) (formerly [facility])      CAD (coronary artery disease)      Diabetes mellitus (formerly [facility])      Anxiety      Depression      PTSD (post-traumatic stress disorder)        LOS (days): 5  Geometric Mean LOS (GMLOS) (days): 1.7  Days to GMLOS:-2.9     OBJECTIVE:  Risk of Unplanned Readmission Score: 11.99         Current admission status: Inpatient   Preferred Pharmacy:   [facility]/pharmacy #[id number] [place] [id number]  Phone: [phone] Fax: [phone]    Primary Care Provider: Efe Portillo DO    Primary Insurance: MEDICARE  Secondary Insurance: [facility]    DISCHARGE DETAILS:     Additional Comments: CM met with patient bedside to introduce self and discuss DCP. CM inquired with patient re: options of places to go upon discharge. CM provided patient with list of affordable hotels locally for lodging.    CM provided patient with list of emergency and permanent shelters to patient, along with information regarding [facility] [place] telephone line. Patient states he will refuse a shelter because he has an official service dog that he will not bring to shelter. CM reiterated that there are multiple shelters available, even shelters specific to veterans. Patient states currently his dog is with a woman who is asking him for money and he  does not know when he will see the dog again. CM provided patient with a list of community resources that support patients with lodging their animals. This CM currently working with department for other resources that could help. Patient states [facility] not [facility].     CM inquired with patient if he is service connected. Pt originally from [place], has been here for 1+ years, was working with [facility] in [place], reports does not want to work with [facility] here. CM inquired with patient regarding initiating process to obtain a CM from the [facility], patient repeatedly refused.     CM advised patient to look into the low-income hotels and housing options provided. CM explained that planning for discharge right now is vital as he will be discharged when medically cleared. Patient verbalized awareness. CM following.

## 2025-01-31 ENCOUNTER — APPOINTMENT (INPATIENT)
Dept: NON INVASIVE DIAGNOSTICS | Facility: HOSPITAL | Age: 60
DRG: 216 | End: 2025-01-31
Payer: MEDICARE

## 2025-01-31 ENCOUNTER — ANESTHESIA (INPATIENT)
Dept: PERIOP | Facility: HOSPITAL | Age: 60
DRG: 216 | End: 2025-01-31
Payer: MEDICARE

## 2025-01-31 ENCOUNTER — APPOINTMENT (INPATIENT)
Dept: RADIOLOGY | Facility: HOSPITAL | Age: 60
DRG: 216 | End: 2025-01-31
Payer: MEDICARE

## 2025-01-31 PROBLEM — Z95.1 S/P CABG (CORONARY ARTERY BYPASS GRAFT): Status: ACTIVE | Noted: 2025-01-31

## 2025-01-31 PROBLEM — Z95.2 S/P AVR: Status: ACTIVE | Noted: 2025-01-31

## 2025-01-31 LAB
ADDITIONAL SETTING: 1
ANCILLARY VALUES: ABNORMAL
ANION GAP SERPL CALCULATED.3IONS-SCNC: 5 MMOL/L (ref 4–13)
AORTIC ROOT: 2.3 CM
AORTIC VALVE MEAN VELOCITY: 23 M/S
APTT PPP: 37 SECONDS (ref 23–34)
ATRIAL RATE: 87 BPM
AV AREA BY CONTINUOUS VTI: 1.1 CM2
AV AREA PEAK VELOCITY: 1 CM2
AV LVOT MEAN GRADIENT: 4 MMHG
AV LVOT PEAK GRADIENT: 8 MMHG
AV MEAN PRESS GRAD SYS DOP V1V2: 27 MMHG
AV ORIFICE AREA US: 1.1 CM2
AV PEAK GRADIENT: 48 MMHG
AV VELOCITY RATIO: 0.43
BASE EXCESS BLDA CALC-SCNC: -1 MMOL/L (ref -2–3)
BASE EXCESS BLDA CALC-SCNC: -2 MMOL/L (ref -2–3)
BASE EXCESS BLDA CALC-SCNC: -4 MMOL/L (ref -2–3)
BASE EXCESS BLDA CALC-SCNC: -4 MMOL/L (ref -2–3)
BASE EXCESS BLDA CALC-SCNC: -6 MMOL/L (ref -2–3)
BASE EXCESS BLDA CALC-SCNC: 0 MMOL/L (ref -2–3)
BASE EXCESS BLDA CALC-SCNC: 1 MMOL/L (ref -2–3)
BASE EXCESS BLDA CALC-SCNC: 2 MMOL/L (ref -2–3)
BUN SERPL-MCNC: 14 MG/DL (ref 5–25)
CA-I BLD-SCNC: 0.97 MMOL/L (ref 1.12–1.32)
CA-I BLD-SCNC: 1 MMOL/L (ref 1.12–1.32)
CA-I BLD-SCNC: 1.01 MMOL/L (ref 1.12–1.32)
CA-I BLD-SCNC: 1.05 MMOL/L (ref 1.12–1.32)
CA-I BLD-SCNC: 1.18 MMOL/L (ref 1.12–1.32)
CA-I BLD-SCNC: 1.2 MMOL/L (ref 1.12–1.32)
CA-I BLD-SCNC: 1.21 MMOL/L (ref 1.12–1.32)
CA-I BLD-SCNC: 1.27 MMOL/L (ref 1.12–1.32)
CALCIUM SERPL-MCNC: 8.1 MG/DL (ref 8.4–10.2)
CHLORIDE SERPL-SCNC: 108 MMOL/L (ref 96–108)
CO2 SERPL-SCNC: 25 MMOL/L (ref 21–32)
CREAT SERPL-MCNC: 0.78 MG/DL (ref 0.6–1.3)
DOP CALC AO VTI: 70.3 CM
DOP CALC LVOT AREA: 2.5
DOP CALC LVOT DIAMETER: 1.8 CM
DOP CALC LVOT PEAK VEL VTI: 30.3 CM
DOP CALC LVOT PEAK VEL: 1.38 M/S
DOP CALC LVOT STROKE INDEX: 42.3 ML/M2
DOP CALC LVOT STROKE VOLUME: 77
DS:DELIVERY SYSTEM: AC
FIBRINOGEN PPP-MCNC: 332 MG/DL (ref 206–523)
FIO2 GAS DIL.REBREATH: 60 L
GFR SERPL CREATININE-BSD FRML MDRD: 99 ML/MIN/1.73SQ M
GLUCOSE SERPL-MCNC: 141 MG/DL (ref 65–140)
GLUCOSE SERPL-MCNC: 153 MG/DL (ref 65–140)
GLUCOSE SERPL-MCNC: 173 MG/DL (ref 65–140)
GLUCOSE SERPL-MCNC: 174 MG/DL (ref 65–140)
GLUCOSE SERPL-MCNC: 177 MG/DL (ref 65–140)
GLUCOSE SERPL-MCNC: 182 MG/DL (ref 65–140)
GLUCOSE SERPL-MCNC: 205 MG/DL (ref 65–140)
GLUCOSE SERPL-MCNC: 216 MG/DL (ref 65–140)
GLUCOSE SERPL-MCNC: 220 MG/DL (ref 65–140)
GLUCOSE SERPL-MCNC: 248 MG/DL (ref 65–140)
GLUCOSE SERPL-MCNC: 250 MG/DL (ref 65–140)
GLUCOSE SERPL-MCNC: 250 MG/DL (ref 65–140)
GLUCOSE SERPL-MCNC: 278 MG/DL (ref 65–140)
GLUCOSE SERPL-MCNC: 279 MG/DL (ref 65–140)
GLUCOSE SERPL-MCNC: 281 MG/DL (ref 65–140)
GLUCOSE SERPL-MCNC: 297 MG/DL (ref 65–140)
GLUCOSE SERPL-MCNC: 320 MG/DL (ref 65–140)
HCO3 BLDA-SCNC: 21.5 MMOL/L (ref 22–28)
HCO3 BLDA-SCNC: 22.4 MMOL/L (ref 22–28)
HCO3 BLDA-SCNC: 22.5 MMOL/L (ref 22–28)
HCO3 BLDA-SCNC: 23.3 MMOL/L (ref 22–28)
HCO3 BLDA-SCNC: 24.8 MMOL/L (ref 24–30)
HCO3 BLDA-SCNC: 25 MMOL/L (ref 22–28)
HCO3 BLDA-SCNC: 26.8 MMOL/L (ref 22–28)
HCO3 BLDA-SCNC: 27.7 MMOL/L (ref 24–30)
HCT VFR BLD AUTO: 41.9 % (ref 36.5–49.3)
HCT VFR BLD AUTO: 44.3 % (ref 36.5–49.3)
HCT VFR BLD CALC: 27 % (ref 36.5–49.3)
HCT VFR BLD CALC: 29 % (ref 36.5–49.3)
HCT VFR BLD CALC: 36 % (ref 36.5–49.3)
HCT VFR BLD CALC: 36 % (ref 36.5–49.3)
HCT VFR BLD CALC: 38 % (ref 36.5–49.3)
HCT VFR BLD CALC: 43 % (ref 36.5–49.3)
HGB BLD-MCNC: 13.8 G/DL (ref 12–17)
HGB BLD-MCNC: 14.4 G/DL (ref 12–17)
HGB BLDA-MCNC: 12.2 G/DL (ref 12–17)
HGB BLDA-MCNC: 12.2 G/DL (ref 12–17)
HGB BLDA-MCNC: 12.9 G/DL (ref 12–17)
HGB BLDA-MCNC: 14.6 G/DL (ref 12–17)
HGB BLDA-MCNC: 9.2 G/DL (ref 12–17)
HGB BLDA-MCNC: 9.9 G/DL (ref 12–17)
INR PPP: 1.22 (ref 0.85–1.19)
KCT BLD-ACNC: 113 SEC (ref 89–137)
KCT BLD-ACNC: 119 SEC (ref 89–137)
KCT BLD-ACNC: 389 SEC (ref 89–137)
KCT BLD-ACNC: 402 SEC (ref 89–137)
KCT BLD-ACNC: 483 SEC (ref 89–137)
KCT BLD-ACNC: 501 SEC (ref 89–137)
KCT BLD-ACNC: 561 SEC (ref 89–137)
P AXIS: 69 DEGREES
PCO2 BLD: 23 MMOL/L (ref 21–32)
PCO2 BLD: 24 MMOL/L (ref 21–32)
PCO2 BLD: 24 MMOL/L (ref 21–32)
PCO2 BLD: 25 MMOL/L (ref 21–32)
PCO2 BLD: 26 MMOL/L (ref 21–32)
PCO2 BLD: 26 MMOL/L (ref 21–32)
PCO2 BLD: 28 MMOL/L (ref 21–32)
PCO2 BLD: 29 MMOL/L (ref 21–32)
PCO2 BLD: 36.6 MM HG (ref 36–44)
PCO2 BLD: 37.5 MM HG (ref 36–44)
PCO2 BLD: 44 MM HG (ref 36–44)
PCO2 BLD: 45.6 MM HG (ref 36–44)
PCO2 BLD: 45.8 MM HG (ref 42–50)
PCO2 BLD: 47.8 MM HG (ref 36–44)
PCO2 BLD: 49.8 MM HG (ref 36–44)
PCO2 BLD: 55.2 MM HG (ref 42–50)
PH BLD: 7.26 [PH] (ref 7.35–7.45)
PH BLD: 7.28 [PH] (ref 7.35–7.45)
PH BLD: 7.3 [PH] (ref 7.35–7.45)
PH BLD: 7.31 [PH] (ref 7.3–7.4)
PH BLD: 7.34 [PH] (ref 7.3–7.4)
PH BLD: 7.39 [PH] (ref 7.35–7.45)
PH BLD: 7.4 [PH] (ref 7.35–7.45)
PH BLD: 7.43 [PH] (ref 7.35–7.45)
PLATELET # BLD AUTO: 218 THOUSANDS/UL (ref 149–390)
PLATELET # BLD AUTO: 219 THOUSANDS/UL (ref 149–390)
PMV BLD AUTO: 11.9 FL (ref 8.9–12.7)
PMV BLD AUTO: 12.5 FL (ref 8.9–12.7)
PO2 BLD: 144 MM HG (ref 75–129)
PO2 BLD: 167 MM HG (ref 75–129)
PO2 BLD: 220 MM HG (ref 75–129)
PO2 BLD: 257 MM HG (ref 75–129)
PO2 BLD: 268 MM HG (ref 75–129)
PO2 BLD: 295 MM HG (ref 75–129)
PO2 BLD: 48 MM HG (ref 35–45)
PO2 BLD: 54 MM HG (ref 35–45)
POTASSIUM BLD-SCNC: 3.8 MMOL/L (ref 3.5–5.3)
POTASSIUM BLD-SCNC: 3.9 MMOL/L (ref 3.5–5.3)
POTASSIUM BLD-SCNC: 4 MMOL/L (ref 3.5–5.3)
POTASSIUM BLD-SCNC: 4.3 MMOL/L (ref 3.5–5.3)
POTASSIUM BLD-SCNC: 4.3 MMOL/L (ref 3.5–5.3)
POTASSIUM BLD-SCNC: 4.5 MMOL/L (ref 3.5–5.3)
POTASSIUM BLD-SCNC: 5 MMOL/L (ref 3.5–5.3)
POTASSIUM BLD-SCNC: 5.3 MMOL/L (ref 3.5–5.3)
POTASSIUM SERPL-SCNC: 4.4 MMOL/L (ref 3.5–5.3)
POTASSIUM SERPL-SCNC: 4.5 MMOL/L (ref 3.5–5.3)
POTASSIUM SERPL-SCNC: 4.5 MMOL/L (ref 3.5–5.3)
PR INTERVAL: 132 MS
PRESSURE SETTING: 20
PROTHROMBIN TIME: 15.7 SECONDS (ref 12.3–15)
QRS AXIS: 78 DEGREES
QRSD INTERVAL: 102 MS
QT INTERVAL: 424 MS
QTC INTERVAL: 510 MS
RESPIRATORY RATE: 120
SAO2 % BLD FROM PO2: 100 % (ref 60–85)
SAO2 % BLD FROM PO2: 81 % (ref 60–85)
SAO2 % BLD FROM PO2: 84 % (ref 60–85)
SAO2 % BLD FROM PO2: 99 % (ref 60–85)
SAO2 % BLD FROM PO2: 99 % (ref 60–85)
SODIUM BLD-SCNC: 135 MMOL/L (ref 136–145)
SODIUM BLD-SCNC: 135 MMOL/L (ref 136–145)
SODIUM BLD-SCNC: 136 MMOL/L (ref 136–145)
SODIUM BLD-SCNC: 137 MMOL/L (ref 136–145)
SODIUM BLD-SCNC: 138 MMOL/L (ref 136–145)
SODIUM BLD-SCNC: 139 MMOL/L (ref 136–145)
SODIUM BLD-SCNC: 139 MMOL/L (ref 136–145)
SODIUM BLD-SCNC: 140 MMOL/L (ref 136–145)
SODIUM SERPL-SCNC: 138 MMOL/L (ref 135–147)
SPECIMEN SOURCE: ABNORMAL
SPECIMEN SOURCE: NORMAL
SPECIMEN SOURCE: NORMAL
T WAVE AXIS: -6 DEGREES
VENT TYPE: 107
VENTILATION VALUE: 500
VENTRICULAR RATE: 87 BPM

## 2025-01-31 PROCEDURE — 82948 REAGENT STRIP/BLOOD GLUCOSE: CPT

## 2025-01-31 PROCEDURE — 5A1221Z PERFORMANCE OF CARDIAC OUTPUT, CONTINUOUS: ICD-10-PCS | Performed by: THORACIC SURGERY (CARDIOTHORACIC VASCULAR SURGERY)

## 2025-01-31 PROCEDURE — 85384 FIBRINOGEN ACTIVITY: CPT | Performed by: PHYSICIAN ASSISTANT

## 2025-01-31 PROCEDURE — 94002 VENT MGMT INPAT INIT DAY: CPT

## 2025-01-31 PROCEDURE — 85730 THROMBOPLASTIN TIME PARTIAL: CPT | Performed by: PHYSICIAN ASSISTANT

## 2025-01-31 PROCEDURE — 80048 BASIC METABOLIC PNL TOTAL CA: CPT | Performed by: PHYSICIAN ASSISTANT

## 2025-01-31 PROCEDURE — A7041 WATER SEAL DRAIN CONTAINER: HCPCS | Performed by: THORACIC SURGERY (CARDIOTHORACIC VASCULAR SURGERY)

## 2025-01-31 PROCEDURE — 82803 BLOOD GASES ANY COMBINATION: CPT

## 2025-01-31 PROCEDURE — 71045 X-RAY EXAM CHEST 1 VIEW: CPT

## 2025-01-31 PROCEDURE — 82947 ASSAY GLUCOSE BLOOD QUANT: CPT

## 2025-01-31 PROCEDURE — 85014 HEMATOCRIT: CPT | Performed by: PHYSICIAN ASSISTANT

## 2025-01-31 PROCEDURE — 88305 TISSUE EXAM BY PATHOLOGIST: CPT | Performed by: PATHOLOGY

## 2025-01-31 PROCEDURE — 30233N0 TRANSFUSION OF AUTOLOGOUS RED BLOOD CELLS INTO PERIPHERAL VEIN, PERCUTANEOUS APPROACH: ICD-10-PCS | Performed by: THORACIC SURGERY (CARDIOTHORACIC VASCULAR SURGERY)

## 2025-01-31 PROCEDURE — 93005 ELECTROCARDIOGRAM TRACING: CPT

## 2025-01-31 PROCEDURE — 82330 ASSAY OF CALCIUM: CPT

## 2025-01-31 PROCEDURE — 85347 COAGULATION TIME ACTIVATED: CPT

## 2025-01-31 PROCEDURE — 33405 REPLACEMENT AORTIC VALVE OPN: CPT | Performed by: PHYSICIAN ASSISTANT

## 2025-01-31 PROCEDURE — 84295 ASSAY OF SERUM SODIUM: CPT

## 2025-01-31 PROCEDURE — 02HV33Z INSERTION OF INFUSION DEVICE INTO SUPERIOR VENA CAVA, PERCUTANEOUS APPROACH: ICD-10-PCS | Performed by: ANESTHESIOLOGY

## 2025-01-31 PROCEDURE — 88311 DECALCIFY TISSUE: CPT | Performed by: PATHOLOGY

## 2025-01-31 PROCEDURE — 84132 ASSAY OF SERUM POTASSIUM: CPT | Performed by: PHYSICIAN ASSISTANT

## 2025-01-31 PROCEDURE — 93355 ECHO TRANSESOPHAGEAL (TEE): CPT

## 2025-01-31 PROCEDURE — 94760 N-INVAS EAR/PLS OXIMETRY 1: CPT

## 2025-01-31 PROCEDURE — 33533 CABG ARTERIAL SINGLE: CPT | Performed by: PHYSICIAN ASSISTANT

## 2025-01-31 PROCEDURE — 02100Z9 BYPASS CORONARY ARTERY, ONE ARTERY FROM LEFT INTERNAL MAMMARY, OPEN APPROACH: ICD-10-PCS | Performed by: THORACIC SURGERY (CARDIOTHORACIC VASCULAR SURGERY)

## 2025-01-31 PROCEDURE — 85049 AUTOMATED PLATELET COUNT: CPT | Performed by: PHYSICIAN ASSISTANT

## 2025-01-31 PROCEDURE — 99223 1ST HOSP IP/OBS HIGH 75: CPT | Performed by: ANESTHESIOLOGY

## 2025-01-31 PROCEDURE — 85014 HEMATOCRIT: CPT

## 2025-01-31 PROCEDURE — B24BZZ4 ULTRASONOGRAPHY OF HEART WITH AORTA, TRANSESOPHAGEAL: ICD-10-PCS | Performed by: ANESTHESIOLOGY

## 2025-01-31 PROCEDURE — 5A1223Z PERFORMANCE OF CARDIAC PACING, CONTINUOUS: ICD-10-PCS | Performed by: THORACIC SURGERY (CARDIOTHORACIC VASCULAR SURGERY)

## 2025-01-31 PROCEDURE — 84132 ASSAY OF SERUM POTASSIUM: CPT

## 2025-01-31 PROCEDURE — 85018 HEMOGLOBIN: CPT | Performed by: PHYSICIAN ASSISTANT

## 2025-01-31 PROCEDURE — 02RF0JZ REPLACEMENT OF AORTIC VALVE WITH SYNTHETIC SUBSTITUTE, OPEN APPROACH: ICD-10-PCS | Performed by: THORACIC SURGERY (CARDIOTHORACIC VASCULAR SURGERY)

## 2025-01-31 PROCEDURE — 93010 ELECTROCARDIOGRAM REPORT: CPT | Performed by: INTERNAL MEDICINE

## 2025-01-31 PROCEDURE — 33533 CABG ARTERIAL SINGLE: CPT | Performed by: THORACIC SURGERY (CARDIOTHORACIC VASCULAR SURGERY)

## 2025-01-31 PROCEDURE — 85049 AUTOMATED PLATELET COUNT: CPT | Performed by: THORACIC SURGERY (CARDIOTHORACIC VASCULAR SURGERY)

## 2025-01-31 PROCEDURE — 33405 REPLACEMENT AORTIC VALVE OPN: CPT | Performed by: THORACIC SURGERY (CARDIOTHORACIC VASCULAR SURGERY)

## 2025-01-31 PROCEDURE — 85610 PROTHROMBIN TIME: CPT | Performed by: PHYSICIAN ASSISTANT

## 2025-01-31 DEVICE — IMPLANTABLE DEVICE: Type: IMPLANTABLE DEVICE | Site: HEART | Status: FUNCTIONAL

## 2025-01-31 DEVICE — COR-KNOT MINI® COMBO KIT
Type: IMPLANTABLE DEVICE | Site: HEART | Status: FUNCTIONAL
Brand: COR-KNOT MINI®

## 2025-01-31 RX ORDER — FENTANYL CITRATE 50 UG/ML
INJECTION, SOLUTION INTRAMUSCULAR; INTRAVENOUS AS NEEDED
Status: DISCONTINUED | OUTPATIENT
Start: 2025-01-31 | End: 2025-01-31

## 2025-01-31 RX ORDER — ASPIRIN 81 MG/1
81 TABLET ORAL DAILY
Status: DISCONTINUED | OUTPATIENT
Start: 2025-01-31 | End: 2025-02-08 | Stop reason: HOSPADM

## 2025-01-31 RX ORDER — ALBUMIN HUMAN 50 G/1000ML
SOLUTION INTRAVENOUS
Status: COMPLETED
Start: 2025-01-31 | End: 2025-01-31

## 2025-01-31 RX ORDER — PANTOPRAZOLE SODIUM 40 MG/1
40 TABLET, DELAYED RELEASE ORAL DAILY
Status: DISCONTINUED | OUTPATIENT
Start: 2025-01-31 | End: 2025-02-08 | Stop reason: HOSPADM

## 2025-01-31 RX ORDER — CEFAZOLIN SODIUM 2 G/50ML
2000 SOLUTION INTRAVENOUS EVERY 8 HOURS
Status: COMPLETED | OUTPATIENT
Start: 2025-01-31 | End: 2025-02-01

## 2025-01-31 RX ORDER — MAGNESIUM SULFATE HEPTAHYDRATE 40 MG/ML
2 INJECTION, SOLUTION INTRAVENOUS ONCE
Status: COMPLETED | OUTPATIENT
Start: 2025-01-31 | End: 2025-01-31

## 2025-01-31 RX ORDER — HYDROMORPHONE HCL/PF 1 MG/ML
0.5 SYRINGE (ML) INJECTION EVERY 2 HOUR PRN
Status: DISCONTINUED | OUTPATIENT
Start: 2025-01-31 | End: 2025-02-01

## 2025-01-31 RX ORDER — FENTANYL CITRATE 50 UG/ML
50 INJECTION, SOLUTION INTRAMUSCULAR; INTRAVENOUS ONCE
Status: COMPLETED | OUTPATIENT
Start: 2025-01-31 | End: 2025-01-31

## 2025-01-31 RX ORDER — ASPIRIN 325 MG
325 TABLET ORAL DAILY
Status: DISCONTINUED | OUTPATIENT
Start: 2025-01-31 | End: 2025-01-31

## 2025-01-31 RX ORDER — VANCOMYCIN HYDROCHLORIDE 1 G/20ML
INJECTION, POWDER, LYOPHILIZED, FOR SOLUTION INTRAVENOUS AS NEEDED
Status: DISCONTINUED | OUTPATIENT
Start: 2025-01-31 | End: 2025-01-31 | Stop reason: HOSPADM

## 2025-01-31 RX ORDER — ONDANSETRON 2 MG/ML
4 INJECTION INTRAMUSCULAR; INTRAVENOUS EVERY 6 HOURS PRN
Status: DISCONTINUED | OUTPATIENT
Start: 2025-01-31 | End: 2025-02-08 | Stop reason: HOSPADM

## 2025-01-31 RX ORDER — METHOCARBAMOL 500 MG/1
500 TABLET, FILM COATED ORAL EVERY 6 HOURS SCHEDULED
Status: DISCONTINUED | OUTPATIENT
Start: 2025-02-01 | End: 2025-02-08 | Stop reason: HOSPADM

## 2025-01-31 RX ORDER — SODIUM CHLORIDE 9 MG/ML
INJECTION, SOLUTION INTRAVENOUS CONTINUOUS PRN
Status: DISCONTINUED | OUTPATIENT
Start: 2025-01-31 | End: 2025-01-31

## 2025-01-31 RX ORDER — OXYCODONE HYDROCHLORIDE 5 MG/1
5 TABLET ORAL EVERY 4 HOURS PRN
Status: DISCONTINUED | OUTPATIENT
Start: 2025-01-31 | End: 2025-02-01

## 2025-01-31 RX ORDER — SODIUM CHLORIDE 450 MG/100ML
20 INJECTION, SOLUTION INTRAVENOUS CONTINUOUS
Status: DISCONTINUED | OUTPATIENT
Start: 2025-01-31 | End: 2025-02-03

## 2025-01-31 RX ORDER — PROPOFOL 10 MG/ML
INJECTION, EMULSION INTRAVENOUS AS NEEDED
Status: DISCONTINUED | OUTPATIENT
Start: 2025-01-31 | End: 2025-01-31

## 2025-01-31 RX ORDER — CALCIUM GLUCONATE 20 MG/ML
2 INJECTION, SOLUTION INTRAVENOUS ONCE AS NEEDED
Status: DISCONTINUED | OUTPATIENT
Start: 2025-01-31 | End: 2025-02-01

## 2025-01-31 RX ORDER — HEPARIN SODIUM 1000 [USP'U]/ML
INJECTION, SOLUTION INTRAVENOUS; SUBCUTANEOUS AS NEEDED
Status: DISCONTINUED | OUTPATIENT
Start: 2025-01-31 | End: 2025-01-31

## 2025-01-31 RX ORDER — CEFEPIME HYDROCHLORIDE 2 G/50ML
INJECTION, SOLUTION INTRAVENOUS AS NEEDED
Status: DISCONTINUED | OUTPATIENT
Start: 2025-01-31 | End: 2025-01-31

## 2025-01-31 RX ORDER — ROCURONIUM BROMIDE 10 MG/ML
INJECTION, SOLUTION INTRAVENOUS AS NEEDED
Status: DISCONTINUED | OUTPATIENT
Start: 2025-01-31 | End: 2025-01-31

## 2025-01-31 RX ORDER — ATORVASTATIN CALCIUM 80 MG/1
80 TABLET, FILM COATED ORAL
Status: DISCONTINUED | OUTPATIENT
Start: 2025-01-31 | End: 2025-02-08 | Stop reason: HOSPADM

## 2025-01-31 RX ORDER — SODIUM CHLORIDE, SODIUM GLUCONATE, SODIUM ACETATE, POTASSIUM CHLORIDE, MAGNESIUM CHLORIDE, SODIUM PHOSPHATE, DIBASIC, AND POTASSIUM PHOSPHATE .53; .5; .37; .037; .03; .012; .00082 G/100ML; G/100ML; G/100ML; G/100ML; G/100ML; G/100ML; G/100ML
INJECTION, SOLUTION INTRAVENOUS AS NEEDED
Status: DISCONTINUED | OUTPATIENT
Start: 2025-01-31 | End: 2025-01-31

## 2025-01-31 RX ORDER — BISACODYL 10 MG
10 SUPPOSITORY, RECTAL RECTAL DAILY PRN
Status: DISCONTINUED | OUTPATIENT
Start: 2025-01-31 | End: 2025-02-08 | Stop reason: HOSPADM

## 2025-01-31 RX ORDER — WARFARIN SODIUM 2.5 MG/1
2.5 TABLET ORAL
Status: COMPLETED | OUTPATIENT
Start: 2025-01-31 | End: 2025-01-31

## 2025-01-31 RX ORDER — PHENYLEPHRINE HCL IN 0.9% NACL 1 MG/10 ML
200-2000 SYRINGE (ML) INTRAVENOUS ONCE
Status: DISCONTINUED | OUTPATIENT
Start: 2025-01-31 | End: 2025-01-31 | Stop reason: HOSPADM

## 2025-01-31 RX ORDER — OXYCODONE HYDROCHLORIDE 5 MG/1
2.5 TABLET ORAL EVERY 4 HOURS PRN
Status: DISCONTINUED | OUTPATIENT
Start: 2025-01-31 | End: 2025-02-01

## 2025-01-31 RX ORDER — ACETAMINOPHEN 325 MG/1
975 TABLET ORAL
Status: DISCONTINUED | OUTPATIENT
Start: 2025-01-31 | End: 2025-02-08 | Stop reason: HOSPADM

## 2025-01-31 RX ORDER — ALBUMIN HUMAN 50 G/1000ML
12.5 SOLUTION INTRAVENOUS ONCE
Status: COMPLETED | OUTPATIENT
Start: 2025-01-31 | End: 2025-01-31

## 2025-01-31 RX ORDER — POTASSIUM CHLORIDE 29.8 MG/ML
40 INJECTION INTRAVENOUS ONCE AS NEEDED
Status: DISCONTINUED | OUTPATIENT
Start: 2025-01-31 | End: 2025-02-01

## 2025-01-31 RX ORDER — CALCIUM CHLORIDE 100 MG/ML
INJECTION INTRAVENOUS; INTRAVENTRICULAR AS NEEDED
Status: DISCONTINUED | OUTPATIENT
Start: 2025-01-31 | End: 2025-01-31

## 2025-01-31 RX ORDER — CHLORHEXIDINE GLUCONATE ORAL RINSE 1.2 MG/ML
15 SOLUTION DENTAL 2 TIMES DAILY
Status: DISCONTINUED | OUTPATIENT
Start: 2025-01-31 | End: 2025-02-08 | Stop reason: HOSPADM

## 2025-01-31 RX ORDER — NEOSTIGMINE METHYLSULFATE 1 MG/ML
INJECTION INTRAVENOUS AS NEEDED
Status: DISCONTINUED | OUTPATIENT
Start: 2025-01-31 | End: 2025-01-31

## 2025-01-31 RX ORDER — MANNITOL 250 MG/ML
INJECTION, SOLUTION INTRAVENOUS AS NEEDED
Status: DISCONTINUED | OUTPATIENT
Start: 2025-01-31 | End: 2025-01-31

## 2025-01-31 RX ORDER — FENTANYL CITRATE 50 UG/ML
50 INJECTION, SOLUTION INTRAMUSCULAR; INTRAVENOUS
Status: DISCONTINUED | OUTPATIENT
Start: 2025-01-31 | End: 2025-02-01

## 2025-01-31 RX ORDER — LIDOCAINE HYDROCHLORIDE 10 MG/ML
INJECTION, SOLUTION INFILTRATION; PERINEURAL AS NEEDED
Status: DISCONTINUED | OUTPATIENT
Start: 2025-01-31 | End: 2025-01-31

## 2025-01-31 RX ORDER — GLYCOPYRROLATE 0.2 MG/ML
INJECTION INTRAMUSCULAR; INTRAVENOUS AS NEEDED
Status: DISCONTINUED | OUTPATIENT
Start: 2025-01-31 | End: 2025-01-31

## 2025-01-31 RX ORDER — LIDOCAINE HCL/PF 100 MG/5ML
SYRINGE (ML) INJECTION AS NEEDED
Status: DISCONTINUED | OUTPATIENT
Start: 2025-01-31 | End: 2025-01-31

## 2025-01-31 RX ORDER — LIDOCAINE 50 MG/G
2 PATCH TOPICAL DAILY
Status: DISCONTINUED | OUTPATIENT
Start: 2025-02-01 | End: 2025-02-08 | Stop reason: HOSPADM

## 2025-01-31 RX ORDER — FONDAPARINUX SODIUM 2.5 MG/.5ML
2.5 INJECTION SUBCUTANEOUS DAILY
Status: DISCONTINUED | OUTPATIENT
Start: 2025-02-01 | End: 2025-02-01

## 2025-01-31 RX ORDER — CHLORHEXIDINE GLUCONATE ORAL RINSE 1.2 MG/ML
15 SOLUTION DENTAL EVERY 12 HOURS SCHEDULED
Status: DISCONTINUED | OUTPATIENT
Start: 2025-01-31 | End: 2025-01-31

## 2025-01-31 RX ORDER — MIDAZOLAM HYDROCHLORIDE 2 MG/2ML
INJECTION, SOLUTION INTRAMUSCULAR; INTRAVENOUS AS NEEDED
Status: DISCONTINUED | OUTPATIENT
Start: 2025-01-31 | End: 2025-01-31

## 2025-01-31 RX ORDER — HEPARIN SODIUM 1000 [USP'U]/ML
10000 INJECTION, SOLUTION INTRAVENOUS; SUBCUTANEOUS ONCE
Status: COMPLETED | OUTPATIENT
Start: 2025-01-31 | End: 2025-01-31

## 2025-01-31 RX ORDER — ALBUTEROL SULFATE 90 UG/1
2 INHALANT RESPIRATORY (INHALATION) EVERY 4 HOURS PRN
Status: DISCONTINUED | OUTPATIENT
Start: 2025-01-31 | End: 2025-02-08 | Stop reason: HOSPADM

## 2025-01-31 RX ORDER — POTASSIUM CHLORIDE 14.9 MG/ML
20 INJECTION INTRAVENOUS ONCE AS NEEDED
Status: DISCONTINUED | OUTPATIENT
Start: 2025-01-31 | End: 2025-02-01

## 2025-01-31 RX ORDER — MAGNESIUM SULFATE HEPTAHYDRATE 40 MG/ML
2 INJECTION, SOLUTION INTRAVENOUS ONCE
Status: COMPLETED | OUTPATIENT
Start: 2025-02-01 | End: 2025-02-02

## 2025-01-31 RX ORDER — HEPARIN SODIUM 1000 [USP'U]/ML
400 INJECTION, SOLUTION INTRAVENOUS; SUBCUTANEOUS ONCE
Status: DISCONTINUED | OUTPATIENT
Start: 2025-01-31 | End: 2025-01-31 | Stop reason: HOSPADM

## 2025-01-31 RX ORDER — AMIODARONE HYDROCHLORIDE 200 MG/1
200 TABLET ORAL EVERY 8 HOURS SCHEDULED
Status: DISCONTINUED | OUTPATIENT
Start: 2025-01-31 | End: 2025-02-08 | Stop reason: HOSPADM

## 2025-01-31 RX ORDER — POLYETHYLENE GLYCOL 3350 17 G/17G
17 POWDER, FOR SOLUTION ORAL DAILY
Status: DISCONTINUED | OUTPATIENT
Start: 2025-01-31 | End: 2025-02-08 | Stop reason: HOSPADM

## 2025-01-31 RX ADMIN — Medication 12.5 MG: at 05:48

## 2025-01-31 RX ADMIN — FENTANYL CITRATE 250 MCG: 50 INJECTION INTRAMUSCULAR; INTRAVENOUS at 11:41

## 2025-01-31 RX ADMIN — CEFAZOLIN SODIUM 2000 MG: 2 SOLUTION INTRAVENOUS at 14:55

## 2025-01-31 RX ADMIN — PHENYLEPHRINE HYDROCHLORIDE 250 MCG: 10 INJECTION INTRAVENOUS at 09:53

## 2025-01-31 RX ADMIN — ASPIRIN 81 MG: 81 TABLET, COATED ORAL at 16:11

## 2025-01-31 RX ADMIN — MIDAZOLAM 2 MG: 1 INJECTION INTRAMUSCULAR; INTRAVENOUS at 07:20

## 2025-01-31 RX ADMIN — PHENYLEPHRINE HYDROCHLORIDE 500 MCG: 10 INJECTION INTRAVENOUS at 10:18

## 2025-01-31 RX ADMIN — FENTANYL CITRATE 50 MCG: 50 INJECTION INTRAMUSCULAR; INTRAVENOUS at 08:04

## 2025-01-31 RX ADMIN — FENTANYL CITRATE 200 MCG: 50 INJECTION INTRAMUSCULAR; INTRAVENOUS at 08:29

## 2025-01-31 RX ADMIN — SODIUM CHLORIDE, SODIUM GLUCONATE, SODIUM ACETATE, POTASSIUM CHLORIDE, MAGNESIUM CHLORIDE, SODIUM PHOSPHATE, DIBASIC, AND POTASSIUM PHOSPHATE 500 ML: .53; .5; .37; .037; .03; .012; .00082 INJECTION, SOLUTION INTRAVENOUS at 08:11

## 2025-01-31 RX ADMIN — NEOSTIGMINE METHYLSULFATE 3 MG: 1 INJECTION INTRAVENOUS at 12:58

## 2025-01-31 RX ADMIN — CEFEPIME HYDROCHLORIDE 2000 MG: 2 INJECTION, SOLUTION INTRAVENOUS at 08:06

## 2025-01-31 RX ADMIN — HYDROMORPHONE HYDROCHLORIDE 0.5 MG: 1 INJECTION, SOLUTION INTRAMUSCULAR; INTRAVENOUS; SUBCUTANEOUS at 23:32

## 2025-01-31 RX ADMIN — ALBUMIN (HUMAN) 12.5 G: 12.5 INJECTION, SOLUTION INTRAVENOUS at 14:55

## 2025-01-31 RX ADMIN — CEFAZOLIN SODIUM 2000 MG: 2 SOLUTION INTRAVENOUS at 19:46

## 2025-01-31 RX ADMIN — PROPOFOL 50 MG: 10 INJECTION, EMULSION INTRAVENOUS at 09:09

## 2025-01-31 RX ADMIN — MANNITOL 25 G: 12.5 INJECTION, SOLUTION INTRAVENOUS at 08:11

## 2025-01-31 RX ADMIN — PHENYLEPHRINE HYDROCHLORIDE 50 MCG/MIN: 10 INJECTION INTRAVENOUS at 09:59

## 2025-01-31 RX ADMIN — CEFEPIME HYDROCHLORIDE 2000 MG: 2 INJECTION, SOLUTION INTRAVENOUS at 12:03

## 2025-01-31 RX ADMIN — MAGNESIUM SULFATE HEPTAHYDRATE 2 G: 500 INJECTION, SOLUTION INTRAMUSCULAR; INTRAVENOUS at 10:53

## 2025-01-31 RX ADMIN — Medication 1 EACH: at 08:11

## 2025-01-31 RX ADMIN — PHENYLEPHRINE HYDROCHLORIDE 250 MCG: 10 INJECTION INTRAVENOUS at 11:34

## 2025-01-31 RX ADMIN — HYDROMORPHONE HYDROCHLORIDE 0.5 MG: 1 INJECTION, SOLUTION INTRAMUSCULAR; INTRAVENOUS; SUBCUTANEOUS at 21:54

## 2025-01-31 RX ADMIN — METHOCARBAMOL 500 MG: 500 TABLET ORAL at 23:54

## 2025-01-31 RX ADMIN — OXYCODONE HYDROCHLORIDE 5 MG: 5 TABLET ORAL at 16:11

## 2025-01-31 RX ADMIN — AMINOCAPROIC ACID 5 G: 250 INJECTION, SOLUTION INTRAVENOUS at 09:03

## 2025-01-31 RX ADMIN — FENTANYL CITRATE 50 MCG: 50 INJECTION INTRAMUSCULAR; INTRAVENOUS at 14:05

## 2025-01-31 RX ADMIN — SODIUM BICARBONATE 50 MEQ: 84 INJECTION, SOLUTION INTRAVENOUS at 08:11

## 2025-01-31 RX ADMIN — HEPARIN SODIUM 10000 UNITS: 1000 INJECTION INTRAVENOUS; SUBCUTANEOUS at 09:23

## 2025-01-31 RX ADMIN — CHLORHEXIDINE GLUCONATE 0.12% ORAL RINSE 15 ML: 1.2 LIQUID ORAL at 18:05

## 2025-01-31 RX ADMIN — PHENYLEPHRINE HYDROCHLORIDE 500 MCG: 10 INJECTION INTRAVENOUS at 11:35

## 2025-01-31 RX ADMIN — PHENYLEPHRINE HYDROCHLORIDE 500 MCG: 10 INJECTION INTRAVENOUS at 10:04

## 2025-01-31 RX ADMIN — PHENYLEPHRINE HYDROCHLORIDE 250 MCG: 10 INJECTION INTRAVENOUS at 10:53

## 2025-01-31 RX ADMIN — SODIUM BICARBONATE 25 MEQ: 84 INJECTION, SOLUTION INTRAVENOUS at 11:17

## 2025-01-31 RX ADMIN — MAGNESIUM SULFATE HEPTAHYDRATE 2 G: 40 INJECTION, SOLUTION INTRAVENOUS at 13:04

## 2025-01-31 RX ADMIN — HEPARIN SODIUM 15000 UNITS: 1000 INJECTION, SOLUTION INTRAVENOUS; SUBCUTANEOUS at 10:27

## 2025-01-31 RX ADMIN — MIDAZOLAM 2 MG: 1 INJECTION INTRAMUSCULAR; INTRAVENOUS at 07:33

## 2025-01-31 RX ADMIN — SODIUM CHLORIDE 20 ML/HR: 0.45 INJECTION, SOLUTION INTRAVENOUS at 13:05

## 2025-01-31 RX ADMIN — HEPARIN SODIUM 10000 UNITS: 1000 INJECTION, SOLUTION INTRAVENOUS; SUBCUTANEOUS at 08:11

## 2025-01-31 RX ADMIN — PROPOFOL 50 MG: 10 INJECTION, EMULSION INTRAVENOUS at 08:06

## 2025-01-31 RX ADMIN — PHENYLEPHRINE HYDROCHLORIDE 250 MCG: 10 INJECTION INTRAVENOUS at 10:11

## 2025-01-31 RX ADMIN — CHLORHEXIDINE GLUCONATE 15 ML: 1.2 SOLUTION ORAL at 05:47

## 2025-01-31 RX ADMIN — INSULIN LISPRO 2 UNITS: 100 INJECTION, SOLUTION INTRAVENOUS; SUBCUTANEOUS at 06:18

## 2025-01-31 RX ADMIN — HYDROMORPHONE HYDROCHLORIDE 0.5 MG: 1 INJECTION, SOLUTION INTRAMUSCULAR; INTRAVENOUS; SUBCUTANEOUS at 14:33

## 2025-01-31 RX ADMIN — PROPOFOL 100 MG: 10 INJECTION, EMULSION INTRAVENOUS at 07:39

## 2025-01-31 RX ADMIN — Medication 300 ML: at 10:34

## 2025-01-31 RX ADMIN — AMIODARONE HYDROCHLORIDE 200 MG: 200 TABLET ORAL at 16:11

## 2025-01-31 RX ADMIN — PHENYLEPHRINE HYDROCHLORIDE 500 MCG: 10 INJECTION INTRAVENOUS at 09:51

## 2025-01-31 RX ADMIN — OXYCODONE HYDROCHLORIDE 5 MG: 5 TABLET ORAL at 20:46

## 2025-01-31 RX ADMIN — ROCURONIUM BROMIDE 70 MG: 10 INJECTION, SOLUTION INTRAVENOUS at 07:40

## 2025-01-31 RX ADMIN — PHENYLEPHRINE HYDROCHLORIDE 250 MCG: 10 INJECTION INTRAVENOUS at 09:50

## 2025-01-31 RX ADMIN — PHENYLEPHRINE HYDROCHLORIDE 500 MCG: 10 INJECTION INTRAVENOUS at 10:54

## 2025-01-31 RX ADMIN — PHENYLEPHRINE HYDROCHLORIDE 250 MCG: 10 INJECTION INTRAVENOUS at 09:57

## 2025-01-31 RX ADMIN — ATORVASTATIN CALCIUM 80 MG: 80 TABLET, FILM COATED ORAL at 16:11

## 2025-01-31 RX ADMIN — SODIUM CHLORIDE: 0.9 INJECTION, SOLUTION INTRAVENOUS at 07:21

## 2025-01-31 RX ADMIN — HYDROMORPHONE HYDROCHLORIDE 0.5 MG: 1 INJECTION, SOLUTION INTRAMUSCULAR; INTRAVENOUS; SUBCUTANEOUS at 19:28

## 2025-01-31 RX ADMIN — AMIODARONE HYDROCHLORIDE 150 MG: 50 INJECTION, SOLUTION INTRAVENOUS at 11:30

## 2025-01-31 RX ADMIN — AMIODARONE HYDROCHLORIDE 200 MG: 200 TABLET ORAL at 21:11

## 2025-01-31 RX ADMIN — NICARDIPINE HYDROCHLORIDE 7.5 MG/HR: 25 INJECTION, SOLUTION INTRAVENOUS at 21:30

## 2025-01-31 RX ADMIN — CALCIUM CHLORIDE 1 G: 100 INJECTION INTRAVENOUS; INTRAVENTRICULAR at 11:38

## 2025-01-31 RX ADMIN — HEPARIN SODIUM 24100 UNITS: 1000 INJECTION INTRAVENOUS; SUBCUTANEOUS at 09:08

## 2025-01-31 RX ADMIN — ACETAMINOPHEN 975 MG: 325 TABLET, FILM COATED ORAL at 16:11

## 2025-01-31 RX ADMIN — Medication 250 ML: at 10:32

## 2025-01-31 RX ADMIN — HEPARIN SODIUM 5000 UNITS: 1000 INJECTION INTRAVENOUS; SUBCUTANEOUS at 08:58

## 2025-01-31 RX ADMIN — PHENYLEPHRINE HYDROCHLORIDE 250 MCG: 10 INJECTION INTRAVENOUS at 11:36

## 2025-01-31 RX ADMIN — FENTANYL CITRATE 250 MCG: 50 INJECTION INTRAMUSCULAR; INTRAVENOUS at 07:39

## 2025-01-31 RX ADMIN — LIDOCAINE HYDROCHLORIDE 3 ML: 10 INJECTION, SOLUTION INFILTRATION; PERINEURAL at 07:38

## 2025-01-31 RX ADMIN — PHENYLEPHRINE HYDROCHLORIDE 250 MCG: 10 INJECTION INTRAVENOUS at 09:44

## 2025-01-31 RX ADMIN — FENTANYL CITRATE 250 MCG: 50 INJECTION INTRAMUSCULAR; INTRAVENOUS at 12:37

## 2025-01-31 RX ADMIN — PROPOFOL 50 MG: 10 INJECTION, EMULSION INTRAVENOUS at 08:29

## 2025-01-31 RX ADMIN — MUPIROCIN 1 APPLICATION: 20 OINTMENT TOPICAL at 20:46

## 2025-01-31 RX ADMIN — LIDOCAINE HYDROCHLORIDE 100 MG: 20 INJECTION INTRAVENOUS at 11:30

## 2025-01-31 RX ADMIN — AMINOCAPROIC ACID 1 G/HR: 250 INJECTION, SOLUTION INTRAVENOUS at 09:06

## 2025-01-31 RX ADMIN — Medication 1000 ML: at 09:52

## 2025-01-31 RX ADMIN — ALBUMIN HUMAN 12.5 G: 50 SOLUTION INTRAVENOUS at 14:55

## 2025-01-31 RX ADMIN — PHENYLEPHRINE HYDROCHLORIDE 500 MCG: 10 INJECTION INTRAVENOUS at 10:05

## 2025-01-31 RX ADMIN — MUPIROCIN 1 APPLICATION: 20 OINTMENT TOPICAL at 05:48

## 2025-01-31 RX ADMIN — ACETAMINOPHEN 975 MG: 325 TABLET, FILM COATED ORAL at 19:35

## 2025-01-31 RX ADMIN — GLYCOPYRROLATE 0.6 MG: 0.2 INJECTION, SOLUTION INTRAMUSCULAR; INTRAVENOUS at 12:58

## 2025-01-31 RX ADMIN — PHENYLEPHRINE HYDROCHLORIDE 250 MCG: 10 INJECTION INTRAVENOUS at 10:00

## 2025-01-31 RX ADMIN — PHENYLEPHRINE HYDROCHLORIDE 250 MCG: 10 INJECTION INTRAVENOUS at 09:58

## 2025-01-31 RX ADMIN — SODIUM CHLORIDE 4 UNITS/HR: 9 INJECTION, SOLUTION INTRAVENOUS at 11:26

## 2025-01-31 RX ADMIN — OXYCODONE HYDROCHLORIDE 10 MG: 10 TABLET ORAL at 01:39

## 2025-01-31 RX ADMIN — OXYCODONE HYDROCHLORIDE 10 MG: 10 TABLET ORAL at 05:47

## 2025-01-31 RX ADMIN — PHENYLEPHRINE HYDROCHLORIDE 250 MCG: 10 INJECTION INTRAVENOUS at 10:09

## 2025-01-31 RX ADMIN — FENTANYL CITRATE 50 MCG: 50 INJECTION INTRAMUSCULAR; INTRAVENOUS at 13:18

## 2025-01-31 RX ADMIN — WARFARIN SODIUM 2.5 MG: 2.5 TABLET ORAL at 18:05

## 2025-01-31 NOTE — ANESTHESIA PROCEDURE NOTES
"Arterial Line Insertion    Performed by: Calvin Mckeon MD  Authorized by: Calvin Mckeon MD  Consent: Verbal consent obtained. Written consent obtained.  Risks and benefits: risks, benefits and alternatives were discussed  Consent given by: patient  Patient understanding: patient states understanding of the procedure being performed  Patient consent: the patient's understanding of the procedure matches consent given  Procedure consent: procedure consent matches procedure scheduled  Relevant documents: relevant documents present and verified  Test results: test results available and properly labeled  Site marked: the operative site was marked  Radiology Images: Radiology Images displayed and confirmed. If images not available, report reviewed  Required items: required blood products, implants, devices, and special equipment available  Patient identity confirmed: verbally with patient, arm band, provided demographic data and hospital-assigned identification number  Time out: Immediately prior to procedure a \"time out\" was called to verify the correct patient, procedure, equipment, support staff and site/side marked as required.  Preparation: Patient was prepped and draped in the usual sterile fashion.  Indications: multiple ABGs  Orientation:  Right  Location: radial artery  Procedure Details:  Larry's test normal: yes  Needle gauge: 20  Seldinger technique: Seldinger technique used  Number of attempts: 1    Post-procedure:  Post-procedure: chlorhexidine patch applied and dressing applied  Waveform: good waveform  Post-procedure CNS: normal  Patient tolerance: patient tolerated the procedure well with no immediate complications          "

## 2025-01-31 NOTE — ASSESSMENT & PLAN NOTE
Lab Results   Component Value Date    HGBA1C 8.9 (H) 01/24/2025     Recent Labs     01/30/25 2026 01/31/25  0517 01/31/25  1257 01/31/25  1258   POCGLU 134 220* 278* 250*     Blood Sugar Average: Last 72 hrs:  (P) 215.2  Type 2 diabetes mellitus with hyperglycemia as evidenced by A1c of 8.9%.  Home regimen: Lantus 35 units in the morning, metformin 1000 mg twice daily, NovoLog 5 units before meals, Jardiance 10 mg daily  Current regimen: Lantus 15 units nightly, Humalog 5 units before meals, correctional scale insulin algorithm 2 before meals and 1 at bedtime.  Blood sugars reviewed-did to have significant hyperglycemia yesterday evening and overnight, as well as throughout the day today.  He has been on a regular diet since after his procedure yesterday.    Plan:  Will change diet to allow carbohydrate intake to 75 g per meal.  Continue Lantus to 15 units nightly as I anticipate patient will be on insulin infusion in the morning for his CABG.  Continue Humalog to 5 units before meals.  Continue correctional scale insulin algorithm  2 before meals and 1 at bedtime.  Continue Accu-Cheks before meals and at bedtime, adjust insulin as needed  Monitor for hypoglycemia, treat per protocol  Goal blood sugar while in the [id number]-[id number] mg/dL  Discharge recommendations pending clinical course  Endocrinology will continue following

## 2025-01-31 NOTE — ANESTHESIA PROCEDURE NOTES
"Introducer/[place]    Performed by: Calvin Mckeon MD  Authorized by: Calvin Mckeon MD    Date/Time: 1/31/2025 7:52 AM  Consent: Verbal consent obtained. Written consent obtained.  Risks and benefits: risks, benefits and alternatives were discussed  Consent given by: patient  Patient understanding: patient states understanding of the procedure being performed  Patient consent: the patient's understanding of the procedure matches consent given  Procedure consent: procedure consent matches procedure scheduled  Relevant documents: relevant documents present and verified  Test results: test results available and properly labeled  Site marked: the operative site was marked  Radiology Images: Radiology Images displayed and confirmed. If images not available, report reviewed  Required items: required blood products, implants, devices, and special equipment available  Patient identity confirmed: verbally with patient, arm band, provided demographic data and hospital-assigned identification number  Time out: Immediately prior to procedure a \"time out\" was called to verify the correct patient, procedure, equipment, support staff and site/side marked as required.  Indications: central pressure monitoring  Location details: right internal jugular  Catheter size: 9 Fr  Patient position: Trendelenburg  Assessment: blood return through all ports and free fluid flow  Preparation: skin prepped with ChloraPrep  Skin prep agent dried: skin prep agent completely dried prior to procedure  Sterile barriers: all five maximum sterile barriers used - cap, mask, sterile gown, sterile gloves, and large sterile sheet  Hand hygiene: hand hygiene performed prior to central venous catheter insertion  Ultrasound guidance: yes  ultrasound permanent image saved  Pre-procedure: landmarks identified  Number of attempts: 1  Successful placement: yes  Post-procedure: line sutured and dressing applied  Patient tolerance: patient tolerated the " procedure well with no immediate complications

## 2025-01-31 NOTE — ANESTHESIA PROCEDURE NOTES
"Central Line Insertion    Performed by: Calvin Mckeon MD  Authorized by: Calvin Mckeon MD    Date/Time: 1/31/2025 7:51 AM  Catheter Type:  triple lumen  Consent: Verbal consent obtained. Written consent obtained.  Risks and benefits: risks, benefits and alternatives were discussed  Consent given by: patient  Patient understanding: patient states understanding of the procedure being performed  Patient consent: the patient's understanding of the procedure matches consent given  Procedure consent: procedure consent matches procedure scheduled  Relevant documents: relevant documents present and verified  Test results: test results available and properly labeled  Site marked: the operative site was marked  Radiology Images: Radiology Images displayed and confirmed. If images not available, report reviewed  Required items: required blood products, implants, devices, and special equipment available  Patient identity confirmed: verbally with patient, arm band, provided demographic data and hospital-assigned identification number  Time out: Immediately prior to procedure a \"time out\" was called to verify the correct patient, procedure, equipment, support staff and site/side marked as required.  Indications: vascular access  Catheter size: 7 Fr  Patient position: Trendelenburg  Assessment: blood return through all ports and free fluid flow  Preparation: skin prepped with ChloraPrep  Skin prep agent dried: skin prep agent completely dried prior to procedure  Sterile barriers: all five maximum sterile barriers used - cap, mask, sterile gown, sterile gloves, and large sterile sheet  Hand hygiene: hand hygiene performed prior to central venous catheter insertion  sterile gel and probe cover used in ultrasound-guided central venous catheter insertionultrasound permanent image saved  Pre-procedure: landmarks identified  Number of attempts: 1  Successful placement: yes  Post-procedure: chlorhexidine patch applied, dressing " applied and line sutured  Patient tolerance: patient tolerated the procedure well with no immediate complications

## 2025-01-31 NOTE — ANESTHESIA PROCEDURE NOTES
Procedure Performed: MAURICE Anesthesia  Start Time:  1/31/2025 8:21 AM        Preanesthesia Checklist    Patient identified, IV assessed, risks and benefits discussed, monitors and equipment assessed, procedure being performed at surgeon's request and anesthesia consent obtained.      Procedure    Diagnostic Indications for MAURICE:  assessment of surgical repair. Type of MAURICE: interventional MAURICE with real time guidance of intracardiac procedure. Images Saved: ultrasound permanent image saved. Physician Requesting Echo: Jarrett Johnson DO.  Location performed: OR. Intubated. Bite block not placed.   Heart visualized. Insertion of MAURICE Probe:  Atraumatic. Probe Type:  Multiplane. Modalities:  2D only, color flow mapping, continuous wave Doppler and pulse wave Doppler.      Echocardiographic and Doppler Measurements    PREPROCEDURE    LEFT VENTRICLE:  Systolic Function: normal. Ejection Fraction: 65%. Cavity size: normal.   Regional Wall Motion Abnormalities: NONE.    RIGHT VENTRICLE:  Systolic Function: normal.  Cavity size normal. No hypertrophy              AORTIC VALVE:  Leaflets: trileaflet. Leaflet motions restricted. Stenosis: severe. Mean Gradient: 38 mmHg.  Maximum Velocity: 4 m/s. Area: 0.45 cm². Regurgitation: mild.      MITRAL VALVE:  Leaflets: normal. Leaflet Motions: normal. Regurgitation: trace.   Stenosis: none.       TRICUSPID VALVE:  Leaflets: normal. Leaflet Motions: normal.        PULMONIC VALVE:  Leaflets: normal. Regurgitation: trace. Stenosis: none.        ASCENDING AORTA:  Size:  normal.  Dissection not present.      AORTIC ARCH:  Size:  normal.  dissection not present. Grade 2: severe intimal thickening without protruding atheroma.    DESCENDING AORTA:  Size: normal.  Dissection not present. Grade 2: severe intimal thickening without protruding atheroma.        RIGHT ATRIUM:  Size:  normal. No spontaneous echo contrast.    LEFT ATRIUM:  Size: normal. No spontaneous echo contrast.    LEFT ATRIAL  APPENDAGE:  Size: normal. No spontaneous echo contrast Emptying Velocity: 5 cm/sec.            VENTRICULAR SEPTUM:  Intra-ventricular septum morphology: normal.             OTHER FINDINGS:  Pericardium:  normal. Pleural Effusion:  none.        POSTPROCEDURE    LEFT VENTRICLE: Unchanged .  Systolic Function: normal. Ejection Fraction: 65 %. Cavity Size: normal. Regional Wall Motion Abnormalities: none.    RIGHT VENTRICLE: Unchanged .           AORTIC VALVE:   Leaflets: mechanical. Stenosis: none. Mean Gradient: 15 mmHg. Regurgitation: none.  Valve Size: 23 mm.    MITRAL VALVE: Unchanged .         TRICUSPID VALVE: Unchanged .        PULMONIC VALVE: Unchanged             ATRIA: Unchanged .  Left Atrial Appendage [place]: No.        AORTA: Unchanged .  Dissection: Dissection not present.      REMOVAL:  Probe Removal: atraumatic.

## 2025-01-31 NOTE — OP NOTE
OPERATIVE REPORT  PATIENT NAME: Larry Harper    :  1965  MRN: [id number]  Pt Location:  OR ROOM 16    SURGERY DATE: 2025    SURGEON: Jarrett Johnson DO    ASSISTANT: James Amaya PA-C    PREOPERATIVE DIAGNOSES: Symptomatic severe aortic stenosis,  Multivessel coronary artery disease, s/p Acute NSTEMI     POSTOPERATIVE DIAGNOSES Symptomatic severe aortic stenosis, Multivessel coronary artery disease, s/p Acute NSTEMI     NYHA Class: 3    CCS Class: 3    PROCEDURES 1. Aortic valve replacement with a 23mm Carbomedics Top Hat mechanical prosthesis. 2. Coronary artery bypass grafting x 1 with LIMA to LAD.     ANESTHESIA General endotracheal anesthesia with transesophageal echocardiogram guidance, DR. Jake Kimble     CARDIOPULMONARY BYPASS TIME 120 minutes.     CROSSCLAMP TIME 104 minutes.     PACKS/TUBES/DRAINS Chest tubes x3.     MATERIALS Pacing wires: A x1, V x1.     SPECIMENS Aortic valve.     TRANSFUSION None.     ESTIMATED BLOOD LOSS: 250 mL    OPERATIVE TECHNIQUE The patient was taken to the operating room and placed supine on the operating table. Following the satisfactory induction of general anesthesia and placement of monitoring lines, the patient was prepped and draped in the usual sterile fashion. A time-out procedure was performed.     The patient underwent median sternotomy, LIMA harvest, systemic heparinization, and conduit preparation. The patient underwent pericardiotomy and epiaortic ultrasound was used to evaluate the ascending aorta, which was found to be free of significant atheromatous disease. The patient underwent aortic and right atrial cannulation and an antegrade was placed. The patient was initiated on bypass and an LV vent was placed through the right superior pulmonary vein. The ascending aorta was crossclamped. Antegrade [place] cardioplegia was delivered with an excellent arrest.     The heart was positioned for grafting. The LIMA was anastomosed to the  left  anterior descending artery  in end-to-side fashion using running 7-0 Prolene suture.  The flow was excellent. The heart was returned to its anatomic position.     Aortotomy was performed and the aortic valve was exposed. There was severe stenosis and heavily calcified leaflets and annulus. The leaflets were excised and the annulus was debrided of calcium. The annulus was sized to a 23mm Carbomedics top hat mechanical prosthesis. Valve sutures were placed with pledgets on the ventricular side. The valve was brought to the field. The sutures were passed through the sewing ring of the valve. The valve was seated and the sutures were tied down, and clearance of the coronary ostia was confirmed. While de-airing the heart, the aortotomy was closed with 2 layers of running 4-0 Prolene suture. The heart was extensively de-aired and the crossclamp was removed. Atrial and ventricular pacing wires were placed. Following a period of reperfusion, the patient was weaned from cardiopulmonary bypass and decannulated. Protamine was administered with normalization of the ACT. Hemostasis was confirmed in all fields. Thoracostomy tubes were placed. The sternum was closed with fiber tapes. The fascia, subdermis and skin were closed as multiple layers of running absorbable suture.    As the attending surgeon, I was present and scrubbed for all critical portions of this procedure. Sponge, needle and instrument counts were reported as correct by the nursing staff. There is no cardiac residency program at this facility and for complex procedures such as this, it is vital to have a physician assistant experienced in cardiac surgery.  The assistance of James Amaya PA-C was necessary for endoscopic saphenous vein harvesting, retraction of the heart and bypass conduit with proper tissue handling techniques, and following of the suture with correct tension during construction of the proximal and distal coronary anastomoses.  The assistance  of James Amaya PA-C was necessary for experienced gentle retraction of the aortic root thereby providing exposure of the aortic valve during valve excision, annular debridement, placement of annular sutures, seating of the valve, and tying of the valve sutures.  James Amaya PA-C was also necessary for following the suture with correct tension during closure of the aortotomy.    Final transesophageal echocardiogram demonstrated a normally functioning mechanical aortic prosthesis with no PVL, normal biventricular function.      SIGNATURE: Jarrett Johnson DO  DATE: January 31, 2025  TIME: 12:17 PM

## 2025-01-31 NOTE — RESPIRATORY THERAPY NOTE
01/31/25 1435   Respiratory Assessment   Resp Comments Pt weaned on 6/6/50%. Tolerated well. Pt extubated and placed on nasal cannula at 6l/m. No resp distress or stridor noted at this time.   O2 Device Nasal cannula 6l/m   Vent Information   Ventilator End Yes   $ Pulse Oximetry Spot Check Charge Completed   SpO2 98 %   Daily Screen   Patient safety screen outcome: Passed   Spont breathing trial % for 30 min Yes   Spont breathing trial outcome: Passed   Preparing to extubate/ Notify Nurse Yes   Extubation order obtained Yes   Consider Cuff Test Yes   Patient extubated Yes   IHI Ventilator Associated Pneumonia Bundle   Daily Assessment of Readiness to Extubate Yes   Head of Bed Elevated HOB 30

## 2025-01-31 NOTE — ANESTHESIA PREPROCEDURE EVALUATION
Procedure:  CORONARY ARTERY BYPASS GRAFT (CABG) 1 VESSELS (Chest)  REPLACEMENT VALVE AORTIC (AVR) (Chest)  BENTALL PROCEDURE (ASCENDING AORTIC REPAIR) (Chest)    Relevant Problems   CARDIO   (+) CAD (coronary artery disease)   (+) NSTEMI (non-ST elevated myocardial infarction) ([facility])   (+) Primary hypertension   (+) Severe aortic stenosis      NEURO/PSYCH   (+) Anxiety   (+) Depression   (+) PTSD (post-traumatic stress disorder)      PULMONARY   (+) COPD (chronic obstructive pulmonary disease) ([facility])        Physical Exam    Airway    Mallampati score: II  TM Distance: >3 FB  Neck ROM: full     Dental        Cardiovascular  Rhythm: regular, Rate: normal, Murmur    Pulmonary  Pulmonary exam normal Breath sounds clear to auscultation    Other Findings        Anesthesia Plan  ASA Score- 4     Anesthesia Type- general with ASA Monitors.         Additional Monitors: arterial line, central venous line and pulmonary artery catheter.    Airway Plan: ETT.           Plan Factors-Exercise tolerance (METS): >4 METS.    Chart reviewed. EKG reviewed. Imaging results reviewed. Existing labs reviewed. Patient summary reviewed.    Patient is not a current smoker.  Patient did not smoke on day of surgery.    Obstructive sleep apnea risk education given perioperatively.        Induction- intravenous.    Postoperative Plan- Plan for postoperative opioid use.     Perioperative Resuscitation Plan - Level 1 - Full Code.       Informed Consent- Anesthetic plan and risks discussed with patient.        NPO Status:  Vitals Value Taken Time   Date of last liquid 01/31/25 01/31/25 0652   Time of last liquid 0000 01/31/25 0652   Date of last solid 01/31/25 01/31/25 0652   Time of last solid 0000 01/31/25 0652

## 2025-01-31 NOTE — RESPIRATORY THERAPY NOTE
RT Protocol Note  Larry Harper 60 y.o. male MRN: [id number]  Unit/Bed#: PPHP-314-01 Encounter: [id number]    Assessment    Principal Problem:    NSTEMI (non-ST elevated myocardial infarction) ([facility])  Active Problems:    CAD (coronary artery disease)    Diabetes mellitus ([facility])    PTSD (post-traumatic stress disorder)    Severe aortic stenosis    Primary hypertension    Dyslipidemia    COPD (chronic obstructive pulmonary disease) ([facility])    Periodontal disease    S/P AVR    S/P CABG (coronary artery bypass graft)      Home Pulmonary Medications:Albuterol MDI         Past Medical History:   Diagnosis Date    Anxiety     Coronary artery disease     Depression     Diabetes mellitus ([facility])     MI, old     PTSD (post-traumatic stress disorder)      Social History     Socioeconomic History    Marital status: Single     Spouse name: None    Number of children: None    Years of education: None    Highest education level: None   Occupational History    None   Tobacco Use    Smoking status: Never     Passive exposure: Never    Smokeless tobacco: Never   Vaping Use    Vaping status: Never Used   Substance and Sexual Activity    Alcohol use: Not Currently    Drug use: Not Currently     Types: Marijuana    Sexual activity: Not Currently   Other Topics Concern    None   Social History Narrative    None     Social Drivers of Health     Financial Resource Strain: Low Risk  (9/12/2023)    Received from [employer]    Financial Resource Strain     Do you have any trouble paying for your medications, or do you think you might in the future?: No     Does your family have trouble paying for medicine? (Household - for ages 0-17 years): Not on file   Food Insecurity: No Food Insecurity (1/26/2025)    Nursing - Inadequate Food Risk Classification     Worried About Running Out of Food in the Last Year: Not on file     Ran Out of Food in the Last Year: Not on file     Ran Out of Food in the Last Year: Never true   Transportation Needs: No  "Transportation Needs (2025)    Nursing - Transportation Risk Classification     Lack of Transportation: Not on file     Lack of Transportation: No   Physical Activity: Not on file   Stress: Not on file   Social Connections: Unknown (2024)    Received from [employer]     How often do you feel lonely or isolated from those around you? (Adult - for ages 18 years and over): Not on file   Intimate Partner Violence: Unknown (2025)    Nursing IPS     Feels Physically and Emotionally Safe: Not on file     Physically Hurt by Someone: Not on file     Humiliated or Emotionally Abused by Someone: Not on file     Physically Hurt by Someone: No     Hurt or Threatened by Someone: No   Housing Stability: Unknown (2025)    Nursing: Inadequate Housing Risk Classification     Has Housing: Not on file     Worried About Losing Housing: Not on file     Unable to Get Utilities: Not on file     Unable to Pay for Housing in the Last Year: No     Has Housin       Subjective         Objective    Physical Exam:   Assessment Type: Assess only  General Appearance: Sedated  Respiratory Pattern: Assisted  Chest Assessment: Chest expansion symmetrical  Bilateral Breath Sounds: Diminished, Clear  O2 Device: Vent    Vitals:  Blood pressure 165/84, pulse 101, temperature 98.1 °F (36.7 °C), resp. rate (!) 10, height 5' 7\" (1.702 m), weight 70.4 kg (155 lb 3.3 oz), SpO2 99%.          Imaging and other studies: Results Review Statement: I personally reviewed the following image studies/reports in PACS and discussed pertinent findings with Radiology: chest xray. My interpretation of the radiology images/reports is: NA.    O2 Device: Vent     Plan             Resp Comments: (P) Pt admitted for severe Aortic stenosis. Pt post op AVR, CABG. Pt has smoking hx. Takes Albuterol MDI at home Q6prn. Will order same med here. When extubated, will start IS. No other rx needed at this time. Will continue to monitor pt.   "

## 2025-01-31 NOTE — ANESTHESIA POSTPROCEDURE EVALUATION
Post-Op Assessment Note    CV Status:  Stable    Pain management: adequate       Mental Status:  Alert and awake   Hydration Status:  Euvolemic   PONV Controlled:  Controlled   Airway Patency:  Patent     Post Op Vitals Reviewed: Yes    No anethesia notable event occurred.    Staff: Anesthesiologist, CRNA           Last Filed PACU Vitals:  Vitals Value Taken Time   Temp 98.4 °F (36.9 °C) 01/29/25 1859   Pulse 73 01/29/25 1901   /56 01/29/25 1900   Resp 21 01/29/25 1901   SpO2 92 % 01/29/25 1901   Vitals shown include unfiled device data.    Modified Bhumika:     Vitals Value Taken Time   Activity 2 01/29/25 1859   Respiration 2 01/29/25 1859   Circulation 2 01/29/25 1859   Consciousness 2 01/29/25 1859   Oxygen Saturation 2 01/29/25 1859     Modified Bhumika Score: 10

## 2025-01-31 NOTE — RESPIRATORY THERAPY NOTE
RT Ventilator Management Note  Larry Harper 60 y.o. male MRN: [id number]  Unit/Bed#: PPHP-314-01 Encounter: [id number]      Daily Screen         1/31/2025  1247             Patient safety screen outcome:: Failed    Not Ready for Weaning due to:: Underline problem not resolved              Physical Exam:   Assessment Type: Assess only  General Appearance: Sedated  Respiratory Pattern: Assisted  Chest Assessment: Chest expansion symmetrical  Bilateral Breath Sounds: Diminished, Clear  O2 Device: Vent      Resp Comments: (P) Pt admitted for severe Aortic stenosis. Pt post op AVR, CABG. Pt has smoking hx. Takes Albuterol MDI at home Q6prn. Will order same med here. When extubated, will start IS. No other rx needed at this time. Will continue to monitor pt.

## 2025-01-31 NOTE — CONSULTS
"Consultation - Critical Care/ICU   Name: Larry Harper 60 y.o. male I MRN: [id number]  Unit/Bed#: PPHP-314-01 I Date of Admission: 1/25/2025   Date of Service: 1/31/2025 I Hospital Day: 6   Inpatient consult to Medical Critical Care  Consult performed by: Niyah White PA-C  Consult ordered by: James Amaya PA-C        Physician Requesting Evaluation: Jarrett Johnson DO   Reason for Evaluation / Principal Problem: Severe AS and CAD    Please contact the SecureChat role,\"[facility] AP\", with any questions/concerns.    Assessment & Plan   Impressions:  Severe AS s/p mechanical AVR  CAD s/p CABG x1  DM2  Depression   COPD  Periodontal disease s/p extractions 1/29  HTN  HLD    Neuro:   Delirium precautions  Regulate sleep/wake cycle  CAM-ICU daily  Discontinue continuous sedation.   ATC tylenol, PRN oxycodone for pain  Trend neuro exam    CV:   Hemodynamic monitoring goals:  MAP > 65   SBP < 130  CI > 2.2  Continue to monitor and trend cardiac output and filling pressures   Continue pulmonary artery catheter for hemodynamic monitoring   Continue central line due to vasoactive medications  Continue arterial line for blood pressure monitoring and/or frequent ABG's  Current cardiac infusions:    Phenylephrine 80 mcg/min  Wean off as tolerated while meeting hemodynamic goals  Volume resuscitation as needed   Epicardial Pacing Wires:    Epicardial pacing wire plan : Epicardial pacing wires in place. Will pace as needed for bradycardia or cardiac output   Continue telemetry monitoring   Coumadin dosing  Heparin infusion/bridge 2/1    Lung:   Check STAT post-op ABG and CXR  Wean vent with spontaneous breathing trial with goal to extubate <24 hours  Continue incentive spirometry  Encourage coughing and deep breathing     GI:   Continue PPI for stress ulcer prophylaxis  Bowel regimen:  Miralax QD  Dulcolax QD PRN  Monitor abdominal exam and bowel function  Zofran PRN for nausea.     FEN:   Nutrition plan: NPO  Replenish " electrolytes with goals: Ca >7.0, K >4.0, Mag >2.0    :   Strict I/O monitoring   Continue to trend renal function tests   Al in place.   Goal UOP >0.5cc/kg/hour.  Lasix versus volume resuscitation as needed for low UOP depending on hemodynamics      ID:   Monitor WBC and temps   Maintain normothermia  Prophylactic post-op ABX per primary team    Heme:   Check STAT post-op H/H and platelets.  Transfuse as needed   Monitor incision site, invasive lines, and chest tube outputs for bleeding.   Send coag panel if needed.    Endo:   Insulin gtt for blood sugar control.   Adjust insulin regimen as needed to maintain -180    MSK/Skin:  Early mobility and skin protection:   PT/OT consult as medically appropriate   Frequent turning and pressure off-loading  Local wound care as needed    Disposition: ICU Care     History of Present Illness   HPI: Larry Harper is a 59 yo M who presented to the ED with c/o episodes of chest pain with associated SOB, diaphoresis and nausea. He was ruled in for NSTEMI. He underwent cardiac cath revealing 2v CAD and severe AS. He was seen in consultation by Dr. Johnson who recommended proceeding with surgical repair and he presents to the ICU post-op.     PMH: AS, DM2, PTSD, HTN, CAD with previous PCI, depression, COPD, periodontal disease.     Intra-op MAURICE LVEF 65%  Post-op medications: Critical Care Infusions : Neosynephrine 80 mcg/min    ROS unable to be obtained due to patient being intubated and sedated.   History obtained from chart review due to patient being intubated and sedated.   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, old  No date: PTSD (post-traumatic stress disorder) Past Surgical History:  1/24/2025: CARDIAC CATHETERIZATION; Left      Comment:  Procedure: Cardiac Left Heart Cath;  Surgeon: Derick Hancock MD;  Location: [facility];  Service:                Cardiology  No date: CARPAL TUNNEL RELEASE; Right  No date: CORONARY ANGIOPLASTY WITH STENT PLACEMENT  No date: ROTATOR CUFF REPAIR; Right  1/29/2025: TOOTH EXTRACTION; Bilateral      Comment:  Procedure: EXTRACTION TOOTH #2, 12, 14, 18, 31;                 Surgeon: Tha Hernandez DMD;  Location: [facility];                 Service: Maxillofacial   Current Outpatient Medications   Medication Instructions    albuterol (PROVENTIL HFA,VENTOLIN HFA) 90 mcg/act inhaler 2 puffs, Inhalation    aspirin (ECOTRIN LOW STRENGTH) 81 mg, Oral, Daily    atorvastatin (LIPITOR) 80 mg, Oral, Every morning    diazepam (VALIUM) 5 mg, Oral, Every 6 hours PRN    Empagliflozin (Jardiance) 10 MG TABS tablet No dose, route, or frequency recorded.    FLUoxetine (PROZAC) 40 mg, Oral, Daily    fluticasone (FLONASE) 50 mcg/act nasal spray 1 spray, Daily    ibuprofen (MOTRIN) 400 mg, Every 8 hours PRN    insulin glargine (LANTUS) 35 Units, Subcutaneous    Lantus SoloStar 30 Units, Daily    lisinopril (ZESTRIL) 2.5 mg, Oral, Daily    lovastatin (MEVACOR) 20 mg, Oral, Daily    metFORMIN (GLUCOPHAGE) 1,000 mg, Oral, Daily    metoprolol tartrate (LOPRESSOR) 12.5 mg, Oral, 2 times daily    NovoLOG FlexPen 100 units/mL injection pen INJECT 12 UNITS BEFORE EACH MEAL SUBCUTANEOUSLY    traZODone (DESYREL) 50 mg, Oral    umeclidinium (Incruse Ellipta) 62.5 mcg/actuation AEPB inhaler 1 puff, Daily    Allergies   Allergen Reactions    Bee Venom Anaphylaxis    Shellfish Allergy - Food Allergy Anaphylaxis      Social History     Tobacco Use    Smoking status: Never     Passive exposure: Never    Smokeless tobacco: Never   Vaping Use    Vaping status: Never Used   Substance Use Topics    Alcohol use: Not Currently    Drug use: Not Currently     Types: Marijuana    History reviewed. No pertinent family history.         Objective  :     Vitals: Invasive Monitoring      /84   Pulse 101   Resp (!) 10   O2 Sat 99 %   O2  Device None (Room air)   Temp 98.1 °F (36.7 °C)    Arterial Line  Trina BP    No data recorded   MAP    No data recorded     PA Catheter   Most Recent  Min/Max in 24hrs    PAP   No data recorded   CVP   No data recorded   CI    No data recorded   SVR   No data recorded          Physical Exam   Physical Exam  Vitals reviewed.   Skin:     General: Skin is warm and dry.   Neck:      Vascular: Central line present.   Cardiovascular:      Rate and Rhythm: Normal rate and regular rhythm.      Heart sounds: No murmur heard.     No friction rub.   Abdominal:      Palpations: Abdomen is soft.      Tenderness: There is no abdominal tenderness.   Constitutional:       General: He is not in acute distress.     Appearance: He is well-developed.      Interventions: He is sedated and intubated.   Pulmonary:      Effort: Pulmonary effort is normal. He is intubated.      Breath sounds: Normal breath sounds.   Genitourinary/Anorectal:  Al present.        Diagnostic Studies      01/31/25 CXR: PA catheter and ETT in place.   This was personally reviewed by myself in PACS.  01/31/25 EKG: NSR. LVH. Prolonged QTc (510).   This was personally reviewed by myself.         Medications:  Scheduled PRN   acetaminophen, 975 mg, Q6H While awake  amiodarone, 200 mg, Q8H SONYA  aspirin, 81 mg, Daily  atorvastatin, 80 mg, Daily With Dinner  cefazolin, 2,000 mg, Q8H  chlorhexidine, 15 mL, BID  FLUoxetine, 40 mg, Daily  [START ON 2/1/2025] fondaparinux, 2.5 mg, Daily  magnesium sulfate, 2 g, Once  mupirocin, 1 Application, Q12H SONYA  pantoprazole, 40 mg, Daily  polyethylene glycol, 17 g, Daily  umeclidinium, 1 puff, Daily  warfarin, 2.5 mg, Once (warfarin)      acetaminophen, 650 mg, Q4H PRN  bisacodyl, 10 mg, Daily PRN  calcium gluconate, 2 g, Once PRN  fentaNYL, 50 mcg, Q1H PRN  HYDROmorphone, 0.5 mg, Q2H PRN  lactated ringers, 500 mL, Once PRN  ondansetron, 4 mg, Q6H PRN  oxyCODONE, 2.5 mg, Q4H PRN   Or  oxyCODONE, 5 mg, Q4H PRN  potassium  chloride, 20 mEq, Once PRN  potassium chloride, 40 mEq, Once PRN  potassium chloride, 40 mEq, Once PRN       Continuous    EPINEPHrine 5,000 mcg (STANDARD CONCENTRATION) IV in sodium chloride 0.9% 250 mL, 1-10 mcg/min  insulin regular (HumuLIN R,NovoLIN R) 1 Units/mL in sodium chloride 0.9 % 100 mL infusion, 0.3-21 Units/hr, Last Rate: 6 Units/hr (01/31/25 1245)  insulin regular 100 units in sodium chloride 0.9% 100 mL, 100 Units  niCARdipine, 2.5-15 mg/hr  phenylephrine (KENNEDY-SYNEPHRINE) 50 mg (STANDARD CONCENTRATION) in sodium chloride 0.9% 250 mL,  mcg/min  phenylephine,  mcg/min, Last Rate: Stopped (01/31/25 1309)  sodium chloride, 20 mL/hr, Last Rate: 20 mL/hr (01/31/25 1305)         Labs:  CBC  Recent Labs     01/31/25  1154 01/31/25  1230 01/31/25  1259 01/31/25  1300   HGB  --    < > 12.2 13.8   HCT  --    < > 36* 41.9     --   --  219    < > = values in this interval not displayed.     BMP  Recent Labs     01/31/25  1230 01/31/25  1259   CO2 24 23       Coags  Recent Labs     01/30/25  1344 01/30/25  2035   PTT 74* 71*        Additional Electrolytes  Recent Labs     01/31/25  1230 01/31/25  1259   CAIONIZED 1.20 1.18          Blood Gas  No recent results  No recent results LFTs  No recent results    Infectious  No recent results     No recent results Glucose  No recent results     Invasive lines and devices:  Invasive Devices       Central Venous Catheter Line  Duration             CVC Central Lines 01/31/25 <1 day    Introducer 01/31/25 <1 day              Peripheral Intravenous Line  Duration             Peripheral IV 01/28/25 Proximal;Right;Ventral (anterior) Forearm 2 days    Peripheral IV 01/29/25 Left Antecubital 2 days              Arterial Line  Duration             Arterial Line 01/31/25 Right Radial <1 day              Line  Duration             Pacer Wires <1 day    Pacer Wires <1 day              Drain  Duration             Chest Tube 1 Left Pleural 32 Fr. <1 day    Chest Tube  2 Anterior;Mediastinal 32 Fr. <1 day    Chest Tube 3 Posterior;Mediastinal 24 Fr. <1 day    Urethral Catheter Non-latex;Temperature probe;Other (Comment) 16 Fr. <1 day              Airway  Duration             ETT  Hi-Lo 8 mm <1 day

## 2025-02-01 ENCOUNTER — APPOINTMENT (INPATIENT)
Dept: RADIOLOGY | Facility: HOSPITAL | Age: 60
DRG: 216 | End: 2025-02-01
Payer: MEDICARE

## 2025-02-01 LAB
ABO GROUP BLD BPU: NORMAL
ANION GAP SERPL CALCULATED.3IONS-SCNC: 7 MMOL/L (ref 4–13)
ANION GAP SERPL CALCULATED.3IONS-SCNC: 8 MMOL/L (ref 4–13)
APTT PPP: 26 SECONDS (ref 23–34)
APTT PPP: 44 SECONDS (ref 23–34)
ATRIAL RATE: 106 BPM
ATRIAL RATE: 108 BPM
BASE EXCESS BLDA CALC-SCNC: 1 MMOL/L
BODY TEMPERATURE: 98.8 DEGREES FEHRENHEIT
BPU ID: NORMAL
BUN SERPL-MCNC: 13 MG/DL (ref 5–25)
BUN SERPL-MCNC: 13 MG/DL (ref 5–25)
CALCIUM SERPL-MCNC: 8.1 MG/DL (ref 8.4–10.2)
CALCIUM SERPL-MCNC: 8.2 MG/DL (ref 8.4–10.2)
CHLORIDE SERPL-SCNC: 104 MMOL/L (ref 96–108)
CHLORIDE SERPL-SCNC: 106 MMOL/L (ref 96–108)
CO2 SERPL-SCNC: 23 MMOL/L (ref 21–32)
CO2 SERPL-SCNC: 24 MMOL/L (ref 21–32)
CREAT SERPL-MCNC: 0.64 MG/DL (ref 0.6–1.3)
CREAT SERPL-MCNC: 0.7 MG/DL (ref 0.6–1.3)
CROSSMATCH: NORMAL
ERYTHROCYTE [DISTWIDTH] IN BLOOD BY AUTOMATED COUNT: 15.4 % (ref 11.6–15.1)
GFR SERPL CREATININE-BSD FRML MDRD: 104 ML/MIN/1.73SQ M
GFR SERPL CREATININE-BSD FRML MDRD: 107 ML/MIN/1.73SQ M
GLUCOSE SERPL-MCNC: 111 MG/DL (ref 65–140)
GLUCOSE SERPL-MCNC: 133 MG/DL (ref 65–140)
GLUCOSE SERPL-MCNC: 137 MG/DL (ref 65–140)
GLUCOSE SERPL-MCNC: 140 MG/DL (ref 65–140)
GLUCOSE SERPL-MCNC: 141 MG/DL (ref 65–140)
GLUCOSE SERPL-MCNC: 141 MG/DL (ref 65–140)
GLUCOSE SERPL-MCNC: 144 MG/DL (ref 65–140)
GLUCOSE SERPL-MCNC: 149 MG/DL (ref 65–140)
GLUCOSE SERPL-MCNC: 149 MG/DL (ref 65–140)
GLUCOSE SERPL-MCNC: 171 MG/DL (ref 65–140)
GLUCOSE SERPL-MCNC: 175 MG/DL (ref 65–140)
GLUCOSE SERPL-MCNC: 177 MG/DL (ref 65–140)
GLUCOSE SERPL-MCNC: 183 MG/DL (ref 65–140)
HCO3 BLDA-SCNC: 25.2 MMOL/L (ref 22–28)
HCT VFR BLD AUTO: 41.4 % (ref 36.5–49.3)
HGB BLD-MCNC: 13.8 G/DL (ref 12–17)
INR PPP: 1.03 (ref 0.85–1.19)
MAGNESIUM SERPL-MCNC: 2.7 MG/DL (ref 1.9–2.7)
MCH RBC QN AUTO: 27.9 PG (ref 26.8–34.3)
MCHC RBC AUTO-ENTMCNC: 33.3 G/DL (ref 31.4–37.4)
MCV RBC AUTO: 84 FL (ref 82–98)
NASAL CANNULA: 6
O2 CT BLDA-SCNC: 19.2 ML/DL (ref 16–23)
OXYHGB MFR BLDA: 94.3 % (ref 94–97)
P AXIS: 58 DEGREES
P AXIS: 61 DEGREES
PCO2 BLDA: 38.7 MM HG (ref 36–44)
PCO2 TEMP ADJ BLDA: 38.9 MM HG (ref 36–44)
PH BLD: 7.43 [PH] (ref 7.35–7.45)
PH BLDA: 7.43 [PH] (ref 7.35–7.45)
PLATELET # BLD AUTO: 182 THOUSANDS/UL (ref 149–390)
PMV BLD AUTO: 11.9 FL (ref 8.9–12.7)
PO2 BLD: 72.8 MM HG (ref 75–129)
PO2 BLDA: 72.3 MM HG (ref 75–129)
POTASSIUM SERPL-SCNC: 4 MMOL/L (ref 3.5–5.3)
POTASSIUM SERPL-SCNC: 4.5 MMOL/L (ref 3.5–5.3)
PR INTERVAL: 112 MS
PR INTERVAL: 116 MS
PROTHROMBIN TIME: 13.8 SECONDS (ref 12.3–15)
QRS AXIS: 47 DEGREES
QRS AXIS: 51 DEGREES
QRSD INTERVAL: 98 MS
QRSD INTERVAL: 98 MS
QT INTERVAL: 396 MS
QT INTERVAL: 418 MS
QTC INTERVAL: 530 MS
QTC INTERVAL: 555 MS
RBC # BLD AUTO: 4.95 MILLION/UL (ref 3.88–5.62)
SODIUM SERPL-SCNC: 135 MMOL/L (ref 135–147)
SODIUM SERPL-SCNC: 137 MMOL/L (ref 135–147)
SPECIMEN SOURCE: ABNORMAL
T WAVE AXIS: -21 DEGREES
T WAVE AXIS: -31 DEGREES
UNIT DISPENSE STATUS: NORMAL
UNIT PRODUCT CODE: NORMAL
UNIT PRODUCT VOLUME: 350 ML
UNIT RH: NORMAL
VENTRICULAR RATE: 106 BPM
VENTRICULAR RATE: 108 BPM
WBC # BLD AUTO: 19.73 THOUSAND/UL (ref 4.31–10.16)

## 2025-02-01 PROCEDURE — 99232 SBSQ HOSP IP/OBS MODERATE 35: CPT | Performed by: INTERNAL MEDICINE

## 2025-02-01 PROCEDURE — 83735 ASSAY OF MAGNESIUM: CPT | Performed by: PHYSICIAN ASSISTANT

## 2025-02-01 PROCEDURE — 85027 COMPLETE CBC AUTOMATED: CPT | Performed by: PHYSICIAN ASSISTANT

## 2025-02-01 PROCEDURE — 85730 THROMBOPLASTIN TIME PARTIAL: CPT | Performed by: PHYSICIAN ASSISTANT

## 2025-02-01 PROCEDURE — 80048 BASIC METABOLIC PNL TOTAL CA: CPT | Performed by: PHYSICIAN ASSISTANT

## 2025-02-01 PROCEDURE — 82948 REAGENT STRIP/BLOOD GLUCOSE: CPT

## 2025-02-01 PROCEDURE — 94760 N-INVAS EAR/PLS OXIMETRY 1: CPT

## 2025-02-01 PROCEDURE — 99291 CRITICAL CARE FIRST HOUR: CPT | Performed by: ANESTHESIOLOGY

## 2025-02-01 PROCEDURE — 85730 THROMBOPLASTIN TIME PARTIAL: CPT | Performed by: THORACIC SURGERY (CARDIOTHORACIC VASCULAR SURGERY)

## 2025-02-01 PROCEDURE — 93010 ELECTROCARDIOGRAM REPORT: CPT | Performed by: INTERNAL MEDICINE

## 2025-02-01 PROCEDURE — 82805 BLOOD GASES W/O2 SATURATION: CPT | Performed by: PHYSICIAN ASSISTANT

## 2025-02-01 PROCEDURE — 97163 PT EVAL HIGH COMPLEX 45 MIN: CPT

## 2025-02-01 PROCEDURE — NC001 PR NO CHARGE: Performed by: ANESTHESIOLOGY

## 2025-02-01 PROCEDURE — 93005 ELECTROCARDIOGRAM TRACING: CPT

## 2025-02-01 PROCEDURE — 85610 PROTHROMBIN TIME: CPT | Performed by: PHYSICIAN ASSISTANT

## 2025-02-01 PROCEDURE — 94002 VENT MGMT INPAT INIT DAY: CPT

## 2025-02-01 PROCEDURE — 94660 CPAP INITIATION&MGMT: CPT

## 2025-02-01 PROCEDURE — 99024 POSTOP FOLLOW-UP VISIT: CPT | Performed by: THORACIC SURGERY (CARDIOTHORACIC VASCULAR SURGERY)

## 2025-02-01 PROCEDURE — 97167 OT EVAL HIGH COMPLEX 60 MIN: CPT

## 2025-02-01 PROCEDURE — 71045 X-RAY EXAM CHEST 1 VIEW: CPT

## 2025-02-01 PROCEDURE — NC001 PR NO CHARGE: Performed by: INTERNAL MEDICINE

## 2025-02-01 RX ORDER — OXYCODONE HYDROCHLORIDE 10 MG/1
10 TABLET ORAL EVERY 4 HOURS PRN
Status: DISCONTINUED | OUTPATIENT
Start: 2025-02-01 | End: 2025-02-08 | Stop reason: HOSPADM

## 2025-02-01 RX ORDER — WARFARIN SODIUM 2.5 MG/1
2.5 TABLET ORAL
Status: COMPLETED | OUTPATIENT
Start: 2025-02-01 | End: 2025-02-01

## 2025-02-01 RX ORDER — POTASSIUM CHLORIDE 1500 MG/1
20 TABLET, EXTENDED RELEASE ORAL 2 TIMES DAILY
Status: DISCONTINUED | OUTPATIENT
Start: 2025-02-01 | End: 2025-02-04

## 2025-02-01 RX ORDER — KETOROLAC TROMETHAMINE 30 MG/ML
15 INJECTION, SOLUTION INTRAMUSCULAR; INTRAVENOUS EVERY 6 HOURS SCHEDULED
Status: COMPLETED | OUTPATIENT
Start: 2025-02-01 | End: 2025-02-01

## 2025-02-01 RX ORDER — HYDROMORPHONE HCL/PF 1 MG/ML
0.5 SYRINGE (ML) INJECTION ONCE
Refills: 0 | Status: COMPLETED | OUTPATIENT
Start: 2025-02-01 | End: 2025-02-01

## 2025-02-01 RX ORDER — METOPROLOL TARTRATE 25 MG/1
25 TABLET, FILM COATED ORAL EVERY 12 HOURS SCHEDULED
Status: DISCONTINUED | OUTPATIENT
Start: 2025-02-01 | End: 2025-02-08 | Stop reason: HOSPADM

## 2025-02-01 RX ORDER — TEMAZEPAM 15 MG/1
15 CAPSULE ORAL
Status: DISCONTINUED | OUTPATIENT
Start: 2025-02-01 | End: 2025-02-08 | Stop reason: HOSPADM

## 2025-02-01 RX ORDER — MAGNESIUM SULFATE HEPTAHYDRATE 40 MG/ML
2 INJECTION, SOLUTION INTRAVENOUS ONCE
Status: COMPLETED | OUTPATIENT
Start: 2025-02-01 | End: 2025-02-02

## 2025-02-01 RX ORDER — LISINOPRIL 2.5 MG/1
2.5 TABLET ORAL DAILY
Status: DISCONTINUED | OUTPATIENT
Start: 2025-02-01 | End: 2025-02-07

## 2025-02-01 RX ORDER — HEPARIN SODIUM 10000 [USP'U]/100ML
3-20 INJECTION, SOLUTION INTRAVENOUS
Status: DISCONTINUED | OUTPATIENT
Start: 2025-02-01 | End: 2025-02-06

## 2025-02-01 RX ORDER — FUROSEMIDE 10 MG/ML
40 INJECTION INTRAMUSCULAR; INTRAVENOUS
Status: DISCONTINUED | OUTPATIENT
Start: 2025-02-01 | End: 2025-02-03

## 2025-02-01 RX ORDER — DOCUSATE SODIUM 100 MG/1
100 CAPSULE, LIQUID FILLED ORAL 2 TIMES DAILY
Status: DISCONTINUED | OUTPATIENT
Start: 2025-02-01 | End: 2025-02-08 | Stop reason: HOSPADM

## 2025-02-01 RX ORDER — OXYCODONE HYDROCHLORIDE 5 MG/1
5 TABLET ORAL EVERY 4 HOURS PRN
Status: DISCONTINUED | OUTPATIENT
Start: 2025-02-01 | End: 2025-02-08 | Stop reason: HOSPADM

## 2025-02-01 RX ADMIN — KETOROLAC TROMETHAMINE 15 MG: 30 INJECTION, SOLUTION INTRAMUSCULAR; INTRAVENOUS at 23:38

## 2025-02-01 RX ADMIN — POTASSIUM CHLORIDE 20 MEQ: 1500 TABLET, EXTENDED RELEASE ORAL at 07:57

## 2025-02-01 RX ADMIN — METOPROLOL TARTRATE 25 MG: 25 TABLET, FILM COATED ORAL at 07:56

## 2025-02-01 RX ADMIN — UMECLIDINIUM 1 PUFF: 62.5 AEROSOL, POWDER ORAL at 09:46

## 2025-02-01 RX ADMIN — MUPIROCIN 1 APPLICATION: 20 OINTMENT TOPICAL at 20:05

## 2025-02-01 RX ADMIN — METHOCARBAMOL 500 MG: 500 TABLET ORAL at 05:34

## 2025-02-01 RX ADMIN — MAGNESIUM SULFATE HEPTAHYDRATE 2 G: 40 INJECTION, SOLUTION INTRAVENOUS at 00:10

## 2025-02-01 RX ADMIN — ALBUTEROL SULFATE 2 PUFF: 90 AEROSOL, METERED RESPIRATORY (INHALATION) at 07:56

## 2025-02-01 RX ADMIN — OXYCODONE HYDROCHLORIDE 10 MG: 10 TABLET ORAL at 09:45

## 2025-02-01 RX ADMIN — CEFAZOLIN SODIUM 2000 MG: 2 SOLUTION INTRAVENOUS at 04:12

## 2025-02-01 RX ADMIN — MAGNESIUM SULFATE HEPTAHYDRATE 2 G: 40 INJECTION, SOLUTION INTRAVENOUS at 06:45

## 2025-02-01 RX ADMIN — ASPIRIN 81 MG: 81 TABLET, COATED ORAL at 07:56

## 2025-02-01 RX ADMIN — METHOCARBAMOL 500 MG: 500 TABLET ORAL at 18:01

## 2025-02-01 RX ADMIN — HEPARIN SODIUM 12 UNITS/KG/HR: 10000 INJECTION, SOLUTION INTRAVENOUS at 11:31

## 2025-02-01 RX ADMIN — HYDROMORPHONE HYDROCHLORIDE 0.5 MG: 1 INJECTION, SOLUTION INTRAMUSCULAR; INTRAVENOUS; SUBCUTANEOUS at 08:48

## 2025-02-01 RX ADMIN — CHLORHEXIDINE GLUCONATE 0.12% ORAL RINSE 15 ML: 1.2 LIQUID ORAL at 07:57

## 2025-02-01 RX ADMIN — CHLORHEXIDINE GLUCONATE 0.12% ORAL RINSE 15 ML: 1.2 LIQUID ORAL at 18:01

## 2025-02-01 RX ADMIN — KETOROLAC TROMETHAMINE 15 MG: 30 INJECTION, SOLUTION INTRAMUSCULAR; INTRAVENOUS at 11:30

## 2025-02-01 RX ADMIN — METHOCARBAMOL 500 MG: 500 TABLET ORAL at 23:39

## 2025-02-01 RX ADMIN — DOCUSATE SODIUM 100 MG: 100 CAPSULE, LIQUID FILLED ORAL at 18:01

## 2025-02-01 RX ADMIN — LISINOPRIL 2.5 MG: 2.5 TABLET ORAL at 09:45

## 2025-02-01 RX ADMIN — METHOCARBAMOL 500 MG: 500 TABLET ORAL at 11:31

## 2025-02-01 RX ADMIN — ACETAMINOPHEN 975 MG: 325 TABLET, FILM COATED ORAL at 19:55

## 2025-02-01 RX ADMIN — DOCUSATE SODIUM 100 MG: 100 CAPSULE, LIQUID FILLED ORAL at 07:56

## 2025-02-01 RX ADMIN — OXYCODONE HYDROCHLORIDE 10 MG: 10 TABLET ORAL at 19:55

## 2025-02-01 RX ADMIN — ACETAMINOPHEN 975 MG: 325 TABLET, FILM COATED ORAL at 13:59

## 2025-02-01 RX ADMIN — OXYCODONE HYDROCHLORIDE 10 MG: 10 TABLET ORAL at 13:59

## 2025-02-01 RX ADMIN — PANTOPRAZOLE SODIUM 40 MG: 40 TABLET, DELAYED RELEASE ORAL at 05:34

## 2025-02-01 RX ADMIN — FUROSEMIDE 40 MG: 10 INJECTION, SOLUTION INTRAMUSCULAR; INTRAVENOUS at 15:50

## 2025-02-01 RX ADMIN — ATORVASTATIN CALCIUM 80 MG: 80 TABLET, FILM COATED ORAL at 15:50

## 2025-02-01 RX ADMIN — HYDROMORPHONE HYDROCHLORIDE 0.5 MG: 1 INJECTION, SOLUTION INTRAMUSCULAR; INTRAVENOUS; SUBCUTANEOUS at 02:17

## 2025-02-01 RX ADMIN — LIDOCAINE 5% 2 PATCH: 700 PATCH TOPICAL at 07:57

## 2025-02-01 RX ADMIN — FUROSEMIDE 40 MG: 10 INJECTION, SOLUTION INTRAMUSCULAR; INTRAVENOUS at 07:57

## 2025-02-01 RX ADMIN — POTASSIUM CHLORIDE 20 MEQ: 1500 TABLET, EXTENDED RELEASE ORAL at 18:01

## 2025-02-01 RX ADMIN — OXYCODONE HYDROCHLORIDE 10 MG: 10 TABLET ORAL at 04:48

## 2025-02-01 RX ADMIN — KETOROLAC TROMETHAMINE 15 MG: 30 INJECTION, SOLUTION INTRAMUSCULAR; INTRAVENOUS at 18:01

## 2025-02-01 RX ADMIN — AMIODARONE HYDROCHLORIDE 200 MG: 200 TABLET ORAL at 22:16

## 2025-02-01 RX ADMIN — TEMAZEPAM 15 MG: 15 CAPSULE ORAL at 22:16

## 2025-02-01 RX ADMIN — MUPIROCIN 1 APPLICATION: 20 OINTMENT TOPICAL at 07:56

## 2025-02-01 RX ADMIN — AMIODARONE HYDROCHLORIDE 200 MG: 200 TABLET ORAL at 05:34

## 2025-02-01 RX ADMIN — WARFARIN SODIUM 2.5 MG: 2.5 TABLET ORAL at 18:01

## 2025-02-01 RX ADMIN — POLYETHYLENE GLYCOL 3350 17 G: 17 POWDER, FOR SOLUTION ORAL at 07:57

## 2025-02-01 RX ADMIN — METOPROLOL TARTRATE 25 MG: 25 TABLET, FILM COATED ORAL at 20:02

## 2025-02-01 RX ADMIN — FLUOXETINE HYDROCHLORIDE 40 MG: 20 CAPSULE ORAL at 07:56

## 2025-02-01 RX ADMIN — ACETAMINOPHEN 975 MG: 325 TABLET, FILM COATED ORAL at 07:57

## 2025-02-01 RX ADMIN — HYDROMORPHONE HYDROCHLORIDE 0.5 MG: 1 INJECTION, SOLUTION INTRAMUSCULAR; INTRAVENOUS; SUBCUTANEOUS at 04:10

## 2025-02-01 RX ADMIN — OXYCODONE HYDROCHLORIDE 5 MG: 5 TABLET ORAL at 01:37

## 2025-02-01 RX ADMIN — NICARDIPINE HYDROCHLORIDE 2.5 MG/HR: 25 INJECTION, SOLUTION INTRAVENOUS at 02:34

## 2025-02-01 RX ADMIN — SODIUM CHLORIDE 3 UNITS/HR: 9 INJECTION, SOLUTION INTRAVENOUS at 09:45

## 2025-02-01 NOTE — ASSESSMENT & PLAN NOTE
Lab Results   Component Value Date    HGBA1C 8.9 (H) 01/24/2025     Recent Labs     02/01/25  0958 02/01/25  1128 02/01/25  1330 02/01/25  1551   POCGLU 141* 133 177* 149*     Uncontrolled type 2 diabetes mellitus on insulin therapy with hyperglycemia   Home regimen: Lantus 35 units in the morning, metformin 1000 mg twice daily, NovoLog 5 units before meals, Jardiance 10 mg daily    24-hour blood sugars reviewed-while on insulin infusion, well-controlled.  Due to 0% oral intake, patient is not appropriate for transition off of insulin infusion    Plan:  Continue insulin infusion, reassess over the next 24 hours and transition once p.o. intake improves  Continue Accu-Cheks every 2 hours, adjust insulin as needed  Monitor for hypoglycemia, treat per protocol  Diet-level 2 carb   Goal blood sugar while in the [id number] mg/dL  Discharge recommendations pending clinical course  Endocrinology will continue following

## 2025-02-01 NOTE — PLAN OF CARE
Problem: PAIN - ADULT  Goal: Verbalizes/displays adequate comfort level or baseline comfort level  Description: Interventions:  - Encourage patient to monitor pain and request assistance  - Assess pain using appropriate pain scale  - Administer analgesics based on type and severity of pain and evaluate response  - Implement non-pharmacological measures as appropriate and evaluate response  - Consider cultural and social influences on pain and pain management  - Notify physician/advanced practitioner if interventions unsuccessful or patient reports new pain  1/31/2025 2307 by Chaz Nur RN  Outcome: Not Progressing  1/31/2025 2306 by Chaz Nur RN  Outcome: Not Progressing     Problem: DISCHARGE PLANNING  Goal: Discharge to home or other facility with appropriate resources  Description: INTERVENTIONS:  - Identify barriers to discharge w/patient and caregiver  - Arrange for needed discharge resources and transportation as appropriate  - Identify discharge learning needs (meds, wound care, etc.)  - Arrange for interpretive services to assist at discharge as needed  - Refer to Case Management Department for coordinating discharge planning if the patient needs post-hospital services based on physician/advanced practitioner order or complex needs related to functional status, cognitive ability, or social support system  Outcome: Progressing     Problem: Knowledge Deficit  Goal: Patient/family/caregiver demonstrates understanding of disease process, treatment plan, medications, and discharge instructions  Description: Complete learning assessment and assess knowledge base.  Interventions:  - Provide teaching at level of understanding  - Provide teaching via preferred learning methods  Outcome: Progressing     Problem: Nutrition/Hydration-ADULT  Goal: Nutrient/Hydration intake appropriate for improving, restoring or maintaining nutritional needs  Description: Monitor and assess patient's nutrition/hydration  status for malnutrition. Collaborate with interdisciplinary team and initiate plan and interventions as ordered.  Monitor patient's weight and dietary intake as ordered or per policy. Utilize nutrition screening tool and intervene as necessary. Determine patient's food preferences and provide high-protein, high-caloric foods as appropriate.     INTERVENTIONS:  - Monitor oral intake, urinary output, labs, and treatment plans  - Assess nutrition and hydration status and recommend course of action  - Evaluate amount of meals eaten  - Assist patient with eating if necessary   - Allow adequate time for meals  - Recommend/ encourage appropriate diets, oral nutritional supplements, and vitamin/mineral supplements  - Order, calculate, and assess calorie counts as needed  - Recommend, monitor, and adjust tube feedings and TPN/PPN based on assessed needs  - Assess need for intravenous fluids  - Provide specific nutrition/hydration education as appropriate  - Include patient/family/caregiver in decisions related to nutrition  Outcome: Progressing

## 2025-02-01 NOTE — PROGRESS NOTES
Progress Note - Cardiothoracic Surgery   Larry Harper 60 y.o. male MRN: [id number]  Unit/Bed#: PPHP-314-01 Encounter: [id number]      POD # 1 s/p CABG x1, mechanical AVR    Pt seen/examined.  Interval history and data reviewed with critical care team.  Pt doing well.  No specific complaints.  Bipap for splinting        Medications:   Scheduled Meds:  Current Facility-Administered Medications   Medication Dose Route Frequency Provider Last Rate    acetaminophen  975 mg Oral Q6H While awake James Amaya PA-C      albuterol  2 puff Inhalation Q4H PRN Jarrett Johnson DO      [Held by provider] amiodarone  200 mg Oral Q8H SONYA James Amaya PA-C      aspirin  81 mg Oral Daily James Amaya PA-C      atorvastatin  80 mg Oral Daily With Dinner James Amaya PA-C      bisacodyl  10 mg Rectal Daily PRN James Amaya PA-C      chlorhexidine  15 mL Mouth/Throat BID James Amaya PA-C      docusate sodium  100 mg Oral BID Niyah White PA-C      FLUoxetine  40 mg Oral Daily James Aamya PA-C      furosemide  40 mg Intravenous BID (diuretic) Niyah White PA-C      heparin (porcine)  3-20 Units/kg/hr (Order-Specific) Intravenous Titrated Niyah White PA-C      insulin regular (HumuLIN R,NovoLIN R) 1 Units/mL in sodium chloride 0.9 % 100 mL infusion  0.3-21 Units/hr Intravenous Titrated James Amaya PA-C 3 Units/hr (02/01/25 0945)    ketorolac  15 mg Intravenous Q6H SONYA Niyah White PA-C      lidocaine  2 patch Topical Daily Anselmo Cox PA-C      lisinopril  2.5 mg Oral Daily Niyah White PA-C      methocarbamol  500 mg Oral Q6H SONYA Anselmo Cox PA-C      metoprolol tartrate  25 mg Oral Q12H SONYA Niyah White PA-C      mupirocin  1 Application Nasal Q12H SONYA James Amaya PA-C      niCARdipine  2.5-15 mg/hr Intravenous Titrated James Amaya PA-C Stopped (02/01/25 0957)    ondansetron  4 mg Intravenous Q6H PRN James Amaya PA-C      oxyCODONE  5 mg Oral Q4H PRN Anselmo Cox PA-C      Or  "   oxyCODONE  10 mg Oral Q4H PRN Anselmo Cox PA-C      pantoprazole  40 mg Oral Daily James Amaya PA-C      polyethylene glycol  17 g Oral Daily James Amaya PA-C      potassium chloride  20 mEq Oral BID Niyah White PA-C      sodium chloride  20 mL/hr Intravenous Continuous James Amaya PA-C 20 mL/hr (01/31/25 1305)    temazepam  15 mg Oral HS PRN Niyah White PA-C      umeclidinium  1 puff Inhalation Daily James Amaya PA-C      warfarin  2.5 mg Oral Once (warfarin) Niyah White PA-C       Continuous Infusions:heparin (porcine), 3-20 Units/kg/hr (Order-Specific)  insulin regular (HumuLIN R,NovoLIN R) 1 Units/mL in sodium chloride 0.9 % 100 mL infusion, 0.3-21 Units/hr, Last Rate: 3 Units/hr (02/01/25 0945)  niCARdipine, 2.5-15 mg/hr, Last Rate: Stopped (02/01/25 0957)  sodium chloride, 20 mL/hr, Last Rate: 20 mL/hr (01/31/25 1305)      PRN Meds:.  albuterol    bisacodyl    ondansetron    oxyCODONE **OR** oxyCODONE    temazepam    Vitals: Blood pressure 125/67, pulse 94, temperature 98.4 °F (36.9 °C), temperature source Oral, resp. rate (!) 33, height 5' 7\" (1.702 m), weight 72.3 kg (159 lb 6.3 oz), SpO2 96%.,Body mass index is 24.96 kg/m².  I/O last 24 hours:  In: 6227.6 [P.O.:1080; I.V.:3297.6; IV Piggyback:850]  Out: 3275 [Urine:2970; Chest Tube:305]  Invasive Devices       Central Venous Catheter Line  Duration             CVC Central Lines 01/31/25 Triple Right Internal jugular 1 day              Peripheral Intravenous Line  Duration             Peripheral IV 01/28/25 Proximal;Right;Ventral (anterior) Forearm 3 days    Peripheral IV 01/29/25 Left Antecubital 2 days              Arterial Line  Duration             Arterial Line 01/31/25 Right Radial 1 day              Line  Duration             Pacer Wires <1 day    Pacer Wires <1 day              Drain  Duration             Urethral Catheter Non-latex;Temperature probe;Other (Comment) 16 Fr. 1 day    Chest Tube 1 Left Pleural 32 Fr. <1 day "    Chest Tube 2 Anterior;Mediastinal 32 Fr. <1 day    Chest Tube 3 Posterior;Mediastinal 24 Fr. <1 day                      Lab, Imaging and other studies:   Results from last 7 days   Lab Units 02/01/25 0350 01/31/25 1947 01/31/25 1300 01/31/25  1230 01/31/25  1154 01/31/25  0755 01/28/25  0518 01/27/25  0105   WBC Thousand/uL 19.73*  --   --   --   --   --  6.58 7.53   HEMOGLOBIN g/dL 13.8 14.4 13.8  --   --   --  13.6 13.2   I STAT HEMOGLOBIN   --   --   --    < >  --    < >  --   --    HEMATOCRIT % 41.4 44.3 41.9  --   --   --  41.8 39.5   HEMATOCRIT, ISTAT   --   --   --    < >  --    < >  --   --    PLATELETS Thousands/uL 182  --  219  --  218  --  241 250    < > = values in this interval not displayed.     Results from last 7 days   Lab Units 02/01/25 0351 02/01/25  0009 01/31/25 1947 01/31/25  1605 01/31/25  1300 01/31/25  1259   POTASSIUM mmol/L 4.0 4.5 4.5   < > 4.5  --    CHLORIDE mmol/L 104 106  --   --  108  --    CO2 mmol/L 24 23  --   --  25  --    CO2, I-STAT mmol/L  --   --   --   --   --  23   BUN mg/dL 13 13  --   --  14  --    CREATININE mg/dL 0.64 0.70  --   --  0.78  --    GLUCOSE, ISTAT mg/dl  --   --   --   --   --  248*   CALCIUM mg/dL 8.1* 8.2*  --   --  8.1*  --     < > = values in this interval not displayed.     Results from last 7 days   Lab Units 02/01/25 0351 01/31/25  1300 01/30/25  2035 01/30/25  1344   INR  1.03 1.22*  --   --    PTT seconds  --  37* 71* 74*     Recent Labs     02/01/25 0351   PHART 7.431   [id number] 25.2   PO2ART 72.3*   [id number] 38.7   BEART 1.0           Plan:    DC [place]/Al.  Continue chest tubes, remove pacing wires.  Heparin to coumadin bridge  Wean bipap as tolerated  Can have toradol for pain  Incentive spirometry.  Diuresis.  PO ASA/Statin/B blocker.  ICU        SIGNATURE: Jarrett Johnson DO  DATE: February 1, 2025  TIME: 10:58 AM

## 2025-02-01 NOTE — PROGRESS NOTES
Progress Note - Critical Care/ICU   Name: Larry Harper 60 y.o. male I MRN: [id number]  Unit/Bed#: PPHP-314-01 I Date of Admission: 1/25/2025   Date of Service: 2/1/2025 I Hospital Day: 7      Assessment & Plan   Impressions:  Severe AS s/p mechanical AVR  CAD s/p CABG x1  DM2  Depression   COPD  Periodontal disease s/p extractions 1/29  HTN  HLD    Neuro:   Analgesia:    ATC tylenol  PRN oxycodone 5-10 mg  Scheduled Robaxin   Lidocaine Patch  Delirium precautions  Regulate sleep/wake cycle  Restoril 15mg qHS PRN  CAM-ICU daily  Routine neuro checks     CV:   Hemodynamic monitoring goals:  MAP > 65   SBP < 130  CI > 2.2  Discontinue PA catheter  Discontinue arterial line   Current cardiac infusions:    Nicardipine 2.5 mg/hr  Beta Blocker:    Start metoprolol tartrate 25 mg BID  Epicardial Pacing Wires:    Epicardial pacing wire plan : No longer needed, discontinue  Post op cardiac surgery medications:    ASA 81 mg daily  Atorvastatin 80 mg qHS  Amiodarone 200 mg PO q8 hours   Continue telemetry monitoring   Heparin to coumadin bridge    Lung:   Expected post operative oxygen requirement of 8 liters/min via nasal cannula  Chest tube output remains persistently high; Continue chest tubes to suction today  Continue incentive spirometry  Encourage coughing and deep breathing     GI:   Continue PPI for stress ulcer prophylaxis  Bowel regimen:  Colace BID  Miralax QD  Dulcolax QD PRN  Monitor abdominal exam and bowel function    FEN:   Diuresis Plan: Lasix 40mg IV BID  Nutrition plan: Cardiac PO diet as tolerated.   Replenish electrolytes with goals: K >4.0, Mag >2.0, and Phos >3.0    :   Strict I/O monitoring   Continue to trend renal function tests   Al: No longer needed. Will place order to discontinue     ID:   Monitor WBC and temps   Maintain normothermia    Heme:   Trend Hgb and plts  Transfuse as needed     Endo:   Continue Insulin infusion and consult endocrine. Initiate home insulin teaching.  Adjust  insulin regimen as needed to maintain -180    MSK/Skin:  Early mobility and skin protection:   PT/OT consult as medically appropriate   Frequent turning and pressure off-loading  Local wound care as needed    Disposition:  Med Surg with Telemetry    ICU Core Measures     A: Assess, Prevent, and Manage Pain Has pain been assessed? Yes  Need for changes to pain regimen? No   B: Both SAT/SAT  N/A   C: Choice of Sedation RASS Goal: N/A patient not on sedation  Need for changes to sedation or analgesia regimen? NA   D: Delirium CAM-ICU: Negative   E: Early Mobility  Plan for early mobility? Yes   F: Family Engagement Plan for family engagement today? Yes       Review of Invasive Devices:    Al Plan: Al to be removed. Order has been placed  Central access plan: Medications requiring central line  [place] Plan: Discontinue arterial line    Prophylaxis:  VTE VTE covered by:  fondaparinux, Subcutaneous       Stress Ulcer  covered bypantoprazole (PROTONIX) EC tablet 40 mg [[id number]]            24 Hour Events    24 Hour Events/HPI: POD # 1 s/p mechanical AVR and CABG x1. [place] weaned to off. Delined this AM.     Subjective   Review of Systems: See HPI for Review of Systems  Objective :    Medications:  Scheduled Continuous   acetaminophen, 975 mg, Q6H While awake  amiodarone, 200 mg, Q8H SONYA  aspirin, 81 mg, Daily  atorvastatin, 80 mg, Daily With Dinner  chlorhexidine, 15 mL, BID  FLUoxetine, 40 mg, Daily  fondaparinux, 2.5 mg, Daily  lidocaine, 2 patch, Daily  methocarbamol, 500 mg, Q6H SONYA  mupirocin, 1 Application, Q12H SONYA  pantoprazole, 40 mg, Daily  polyethylene glycol, 17 g, Daily  umeclidinium, 1 puff, Daily      EPINEPHrine 5,000 mcg (STANDARD CONCENTRATION) IV in sodium chloride 0.9% 250 mL, 1-10 mcg/min  insulin regular (HumuLIN R,NovoLIN R) 1 Units/mL in sodium chloride 0.9 % 100 mL infusion, 0.3-21 Units/hr, Last Rate: 3 Units/hr (02/01/25 0531)  insulin regular 100 units in sodium chloride 0.9% 100 mL,  100 Units  niCARdipine, 2.5-15 mg/hr, Last Rate: 2.5 mg/hr (02/01/25 0532)  phenylephrine (KENNEDY-SYNEPHRINE) 50 mg (STANDARD CONCENTRATION) in sodium chloride 0.9% 250 mL,  mcg/min  phenylephine,  mcg/min, Last Rate: Stopped (01/31/25 1309)  sodium chloride, 20 mL/hr, Last Rate: 20 mL/hr (01/31/25 1305)         Vitals: Invasive Monitoring       Most Recent Min/Max in 24hrs   Temp 98.8 °F (37.1 °C) Temp  Min: 97.2 °F (36.2 °C)  Max: 98.8 °F (37.1 °C)   Pulse 104 Pulse  Min: 88  Max: 113   Resp (!) 33 Resp  Min: 10  Max: 42   /67 BP  Min: 98/59  Max: 145/75   O2 Sat 93 % SpO2  Min: 89 %  Max: 99 %   O2 Device: Mid flow nasal cannula  Nasal Cannula O2 Flow Rate (L/min): 8 L/min Arterial Line  Jones /58  Arterial Line BP  Min: 90/49  Max: 142/69   MAP 80 mmHg  Arterial Line MAP (mmHg)  Min: 64 mmHg  Max: 94 mmHg     PA Catheter   Most Recent  Min/Max in 24hrs    PAP 25/17 PAP  Min: 20/12  Max: 27/16   CVP 22 mmHg CVP (mean)  Min: 2 mmHg  Max: 35 mmHg   CI 3.8 L/min/m2  CI (L/min/m2)  Min: 1.5 L/min/m2  Max: 8.2 L/min/m2    (dyne*sec)/cm5 SVR (dyne*sec)/cm5  Min: 354 (dyne*sec)/cm5  Max: 2087 (dyne*sec)/cm5        I/O Chest tube output (if present)     Intake/Output Summary (Last 24 hours) at 2/1/2025 0616  Last data filed at 2/1/2025 0540  Gross per 24 hour   Intake 5831.64 ml   Output 2625 ml   Net 3206.64 ml     UOP: 30/hour    BM: 0 in the last 24 hours  Weight change: 1.9 kg (4 lb 3 oz)     Mediastinal tubes:  90 mL/8hr  305 mL/24hr   Pleural tubes: 0 mL/8hr  0 mL/24hr        Physical Exam   Physical Exam  Vitals reviewed.   Skin:     General: Skin is warm and dry.   Neck:      Vascular: Central line present.   Cardiovascular:      Rate and Rhythm: Regular rhythm. Tachycardia present.      Heart sounds: No murmur heard.     Comments: Crisp AV closure  Abdominal:      Palpations: Abdomen is soft.   Constitutional:       General: He is not in acute distress.     Appearance: He is  well-developed.      Interventions: Nasal cannula in place.   Pulmonary:      Effort: No respiratory distress.      Breath sounds: Normal breath sounds.      Comments: Decreased effort  Psychiatric:         Mood and Affect: Affect is flat.   Neurological:      Mental Status: He is alert.   Genitourinary/Anorectal:  Al present.        Diagnostic Studies      02/01/25 CXR: CVC in place. Gastric air bubble.   This was personally reviewed by myself in PACS.  02/01/25 EKG: NSR. Prolonged QTc. (530).   This was personally reviewed by myself.       Labs:  CBC  Recent Labs     01/31/25  1300 01/31/25  1947 02/01/25  0350   WBC  --   --  19.73*   HGB 13.8 14.4 13.8   HCT 41.9 44.3 41.4     --  182     BMP  Recent Labs     02/01/25  0009 02/01/25  0351   SODIUM 137 135   K 4.5 4.0    104   CO2 23 24   AGAP 8 7   BUN 13 13   CREATININE 0.70 0.64   CALCIUM 8.2* 8.1*      Baseline Creat: 0.8   Coags  Recent Labs     01/30/25  2035 01/31/25  1300 02/01/25  0351   INR  --  1.22* 1.03   PTT 71* 37*  --               Additional Electrolytes  Recent Labs     01/31/25  1230 01/31/25  1259 02/01/25  0351   MG  --   --  2.7   CAIONIZED 1.20 1.18  --           Blood Gas  Recent Labs     02/01/25  0351   PHART 7.431   [id number] 38.7   PO2ART 72.3*   [id number] 25.2   BEART 1.0   SOURCE Line, Arterial     Recent Labs     02/01/25  0351   SOURCE Line, Arterial    LFTs  No recent results    Infectious  No recent results     No recent results Glucose  Recent Labs     01/31/25  1300 02/01/25  0009 02/01/25  0351   GLUC 279* 175* 133        Collaborative bedside rounds performed with cardiac surgery attending, critical care attending and bedside RN.

## 2025-02-01 NOTE — PLAN OF CARE
Problem: OCCUPATIONAL THERAPY ADULT  Goal: Performs self-care activities at highest level of function for planned discharge setting.  See evaluation for individualized goals.  Description: Treatment Interventions: ADL retraining, Functional transfer training, UE strengthening/ROM, Endurance training, Patient/family training, Equipment evaluation/education, Compensatory technique education, Continued evaluation, Cardiac education, Energy conservation, Activityengagement          See flowsheet documentation for full assessment, interventions and recommendations.   Note: Limitation: Decreased ADL status, Decreased endurance, Decreased self-care trans, Decreased high-level ADLs  Prognosis: Fair  Assessment: Pt is a 59 yo male who originally presented to [facility] with chest pain in which pt found with NSTEMI and transferred to [facility] for cardiac surgery evaluation. Pt s/p AVR and CABG on 1/31. Pt  has a past medical history of Anxiety, Coronary artery disease, Depression, Diabetes mellitus (HCC), MI, old, and PTSD (post-traumatic stress disorder). Pt with active OT orders in which OT consulted to assess pt's functional status and occupational performance to determine safe d/c needs. Pt seen with PT to increase safety, decrease fall risk, and maximize functional/occupational performance 2* medical complexity which is a regression from pt's functional baseline. Pt reporting that upon d/c, he is unable to return to his previous living situation. Will continue to follow w/ CM for safe d/c planning. PTA, pt was independent in ADLs/IADLs and uses no DME for functional mobility. (+) driving. Currently, pt performing bed mobility w/ supervision, functional transfers/mobility w/ Min A x1 w/ RW, and UB/LB ADL w/ Min A. Pt demonstrates the following limitations/impairments which impact the pt's ability to engage in valued occupations: cardiac/sternal precautions, balance, endurance/activity tolerance, standing tolerance,  functional reach, postural/trunk control, strength, safety awareness, and pain. From an OT standpoint, recommend discharge w/ Level 3 vs 4 resources, once medically stable. The patient's raw score on the AM-PAC Daily Activity Inpatient Short Form is 20. A raw score of greater than or equal to 19 suggests the patient may benefit from discharge to home. Please refer to the recommendation of the Occupational Therapist for safe discharge planning.  Pt would benefit from skilled OT services 2-3x/wk to address acute care needs and underlying performance skills to promote safety, decrease fall risk, and enhance occupational performance to return to PLOF. Goals to be met within the next 10-14 days.     Rehab Resource Intensity Level, OT: No post-acute rehabilitation needs (Anticipate no needs pending continued functional progress; would recommend outpatient cardiac rehab when appropriate; continued coordination with CM to faciliate safe d/c planning)

## 2025-02-01 NOTE — ASSESSMENT & PLAN NOTE
TTE 1/28/25 LVEF 60%, trileafet severe AS with mild to moderate AI.  Mean gradient 39 mmHg with GRAYSON 1.01 cm²  Known history of AS, follows with a cardiologist in [place]  Generally has been asymptomatic and surgical intervention had not yet been pursued by primary cardiologist

## 2025-02-01 NOTE — PROCEDURES
Progress Note - Critical Care/ICU   Name: Larry Harper 60 y.o. male I MRN: [id number]  Unit/Bed#: PPHP-314-01 I Date of Admission: 1/25/2025   Date of Service: 2/1/2025 I Hospital Day: 7      Procedure: Epicardial Wire Removal    02/01/25    Patient was returned to bed. EPW x 2 d/c'd in typical fashion.  No immediate complications. Patient and nurse aware of mandatory 1 hour bedrest protocol. Vital signs ordered q15 minutes for one hour as per protocol.    Niyah White PA-C

## 2025-02-01 NOTE — OCCUPATIONAL THERAPY NOTE
Occupational Therapy Evaluation     Patient Name: Larry Harper  Today's Date: 2/1/2025  Problem List  Principal Problem:    NSTEMI (non-ST elevated myocardial infarction) ([facility])  Active Problems:    CAD (coronary artery disease)    Diabetes mellitus ([facility])    PTSD (post-traumatic stress disorder)    Severe aortic stenosis    Primary hypertension    Dyslipidemia    COPD (chronic obstructive pulmonary disease) ([facility])    Periodontal disease    S/P AVR    S/P CABG (coronary artery bypass graft)    Past Medical History  Past Medical History:   Diagnosis Date    Anxiety     Coronary artery disease     Depression     Diabetes mellitus ([facility])     MI, old     PTSD (post-traumatic stress disorder)      Past Surgical History  Past Surgical History:   Procedure Laterality Date    CARDIAC CATHETERIZATION Left 1/24/2025    Procedure: Cardiac Left Heart Cath;  Surgeon: Derick Hancock MD;  Location: [facility];  Service: Cardiology    CARPAL TUNNEL RELEASE Right     CORONARY ANGIOPLASTY WITH STENT PLACEMENT      ROTATOR CUFF REPAIR Right     TOOTH EXTRACTION Bilateral 1/29/2025    Procedure: EXTRACTION TOOTH #2, 12, 14, 18, 31;  Surgeon: Tha Hernandez DMD;  Location: [facility];  Service: Maxillofacial           02/01/25 1314   OT Last Visit   OT Visit Date 02/01/25   Note Type   Note type Evaluation   Additional Comments Pt seen w/ PT to increase safety, decrease fall risk, and maximize functional/occupational performance 2* medical complexity which is a regression from pt's functional baseline.   Pain Assessment   Pain Assessment Tool 0-10   Pain Score 2   Pain Location/Orientation Location: Chest;Location: Incision   Effect of Pain on Daily Activities Impacts ability to engage in valued occupations   Hospital Pain Intervention(s) Repositioned;Ambulation/increased activity;Emotional support   Restrictions/Precautions   Weight Bearing Precautions Per Order No   Other Precautions Cardiac/sternal;Multiple  "lines;Telemetry;O2;Fall Risk;Pain  (A-line, 2 chest tubes, andersen)   Home Living   Additional Comments Pt reporting that he previously was living with a friend however will not be able to return to his previous living arrangement upon d/c, reporting that he is working w/ CM to faciliate a safe d/c; no DME PTA   Prior Function   Level of [place] Independent with ADLs;Independent with functional mobility;Independent with IADLS   IADLs Independent with driving;Independent with meal prep;Independent with medication management   Falls in the last 6 months 0   Vocational Full time employment   Comments (+) driving   Lifestyle   Autonomy PTA, pt was independent in ADLs/IADLs and uses no DME for functional mobility; (+) driving   Reciprocal Relationships Reports that he has a service dog   Service to Others 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 ; 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   Intrinsic Gratification Enjoys rescusing animals   Subjective   Subjective \"I am independent, do all that stuff.\"   ADL   Where Assessed Chair   Eating Assistance 5  Supervision/Setup   Grooming Assistance 5  Supervision/Setup   UB Bathing Assistance 4  Minimal Assistance   LB Bathing Assistance 4  Minimal Assistance   UB Dressing Assistance 4  Minimal Assistance   LB Dressing Assistance 4  Minimal Assistance   Toileting Assistance  4  Minimal Assistance   Functional Assistance 4  Minimal Assistance   Additional Comments Anticipate pending line removal, pt will progress to higher levels of independence in ADLs/occupational performance   Bed Mobility   Supine to Sit 5  Supervision   Additional items HOB elevated;Bedrails;Increased time required;Verbal cues   Sit to Supine Unable to assess   Additional Comments At end of session, pt left sitting upright in recliner with all functional needs in reach   Transfers   Sit to Stand 4  Minimal assistance   Additional items Assist x 1;Increased time required;Verbal cues   Stand to Sit 4  " Minimal assistance   Additional items Assist x 1;Increased time required;Verbal cues   Additional Comments w/ RW and 2-3 present for line management   Functional Mobility   Functional Mobility 4  Minimal assistance   Additional Comments Pt completed few small steps from EOB <> recliner w/ Min A x1 w/ RW and 2-3 present for line management   Additional items Rolling walker   Balance   Static Sitting Good   Dynamic Sitting Fair +   Static Standing Fair   Dynamic Standing Fair -   Ambulatory Poor +   Activity Tolerance   Activity Tolerance Patient tolerated treatment well;Patient limited by fatigue   Medical Staff Made Aware GUDELIA Rivers; ARLEEN Fitzgerald   Nurse Made Aware RN cleared and updated   RUE Assessment   RUE Assessment WFL   LUE Assessment   LUE Assessment WFL   Hand Function   Gross Motor Coordination Functional   Fine Motor Coordination Functional   Cognition   Overall Cognitive Status WFL   Arousal/Participation Alert;Responsive;Arousable;Cooperative   Attention Within functional limits   Orientation Level Oriented X4   Memory Within functional limits   Following Commands Follows all commands and directions without difficulty   Comments Pt pleasant and cooperative; receptive of cardiac education   Assessment   Limitation Decreased ADL status;Decreased endurance;Decreased self-care trans;Decreased high-level ADLs   Prognosis Fair   Assessment Pt is a 61 yo male who originally presented to [facility] with chest pain in which pt found with NSTEMI and transferred to [facility] for cardiac surgery evaluation. Pt s/p AVR and CABG on 1/31. Pt  has a past medical history of Anxiety, Coronary artery disease, Depression, Diabetes mellitus (HCC), MI, old, and PTSD (post-traumatic stress disorder). Pt with active OT orders in which OT consulted to assess pt's functional status and occupational performance to determine safe d/c needs. Pt seen with PT to increase safety, decrease fall risk, and maximize  functional/occupational performance 2* medical complexity which is a regression from pt's functional baseline. Pt reporting that upon d/c, he is unable to return to his previous living situation. Will continue to follow w/ CM for safe d/c planning. PTA, pt was independent in ADLs/IADLs and uses no DME for functional mobility. (+) driving. Currently, pt performing bed mobility w/ supervision, functional transfers/mobility w/ Min A x1 w/ RW, and UB/LB ADL w/ Min A. Pt demonstrates the following limitations/impairments which impact the pt's ability to engage in valued occupations: cardiac/sternal precautions, balance, endurance/activity tolerance, standing tolerance, functional reach, postural/trunk control, strength, safety awareness, and pain. From an OT standpoint, recommend discharge w/ Level 3 vs 4 resources, once medically stable. The patient's raw score on the -PAC Daily Activity Inpatient Short Form is 20. A raw score of greater than or equal to 19 suggests the patient may benefit from discharge to home. Please refer to the recommendation of the Occupational Therapist for safe discharge planning.  Pt would benefit from skilled OT services 2-3x/wk to address acute care needs and underlying performance skills to promote safety, decrease fall risk, and enhance occupational performance to return to OF. Goals to be met within the next 10-14 days.   Goals   Patient Goals To get better   LTG Time Frame 10-14   Long Term Goal #1 See OT goals listed below   Plan   Treatment Interventions ADL retraining;Functional transfer training;UE strengthening/ROM;Endurance training;Patient/family training;Equipment evaluation/education;Compensatory technique education;Continued evaluation;Cardiac education;Energy conservation;Activityengagement   Goal Expiration Date 02/15/25   OT Frequency 2-3x/wk   Discharge Recommendation   Rehab Resource Intensity Level, OT No post-acute rehabilitation needs  (Anticipate no needs pending  continued functional progress; would recommend outpatient cardiac rehab when appropriate; continued coordination with CM to faciliate safe d/c planning)   AM-PAC Daily Activity Inpatient   Lower Body Dressing 3   Bathing 3   Toileting 3   Upper Body Dressing 3   Grooming 4   Eating 4   Daily Activity Raw Score 20   Daily Activity Standardized Score (Calc for Raw Score >=11) 42.03   AM-PAC Applied Cognition Inpatient   Following a Speech/Presentation 4   Understanding Ordinary Conversation 4   Taking Medications 4   Remembering Where Things Are Placed or Put Away 4   Remembering List of 4-5 Errands 4   Taking Care of Complicated Tasks 4   Applied Cognition Raw Score 24   Applied Cognition Standardized Score 62.21   End of Consult   Education Provided Yes   Patient Position at End of Consult Bedside chair;Bed/Chair alarm activated;All needs within reach     OT GOALS:    Pt will improve functional mobility during ADL/IADL/leisure tasks with Mod I using AE/DME prn.    Pt will improve activity tolerance/functional endurance during ADL/IADL/leisure tasks for at least 20 minutes to improve occupational performance and engagement in valued occupations using AE/DME prn.    Pt will engage in ongoing functional/formal cognitive assessments to assist with safe d/c planning and increase safety during functional tasks.    Pt will participate in simulated IADL management task w/ Mod I to increase independence and engagement in valued occupations w/ good balance/safety.    Pt will improve dynamic standing balance for at least 15 minutes with Mod I during functional tasks to decrease fall risk and improve independence and engagement in ADL/IADL/leisure activities.    Pt will follow 100% of multi-step commands in ADL/IADL/leisure activities to improve functional cognition used in functional daily routines.    Pt will complete functional transfers on and off all surfaces used in daily routines with Mod I for safety to maximize  functional/occupational performance.    Pt will complete all bed mobility tasks with Mod I to serve as a prerequisite for EOB/OOB ADL/IADL/leisure tasks, optimize positioning/comfort, and increase functional independence.    Pt will engage in cardiac education packet and independently demonstrate/verbalize safety precautions during ADL/IADL/leisure tasks with energy conservation techniques s/p skilled instruction without verbal cues.    Pt will complete UB ADL tasks with Mod I using AE/DME prn to increase functional independence in ADL/IADL/leisure tasks.    Pt will complete LB ADL tasks with Mod I using AE/DME prn to increase functional independence in ADL/IADL/leisure tasks.     Pt will complete toileting tasks with Mod I and good hygiene/thoroughness using AE/DME prn to increase functional independence.    Pt will independently identify and utilize 2-3 positive coping strategies to enhance overall wellbeing and engagement in valued occupations.    Enedina Arias MS, OTR/L

## 2025-02-01 NOTE — PROGRESS NOTES
Progress Note - Endocrinology   Name: Larry Harper 60 y.o. male I MRN: [id number]  Unit/Bed#: PPHP-314-01 I Date of Admission: 1/25/2025   Date of Service: 2/1/2025 I Hospital Day: 7    Assessment & Plan  Diabetes mellitus (HCC)  Lab Results   Component Value Date    HGBA1C 8.9 (H) 01/24/2025     Recent Labs     02/01/25  0958 02/01/25  1128 02/01/25  1330 02/01/25  1551   POCGLU 141* 133 177* 149*     Uncontrolled type 2 diabetes mellitus on insulin therapy with hyperglycemia   Home regimen: Lantus 35 units in the morning, metformin 1000 mg twice daily, NovoLog 5 units before meals, Jardiance 10 mg daily    24-hour blood sugars reviewed-while on insulin infusion, well-controlled.  Due to 0% oral intake, patient is not appropriate for transition off of insulin infusion    Plan:  Continue insulin infusion, reassess over the next 24 hours and transition once p.o. intake improves  Continue Accu-Cheks every 2 hours, adjust insulin as needed  Monitor for hypoglycemia, treat per protocol  Diet-level 2 carb   Goal blood sugar while in the [id number] mg/dL  Discharge recommendations pending clinical course  Endocrinology will continue following  NSTEMI (non-ST elevated myocardial infarction) (HCC)  Management per primary team  S/P AVR  Severe aortic stenosis-1/31 s/p AVR  Management per primary team  S/P CABG (coronary artery bypass graft)  MV-CAD-1/31 s/p CABG x 1  Management per primary team    24 Hour Events : No major events overnight.  Subjective : RN reports oral intake limited to juice x 2.  Patient confirms he did not eat his breakfast.  When met at bedside, patient was on BiPAP.  He denied experiencing any acute concerns.    Objective :  Temp:  [97.8 °F (36.6 °C)-98.8 °F (37.1 °C)] 97.8 °F (36.6 °C)  HR:  [] 82  BP: (103-145)/(56-78) 103/64  Resp:  [12-42] 17  SpO2:  [89 %-97 %] 97 %  O2 Device: Mid flow nasal cannula  Nasal Cannula O2 Flow Rate (L/min):  [4 L/min-15 L/min] 12 L/min  FiO2 (%):   [60] 60    Physical Exam  Vitals reviewed.   Constitutional:       General: He is not in acute distress.     Appearance: Normal appearance. He is not ill-appearing.   HENT:      Head: Normocephalic and atraumatic.      Mouth/Throat:      Comments: BiPAP mask in place  Eyes:      General: No scleral icterus.     Conjunctiva/sclera: Conjunctivae normal.   Cardiovascular:      Rate and Rhythm: Normal rate and regular rhythm.   Pulmonary:      Effort: Pulmonary effort is normal. No respiratory distress.   Abdominal:      General: There is no distension.   Musculoskeletal:         General: Normal range of motion.      Cervical back: Normal range of motion.   Skin:     General: Skin is dry.      Capillary Refill: Capillary refill takes less than 2 seconds.      Coloration: Skin is not jaundiced or pale.   Neurological:      Mental Status: He is alert and oriented to person, place, and time. Mental status is at baseline.   Psychiatric:         Mood and Affect: Mood normal.         Behavior: Behavior normal.         Lab Results: I have reviewed the following results:CBC/BMP:   .     02/01/25  0350 02/01/25  0351   WBC 19.73*  --    HGB 13.8  --    HCT 41.4  --      --    SODIUM  --  135   K  --  4.0   CL  --  104   CO2  --  24   BUN  --  13   CREATININE  --  0.64   GLUC  --  133   MG  --  2.7      Lab Results   Component Value Date    POCGLU 149 (H) 02/01/2025    POCGLU 177 (H) 02/01/2025    POCGLU 133 02/01/2025    POCGLU 141 (H) 02/01/2025    POCGLU 149 (H) 02/01/2025    POCGLU 144 (H) 02/01/2025    POCGLU 133 02/01/2025    POCGLU 140 02/01/2025    POCGLU 183 (H) 02/01/2025    POCGLU 177 (H) 01/31/2025    POCGLU 182 (H) 01/31/2025    POCGLU 153 (H) 01/31/2025    POCGLU 216 (H) 01/31/2025    POCGLU 141 (H) 01/31/2025    POCGLU 250 (H) 01/31/2025         Imaging Results Review: No pertinent imaging studies reviewed.  Other Study Results Review: No additional pertinent studies reviewed.      Abhishek Garduno  MD  Endocrinology fellow, PGY-4

## 2025-02-01 NOTE — ASSESSMENT & PLAN NOTE
Presented to [facility] with development of chest discomfort while attending his brother's , symptoms similar to prior angina. Resolved with nitroglycerin.   Was transferred to [facility] and underwent a cardiac catheterization on 2025    HS troponin 149, 272, 288, 142  [facility]: Two mid LAD lesions, 70% and 95%.  70% OM 3 stenosis  Transferred to [facility] for CABG/AVR evaluation.   On aspirin, high intensity statin, metoprolol tartrate 12.5 mg twice daily, IV heparin, and nitropaste  POD#! 2025 patient underwent an aortic valve replacement with a 23 mm [employer] top hat mechanical prosthesis and coronary bypass grafting x 1 with a ALMARAZ to the LAD by   ASA 81 mg daily  Amiodarone 200 mg q 8 hr  Heparin gtt to Coumadin 2.5 mg daily   Check INR     Post anemia secondary to surgery  H& H stable, monitor

## 2025-02-01 NOTE — CONSULTS
Endocrinology   Name: Larry Harper 60 y.o. male I MRN: [id number]  Unit/Bed#: PPHP-314-01 I Date of Admission: 1/25/2025   Date of Service: 2/1/2025 I Hospital Day: 7   Inpatient consult to Endocrinology  Consult performed by: Danielle Olivarez MD  Consult ordered by: Niyah White PA-C        Physician Requesting Evaluation: Jarrett Johnson DO       Service already following. Seen consult from 1/27/25 and subsequent progress notes

## 2025-02-01 NOTE — ASSESSMENT & PLAN NOTE
aortic valve replacement with a 23 mm [employer] top hat mechanical prosthesis  and CABG x1 LIMA to LAD

## 2025-02-01 NOTE — RESPIRATORY THERAPY NOTE
02/01/25 0807   Inhalation Therapy Tx   $ Pulse Oximetry Spot Check Charge Completed   SpO2 90 %   Breath Sounds Pre-Treatment Bilateral Diminished;Expiratory wheeze   Delivery Source MDI   Position Up in chair   Resp Comments RN just gave MDI. Pt has some exp wheeze. IS done at this time.

## 2025-02-01 NOTE — PROGRESS NOTES
Progress Note - Cardiology   Name: Larry Harper 60 y.o. male I MRN: [id number]  Unit/Bed#: PPHP-314-01 I Date of Admission: 2025   Date of Service: 2025 I Hospital Day: 7     Assessment & Plan  NSTEMI (non-ST elevated myocardial infarction) (HCC)  Presented to [facility] with development of chest discomfort while attending his brother's , symptoms similar to prior angina. Resolved with nitroglycerin.   Was transferred to [facility] and underwent a cardiac catheterization on 2025    HS troponin 149, 272, 288, 142  [facility]: Two mid LAD lesions, 70% and 95%.  70% OM 3 stenosis  Transferred to [facility] for CABG/AVR evaluation.   On aspirin, high intensity statin, metoprolol tartrate 12.5 mg twice daily, IV heparin, and nitropaste  POD#! 2025 patient underwent an aortic valve replacement with a 23 mm [employer] top hat mechanical prosthesis and coronary bypass grafting x 1 with a ALMARAZ to the LAD by   ASA 81 mg daily  Amiodarone 200 mg q 8 hr  Heparin gtt to Coumadin 2.5 mg daily   Check INR     Post anemia secondary to surgery  H& H stable, monitor  Severe aortic stenosis  TTE 25 LVEF 60%, trileafet severe AS with mild to moderate AI.  Mean gradient 39 mmHg with GRAYSON 1.01 cm²  Known history of AS, follows with a cardiologist in [place]  Generally has been asymptomatic and surgical intervention had not yet been pursued by primary cardiologist  CAD (coronary artery disease)  S/p KAEL to RCA, 2021  Follows with cardiologist in [place]  Primary hypertension  Well controlled  Diabetes mellitus ([facility])  Lab Results   Component Value Date    HGBA1C 8.9 (H) 2025   Poorly controlled.  Management per primary team  Dyslipidemia  Total cholesterol 226, triglycerides 108, HDL 41,  on admission.  Started on atorvastatin 80 mg daily  COPD (chronic obstructive pulmonary disease) ([facility])  Hypoxia on 4- 6 L of nasal cannula  Currently on BiPAP    Periodontal  "disease  S/p dental extractions  PTSD (post-traumatic stress disorder)    S/P AVR  aortic valve replacement with a 23 mm [employer] top hat mechanical prosthesis  and CABG x1 LIMA to LAD  S/P CABG (coronary artery bypass graft)  S/P LIMA to LAD  Subjective   Review of Systems - Negative except cough and headaches    /74 (BP Location: Left arm)   Pulse (!) 108   Temp 98.4 °F (36.9 °C) (Oral)   Resp (!) 34   Ht 5' 7\" (1.702 m)   Wt 72.3 kg (159 lb 6.3 oz)   SpO2 92%   BMI 24.96 kg/m²   I/O last 3 completed shifts:  In: 6065.1 [P.O.:1080; I.V.:3135.1; IV Piggyback:850]  Out: 3375 [Urine:3070; Chest Tube:305]  I/O this shift:  In: 252.4 [I.V.:252.4]  Out: 325 [Urine:325]    Weight (last 2 days)       Date/Time Weight    02/01/25 0600 72.3 (159.39)    02/01/25 0500 72.3 (159.39)    01/31/25 0534 70.4 (155.2)    01/30/25 0455 72.7 (160.27)            Physical Exam     Telemetry Review sinus tachycardia with rates of 100-105    Physical Exam  Vitals reviewed. Tachycardia  Constitutional:       General: Currently on BiPAP due to desaturation.     Appearance: Normal appearance.  No complaints of chest pain just incisional pain when he coughs  Neck:      Vascular: No hepatojugular reflux or JVD.   Cardiovascular:      Rate and Rhythm: Tachycardia     Heart sounds: Slightly distant  heart sounds. No murmur heard.     No friction rub. No gallop.   Sternal dressing clean dry and intact  Mediastinal tubes intact due to high drainage    Pulmonary:      Effort: Pulmonary effort is normal. No respiratory distress.      Breath sounds:diminished. No rales.   On BiPAP due to to desaturation  Abdominal:      General: Bowel sounds are normal. There is no distension.      Palpations: Abdomen is soft.      Tenderness: There is no abdominal tenderness.   Musculoskeletal:         General: No tenderness. Normal range of motion.      Cervical back: Neck supple.      Right lower leg: No edema.      Left lower leg: No edema. "   Skin:     General: Skin is warm and dry.      Capillary Refill: Capillary refill takes less than 2 seconds.      Findings: No erythema.   Neurological:      Mental Status: He is alert and oriented to person, place, and time.   Psychiatric:         Mood and Affect: Mood normal.

## 2025-02-01 NOTE — PHYSICAL THERAPY NOTE
Physical Therapy Evaluation    Patient Name: Larry Harper    Today's Date: 2/1/2025     Problem List  Principal Problem:    NSTEMI (non-ST elevated myocardial infarction) ([facility])  Active Problems:    CAD (coronary artery disease)    Diabetes mellitus ([facility])    PTSD (post-traumatic stress disorder)    Severe aortic stenosis    Primary hypertension    Dyslipidemia    COPD (chronic obstructive pulmonary disease) ([facility])    Periodontal disease    S/P AVR    S/P CABG (coronary artery bypass graft)       Past Medical History  Past Medical History:   Diagnosis Date    Anxiety     Coronary artery disease     Depression     Diabetes mellitus ([facility])     MI, old     PTSD (post-traumatic stress disorder)         Past Surgical History  Past Surgical History:   Procedure Laterality Date    CARDIAC CATHETERIZATION Left 1/24/2025    Procedure: Cardiac Left Heart Cath;  Surgeon: Derick Hancock MD;  Location: [facility];  Service: Cardiology    CARPAL TUNNEL RELEASE Right     CORONARY ANGIOPLASTY WITH STENT PLACEMENT      ROTATOR CUFF REPAIR Right     TOOTH EXTRACTION Bilateral 1/29/2025    Procedure: EXTRACTION TOOTH #2, 12, 14, 18, 31;  Surgeon: Tha Hernandez DMD;  Location: [facility];  Service: Maxillofacial           02/01/25 1315   PT Last Visit   PT Visit Date 02/01/25   Note Type   Note type Evaluation   Pain Assessment   Pain Assessment Tool 0-10   Pain Score 2   Pain Location/Orientation Location: Incision;Location: Chest   Pain Onset/Description Onset: Ongoing   Effect of Pain on Daily Activities limited mobility   Hospital Pain Intervention(s) Ambulation/increased activity;Repositioned;Emotional support   Restrictions/Precautions   Weight Bearing Precautions Per Order No   Other Precautions Cardiac/sternal;Multiple lines;Telemetry;O2;Fall Risk;Pain  (A-line, chest tubes (2), andersen)   Home Living   Type of Home Homeless   Home Equipment Other (Comment)  (no DME)    Additional Comments Pt is working with CM to facilitate a safe d/c   Prior Function   Level of [place] Independent with ADLs;Independent with functional mobility;Independent with IADLS   IADLs Independent with driving   Falls in the last 6 months 0   General   Family/Caregiver Present No   Cognition   Overall Cognitive Status WFL   Arousal/Participation Cooperative   Orientation Level Oriented X4   Following Commands Follows all commands and directions without difficulty   Subjective   Subjective Pt agreeable to participate in PT session   RLE Assessment   RLE Assessment WFL   LLE Assessment   LLE Assessment WFL   Bed Mobility   Supine to Sit 5  Supervision   Additional items HOB elevated;Increased time required;Verbal cues   Sit to Supine Unable to assess   Transfers   Sit to Stand 4  Minimal assistance   Additional items Assist x 1;Increased time required;Verbal cues   Stand to Sit 4  Minimal assistance   Additional items Assist x 1;Increased time required;Verbal cues   Additional Comments +1-2 people for line management   Ambulation/Elevation   Gait pattern Improper Weight shift;Decreased foot clearance;Excessively slow   Gait Assistance 4  Minimal assist   Additional items Assist x 1;Verbal cues   Assistive Device Rolling walker   Distance 2' to chair   Stair Management Assistance Not tested   Ambulation/Elevation Additional Comments +1-2 people for line management   Balance   Static Sitting Fair -   Dynamic Sitting Fair -   Static Standing Poor +   Dynamic Standing Poor +   Ambulatory Poor +   Endurance Deficit   Endurance Deficit Yes   Endurance Deficit Description generalized weakness and fatigue s/p CABG and AVR   Activity Tolerance   Activity Tolerance Patient limited by pain;Patient limited by fatigue   Medical Staff Made Aware ULICES Cobos   Nurse Made Aware yes-RN cleared   Assessment   Prognosis Good   Problem List Decreased strength;Decreased endurance;Decreased mobility;Pain;Impaired  balance   Assessment Pt is a 61 yo male admitted to [facility] with a primary diagnosis NSTEMI (non-ST elevated myocardial infarction) (HCC) s/p CABG and AVR. Pt was seen for a high complexity PT evaluation 2* to  continuous SpO2 monitoring, fall risk, telemetry, ongoing management of medical condition, and a PMH as listed above. Currently, pt requires S for bed mobility and min Ax1 for transfers and ambulation. Pt is performing below baseline 2* to pain, decreased endurance, decreased mobility, decreased balance, and reliance on more restrictive AD. Pt will continue to benefit from skilled acute PT to address functional mobility impairments. Pt left seated in chair with call bell and all personal needs within reach. The patient's AM-PAC Basic Mobility Inpatient Short Form Raw Score is 16. A Raw score of greater than or equal to 16 suggests the patient may benefit from discharge to home. Please also refer to the recommendation of the Physical Therapist for safe discharge planning.   Barriers to Discharge Inaccessible home environment;Decreased caregiver support   Goals   Patient Goals To get better   STG Expiration Date 02/15/25   Short Term Goal #1 In 14 days, Patient will 1) perform bed mobility mod I to improve functional mobility. 2) perform transfers mod I to improve functional mobility and assist in safe d/c. 3) ambulate 100 feet mod I with least restrictive AD to improve functional mobility and assist safe d/c. 4) improve balance by 1/2 grades to decrease fall risk. 5) negotiate 5 stairs mod I to promote independence in the community.   PT Treatment Day 0   Plan   Treatment/Interventions ADL retraining;LE strengthening/ROM;Functional transfer training;Elevations;Therapeutic exercise;Endurance training;Patient/family training;Equipment eval/education;Bed mobility;Gait training;Compensatory technique education;Spoke to nursing;OT   PT Frequency 4-6x/wk   Discharge Recommendation   Rehab Resource Intensity Level, PT  III (Minimum Resource Intensity)   Equipment Recommended Walker   Walker Package Recommended Wheeled walker   AM-PAC Basic Mobility Inpatient   Turning in Flat Bed Without Bedrails 3   Lying on Back to Sitting on Edge of Flat Bed Without Bedrails 3   Moving Bed to Chair 3   Standing Up From Chair Using Arms 3   Walk in Room 3   Climb 3-5 Stairs With Railing 1   Basic Mobility Inpatient Raw Score 16   Basic Mobility Standardized Score 38.32   [facility] [place] Highest Level Of Mobility   -[facility] Goal 5: Stand one or more mins   -HL Achieved 4: Move to chair/commode   Modified Braxton Scale   Modified Talya Scale 4     Lia Guillen, SPT

## 2025-02-01 NOTE — PLAN OF CARE
Problem: PHYSICAL THERAPY ADULT  Goal: Performs mobility at highest level of function for planned discharge setting.  See evaluation for individualized goals.  Description: Treatment/Interventions: ADL retraining, LE strengthening/ROM, Functional transfer training, Elevations, Therapeutic exercise, Endurance training, Patient/family training, Equipment eval/education, Bed mobility, Gait training, Compensatory technique education, Spoke to nursing, OT  Equipment Recommended: Walker       See flowsheet documentation for full assessment, interventions and recommendations.  Outcome: Progressing  Note: Prognosis: Good  Problem List: Decreased strength, Decreased endurance, Decreased mobility, Pain, Impaired balance  Assessment: Pt is a 59 yo male [facility] [place] with a primary diagnosis NSTEMI (non-ST elevated myocardial infarction) (HCC) s/p CABG and AVR. Pt was seen for a high complexity PT evaluation 2* to  continuous SpO2 monitoring, fall risk, telemetry, ongoing management of medical condition, and a PMH as listed above. Currently, pt requires S for bed mobility and min Ax1 for transfers and ambulation. Pt is performing below baseline 2* to pain, decreased endurance, decreased mobility, decreased balance, and reliance on more restrictive AD. Pt will continue to benefit from skilled acute PT to address functional mobility impairments. Pt left seated in chair with call bell and all personal needs within reach. The patient's AM-PAC Basic Mobility Inpatient Short Form Raw Score is 16. A Raw score of greater than or equal to 16 suggests the patient may benefit from discharge to home. Please also refer to the recommendation of the Physical Therapist for safe discharge planning.  Barriers to Discharge: Inaccessible home environment, Decreased caregiver support     Rehab Resource Intensity Level, PT: III (Minimum Resource Intensity)    See flowsheet documentation for full assessment.

## 2025-02-01 NOTE — RESPIRATORY THERAPY NOTE
02/01/25 0845   Respiratory Assessment   Resp Comments Called to set up Bipap for low 02 sats. Pt with rales. Will continue to monitor pt.   Non-Invasive Information   O2 Interface Device Face mask   Non-Invasive Ventilation Mode BiPAP   $ Continous NIV Initial   SpO2 92 %   $ Pulse Oximetry Spot Check Charge Completed   Non-Invasive Settings   IPAP (cm) 12 cm   EPAP (cm) 6 cm   Rate (Set) 8   FiO2 (%) 70   Rise Time 3   Inspiratory Time (Set) 1   Temperature (Set) 31   Non-Invasive Readings   Total Rate 26   Vt (mL) (Mech) 654   MV (Mech) 16.8   Peak Pressure (Obs) 13   Spontaneous Vt (mL) 746   Heater Temperature (Obs) 31   Leak (lpm) 24   Skin Intervention Skin intact   Non-Invasive Alarms   Insp Pressure High (cm H20) 50   Low Insp Pressure Time (sec) 20 sec   MV Low (L/min) 3   Vt High (mL) 2500   Vt Low (mL) 200   High Resp Rate (BPM) 50 BPM   Low Resp Rate (BPM) 8 BPM

## 2025-02-01 NOTE — CONSULTS
Cardiology   Larry Harper 60 y.o. male MRN: [id number]  Unit/Bed#: PPHP-314-01 Encounter: [id number]      Please refer to full cardiology consult from Dr. Ambrose at [facility] on 1/24/2025

## 2025-02-02 VITALS
BODY MASS INDEX: 25.16 KG/M2 | HEIGHT: 67 IN | HEART RATE: 79 BPM | DIASTOLIC BLOOD PRESSURE: 61 MMHG | TEMPERATURE: 98.1 F | RESPIRATION RATE: 18 BRPM | OXYGEN SATURATION: 96 % | WEIGHT: 160.27 LBS | SYSTOLIC BLOOD PRESSURE: 110 MMHG

## 2025-02-02 LAB
ANION GAP SERPL CALCULATED.3IONS-SCNC: 6 MMOL/L (ref 4–13)
APTT PPP: 59 SECONDS (ref 23–34)
APTT PPP: 73 SECONDS (ref 23–34)
APTT PPP: 75 SECONDS (ref 23–34)
BUN SERPL-MCNC: 18 MG/DL (ref 5–25)
CALCIUM SERPL-MCNC: 8 MG/DL (ref 8.4–10.2)
CHLORIDE SERPL-SCNC: 97 MMOL/L (ref 96–108)
CO2 SERPL-SCNC: 27 MMOL/L (ref 21–32)
CREAT SERPL-MCNC: 0.65 MG/DL (ref 0.6–1.3)
ERYTHROCYTE [DISTWIDTH] IN BLOOD BY AUTOMATED COUNT: 15.4 % (ref 11.6–15.1)
GFR SERPL CREATININE-BSD FRML MDRD: 107 ML/MIN/1.73SQ M
GLUCOSE SERPL-MCNC: 118 MG/DL (ref 65–140)
GLUCOSE SERPL-MCNC: 126 MG/DL (ref 65–140)
GLUCOSE SERPL-MCNC: 134 MG/DL (ref 65–140)
GLUCOSE SERPL-MCNC: 137 MG/DL (ref 65–140)
GLUCOSE SERPL-MCNC: 138 MG/DL (ref 65–140)
GLUCOSE SERPL-MCNC: 142 MG/DL (ref 65–140)
GLUCOSE SERPL-MCNC: 146 MG/DL (ref 65–140)
GLUCOSE SERPL-MCNC: 166 MG/DL (ref 65–140)
GLUCOSE SERPL-MCNC: 183 MG/DL (ref 65–140)
GLUCOSE SERPL-MCNC: 183 MG/DL (ref 65–140)
GLUCOSE SERPL-MCNC: 194 MG/DL (ref 65–140)
GLUCOSE SERPL-MCNC: 197 MG/DL (ref 65–140)
HCT VFR BLD AUTO: 33.7 % (ref 36.5–49.3)
HGB BLD-MCNC: 11.2 G/DL (ref 12–17)
INR PPP: 1.31 (ref 0.85–1.19)
MAGNESIUM SERPL-MCNC: 2.2 MG/DL (ref 1.9–2.7)
MCH RBC QN AUTO: 28 PG (ref 26.8–34.3)
MCHC RBC AUTO-ENTMCNC: 33.2 G/DL (ref 31.4–37.4)
MCV RBC AUTO: 84 FL (ref 82–98)
PLATELET # BLD AUTO: 161 THOUSANDS/UL (ref 149–390)
PMV BLD AUTO: 11.5 FL (ref 8.9–12.7)
POTASSIUM SERPL-SCNC: 3.8 MMOL/L (ref 3.5–5.3)
PROTHROMBIN TIME: 16.6 SECONDS (ref 12.3–15)
RBC # BLD AUTO: 4 MILLION/UL (ref 3.88–5.62)
SODIUM SERPL-SCNC: 130 MMOL/L (ref 135–147)
WBC # BLD AUTO: 14.89 THOUSAND/UL (ref 4.31–10.16)

## 2025-02-02 PROCEDURE — 82948 REAGENT STRIP/BLOOD GLUCOSE: CPT

## 2025-02-02 PROCEDURE — 99024 POSTOP FOLLOW-UP VISIT: CPT | Performed by: THORACIC SURGERY (CARDIOTHORACIC VASCULAR SURGERY)

## 2025-02-02 PROCEDURE — 99232 SBSQ HOSP IP/OBS MODERATE 35: CPT | Performed by: INTERNAL MEDICINE

## 2025-02-02 PROCEDURE — 80048 BASIC METABOLIC PNL TOTAL CA: CPT | Performed by: PHYSICIAN ASSISTANT

## 2025-02-02 PROCEDURE — 99233 SBSQ HOSP IP/OBS HIGH 50: CPT | Performed by: ANESTHESIOLOGY

## 2025-02-02 PROCEDURE — 94660 CPAP INITIATION&MGMT: CPT

## 2025-02-02 PROCEDURE — 85027 COMPLETE CBC AUTOMATED: CPT | Performed by: PHYSICIAN ASSISTANT

## 2025-02-02 PROCEDURE — NC001 PR NO CHARGE: Performed by: ANESTHESIOLOGY

## 2025-02-02 PROCEDURE — 94760 N-INVAS EAR/PLS OXIMETRY 1: CPT

## 2025-02-02 PROCEDURE — 85610 PROTHROMBIN TIME: CPT | Performed by: PHYSICIAN ASSISTANT

## 2025-02-02 PROCEDURE — 83735 ASSAY OF MAGNESIUM: CPT | Performed by: PHYSICIAN ASSISTANT

## 2025-02-02 PROCEDURE — 85730 THROMBOPLASTIN TIME PARTIAL: CPT | Performed by: PHYSICIAN ASSISTANT

## 2025-02-02 PROCEDURE — 85730 THROMBOPLASTIN TIME PARTIAL: CPT | Performed by: THORACIC SURGERY (CARDIOTHORACIC VASCULAR SURGERY)

## 2025-02-02 RX ORDER — GUAIFENESIN 600 MG/1
600 TABLET, EXTENDED RELEASE ORAL EVERY 12 HOURS SCHEDULED
Status: DISCONTINUED | OUTPATIENT
Start: 2025-02-02 | End: 2025-02-08 | Stop reason: HOSPADM

## 2025-02-02 RX ORDER — INSULIN LISPRO 100 [IU]/ML
1-6 INJECTION, SOLUTION INTRAVENOUS; SUBCUTANEOUS
Status: DISCONTINUED | OUTPATIENT
Start: 2025-02-03 | End: 2025-02-03

## 2025-02-02 RX ORDER — INSULIN GLARGINE 100 [IU]/ML
24 INJECTION, SOLUTION SUBCUTANEOUS
Status: DISCONTINUED | OUTPATIENT
Start: 2025-02-02 | End: 2025-02-03

## 2025-02-02 RX ORDER — INSULIN LISPRO 100 [IU]/ML
8 INJECTION, SOLUTION INTRAVENOUS; SUBCUTANEOUS
Status: DISCONTINUED | OUTPATIENT
Start: 2025-02-03 | End: 2025-02-03

## 2025-02-02 RX ORDER — WARFARIN SODIUM 2.5 MG/1
2.5 TABLET ORAL
Status: COMPLETED | OUTPATIENT
Start: 2025-02-02 | End: 2025-02-02

## 2025-02-02 RX ADMIN — AMIODARONE HYDROCHLORIDE 200 MG: 200 TABLET ORAL at 13:35

## 2025-02-02 RX ADMIN — MUPIROCIN 1 APPLICATION: 20 OINTMENT TOPICAL at 08:32

## 2025-02-02 RX ADMIN — POTASSIUM CHLORIDE 20 MEQ: 1500 TABLET, EXTENDED RELEASE ORAL at 08:32

## 2025-02-02 RX ADMIN — OXYCODONE HYDROCHLORIDE 10 MG: 10 TABLET ORAL at 10:02

## 2025-02-02 RX ADMIN — METHOCARBAMOL 500 MG: 500 TABLET ORAL at 05:55

## 2025-02-02 RX ADMIN — CHLORHEXIDINE GLUCONATE 0.12% ORAL RINSE 15 ML: 1.2 LIQUID ORAL at 17:43

## 2025-02-02 RX ADMIN — FLUOXETINE HYDROCHLORIDE 40 MG: 20 CAPSULE ORAL at 08:32

## 2025-02-02 RX ADMIN — POTASSIUM CHLORIDE 20 MEQ: 1500 TABLET, EXTENDED RELEASE ORAL at 17:43

## 2025-02-02 RX ADMIN — ATORVASTATIN CALCIUM 80 MG: 80 TABLET, FILM COATED ORAL at 15:38

## 2025-02-02 RX ADMIN — FUROSEMIDE 40 MG: 10 INJECTION, SOLUTION INTRAMUSCULAR; INTRAVENOUS at 08:33

## 2025-02-02 RX ADMIN — ASPIRIN 81 MG: 81 TABLET, COATED ORAL at 08:32

## 2025-02-02 RX ADMIN — LISINOPRIL 2.5 MG: 2.5 TABLET ORAL at 08:33

## 2025-02-02 RX ADMIN — AMIODARONE HYDROCHLORIDE 200 MG: 200 TABLET ORAL at 05:56

## 2025-02-02 RX ADMIN — INSULIN GLARGINE 24 UNITS: 100 INJECTION, SOLUTION SUBCUTANEOUS at 22:32

## 2025-02-02 RX ADMIN — FUROSEMIDE 40 MG: 10 INJECTION, SOLUTION INTRAMUSCULAR; INTRAVENOUS at 15:38

## 2025-02-02 RX ADMIN — METHOCARBAMOL 500 MG: 500 TABLET ORAL at 17:43

## 2025-02-02 RX ADMIN — ACETAMINOPHEN 975 MG: 325 TABLET, FILM COATED ORAL at 13:35

## 2025-02-02 RX ADMIN — ACETAMINOPHEN 975 MG: 325 TABLET, FILM COATED ORAL at 20:34

## 2025-02-02 RX ADMIN — METOPROLOL TARTRATE 25 MG: 25 TABLET, FILM COATED ORAL at 08:33

## 2025-02-02 RX ADMIN — OXYCODONE HYDROCHLORIDE 10 MG: 10 TABLET ORAL at 04:44

## 2025-02-02 RX ADMIN — UMECLIDINIUM 1 PUFF: 62.5 AEROSOL, POWDER ORAL at 08:33

## 2025-02-02 RX ADMIN — ALBUTEROL SULFATE 2 PUFF: 90 AEROSOL, METERED RESPIRATORY (INHALATION) at 10:03

## 2025-02-02 RX ADMIN — CHLORHEXIDINE GLUCONATE 0.12% ORAL RINSE 15 ML: 1.2 LIQUID ORAL at 08:32

## 2025-02-02 RX ADMIN — OXYCODONE HYDROCHLORIDE 10 MG: 10 TABLET ORAL at 13:35

## 2025-02-02 RX ADMIN — PANTOPRAZOLE SODIUM 40 MG: 40 TABLET, DELAYED RELEASE ORAL at 05:56

## 2025-02-02 RX ADMIN — METOPROLOL TARTRATE 25 MG: 25 TABLET, FILM COATED ORAL at 20:34

## 2025-02-02 RX ADMIN — DOCUSATE SODIUM 100 MG: 100 CAPSULE, LIQUID FILLED ORAL at 17:43

## 2025-02-02 RX ADMIN — AMIODARONE HYDROCHLORIDE 200 MG: 200 TABLET ORAL at 20:39

## 2025-02-02 RX ADMIN — METHOCARBAMOL 500 MG: 500 TABLET ORAL at 11:59

## 2025-02-02 RX ADMIN — DOCUSATE SODIUM 100 MG: 100 CAPSULE, LIQUID FILLED ORAL at 08:33

## 2025-02-02 RX ADMIN — OXYCODONE HYDROCHLORIDE 10 MG: 10 TABLET ORAL at 20:34

## 2025-02-02 RX ADMIN — POLYETHYLENE GLYCOL 3350 17 G: 17 POWDER, FOR SOLUTION ORAL at 08:33

## 2025-02-02 RX ADMIN — GUAIFENESIN 600 MG: 600 TABLET, EXTENDED RELEASE ORAL at 20:34

## 2025-02-02 RX ADMIN — HEPARIN SODIUM 16 UNITS/KG/HR: 10000 INJECTION, SOLUTION INTRAVENOUS at 08:56

## 2025-02-02 RX ADMIN — WARFARIN SODIUM 2.5 MG: 2.5 TABLET ORAL at 17:43

## 2025-02-02 RX ADMIN — LIDOCAINE 5% 2 PATCH: 700 PATCH TOPICAL at 08:33

## 2025-02-02 RX ADMIN — ACETAMINOPHEN 975 MG: 325 TABLET, FILM COATED ORAL at 08:32

## 2025-02-02 NOTE — PROGRESS NOTES
Progress Note - Critical Care/ICU   Name: Larry Harper 60 y.o. male I MRN: [id number]  Unit/Bed#: PPHP-314-01 I Date of Admission: 1/25/2025   Date of Service: 2/2/2025 I Hospital Day: 8      Assessment & Plan   Impressions:  Severe AS s/p mechanical AVR  CAD s/p CABG x1  DM2  Depression   COPD  Periodontal disease s/p extractions 1/29  HTN  HLD  Hyponatremia    Neuro:   Analgesia:    ATC tylenol  PRN oxycodone 5-10 mg  Scheduled Robaxin   Lidocaine Patch  Consider short course of toradol  Delirium precautions  Regulate sleep/wake cycle  Restoril 15mg qHS PRN  CAM-ICU daily  Routine neuro checks     CV:   Hemodynamic monitoring goals:  MAP > 65   SBP < 130  Current cardiac infusions:    None  Beta Blocker:    Continue metoprolol tartrate 25 mg BID  Epicardial Pacing Wires:    Epicardial pacing wire plan : Epicardial wires not in place  Post op cardiac surgery medications:    ASA 81 mg daily  Atorvastatin 80 mg qHS  Amiodarone 200 mg PO q8 hours   Lisinopril 2.5 mg   Continue telemetry monitoring   Heparin drip to coumadin (2.5 mg)    Lung:   Expected post operative oxygen requirement of 6 liters/min via nasal cannula  Chest tube drainage diminished; D/C today  Continue incentive spirometry  Encourage coughing and deep breathing     GI:   Continue PPI for stress ulcer prophylaxis  Bowel regimen:  Colace BID  Miralax QD  Dulcolax QD PRN  Monitor abdominal exam and bowel function    FEN:   Diuresis Plan: Lasix 40mg IV BID  Nutrition plan: Cardiac PO diet as tolerated.   Replenish electrolytes with goals: K >4.0, Mag >2.0, and Phos >3.0    :   Strict I/O monitoring   Continue to trend renal function tests      ID:   Monitor WBC and temps   Maintain normothermia    Heme:   Trend Hgb and plts  Transfuse as needed     Endo:   Endocrine has been consulted. Continue home insulin teaching.  Adjust insulin regimen as needed to maintain -180    MSK/Skin:  Early mobility and skin protection:   PT/OT consult as  medically appropriate   Frequent turning and pressure off-loading  Local wound care as needed    Disposition:  Med Surg with Telemetry    ICU Core Measures     A: Assess, Prevent, and Manage Pain Has pain been assessed? Yes  Need for changes to pain regimen? No   B: Both SAT/SAT  N/A   C: Choice of Sedation RASS Goal: N/A patient not on sedation  Need for changes to sedation or analgesia regimen? NA   D: Delirium CAM-ICU: Negative   E: Early Mobility  Plan for early mobility? Yes   F: Family Engagement Plan for family engagement today? Yes       Review of Invasive Devices:      Central access plan: Medications requiring central line      Prophylaxis:  VTE VTE covered by:  heparin (porcine), Intravenous, 16 Units/kg/hr at 02/02/25 0102  warfarin, Oral       Stress Ulcer  covered bypantoprazole (PROTONIX) EC tablet 40 mg [[id number]]            24 Hour Events    24 Hour Events/HPI: POD # 2 s/p mechanical AVR and CABG x1. Intermittent BiPAP with improved sats. EPW removed and heparin drip started.     Subjective   Review of Systems: See HPI for Review of Systems  Objective :    Medications:  Scheduled Continuous   acetaminophen, 975 mg, Q6H While awake  amiodarone, 200 mg, Q8H SONYA  aspirin, 81 mg, Daily  atorvastatin, 80 mg, Daily With Dinner  chlorhexidine, 15 mL, BID  docusate sodium, 100 mg, BID  FLUoxetine, 40 mg, Daily  furosemide, 40 mg, BID (diuretic)  lidocaine, 2 patch, Daily  lisinopril, 2.5 mg, Daily  methocarbamol, 500 mg, Q6H SONYA  metoprolol tartrate, 25 mg, Q12H SONYA  mupirocin, 1 Application, Q12H SONYA  pantoprazole, 40 mg, Daily  polyethylene glycol, 17 g, Daily  potassium chloride, 20 mEq, BID  umeclidinium, 1 puff, Daily  warfarin, 2.5 mg, Once (warfarin)      heparin (porcine), 3-20 Units/kg/hr (Order-Specific), Last Rate: 16 Units/kg/hr (02/02/25 0102)  insulin regular (HumuLIN R,NovoLIN R) 1 Units/mL in sodium chloride 0.9 % 100 mL infusion, 0.3-21 Units/hr, Last Rate: 1.5 Units/hr (02/02/25  0223)  sodium chloride, 20 mL/hr, Last Rate: 20 mL/hr (01/31/25 1305)         Vitals: Invasive Monitoring       Most Recent Min/Max in 24hrs   Temp 97.7 °F (36.5 °C) Temp  Min: 97.7 °F (36.5 °C)  Max: 98.5 °F (36.9 °C)   Pulse 81 Pulse  Min: 71  Max: 110   Resp (!) 25 Resp  Min: 9  Max: 37   /58 BP  Min: 88/55  Max: 137/48   O2 Sat 94 % SpO2  Min: 90 %  Max: 97 %   O2 Device: Mid flow nasal cannula  Nasal Cannula O2 Flow Rate (L/min): 6 L/min Arterial Line  Trina /52  Arterial Line BP  Min: 96/45  Max: 155/71   MAP 72 mmHg  Arterial Line MAP (mmHg)  Min: 61 mmHg  Max: 99 mmHg      I/O Chest tube output (if present)     Intake/Output Summary (Last 24 hours) at 2/2/2025 0618  Last data filed at 2/2/2025 0601  Gross per 24 hour   Intake 1379.54 ml   Output 1595 ml   Net -215.46 ml     UOP: 310/ 8 hours    BM: 0 in the last 24 hours  Weight change: 0.4 kg (14.1 oz)     Mediastinal tubes:  50 mL/8hr  160 mL/24hr   Pleural tubes: -- mL/8hr  -- mL/24hr        Physical Exam   Physical Exam  Vitals reviewed.   Skin:     General: Skin is warm and dry.   Neck:      Vascular: Central line present.   Cardiovascular:      Rate and Rhythm: Normal rate and regular rhythm.      Heart sounds: No murmur heard.     No friction rub.   Abdominal:      Palpations: Abdomen is soft.   Constitutional:       General: He is not in acute distress.     Appearance: He is well-developed.      Interventions: Nasal cannula in place.   Pulmonary:      Effort: Pulmonary effort is normal. No respiratory distress.      Breath sounds: Normal breath sounds.   Neurological:      General: No focal deficit present.      Mental Status: He is alert.          Diagnostic Studies      None.        Labs:  CBC  Recent Labs     02/01/25  0350 02/02/25 0419   WBC 19.73* 14.89*   HGB 13.8 11.2*   HCT 41.4 33.7*    161     BMP  Recent Labs     02/01/25  0351 02/02/25 0419   SODIUM 135 130*   K 4.0 3.8    97   CO2 24 27   AGAP 7 6   BUN 13 18    CREATININE 0.64 0.65   CALCIUM 8.1* 8.0*      Baseline Creat: 0.8   Coags  Recent Labs     02/01/25  0351 02/01/25  1126 02/01/25  2343 02/02/25  0419   INR 1.03  --   --  1.31*   PTT  --    < > 59* 75*    < > = values in this interval not displayed.              Additional Electrolytes  Recent Labs     01/31/25  1230 01/31/25  1259 02/01/25  0351 02/02/25  0419   MG  --   --  2.7 2.2   CAIONIZED 1.20 1.18  --   --           Blood Gas  Recent Labs     02/01/25  0351   PHART 7.431   QFZ2XFI 38.7   PO2ART 72.3*   ABI1PCH 25.2   BEART 1.0   SOURCE Line, Arterial     Recent Labs     02/01/25  0351   SOURCE Line, Arterial    LFTs  No recent results    Infectious  No recent results     No recent results Glucose  Recent Labs     01/31/25  1300 02/01/25  0009 02/01/25  0351 02/02/25  0419   GLUC 279* 175* 133 142*        Collaborative bedside rounds performed with cardiac surgery attending, critical care attending and bedside RN.

## 2025-02-02 NOTE — PROCEDURES
Progress Note - Critical Care/ICU   Name: Larry Harper 60 y.o. male I MRN: [id number]  Unit/Bed#: PPHP-314-01 I Date of Admission: 1/25/2025   Date of Service: 2/2/2025 I Hospital Day: 8      Procedure: Chest Tube Removal    02/02/25    Mediastinal CT x 2 and LEFT pleural CT x 1 d/c'd in typical fashion. Patient tolerated procedure well. No immediate complications. Site dressed with Acticoat dressing. Patient's nurse was made aware of removal.     Niyah White PA-C

## 2025-02-02 NOTE — RESPIRATORY THERAPY NOTE
02/02/25 0733   Respiratory Assessment   Assessment Type Assess only   Cough Non-productive;Strong   Resp Comments Pt alert, OOB BS decreased clearer this am.   Oxygen Therapy/Pulse Ox   O2 Device Mid flow nasal cannula   O2 Therapy Oxygen humidified   O2 Flow Rate (L/min) 6 L/min   SpO2 94 %   SpO2 Activity At Rest   $ Pulse Oximetry Spot Check Charge Completed

## 2025-02-02 NOTE — ASSESSMENT & PLAN NOTE
Lab Results   Component Value Date    HGBA1C 8.9 (H) 01/24/2025     Recent Labs     02/02/25  1006 02/02/25  1124 02/02/25  1331 02/02/25  1533   POCGLU 183* 126 138 183*     Uncontrolled type 2 diabetes mellitus on insulin therapy with hyperglycemia   Home regimen: Lantus 35 units in the morning, metformin 1000 mg twice daily, NovoLog 5 units before meals, Jardiance 10 mg daily    24-hour blood sugars reviewed-while on insulin infusion, well-controlled.  Has had improvement in oral intake, and is ready for transition off of insulin infusion.    Plan:  Start Lantus 24 units at bedtime-overlap insulin infusion by 2 hours, then discontinue insulin infusion  Tomorrow start Humalog 8 units prior to meals  Tomorrow start correctional insulin AC and HS   Continue Accu-Cheks every 2 hours while on insulin infusion, then once on basal bolus regimen AC and HS   Monitor for hypoglycemia, treat per protocol  Diet-level 2 carb   Goal blood sugar while in the [id number] mg/dL  Discharge recommendations pending clinical course  Endocrinology will continue following

## 2025-02-02 NOTE — PROGRESS NOTES
Progress Note - Endocrinology   Name: Larry Harper 60 y.o. male I MRN: [id number]  Unit/Bed#: PPHP-324-01 I Date of Admission: 1/25/2025   Date of Service: 2/2/2025 I Hospital Day: 8    Assessment & Plan  Diabetes mellitus (HCC)  Lab Results   Component Value Date    HGBA1C 8.9 (H) 01/24/2025     Recent Labs     02/02/25  1006 02/02/25  1124 02/02/25  1331 02/02/25  1533   POCGLU 183* 126 138 183*     Uncontrolled type 2 diabetes mellitus on insulin therapy with hyperglycemia   Home regimen: Lantus 35 units in the morning, metformin 1000 mg twice daily, NovoLog 5 units before meals, Jardiance 10 mg daily    24-hour blood sugars reviewed-while on insulin infusion, well-controlled.  Has had improvement in oral intake, and is ready for transition off of insulin infusion.    Plan:  Start Lantus 24 units at bedtime-overlap insulin infusion by 2 hours, then discontinue insulin infusion  Tomorrow start Humalog 8 units prior to meals  Tomorrow start correctional insulin AC and HS   Continue Accu-Cheks every 2 hours while on insulin infusion, then once on basal bolus regimen AC and HS   Monitor for hypoglycemia, treat per protocol  Diet-level 2 carb   Goal blood sugar while in the [id number] mg/dL  Discharge recommendations pending clinical course  Endocrinology will continue following  NSTEMI (non-ST elevated myocardial infarction) (HCC)  Management per primary team  S/P AVR  Severe aortic stenosis-1/31 s/p AVR  Management per primary team  S/P CABG (coronary artery bypass graft)  MV-CAD-1/31 s/p CABG x 1  Management per primary team    24 Hour Events : No major events overnight.  Subjective : Patient met at bedside.  Reports feeling overall well.  Denies any acute concerns or complaints.  Reports eating 100% of his breakfast and lunch.  Also reporting ambulating without difficulty today.    Objective :  Temp:  [97.7 °F (36.5 °C)-98.3 °F (36.8 °C)] 98.1 °F (36.7 °C)  HR:  [71-90] 84  BP: ()/(48-74)  103/56  Resp:  [12-37] 18  SpO2:  [90 %-96 %] 91 %  O2 Device: None (Room air)  Nasal Cannula O2 Flow Rate (L/min):  [3 L/min-8 L/min] 3 L/min  FiO2 (%):  [2-60] 2    Physical Exam  Vitals reviewed.   Constitutional:       General: He is not in acute distress.     Appearance: Normal appearance. He is not ill-appearing.   HENT:      Head: Normocephalic and atraumatic.   Eyes:      General: No scleral icterus.     Conjunctiva/sclera: Conjunctivae normal.   Cardiovascular:      Rate and Rhythm: Normal rate and regular rhythm.   Pulmonary:      Effort: Pulmonary effort is normal. No respiratory distress.   Abdominal:      General: There is no distension.   Musculoskeletal:         General: Normal range of motion.      Cervical back: Normal range of motion.   Skin:     General: Skin is dry.      Capillary Refill: Capillary refill takes less than 2 seconds.      Coloration: Skin is not jaundiced or pale.   Neurological:      Mental Status: He is alert and oriented to person, place, and time. Mental status is at baseline.   Psychiatric:         Mood and Affect: Mood normal.         Behavior: Behavior normal.         Lab Results: I have reviewed the following results:CBC/BMP:   .     02/02/25  0419   WBC 14.89*   HGB 11.2*   HCT 33.7*      SODIUM 130*   K 3.8   CL 97   CO2 27   BUN 18   CREATININE 0.65   GLUC 142*   MG 2.2      Lab Results   Component Value Date    POCGLU 183 (H) 02/02/2025    POCGLU 138 02/02/2025    POCGLU 126 02/02/2025    POCGLU 183 (H) 02/02/2025    POCGLU 197 (H) 02/02/2025    POCGLU 137 02/02/2025    POCGLU 134 02/02/2025    POCGLU 118 02/02/2025    POCGLU 141 (H) 02/01/2025    POCGLU 171 (H) 02/01/2025    POCGLU 137 02/01/2025    POCGLU 111 02/01/2025    POCGLU 149 (H) 02/01/2025    POCGLU 177 (H) 02/01/2025    POCGLU 133 02/01/2025         Imaging Results Review: No pertinent imaging studies reviewed.  Other Study Results Review: No additional pertinent studies reviewed.      Abhishek  MD Laureen  Endocrinology fellow, PGY-4

## 2025-02-02 NOTE — PROGRESS NOTES
Progress Note - Cardiothoracic Surgery   Larry Harper 60 y.o. male MRN: [id number]  Unit/Bed#: PPHP-314-01 Encounter: [id number]      POD # 2 s/p CABG x1, mechanical AVR    Pt seen/examined.  Interval history and data reviewed with critical care team.  Pt doing well.  No specific complaints.  Off bipap      Medications:   Scheduled Meds:  Current Facility-Administered Medications   Medication Dose Route Frequency Provider Last Rate    acetaminophen  975 mg Oral Q6H While awake James Amaya PA-C      albuterol  2 puff Inhalation Q4H PRN Jarrett Johnson DO      amiodarone  200 mg Oral Q8H [facility] Anselmo Cox PA-C      aspirin  81 mg Oral Daily James Amaya PA-C      atorvastatin  80 mg Oral Daily With Dinner James Amaya PA-C      bisacodyl  10 mg Rectal Daily PRN James Amaya PA-C      chlorhexidine  15 mL Mouth/Throat BID James Amaya PA-C      docusate sodium  100 mg Oral BID Niyah White PA-C      FLUoxetine  40 mg Oral Daily James Amaya PA-C      furosemide  40 mg Intravenous BID (diuretic) Niyah White PA-C      heparin (porcine)  3-20 Units/kg/hr (Order-Specific) Intravenous Titrated Niyah White PA-C 16 Units/kg/hr (02/02/25 0856)    insulin regular (HumuLIN R,NovoLIN R) 1 Units/mL in sodium chloride 0.9 % 100 mL infusion  0.3-21 Units/hr Intravenous Titrated James Amaya PA-C 6 Units/hr (02/02/25 1007)    lidocaine  2 patch Topical Daily Anselmo Cox PA-C      lisinopril  2.5 mg Oral Daily Niyah White PA-C      methocarbamol  500 mg Oral Q6H [facility] Anselmo Cox PA-C      metoprolol tartrate  25 mg Oral Q12H [facility] Niyah White PA-C      mupirocin  1 Application Nasal Q12H SONYA James Amaya PA-C      ondansetron  4 mg Intravenous Q6H PRN James Amaya PA-C      oxyCODONE  5 mg Oral Q4H PRN Anselmo Cox PA-C      Or    oxyCODONE  10 mg Oral Q4H PRN Anselmo Cox PA-C      pantoprazole  40 mg Oral Daily James Amaya PA-C      polyethylene glycol  17 g Oral Daily James  "LEON Amaya      potassium chloride  20 mEq Oral BID Niyah White PA-C      sodium chloride  20 mL/hr Intravenous Continuous James Amaya PA-C 20 mL/hr (01/31/25 1305)    temazepam  15 mg Oral HS PRN Niyah White PA-C      umeclidinium  1 puff Inhalation Daily James Amaya PA-C      warfarin  2.5 mg Oral Once (warfarin) Niyah White PA-C       Continuous Infusions:heparin (porcine), 3-20 Units/kg/hr (Order-Specific), Last Rate: 16 Units/kg/hr (02/02/25 0856)  insulin regular (HumuLIN R,NovoLIN R) 1 Units/mL in sodium chloride 0.9 % 100 mL infusion, 0.3-21 Units/hr, Last Rate: 6 Units/hr (02/02/25 1007)  sodium chloride, 20 mL/hr, Last Rate: 20 mL/hr (01/31/25 1305)      PRN Meds:.  albuterol    bisacodyl    ondansetron    oxyCODONE **OR** oxyCODONE    temazepam    Vitals: Blood pressure 123/61, pulse 83, temperature 98 °F (36.7 °C), temperature source Oral, resp. rate 20, height 5' 7\" (1.702 m), weight 72.7 kg (160 lb 4.4 oz), SpO2 96%.,Body mass index is 25.1 kg/m².  I/O last 24 hours:  In: 1445.5 [P.O.:360; I.V.:1085.5]  Out: 2020 [Urine:1860; Chest Tube:160]  Invasive Devices       Central Venous Catheter Line  Duration             CVC Central Lines 01/31/25 Triple Right Internal jugular 2 days              Peripheral Intravenous Line  Duration             Peripheral IV 01/29/25 Left Antecubital 3 days                      Lab, Imaging and other studies:   Results from last 7 days   Lab Units 02/02/25  0419 02/01/25  0350 01/31/25  1947 01/31/25  1300 01/31/25  0755 01/28/25  0518   WBC Thousand/uL 14.89* 19.73*  --   --   --  6.58   HEMOGLOBIN g/dL 11.2* 13.8 14.4 13.8  --  13.6   I STAT HEMOGLOBIN   --   --   --   --    < >  --    HEMATOCRIT % 33.7* 41.4 44.3 41.9  --  41.8   HEMATOCRIT, ISTAT   --   --   --   --    < >  --    PLATELETS Thousands/uL 161 182  --  219   < > 241    < > = values in this interval not displayed.     Results from last 7 days   Lab Units 02/02/25  0419 02/01/25  0351 " 02/01/25  0009 01/31/25  1300 01/31/25  1259   POTASSIUM mmol/L 3.8 4.0 4.5   < >  --    CHLORIDE mmol/L 97 104 106   < >  --    CO2 mmol/L 27 24 23   < >  --    CO2, I-STAT mmol/L  --   --   --   --  23   BUN mg/dL 18 13 13   < >  --    CREATININE mg/dL 0.65 0.64 0.70   < >  --    GLUCOSE, ISTAT mg/dl  --   --   --   --  248*   CALCIUM mg/dL 8.0* 8.1* 8.2*   < >  --     < > = values in this interval not displayed.     Results from last 7 days   Lab Units 02/02/25  0419 02/01/25  2343 02/01/25  1642 02/01/25  1126 02/01/25  0351 01/31/25  1300   INR  1.31*  --   --   --  1.03 1.22*   PTT seconds 75* 59* 44*   < >  --  37*    < > = values in this interval not displayed.     Recent Labs     02/01/25  0351   PHART 7.431   KKU8TMR 25.2   PO2ART 72.3*   OVU5VDI 38.7   BEART 1.0           Plan:    Remove drains  Heparin to coumadin bridge  Wean bipap as tolerated  Incentive spirometry.  Diuresis.  PO ASA/Statin/B blocker.  Transfer to floor        SIGNATURE: Jarrett Johnson DO  DATE: February 2, 2025  TIME: 10:27 AM

## 2025-02-02 NOTE — ASSESSMENT & PLAN NOTE
Presented to [facility] with development of chest discomfort while attending his brother's , symptoms similar to prior angina. Resolved with nitroglycerin.   Was transferred to [facility] and underwent a cardiac catheterization on 2025    HS troponin 149, 272, 288, 142  [facility]: Two mid LAD lesions, 70% and 95%.  70% OM 3 stenosis  Transferred to [facility] for CABG/AVR evaluation.   On aspirin, high intensity statin, metoprolol tartrate 12.5 mg twice daily, IV heparin, and nitropaste  POD#! 2025 patient underwent an aortic valve replacement with a 23 mm [employer] top hat mechanical prosthesis and coronary bypass grafting x 1 with a ALMARAZ to the LAD by   ASA 81 mg daily  Amiodarone 200 mg q 8 hr  Heparin gtt to Coumadin 2.5 mg daily    INR 1.13  Chest tubes and pacer wires are out  Post anemia secondary to surgery  H& H stable 11.2 and 33  Platelet count 161,000

## 2025-02-02 NOTE — PROGRESS NOTES
Progress Note - Cardiology   Name: Larry Harper 60 y.o. male I MRN: [id number]  Unit/Bed#: PPHP-314-01 I Date of Admission: 2025   Date of Service: 2025 I Hospital Day: 8     Assessment & Plan  NSTEMI (non-ST elevated myocardial infarction) (HCC)  Presented to [facility] with development of chest discomfort while attending his brother's , symptoms similar to prior angina. Resolved with nitroglycerin.   Was transferred to [facility] and underwent a cardiac catheterization on 2025    HS troponin 149, 272, 288, 142  [facility]: Two mid LAD lesions, 70% and 95%.  70% OM 3 stenosis  Transferred to [facility] for CABG/AVR evaluation.   On aspirin, high intensity statin, metoprolol tartrate 12.5 mg twice daily, IV heparin, and nitropaste  POD#! 2025 patient underwent an aortic valve replacement with a 23 mm [employer] top hat mechanical prosthesis and coronary bypass grafting x 1 with a ALMARAZ to the LAD by   ASA 81 mg daily  Amiodarone 200 mg q 8 hr  Heparin gtt to Coumadin 2.5 mg daily    INR 1.13  Chest tubes and pacer wires are out  Post anemia secondary to surgery  H& H stable 11.2 and 33  Platelet count 161,000  Severe aortic stenosis  TTE 25 LVEF 60%, trileafet severe AS with mild to moderate AI.  Mean gradient 39 mmHg with GRAYSON 1.01 cm²  Known history of AS, follows with a cardiologist in [place]  Generally has been asymptomatic and surgical intervention had not yet been pursued by primary cardiologist  CAD (coronary artery disease)  S/p KAEL to RCA, 2021  Follows with cardiologist in [place]  Primary hypertension  Well controlled  Diabetes mellitus ([facility])  Lab Results   Component Value Date    HGBA1C 8.9 (H) 2025   Poorly controlled.  Management per primary team  Dyslipidemia  Total cholesterol 226, triglycerides 108, HDL 41,  on admission.  Started on atorvastatin 80 mg daily  COPD (chronic obstructive pulmonary disease) ([facility])  Hypoxia now  on 3 L nasal cannula   BiPAP continued    Periodontal disease  S/p dental extractions  PTSD (post-traumatic stress disorder)    S/P AVR  aortic valve replacement with a 23 mm [employer] top hat mechanical prosthesis  and CABG x1 LIMA to LAD  S/P CABG (coronary artery bypass graft)  S/P LIMA to LAD    Subjective   Chief Complaint:   Feels much better today.  Minor complaints of incisional pain appendectomy to have chest tubes and pacer wires out      Temp:  [97.7 °F (36.5 °C)-98.5 °F (36.9 °C)] 98 °F (36.7 °C)  HR:  [71-91] 83  BP: ()/(48-78) 123/61  Resp:  [9-37] 20  SpO2:  [93 %-97 %] 96 %  O2 Device: Nasal cannula  Nasal Cannula O2 Flow Rate (L/min):  [3 L/min-12 L/min] 3 L/min  FiO2 (%):  [60] 60  Orthostatic Blood Pressures      Flowsheet Row Most Recent Value   Blood Pressure 123/61 filed at 02/02/2025 0832   Patient Position - Orthostatic VS Sitting filed at 02/02/2025 0800          Weight (last 2 days)       Date/Time Weight    02/02/25 0558 72.7 (160.27)    02/01/25 0600 72.3 (159.39)    02/01/25 0500 72.3 (159.39)    01/31/25 0534 70.4 (155.2)             Physical Exam  Vitals reviewed.   Constitutional:       Appearance: Normal appearance.   HENT:      Head: Normocephalic and atraumatic.      Mouth/Throat:      Mouth: Mucous membranes are moist.   Cardiovascular:      Rate and Rhythm: Normal rate and regular rhythm.      Heart sounds: Normal heart sounds.      Comments: Crisp prosthetic valvular sounds audible  Pulmonary:      Effort: Pulmonary effort is normal.      Breath sounds: Normal breath sounds.   Musculoskeletal:      Right lower leg: No edema.      Left lower leg: No edema.   Skin:     Capillary Refill: Capillary refill takes 2 to 3 seconds.   Neurological:      Mental Status: He is alert and oriented to person, place, and time.   Psychiatric:         Behavior: Behavior normal.           Lab Results: I have reviewed the following results:  Results from last 7 days   Lab Units  02/02/25 0419 02/01/25  0350 01/31/25  1947 01/31/25  1300 01/31/25  0755 01/28/25  0518   WBC Thousand/uL 14.89* 19.73*  --   --   --  6.58   HEMOGLOBIN g/dL 11.2* 13.8 14.4 13.8  --  13.6   I STAT HEMOGLOBIN   --   --   --   --    < >  --    HEMATOCRIT % 33.7* 41.4 44.3 41.9  --  41.8   HEMATOCRIT, ISTAT   --   --   --   --    < >  --    PLATELETS Thousands/uL 161 182  --  219   < > 241    < > = values in this interval not displayed.     Results from last 7 days   Lab Units 02/02/25 0419 02/01/25  0351 02/01/25  0009 01/31/25  1300 01/31/25  1259   POTASSIUM mmol/L 3.8 4.0 4.5   < >  --    CHLORIDE mmol/L 97 104 106   < >  --    CO2 mmol/L 27 24 23   < >  --    CO2, I-STAT mmol/L  --   --   --   --  23   BUN mg/dL 18 13 13   < >  --    CREATININE mg/dL 0.65 0.64 0.70   < >  --    GLUCOSE, ISTAT mg/dl  --   --   --   --  248*   CALCIUM mg/dL 8.0* 8.1* 8.2*   < >  --     < > = values in this interval not displayed.     Results from last 7 days   Lab Units 02/02/25 0419 02/01/25  2343 02/01/25  1642 02/01/25  1126 02/01/25  0351 01/31/25  1300   INR  1.31*  --   --   --  1.03 1.22*   PTT seconds 75* 59* 44*   < >  --  37*    < > = values in this interval not displayed.     Lab Results   Component Value Date    HGBA1C 8.9 (H) 01/24/2025     VTE Pharmacologic Prophylaxis: Warfarin (Coumadin) Heparin gtt  VTE Mechanical Prophylaxis: sequential compression device  Tele:  NSR

## 2025-02-03 PROBLEM — D72.829 LEUKOCYTOSIS: Status: ACTIVE | Noted: 2025-02-03

## 2025-02-03 PROBLEM — Z79.01 ANTICOAGULATED ON COUMADIN: Status: ACTIVE | Noted: 2025-02-03

## 2025-02-03 LAB
ANION GAP SERPL CALCULATED.3IONS-SCNC: 8 MMOL/L (ref 4–13)
APTT PPP: 75 SECONDS (ref 23–34)
BUN SERPL-MCNC: 20 MG/DL (ref 5–25)
CALCIUM SERPL-MCNC: 8.1 MG/DL (ref 8.4–10.2)
CHLORIDE SERPL-SCNC: 97 MMOL/L (ref 96–108)
CO2 SERPL-SCNC: 26 MMOL/L (ref 21–32)
CREAT SERPL-MCNC: 0.73 MG/DL (ref 0.6–1.3)
ERYTHROCYTE [DISTWIDTH] IN BLOOD BY AUTOMATED COUNT: 14.9 % (ref 11.6–15.1)
GFR SERPL CREATININE-BSD FRML MDRD: 102 ML/MIN/1.73SQ M
GLUCOSE SERPL-MCNC: 184 MG/DL (ref 65–140)
GLUCOSE SERPL-MCNC: 185 MG/DL (ref 65–140)
GLUCOSE SERPL-MCNC: 192 MG/DL (ref 65–140)
GLUCOSE SERPL-MCNC: 218 MG/DL (ref 65–140)
GLUCOSE SERPL-MCNC: 225 MG/DL (ref 65–140)
HCT VFR BLD AUTO: 34.2 % (ref 36.5–49.3)
HGB BLD-MCNC: 11.1 G/DL (ref 12–17)
INR PPP: 1.51 (ref 0.85–1.19)
MAGNESIUM SERPL-MCNC: 1.8 MG/DL (ref 1.9–2.7)
MCH RBC QN AUTO: 27.2 PG (ref 26.8–34.3)
MCHC RBC AUTO-ENTMCNC: 32.5 G/DL (ref 31.4–37.4)
MCV RBC AUTO: 84 FL (ref 82–98)
PLATELET # BLD AUTO: 206 THOUSANDS/UL (ref 149–390)
PMV BLD AUTO: 11.5 FL (ref 8.9–12.7)
POTASSIUM SERPL-SCNC: 4.2 MMOL/L (ref 3.5–5.3)
PROTHROMBIN TIME: 18.4 SECONDS (ref 12.3–15)
RBC # BLD AUTO: 4.08 MILLION/UL (ref 3.88–5.62)
SODIUM SERPL-SCNC: 131 MMOL/L (ref 135–147)
WBC # BLD AUTO: 14.12 THOUSAND/UL (ref 4.31–10.16)

## 2025-02-03 PROCEDURE — 85027 COMPLETE CBC AUTOMATED: CPT | Performed by: PHYSICIAN ASSISTANT

## 2025-02-03 PROCEDURE — 94660 CPAP INITIATION&MGMT: CPT

## 2025-02-03 PROCEDURE — 99233 SBSQ HOSP IP/OBS HIGH 50: CPT | Performed by: INTERNAL MEDICINE

## 2025-02-03 PROCEDURE — 85610 PROTHROMBIN TIME: CPT | Performed by: PHYSICIAN ASSISTANT

## 2025-02-03 PROCEDURE — 83735 ASSAY OF MAGNESIUM: CPT | Performed by: PHYSICIAN ASSISTANT

## 2025-02-03 PROCEDURE — 85730 THROMBOPLASTIN TIME PARTIAL: CPT | Performed by: THORACIC SURGERY (CARDIOTHORACIC VASCULAR SURGERY)

## 2025-02-03 PROCEDURE — 80048 BASIC METABOLIC PNL TOTAL CA: CPT | Performed by: PHYSICIAN ASSISTANT

## 2025-02-03 PROCEDURE — 99024 POSTOP FOLLOW-UP VISIT: CPT | Performed by: PHYSICIAN ASSISTANT

## 2025-02-03 PROCEDURE — 82948 REAGENT STRIP/BLOOD GLUCOSE: CPT

## 2025-02-03 RX ORDER — INSULIN LISPRO 100 [IU]/ML
2-12 INJECTION, SOLUTION INTRAVENOUS; SUBCUTANEOUS
Status: DISCONTINUED | OUTPATIENT
Start: 2025-02-03 | End: 2025-02-08 | Stop reason: HOSPADM

## 2025-02-03 RX ORDER — INSULIN LISPRO 100 [IU]/ML
10 INJECTION, SOLUTION INTRAVENOUS; SUBCUTANEOUS
Status: DISCONTINUED | OUTPATIENT
Start: 2025-02-03 | End: 2025-02-04

## 2025-02-03 RX ORDER — TORSEMIDE 20 MG/1
20 TABLET ORAL 2 TIMES DAILY
Status: DISCONTINUED | OUTPATIENT
Start: 2025-02-03 | End: 2025-02-04

## 2025-02-03 RX ORDER — WARFARIN SODIUM 3 MG/1
3 TABLET ORAL
Status: COMPLETED | OUTPATIENT
Start: 2025-02-03 | End: 2025-02-03

## 2025-02-03 RX ORDER — INSULIN GLARGINE 100 [IU]/ML
30 INJECTION, SOLUTION SUBCUTANEOUS
Status: DISCONTINUED | OUTPATIENT
Start: 2025-02-03 | End: 2025-02-04

## 2025-02-03 RX ORDER — INSULIN LISPRO 100 [IU]/ML
1-6 INJECTION, SOLUTION INTRAVENOUS; SUBCUTANEOUS
Status: DISCONTINUED | OUTPATIENT
Start: 2025-02-03 | End: 2025-02-08 | Stop reason: HOSPADM

## 2025-02-03 RX ADMIN — METHOCARBAMOL 500 MG: 500 TABLET ORAL at 00:19

## 2025-02-03 RX ADMIN — METHOCARBAMOL 500 MG: 500 TABLET ORAL at 05:08

## 2025-02-03 RX ADMIN — UMECLIDINIUM 1 PUFF: 62.5 AEROSOL, POWDER ORAL at 09:19

## 2025-02-03 RX ADMIN — AMIODARONE HYDROCHLORIDE 200 MG: 200 TABLET ORAL at 14:46

## 2025-02-03 RX ADMIN — FUROSEMIDE 40 MG: 10 INJECTION, SOLUTION INTRAMUSCULAR; INTRAVENOUS at 07:37

## 2025-02-03 RX ADMIN — FLUOXETINE HYDROCHLORIDE 40 MG: 20 CAPSULE ORAL at 09:19

## 2025-02-03 RX ADMIN — METHOCARBAMOL 500 MG: 500 TABLET ORAL at 17:15

## 2025-02-03 RX ADMIN — GUAIFENESIN 600 MG: 600 TABLET, EXTENDED RELEASE ORAL at 09:19

## 2025-02-03 RX ADMIN — OXYCODONE HYDROCHLORIDE 10 MG: 10 TABLET ORAL at 07:37

## 2025-02-03 RX ADMIN — CHLORHEXIDINE GLUCONATE 0.12% ORAL RINSE 15 ML: 1.2 LIQUID ORAL at 21:27

## 2025-02-03 RX ADMIN — ASPIRIN 81 MG: 81 TABLET, COATED ORAL at 09:19

## 2025-02-03 RX ADMIN — LIDOCAINE 5% 2 PATCH: 700 PATCH TOPICAL at 09:19

## 2025-02-03 RX ADMIN — LISINOPRIL 2.5 MG: 2.5 TABLET ORAL at 09:19

## 2025-02-03 RX ADMIN — INSULIN LISPRO 8 UNITS: 100 INJECTION, SOLUTION INTRAVENOUS; SUBCUTANEOUS at 07:34

## 2025-02-03 RX ADMIN — INSULIN LISPRO 2 UNITS: 100 INJECTION, SOLUTION INTRAVENOUS; SUBCUTANEOUS at 10:55

## 2025-02-03 RX ADMIN — INSULIN LISPRO 4 UNITS: 100 INJECTION, SOLUTION INTRAVENOUS; SUBCUTANEOUS at 17:17

## 2025-02-03 RX ADMIN — AMIODARONE HYDROCHLORIDE 200 MG: 200 TABLET ORAL at 21:23

## 2025-02-03 RX ADMIN — INSULIN LISPRO 8 UNITS: 100 INJECTION, SOLUTION INTRAVENOUS; SUBCUTANEOUS at 12:28

## 2025-02-03 RX ADMIN — METOPROLOL TARTRATE 25 MG: 25 TABLET, FILM COATED ORAL at 21:23

## 2025-02-03 RX ADMIN — WARFARIN SODIUM 3 MG: 3 TABLET ORAL at 17:15

## 2025-02-03 RX ADMIN — OXYCODONE HYDROCHLORIDE 10 MG: 10 TABLET ORAL at 03:33

## 2025-02-03 RX ADMIN — TORSEMIDE 20 MG: 20 TABLET ORAL at 09:19

## 2025-02-03 RX ADMIN — TORSEMIDE 20 MG: 20 TABLET ORAL at 17:15

## 2025-02-03 RX ADMIN — HEPARIN SODIUM 16 UNITS/KG/HR: 10000 INJECTION, SOLUTION INTRAVENOUS at 05:08

## 2025-02-03 RX ADMIN — METHOCARBAMOL 500 MG: 500 TABLET ORAL at 12:26

## 2025-02-03 RX ADMIN — CHLORHEXIDINE GLUCONATE 0.12% ORAL RINSE 15 ML: 1.2 LIQUID ORAL at 09:19

## 2025-02-03 RX ADMIN — ATORVASTATIN CALCIUM 80 MG: 80 TABLET, FILM COATED ORAL at 17:15

## 2025-02-03 RX ADMIN — METOPROLOL TARTRATE 25 MG: 25 TABLET, FILM COATED ORAL at 09:19

## 2025-02-03 RX ADMIN — ALBUTEROL SULFATE 2 PUFF: 90 AEROSOL, METERED RESPIRATORY (INHALATION) at 14:49

## 2025-02-03 RX ADMIN — INSULIN LISPRO 1 UNITS: 100 INJECTION, SOLUTION INTRAVENOUS; SUBCUTANEOUS at 21:25

## 2025-02-03 RX ADMIN — ACETAMINOPHEN 975 MG: 325 TABLET, FILM COATED ORAL at 07:37

## 2025-02-03 RX ADMIN — DOCUSATE SODIUM 100 MG: 100 CAPSULE, LIQUID FILLED ORAL at 09:19

## 2025-02-03 RX ADMIN — ACETAMINOPHEN 975 MG: 325 TABLET, FILM COATED ORAL at 14:46

## 2025-02-03 RX ADMIN — AMIODARONE HYDROCHLORIDE 200 MG: 200 TABLET ORAL at 05:08

## 2025-02-03 RX ADMIN — INSULIN LISPRO 1 UNITS: 100 INJECTION, SOLUTION INTRAVENOUS; SUBCUTANEOUS at 06:37

## 2025-02-03 RX ADMIN — POTASSIUM CHLORIDE 20 MEQ: 1500 TABLET, EXTENDED RELEASE ORAL at 17:15

## 2025-02-03 RX ADMIN — POLYETHYLENE GLYCOL 3350 17 G: 17 POWDER, FOR SOLUTION ORAL at 09:19

## 2025-02-03 RX ADMIN — DOCUSATE SODIUM 100 MG: 100 CAPSULE, LIQUID FILLED ORAL at 17:15

## 2025-02-03 RX ADMIN — INSULIN LISPRO 10 UNITS: 100 INJECTION, SOLUTION INTRAVENOUS; SUBCUTANEOUS at 17:16

## 2025-02-03 RX ADMIN — OXYCODONE HYDROCHLORIDE 10 MG: 10 TABLET ORAL at 19:49

## 2025-02-03 RX ADMIN — PANTOPRAZOLE SODIUM 40 MG: 40 TABLET, DELAYED RELEASE ORAL at 05:08

## 2025-02-03 RX ADMIN — ACETAMINOPHEN 975 MG: 325 TABLET, FILM COATED ORAL at 21:22

## 2025-02-03 RX ADMIN — TEMAZEPAM 15 MG: 15 CAPSULE ORAL at 21:31

## 2025-02-03 RX ADMIN — POTASSIUM CHLORIDE 20 MEQ: 1500 TABLET, EXTENDED RELEASE ORAL at 09:19

## 2025-02-03 RX ADMIN — INSULIN GLARGINE 30 UNITS: 100 INJECTION, SOLUTION SUBCUTANEOUS at 21:27

## 2025-02-03 RX ADMIN — GUAIFENESIN 600 MG: 600 TABLET, EXTENDED RELEASE ORAL at 21:22

## 2025-02-03 NOTE — ASSESSMENT & PLAN NOTE
Lab Results   Component Value Date    HGBA1C 8.9 (H) 01/24/2025     Recent Labs     02/02/25  1935 02/02/25  2132 02/03/25  0526 02/03/25  1029   POCGLU 146* 194* 192* 225*     Uncontrolled type 2 diabetes mellitus on insulin therapy with hyperglycemia   Home regimen: Lantus 35 units in the morning, metformin 1000 mg twice daily, NovoLog 5 units before meals, Jardiance 10 mg daily    24-hour blood sugars reviewed-while on insulin infusion, well-controlled.  Has had improvement in oral intake, and is ready for transition off of insulin infusion.    Plan:  Increase lantus to 30 units qhs and humalog to 10 units with meals.  Change correctional scale algorithm 4 during the day and 3 at bedtime.  Monitor for hypoglycemia, treat per protocol  Change diet to consistent carbohydrate level 2  Goal blood sugar while in the [id number] mg/dL  Discharge recommendations pending clinical course  Endocrinology will continue following

## 2025-02-03 NOTE — PROGRESS NOTES
Progress Note - Endocrinology   Name: Larry Harper 60 y.o. male I MRN: [id number]  Unit/Bed#: PPHP-324-01 I Date of Admission: 1/25/2025   Date of Service: 2/3/2025 I Hospital Day: 9     Assessment & Plan  Diabetes mellitus (HCC)  Lab Results   Component Value Date    HGBA1C 8.9 (H) 01/24/2025     Recent Labs     02/02/25  1935 02/02/25  2132 02/03/25  0526 02/03/25  1029   POCGLU 146* 194* 192* 225*     Uncontrolled type 2 diabetes mellitus on insulin therapy with hyperglycemia   Home regimen: Lantus 35 units in the morning, metformin 1000 mg twice daily, NovoLog 5 units before meals, Jardiance 10 mg daily    24-hour blood sugars reviewed-while on insulin infusion, well-controlled.  Has had improvement in oral intake, and is ready for transition off of insulin infusion.    Plan:  Increase lantus to 30 units qhs and humalog to 10 units with meals.  Change correctional scale algorithm 4 during the day and 3 at bedtime.  Monitor for hypoglycemia, treat per protocol  Change diet to consistent carbohydrate level 2  Goal blood sugar while in the [id number] mg/dL  Discharge recommendations pending clinical course  Endocrinology will continue following  NSTEMI (non-ST elevated myocardial infarction) (HCC)  Management per primary team  S/P AVR  Severe aortic stenosis-1/31 s/p AVR  Management per primary team  S/P CABG (coronary artery bypass graft)  MV-CAD-1/31 s/p CABG x 1  Management per primary team  Anticoagulated on Coumadin    Leukocytosis      24 Hour Events : He is off IV insulin.  Subjective : He is eating.  Overall, improving.    Objective :  Temp:  [97.9 °F (36.6 °C)-98.2 °F (36.8 °C)] 98.2 °F (36.8 °C)  HR:  [74-92] 74  BP: (103-130)/(56-74) 123/74  Resp:  [18] 18  SpO2:  [89 %-97 %] 90 %  O2 Device: None (Room air)    Physical Exam  Vitals reviewed.   Constitutional:       General: He is not in acute distress.     Appearance: He is well-developed. He is not diaphoretic.   HENT:      Head:  Normocephalic and atraumatic.      Mouth/Throat:      Pharynx: No oropharyngeal exudate.   Eyes:      General: Lids are normal. No scleral icterus.        Right eye: No discharge.         Left eye: No discharge.      Conjunctiva/sclera: Conjunctivae normal.   Neck:      Thyroid: No thyromegaly.      Comments: Right IJ is in place.  Cardiovascular:      Rate and Rhythm: Normal rate and regular rhythm.      Heart sounds: Normal heart sounds. No murmur heard.     No friction rub. No gallop.   Pulmonary:      Effort: Pulmonary effort is normal. No respiratory distress.      Breath sounds: Normal breath sounds. No wheezing.   Abdominal:      General: Bowel sounds are normal. There is no distension.      Palpations: Abdomen is soft.      Tenderness: There is no abdominal tenderness.   Musculoskeletal:         General: No tenderness or deformity. Normal range of motion.      Cervical back: Neck supple.   Lymphadenopathy:      Head:      Right side of head: No occipital adenopathy.      Left side of head: No occipital adenopathy.      Upper Body:      Right upper body: No supraclavicular adenopathy.      Left upper body: No supraclavicular adenopathy.   Skin:     General: Skin is warm.      Findings: No erythema or rash.   Neurological:      General: No focal deficit present.      Mental Status: He is alert and oriented to person, place, and time.      Cranial Nerves: No cranial nerve deficit.      Coordination: Coordination normal.         Lab Results: I have reviewed the following results: Glucose readings

## 2025-02-03 NOTE — PROGRESS NOTES
Progress Note - Cardiac Surgery   Larry Harper 60 y.o. male MRN: [id number]  Unit/Bed#: PPHP-324-01 Encounter: [id number]    Aortic stenosis, Non-Rheumatic, Coronary artery disease. S/P aortic valve replacement (mechanical) and coronary artery bypass grafting x 1; POD # 3      24 Hour Events: Transferred to telemetry yesterday. Remains on Heparin gtt. BM x 1 yesterday. Feels well, offers no complaints. Requesting  visit due to living situation at discharge.     Medications:   Scheduled Meds:  Current Facility-Administered Medications   Medication Dose Route Frequency Provider Last Rate    acetaminophen  975 mg Oral Q6H While awake Niyah White PA-C      albuterol  2 puff Inhalation Q4H PRN Niyah White PA-C      amiodarone  200 mg Oral Q8H SONYA Niyah White PA-C      aspirin  81 mg Oral Daily Niyah White PA-C      atorvastatin  80 mg Oral Daily With Dinner Niyah White PA-C      bisacodyl  10 mg Rectal Daily PRN Niyah White PA-C      chlorhexidine  15 mL Mouth/Throat BID Niyah White PA-C      docusate sodium  100 mg Oral BID Niyah White PA-C      FLUoxetine  40 mg Oral Daily Niyah White PA-C      furosemide  40 mg Intravenous BID (diuretic) Niyah White PA-C      guaiFENesin  600 mg Oral Q12H SONYA Harriet Gonzales PA-C      heparin (porcine)  3-20 Units/kg/hr (Order-Specific) Intravenous Titrated Niyah White PA-C 16 Units/kg/hr (02/03/25 0508)    insulin glargine  24 Units Subcutaneous HS Abhishek Garduno MD      insulin lispro  1-6 Units Subcutaneous 4x Daily (AC & HS) Abhishek Garduno MD      insulin lispro  8 Units Subcutaneous TID With Meals Abhishek Garduno MD      lidocaine  2 patch Topical Daily Niyah White PA-C      lisinopril  2.5 mg Oral Daily Niyah White PA-C      methocarbamol  500 mg Oral Q6H SONYA Niyah White PA-C      metoprolol tartrate  25 mg Oral Q12H SONYA Niyah White PA-C      ondansetron  4 mg Intravenous Q6H PRN Niyah White PA-C       oxyCODONE  5 mg Oral Q4H PRN Niyah White PA-C      Or    oxyCODONE  10 mg Oral Q4H PRN Niyah White PA-C      pantoprazole  40 mg Oral Daily Niyah White PA-C      polyethylene glycol  17 g Oral Daily Niyah White PA-C      potassium chloride  20 mEq Oral BID Niyah White PA-C      sodium chloride  20 mL/hr Intravenous Continuous RYAN OntiverosC 20 mL/hr (01/31/25 1305)    temazepam  15 mg Oral HS PRN Niyah White PA-C      umeclidinium  1 puff Inhalation Daily Niyah White PA-C       Continuous Infusions:heparin (porcine), 3-20 Units/kg/hr (Order-Specific), Last Rate: 16 Units/kg/hr (02/03/25 0508)  sodium chloride, 20 mL/hr, Last Rate: 20 mL/hr (01/31/25 1305)      PRN Meds:.  albuterol    bisacodyl    ondansetron    oxyCODONE **OR** oxyCODONE    temazepam    Vitals:   Vitals:    02/02/25 2228 02/03/25 0247 02/03/25 0600 02/03/25 0642   BP: 110/61 119/68  130/69   BP Location:       Pulse: 79 74  89   Resp: 18      Temp: 98.1 °F (36.7 °C) 97.9 °F (36.6 °C)     TempSrc:       SpO2: 96% 97%  97%   Weight:   75 kg (165 lb 5.5 oz)    Height:           Telemetry: NSR; Heart Rate: 87    Respiratory:   SpO2: SpO2: 97 %; Room Air    Intake/Output:     Intake/Output Summary (Last 24 hours) at 2/3/2025 0823  Last data filed at 2/3/2025 0601  Gross per 24 hour   Intake 1346.56 ml   Output 1925 ml   Net -578.44 ml        Weights:   Weight (last 2 days)       Date/Time Weight    02/03/25 0600 75 (165.35)    02/02/25 0558 72.7 (160.27)    02/01/25 0600 72.3 (159.39)    02/01/25 0500 72.3 (159.39)            Chest tube Output: removed 2/2        Results:   Results from last 7 days   Lab Units 02/03/25  0503 02/02/25  0419 02/01/25  0350   WBC Thousand/uL 14.12* 14.89* 19.73*   HEMOGLOBIN g/dL 11.1* 11.2* 13.8   HEMATOCRIT % 34.2* 33.7* 41.4   PLATELETS Thousands/uL 206 161 182     Results from last 7 days   Lab Units 02/03/25  0503 02/02/25  0419 02/01/25  0351 01/31/25  1300 01/31/25  1259   SODIUM mmol/L 131* 130*  135   < >  --    POTASSIUM mmol/L 4.2 3.8 4.0   < >  --    CHLORIDE mmol/L 97 97 104   < >  --    CO2 mmol/L 26 27 24   < >  --    CO2, I-STAT mmol/L  --   --   --   --  23   BUN mg/dL 20 18 13   < >  --    CREATININE mg/dL 0.73 0.65 0.64   < >  --    GLUCOSE, ISTAT mg/dl  --   --   --   --  248*   CALCIUM mg/dL 8.1* 8.0* 8.1*   < >  --     < > = values in this interval not displayed.     Recent Labs     02/03/25  0503   MG 1.8*     Results from last 7 days   Lab Units 02/03/25  0503 02/02/25  1003 02/02/25  0419 02/01/25  1126 02/01/25  0351   INR  1.51*  --  1.31*  --  1.03   PTT seconds 75* 73* 75*   < >  --     < > = values in this interval not displayed.         Date:   INR:  Coumadin Dose:  2/3  1.51  -  2/2  1.31  2.5  2/1  1.03  2.5  1/31  1.22  2.5      Point of care glucose:  - 194   Endo following    Studies:  No new studies past 24 hrs        Invasive Lines/Tubes:  Invasive Devices       Central Venous Catheter Line  Duration             CVC Central Lines 01/31/25 Triple Right Internal jugular 3 days                    Physical Exam:    General: No acute distress, Alert, and Normal appearance  HEENT/NECK:  Normocephalic. Atraumatic.  No jugular venous distention.    Cardiac: Regular rate and rhythm and No murmurs/rubs/gallops  Pulmonary:  Breath sounds clear bilaterally and No rales/rhonchi/wheezes  Abdomen:  Non-tender, Non-distended, and Normal bowel sounds  Incisions: Sternum is stable.  Incision is clean, dry, and intact.   Extremities: Extremities warm/dry and No edema B/L  Neuro: Alert and oriented X 3, Sensation is grossly intact, and No focal deficits  Skin: Warm/Dry, without rashes or lesions.    Assessment:  Principal Problem:    NSTEMI (non-ST elevated myocardial infarction) ([facility])  Active Problems:    CAD (coronary artery disease)    Diabetes mellitus (HCC)    PTSD (post-traumatic stress disorder)    Severe aortic stenosis    Primary hypertension    Dyslipidemia    COPD (chronic  obstructive pulmonary disease) (HCC)    Periodontal disease    S/P AVR    S/P CABG (coronary artery bypass graft)       Aortic stenosis, Non-Rheumatic, Coronary artery disease. S/P aortic valve replacement (mechanical) and coronary artery bypass grafting x 1; POD # 3    Plan:    Cardiac:     S/P acute preoperative NSTEMI  Normal ventricular systolic function, EF 65%    NSR; HR well-controlled  BP well-controlled    Continue Lopressor, 25mg PO BID    ACE inhibitor/ARB indicated; Continue Lisinopril, 2.5mg PO Daily    Continue prophylactic Amiodarone, 200 mg PO TID    Anticoagulated for mechanical aortic valve. Continue Heparin gtt until INR therapeutic.  INR 1.51, Dose Coumadin, 3 mg today    Continue ASA and Statin therapy    Epicardial pacing wires have been removed    Maintain central IV access today for lack of peripheral access    Continue Coumadin for DVT prophylaxis    Pulmonary:     Good Room air oxygen saturation; Continue incentive spirometry/Coughing/Deep breathing exercises    Chest tubes have been discontinued    Renal:     Normal preoperative renal function  Creatinine 0.73 today, from 0.65    Intake/Output net: -512 mL/24 hours    Diuretic Regimen:  Transition to  PO Torsemide, 20 mg BID  Continue Potassium Chloride 20 mEq PO BID    Neuro:    Neurologically intact; No active issues     Incisional pain well controlled   Continue tylenol, 975 mg PO q 8, standing dose   Continue oxycodone, 5 to 10 mg PO q 4 hours prn pain    Continue methocarbamol, 500 mg PO q 6 hours for pain/muscle spasm    GI:    Controlled carbohydrate diet level two/Cardiac diet, with 1800 mL fluid restriction    Tolerating diet without complaint  + BM postoperatively    Continue stool softeners and prn suppository    Continue GI prophylaxis    Endo:     Pre-Op Hgb A1C: 8.9    Patient has been transitioned from continuous insulin infusion to intermittent subcutaneous dosing  On injectable therapy, prior to surgical  intervention  Recommendation for discharge diabetes regimen pending endocrinology follow up  Endocrinology following daily    7    Hematology:     Post-operative blood count acceptable; Trend prn  Leukocytosis; Afebrile with no signs of infection; Trend CBC prn    8.   Disposition:        Following daily PT/OT recommendations regarding home vs. rehab when medically cleared for discharge  Routine postoperative recovery to this point; Anticipated discharge date: next 48 hrs      VTE Pharmacologic Prophylaxis: Warfarin (Coumadin)  VTE Mechanical Prophylaxis: sequential compression device    Collaborative rounds completed with supervising physician  Plan of care discussed with bedside nurse    SIGNATURE: Fariba Kevin PA-C  DATE: February 3, 2025  TIME: 8:23 AM

## 2025-02-03 NOTE — PROGRESS NOTES
Pastoral Care Progress Note          Chaplaincy Interventions Utilized:   Empowerment: Clarified, confirmed, or reviewed information from treatment team , Encouraged focus on present, Encouraged self-care, and Provided anxiety containment    Exploration: Explored hope, Explored emotional needs & resources, and Explored spiritual needs & resources    Collaboration: Consulted with interdisciplinary team and Encouraged adherence to treatment plan     Relationship Building: Cultivated a relationship of care and support, Listened empathically, and Hospitality    Ritual:      02/03/25 1200   Clinical Encounter Type   Visited With Patient   Routine Visit Follow-up       Chaplaincy Outcomes Achieved:  Expressed gratitude     met with the patient in his room where he was up and in his recliner.  Patient expressed some discomfort from his surgery but overall is feeling better.  He is still very worried about his service dog, Sue.   listened and ensured the patient that I would pass the information along to his , which I did.        Spiritual Coping Strategies Utilized:        remains available.

## 2025-02-04 LAB
ANION GAP SERPL CALCULATED.3IONS-SCNC: 11 MMOL/L (ref 4–13)
APTT PPP: 51 SECONDS (ref 23–34)
APTT PPP: 62 SECONDS (ref 23–34)
APTT PPP: 66 SECONDS (ref 23–34)
BUN SERPL-MCNC: 17 MG/DL (ref 5–25)
CALCIUM SERPL-MCNC: 8.2 MG/DL (ref 8.4–10.2)
CHLORIDE SERPL-SCNC: 93 MMOL/L (ref 96–108)
CO2 SERPL-SCNC: 30 MMOL/L (ref 21–32)
CREAT SERPL-MCNC: 0.81 MG/DL (ref 0.6–1.3)
GFR SERPL CREATININE-BSD FRML MDRD: 97 ML/MIN/1.73SQ M
GLUCOSE SERPL-MCNC: 124 MG/DL (ref 65–140)
GLUCOSE SERPL-MCNC: 161 MG/DL (ref 65–140)
GLUCOSE SERPL-MCNC: 229 MG/DL (ref 65–140)
GLUCOSE SERPL-MCNC: 231 MG/DL (ref 65–140)
GLUCOSE SERPL-MCNC: 357 MG/DL (ref 65–140)
INR PPP: 1.62 (ref 0.85–1.19)
POTASSIUM SERPL-SCNC: 3.9 MMOL/L (ref 3.5–5.3)
PROTHROMBIN TIME: 19.4 SECONDS (ref 12.3–15)
SODIUM SERPL-SCNC: 134 MMOL/L (ref 135–147)

## 2025-02-04 PROCEDURE — 99233 SBSQ HOSP IP/OBS HIGH 50: CPT | Performed by: INTERNAL MEDICINE

## 2025-02-04 PROCEDURE — 85610 PROTHROMBIN TIME: CPT | Performed by: PHYSICIAN ASSISTANT

## 2025-02-04 PROCEDURE — 99232 SBSQ HOSP IP/OBS MODERATE 35: CPT | Performed by: INTERNAL MEDICINE

## 2025-02-04 PROCEDURE — 80048 BASIC METABOLIC PNL TOTAL CA: CPT | Performed by: PHYSICIAN ASSISTANT

## 2025-02-04 PROCEDURE — 99024 POSTOP FOLLOW-UP VISIT: CPT | Performed by: PHYSICIAN ASSISTANT

## 2025-02-04 PROCEDURE — 97530 THERAPEUTIC ACTIVITIES: CPT

## 2025-02-04 PROCEDURE — 85730 THROMBOPLASTIN TIME PARTIAL: CPT | Performed by: THORACIC SURGERY (CARDIOTHORACIC VASCULAR SURGERY)

## 2025-02-04 PROCEDURE — 82948 REAGENT STRIP/BLOOD GLUCOSE: CPT

## 2025-02-04 RX ORDER — INSULIN GLARGINE 100 [IU]/ML
45 INJECTION, SOLUTION SUBCUTANEOUS
Status: DISCONTINUED | OUTPATIENT
Start: 2025-02-04 | End: 2025-02-05

## 2025-02-04 RX ORDER — INSULIN LISPRO 100 [IU]/ML
15 INJECTION, SOLUTION INTRAVENOUS; SUBCUTANEOUS
Status: DISCONTINUED | OUTPATIENT
Start: 2025-02-04 | End: 2025-02-08

## 2025-02-04 RX ORDER — TORSEMIDE 20 MG/1
20 TABLET ORAL DAILY
Status: DISCONTINUED | OUTPATIENT
Start: 2025-02-04 | End: 2025-02-08 | Stop reason: HOSPADM

## 2025-02-04 RX ORDER — WARFARIN SODIUM 3 MG/1
3 TABLET ORAL
Status: COMPLETED | OUTPATIENT
Start: 2025-02-04 | End: 2025-02-04

## 2025-02-04 RX ORDER — POTASSIUM CHLORIDE 1500 MG/1
20 TABLET, EXTENDED RELEASE ORAL DAILY
Status: DISCONTINUED | OUTPATIENT
Start: 2025-02-04 | End: 2025-02-08 | Stop reason: HOSPADM

## 2025-02-04 RX ADMIN — INSULIN LISPRO 10 UNITS: 100 INJECTION, SOLUTION INTRAVENOUS; SUBCUTANEOUS at 11:05

## 2025-02-04 RX ADMIN — ATORVASTATIN CALCIUM 80 MG: 80 TABLET, FILM COATED ORAL at 15:34

## 2025-02-04 RX ADMIN — INSULIN LISPRO 4 UNITS: 100 INJECTION, SOLUTION INTRAVENOUS; SUBCUTANEOUS at 08:51

## 2025-02-04 RX ADMIN — TORSEMIDE 20 MG: 20 TABLET ORAL at 08:48

## 2025-02-04 RX ADMIN — LISINOPRIL 2.5 MG: 2.5 TABLET ORAL at 08:48

## 2025-02-04 RX ADMIN — AMIODARONE HYDROCHLORIDE 200 MG: 200 TABLET ORAL at 21:20

## 2025-02-04 RX ADMIN — LIDOCAINE 5% 2 PATCH: 700 PATCH TOPICAL at 08:49

## 2025-02-04 RX ADMIN — OXYCODONE HYDROCHLORIDE 10 MG: 10 TABLET ORAL at 20:04

## 2025-02-04 RX ADMIN — METHOCARBAMOL 500 MG: 500 TABLET ORAL at 05:21

## 2025-02-04 RX ADMIN — TEMAZEPAM 15 MG: 15 CAPSULE ORAL at 20:03

## 2025-02-04 RX ADMIN — METHOCARBAMOL 500 MG: 500 TABLET ORAL at 11:10

## 2025-02-04 RX ADMIN — ASPIRIN 81 MG: 81 TABLET, COATED ORAL at 08:49

## 2025-02-04 RX ADMIN — POTASSIUM CHLORIDE 20 MEQ: 1500 TABLET, EXTENDED RELEASE ORAL at 08:48

## 2025-02-04 RX ADMIN — ACETAMINOPHEN 975 MG: 325 TABLET, FILM COATED ORAL at 13:51

## 2025-02-04 RX ADMIN — ACETAMINOPHEN 975 MG: 325 TABLET, FILM COATED ORAL at 08:49

## 2025-02-04 RX ADMIN — DOCUSATE SODIUM 100 MG: 100 CAPSULE, LIQUID FILLED ORAL at 08:49

## 2025-02-04 RX ADMIN — INSULIN LISPRO 10 UNITS: 100 INJECTION, SOLUTION INTRAVENOUS; SUBCUTANEOUS at 08:51

## 2025-02-04 RX ADMIN — CHLORHEXIDINE GLUCONATE 0.12% ORAL RINSE 15 ML: 1.2 LIQUID ORAL at 18:18

## 2025-02-04 RX ADMIN — FLUOXETINE HYDROCHLORIDE 40 MG: 20 CAPSULE ORAL at 08:48

## 2025-02-04 RX ADMIN — GUAIFENESIN 600 MG: 600 TABLET, EXTENDED RELEASE ORAL at 08:49

## 2025-02-04 RX ADMIN — METHOCARBAMOL 500 MG: 500 TABLET ORAL at 18:18

## 2025-02-04 RX ADMIN — OXYCODONE HYDROCHLORIDE 10 MG: 10 TABLET ORAL at 00:08

## 2025-02-04 RX ADMIN — DOCUSATE SODIUM 100 MG: 100 CAPSULE, LIQUID FILLED ORAL at 18:18

## 2025-02-04 RX ADMIN — METHOCARBAMOL 500 MG: 500 TABLET ORAL at 23:51

## 2025-02-04 RX ADMIN — CHLORHEXIDINE GLUCONATE 0.12% ORAL RINSE 15 ML: 1.2 LIQUID ORAL at 08:49

## 2025-02-04 RX ADMIN — GUAIFENESIN 600 MG: 600 TABLET, EXTENDED RELEASE ORAL at 20:04

## 2025-02-04 RX ADMIN — OXYCODONE HYDROCHLORIDE 10 MG: 10 TABLET ORAL at 15:42

## 2025-02-04 RX ADMIN — METOPROLOL TARTRATE 25 MG: 25 TABLET, FILM COATED ORAL at 20:03

## 2025-02-04 RX ADMIN — INSULIN LISPRO 15 UNITS: 100 INJECTION, SOLUTION INTRAVENOUS; SUBCUTANEOUS at 16:46

## 2025-02-04 RX ADMIN — WARFARIN SODIUM 3 MG: 3 TABLET ORAL at 18:18

## 2025-02-04 RX ADMIN — PANTOPRAZOLE SODIUM 40 MG: 40 TABLET, DELAYED RELEASE ORAL at 05:21

## 2025-02-04 RX ADMIN — HEPARIN SODIUM 16 UNITS/KG/HR: 10000 INJECTION, SOLUTION INTRAVENOUS at 05:21

## 2025-02-04 RX ADMIN — METHOCARBAMOL 500 MG: 500 TABLET ORAL at 00:08

## 2025-02-04 RX ADMIN — INSULIN GLARGINE 45 UNITS: 100 INJECTION, SOLUTION SUBCUTANEOUS at 21:20

## 2025-02-04 RX ADMIN — METOPROLOL TARTRATE 25 MG: 25 TABLET, FILM COATED ORAL at 08:48

## 2025-02-04 RX ADMIN — AMIODARONE HYDROCHLORIDE 200 MG: 200 TABLET ORAL at 13:51

## 2025-02-04 RX ADMIN — INSULIN LISPRO 1 UNITS: 100 INJECTION, SOLUTION INTRAVENOUS; SUBCUTANEOUS at 21:20

## 2025-02-04 RX ADMIN — ACETAMINOPHEN 975 MG: 325 TABLET, FILM COATED ORAL at 20:04

## 2025-02-04 RX ADMIN — OXYCODONE HYDROCHLORIDE 10 MG: 10 TABLET ORAL at 04:26

## 2025-02-04 RX ADMIN — AMIODARONE HYDROCHLORIDE 200 MG: 200 TABLET ORAL at 05:21

## 2025-02-04 NOTE — ASSESSMENT & PLAN NOTE
Lab Results   Component Value Date    HGBA1C 8.9 (H) 01/24/2025     Recent Labs     02/03/25  1551 02/03/25  2033 02/04/25  0558 02/04/25  1024   POCGLU 218* 185* 231* 357*     Uncontrolled type 2 diabetes mellitus on insulin therapy with hyperglycemia   Home regimen: Lantus 35 units in the morning, metformin 1000 mg twice daily, NovoLog 5 units before meals, Jardiance 10 mg daily  Current regimen: Lantus 30 units nightly, Humalog 10 units before meals, correctional scale insulin algorithm for before meals and 3 at bedtime.    24-hour blood sugars reviewed-patient noted to have fasting and postprandial hyperglycemia with blood sugars in the 200s.    Plan:  Increase lantus to 45 units qhs and humalog to 15 units before meals.  Continue correctional scale algorithm 4 during the day and 3 at bedtime.  Monitor for hypoglycemia, treat per protocol  Change diet to consistent carbohydrate level 2, no juice  Goal blood sugar while in the [id number] mg/dL  Discharge recommendations pending clinical course  Endocrinology will continue following

## 2025-02-04 NOTE — ASSESSMENT & PLAN NOTE
Presented to [facility] with development of chest discomfort while attending his brother's , symptoms similar to prior angina. Resolved with nitroglycerin.   Was transferred to [facility] and underwent a cardiac catheterization on 2025    HS troponin 149, 272, 288, 142  [facility]: Two mid LAD lesions, 70% and 95%.  70% OM 3 stenosis  Transferred to [facility] for CABG/AVR evaluation.   On aspirin, high intensity statin, metoprolol tartrate 12.5 mg twice daily, IV heparin, and nitropaste  POD#! 2025 patient underwent an aortic valve replacement with a 23 mm [employer] top hat mechanical prosthesis and coronary bypass grafting x 1 with a ALMARAZ to the LAD by

## 2025-02-04 NOTE — OCCUPATIONAL THERAPY NOTE
Occupational Therapy Cancel Note        Patient Name: Larry Harper  Today's Date: 2/4/2025 02/04/25 1127   OT Last Visit   OT Visit Date 02/04/25   Note Type   Note Type Cancelled Session   Cancel Reasons Refusal       Chart reviewed, attempted to see pt for therapy. Pt reporting he has been feeling dizzy and requests holding therapy at this time. Will continue to follow as agreeable and medically appropriate.      Gaby Sky, JUAN, OTR/L

## 2025-02-04 NOTE — PROGRESS NOTES
Progress Note - Cardiac Surgery   Larry Harper 60 y.o. male MRN: [id number]  Unit/Bed#: PPHP-324-01 Encounter: [id number]    Aortic stenosis, Non-Rheumatic, Coronary artery disease. S/P aortic valve replacement (mechanical) and coronary artery bypass grafting x 1; POD # 4      24 Hour Events: Remains on Heparin gtt. Feels well.    Medications:   Scheduled Meds:  Current Facility-Administered Medications   Medication Dose Route Frequency Provider Last Rate    acetaminophen  975 mg Oral Q6H While awake Niyah White PA-C      albuterol  2 puff Inhalation Q4H PRN Niyah White PA-C      amiodarone  200 mg Oral Q8H SONYA Niyah White PA-C      aspirin  81 mg Oral Daily Niyah White PA-C      atorvastatin  80 mg Oral Daily With Dinner Niyah White PA-C      bisacodyl  10 mg Rectal Daily PRN Niyah White PA-C      chlorhexidine  15 mL Mouth/Throat BID Niyah White PA-C      docusate sodium  100 mg Oral BID Niyah White PA-C      FLUoxetine  40 mg Oral Daily Niyah White PA-C      guaiFENesin  600 mg Oral Q12H SONYA Harriet Gonzales PA-C      heparin (porcine)  3-20 Units/kg/hr (Order-Specific) Intravenous Titrated Niyah White PA-C 18 Units/kg/hr (02/04/25 0620)    insulin glargine  30 Units Subcutaneous HS Julio Cesar Reeves MD      insulin lispro  1-6 Units Subcutaneous HS Julio Cesar Reeves MD      insulin lispro  10 Units Subcutaneous TID With Meals Julio Cesar Reeves MD      insulin lispro  2-12 Units Subcutaneous TID AC Julio Cesar Reeves MD      lidocaine  2 patch Topical Daily Niyah White PA-C      lisinopril  2.5 mg Oral Daily Niyah White PA-C      methocarbamol  500 mg Oral Q6H SONYA Niyah White PA-C      metoprolol tartrate  25 mg Oral Q12H SONYA Niyah White PA-C      ondansetron  4 mg Intravenous Q6H PRN Niyah White PA-C      oxyCODONE  5 mg Oral Q4H PRN Niyah White PA-C      Or    oxyCODONE  10 mg Oral Q4H PRN Niyah White PA-C      pantoprazole  40 mg Oral Daily Niyah White PA-C      polyethylene glycol  17 g  Oral Daily Niyah White PA-C      potassium chloride  20 mEq Oral BID Niyah White PA-C      temazepam  15 mg Oral HS PRN Niyah White PA-C      torsemide  20 mg Oral BID Fariba Kevin PA-C      umeclidinium  1 puff Inhalation Daily Niyah White PA-C       Continuous Infusions:heparin (porcine), 3-20 Units/kg/hr (Order-Specific), Last Rate: 18 Units/kg/hr (02/04/25 0620)      PRN Meds:.  albuterol    bisacodyl    ondansetron    oxyCODONE **OR** oxyCODONE    temazepam    Vitals:   Vitals:    02/03/25 2238 02/04/25 0240 02/04/25 0600 02/04/25 0715   BP: 124/68 136/73  127/71   BP Location:    Right arm   Pulse: 76 76  81   Resp:  20  19   Temp: 98.2 °F (36.8 °C) 97.7 °F (36.5 °C)  98.8 °F (37.1 °C)   TempSrc:    Oral   SpO2: 95% 94%  (!) 89%   Weight:   72.8 kg (160 lb 7.9 oz)    Height:           Telemetry: NSR; Heart Rate: 87    Respiratory:   SpO2: SpO2: (!) 89 %; 4L NC    Intake/Output: -2907 ml/24 hrs    Intake/Output Summary (Last 24 hours) at 2/4/2025 0837  Last data filed at 2/4/2025 0600  Gross per 24 hour   Intake 1116.4 ml   Output 4225 ml   Net -3108.6 ml        Weights:   Weight (last 2 days)       Date/Time Weight    02/04/25 0600 72.8 (160.5)    02/03/25 0600 75 (165.35)    02/02/25 0558 72.7 (160.27)            Chest tube Output: removed 2/2        Results:   Results from last 7 days   Lab Units 02/03/25  0503 02/02/25  0419 02/01/25  0350   WBC Thousand/uL 14.12* 14.89* 19.73*   HEMOGLOBIN g/dL 11.1* 11.2* 13.8   HEMATOCRIT % 34.2* 33.7* 41.4   PLATELETS Thousands/uL 206 161 182     Results from last 7 days   Lab Units 02/04/25  0430 02/03/25  0503 02/02/25  0419 01/31/25  1300 01/31/25  1259   SODIUM mmol/L 134* 131* 130*   < >  --    POTASSIUM mmol/L 3.9 4.2 3.8   < >  --    CHLORIDE mmol/L 93* 97 97   < >  --    CO2 mmol/L 30 26 27   < >  --    CO2, I-STAT mmol/L  --   --   --   --  23   BUN mg/dL 17 20 18   < >  --    CREATININE mg/dL 0.81 0.73 0.65   < >  --    GLUCOSE, ISTAT mg/dl  --    --   --   --  248*   CALCIUM mg/dL 8.2* 8.1* 8.0*   < >  --     < > = values in this interval not displayed.     Recent Labs     02/03/25  0503   MG 1.8*     Results from last 7 days   Lab Units 02/04/25  0430 02/03/25  0503 02/02/25  1003 02/02/25  0419   INR  1.62* 1.51*  --  1.31*   PTT seconds 51* 75* 73* 75*         Date:   INR:  Coumadin Dose:  2/4  1.62  -  2/3  1.51  3  2/2  1.31  2.5  2/1  1.03  2.5  1/31  1.22  2.5      Point of care glucose:  - 231  Endo following    Studies:  No new studies past 24 hrs        Invasive Lines/Tubes:  Invasive Devices       Central Venous Catheter Line  Duration             CVC Central Lines 01/31/25 Triple Right Internal jugular 4 days                    Physical Exam:    General: No acute distress, Alert, and Normal appearance  HEENT/NECK:  Normocephalic. Atraumatic.  No jugular venous distention.    Cardiac: Regular rate and rhythm and No murmurs/rubs/gallops  Pulmonary:  Breath sounds clear bilaterally and inspiratory crackles bilat bases  Abdomen:  Non-tender, Non-distended, and Normal bowel sounds  Incisions: Sternum is stable.  Incision is clean, dry, and intact.   Extremities: Extremities warm/dry and No edema B/L  Neuro: Alert and oriented X 3, Sensation is grossly intact, and No focal deficits  Skin: Warm/Dry, without rashes or lesions.       Assessment:  Principal Problem:    NSTEMI (non-ST elevated myocardial infarction) ([facility])  Active Problems:    CAD (coronary artery disease)    Diabetes mellitus ([facility])    PTSD (post-traumatic stress disorder)    Severe aortic stenosis    Primary hypertension    Dyslipidemia    COPD (chronic obstructive pulmonary disease) ([facility])    Periodontal disease    S/P AVR    S/P CABG (coronary artery bypass graft)    Anticoagulated on Coumadin    Leukocytosis       Aortic stenosis, Non-Rheumatic, Coronary artery disease. S/P aortic valve replacement (mechanical) and coronary artery bypass grafting x 1; POD # 4    Plan:    Cardiac:      S/P acute preoperative NSTEMI  Normal ventricular systolic function, EF 65%    NSR; HR well-controlled  BP well-controlled    Continue Lopressor, 25mg PO BID    ACE inhibitor/ARB indicated; Continue Lisinopril, 2.5mg PO Daily    Continue prophylactic Amiodarone, 200 mg PO TID    Anticoagulated for mechanical aortic valve. Continue Heparin gtt until INR therapeutic.  INR 1.62, Dose Coumadin, 3 mg today    Continue ASA and Statin therapy    Epicardial pacing wires have been removed    Maintain central IV access today for lack of peripheral access    Continue Coumadin for DVT prophylaxis    Pulmonary:     Good Room air oxygen saturation; Continue incentive spirometry/Coughing/Deep breathing exercises    Chest tubes have been discontinued    Renal:     Normal preoperative renal function  Creatinine 0.73 today, from 0.65    Intake/Output net: -2907 mL/24 hours    Diuretic Regimen:  Decrease PO Torsemide, 20 mg QD  Decrease Potassium Chloride 20 mEq PO QD  Hyponatremia - improving. Continue diuresis, fluid restriction and monitor.    Neuro:    Neurologically intact; No active issues     Incisional pain well controlled   Continue tylenol, 975 mg PO q 8, standing dose   Continue oxycodone, 5 to 10 mg PO q 4 hours prn pain    Continue methocarbamol, 500 mg PO q 6 hours for pain/muscle spasm    GI:    Controlled carbohydrate diet level two/Cardiac diet, with 1800 mL fluid restriction    Tolerating diet without complaint  + BM postoperatively    Continue stool softeners and prn suppository    Continue GI prophylaxis    Endo:     Pre-Op Hgb A1C: 8.9    Patient has been transitioned from continuous insulin infusion to intermittent subcutaneous dosing  On injectable therapy, prior to surgical intervention  Recommendation for discharge diabetes regimen pending endocrinology follow up  Endocrinology following daily    7    Hematology:     Post-operative blood count acceptable; Trend prn  Leukocytosis; Afebrile with no signs of  infection; Trend CBC prn    8.   Disposition:        Following daily PT/OT recommendations regarding home vs. rehab when medically cleared for discharge    Social issues regarding placement post discharge - Case mgmt following.    Routine postoperative recovery to this point; Anticipated discharge date: next 48 hrs or once INR therapeutic      VTE Pharmacologic Prophylaxis: Warfarin (Coumadin)  VTE Mechanical Prophylaxis: sequential compression device    Collaborative rounds completed with supervising physician  Plan of care discussed with bedside nurse    SIGNATURE: Fariba Kevin PA-C  DATE: February 4, 2025  TIME: 8:37 AM

## 2025-02-04 NOTE — PLAN OF CARE
Problem: PHYSICAL THERAPY ADULT  Goal: Performs mobility at highest level of function for planned discharge setting.  See evaluation for individualized goals.  Description: Treatment/Interventions: ADL retraining, LE strengthening/ROM, Functional transfer training, Elevations, Therapeutic exercise, Endurance training, Patient/family training, Equipment eval/education, Bed mobility, Gait training, Compensatory technique education, Spoke to nursing, OT  Equipment Recommended: Walker       See flowsheet documentation for full assessment, interventions and recommendations.  Outcome: Progressing  Note: Prognosis: Good  Problem List: Decreased strength, Decreased endurance, Impaired balance, Decreased mobility, Decreased safety awareness  Assessment: Pt is improving in balance, endurance and functional mobility skills progressing to (S) level w/ transfers and amb w/ rw and ambulating further distances as well; pt still remains to be generally guarded, weak and deconditioned w/ rest periods provided b/in bouts of mobility --> will cont to follow to address; D/C recommendations are listed below; will follow  Barriers to Discharge: Decreased caregiver support     Rehab Resource Intensity Level, PT: No post-acute rehabilitation needs    See flowsheet documentation for full assessment.

## 2025-02-04 NOTE — PROGRESS NOTES
Progress Note - Endocrinology   Name: Larry Harper 60 y.o. male I MRN: [id number]  Unit/Bed#: [facility]PHP-324-01 I Date of Admission: 1/25/2025   Date of Service: 2/4/2025 I Hospital Day: 10    Assessment & Plan  Diabetes mellitus (HCC)  Lab Results   Component Value Date    HGBA1C 8.9 (H) 01/24/2025     Recent Labs     02/03/25  1551 02/03/25  2033 02/04/25  0558 02/04/25  1024   POCGLU 218* 185* 231* 357*     Uncontrolled type 2 diabetes mellitus on insulin therapy with hyperglycemia   Home regimen: Lantus 35 units in the morning, metformin 1000 mg twice daily, NovoLog 5 units before meals, Jardiance 10 mg daily  Current regimen: Lantus 30 units nightly, Humalog 10 units before meals, correctional scale insulin algorithm for before meals and 3 at bedtime.    24-hour blood sugars reviewed-patient noted to have fasting and postprandial hyperglycemia with blood sugars in the 200s.    Plan:  Increase lantus to 45 units qhs and humalog to 15 units before meals.  Continue correctional scale algorithm 4 during the day and 3 at bedtime.  Monitor for hypoglycemia, treat per protocol  Change diet to consistent carbohydrate level 2, no juice  Goal blood sugar while in the [id number] mg/dL  Discharge recommendations pending clinical course  Endocrinology will continue following  NSTEMI (non-ST elevated myocardial infarction) (HCC)  Management per primary team  S/P AVR  Severe aortic stenosis-1/31 s/p AVR  Management per primary team  S/P CABG (coronary artery bypass graft)  MV-CAD-1/31 s/p CABG x 1  Management per primary team    24 Hour Events : No significant overnight events.  Subjective : Patient seen and examined at the bedside, not in acute distress at the time of my evaluation.  Reports feeling okay, endorses good appetite.  Denies nausea, vomiting, signs or symptoms of hypoglycemia.    Objective :  Temp:  [97.7 °F (36.5 °C)-99.1 °F (37.3 °C)] 98.8 °F (37.1 °C)  HR:  [68-97] 74  BP: (124-158)/(68-79) 133/70  Resp:   [16-20] 19  SpO2:  [82 %-95 %] 92 %  O2 Device: None (Room air)  Nasal Cannula O2 Flow Rate (L/min):  [2 L/min-4 L/min] 4 L/min    Physical Exam  Vitals and nursing note reviewed.   Constitutional:       General: He is not in acute distress.     Appearance: Normal appearance. He is not ill-appearing, toxic-appearing or diaphoretic.   HENT:      Head: Normocephalic and atraumatic.      Nose: Nose normal.      Mouth/Throat:      Pharynx: Oropharynx is clear.   Eyes:      General: No scleral icterus.        Right eye: No discharge.         Left eye: No discharge.      Extraocular Movements: Extraocular movements intact.      Conjunctiva/sclera: Conjunctivae normal.   Pulmonary:      Effort: Pulmonary effort is normal. No respiratory distress.   Abdominal:      General: There is no distension.   Musculoskeletal:         General: Normal range of motion.      Cervical back: Normal range of motion and neck supple.   Skin:     General: Skin is warm and dry.      Coloration: Skin is not jaundiced or pale.   Neurological:      Mental Status: He is alert and oriented to person, place, and time. Mental status is at baseline.   Psychiatric:         Mood and Affect: Mood normal.         Behavior: Behavior normal.         Thought Content: Thought content normal.         Judgment: Judgment normal.         Lab Results: I have reviewed the following results:CBC/BMP:   .     02/04/25  0430   SODIUM 134*   K 3.9   CL 93*   CO2 30   BUN 17   CREATININE 0.81   GLUC 229*    , Creatinine Clearance: Estimated Creatinine Clearance: 92.9 mL/min (by C-G formula based on SCr of 0.81 mg/dL)., LFTs: No new results in last 24 hours.     Imaging Results Review: No pertinent imaging studies reviewed.  Other Study Results Review: No additional pertinent studies reviewed.

## 2025-02-04 NOTE — PROGRESS NOTES
Cardiology   Larry Harper 60 y.o. male MRN: [id number]  [facility] [id number]            Assessment:    Assessment & Plan  NSTEMI (non-ST elevated myocardial infarction) ([facility])  Presented to [facility] with development of chest discomfort while attending his brother's , symptoms similar to prior angina. Resolved with nitroglycerin.   Was transferred to [facility] and underwent a cardiac catheterization on 2025    HS troponin 149, 272, 288, 142  LHC: Two mid LAD lesions, 70% and 95%.  70% OM 3 stenosis  Transferred to [facility] for CABG/AVR evaluation.   On aspirin, high intensity statin, metoprolol tartrate 12.5 mg twice daily, IV heparin, and nitropaste  POD#! 2025 patient underwent an aortic valve replacement with a 23 mm [employer] top hat mechanical prosthesis and coronary bypass grafting x 1 with a ALMARAZ to the LAD by     Severe aortic stenosis  TTE 25 LVEF 60%, trileafet severe AS with mild to moderate AI.  Mean gradient 39 mmHg with GRAYSON 1.01 cm²    CAD (coronary artery disease)  S/p KAEL to RCA, 2021  Follows with cardiologist in [place]  Primary hypertension  Well controlled  Diabetes mellitus ([facility])  Lab Results   Component Value Date    HGBA1C 8.9 (H) 2025   Poorly controlled.  Management per primary team  Dyslipidemia  Total cholesterol 226, triglycerides 108, HDL 41,  on admission.  Started on atorvastatin 80 mg daily  COPD (chronic obstructive pulmonary disease) ([facility])      Periodontal disease  S/p dental extractions  PTSD (post-traumatic stress disorder)    S/P AVR  aortic valve replacement with a 23 mm [employer] top hat mechanical prosthesis  and CABG x1 LIMA to LAD  S/P CABG (coronary artery bypass graft)    Anticoagulated on Coumadin    Leukocytosis         Plan:    -Continue aspirin, statin, beta-blocker and ACE inhibitor per primary team  -Continue amiodarone  -Wean oxygen as tolerated  -Diuretics per primary  "team  -On Coumadin, being bridged with heparin  -Please call with questions              2/4/2025: No complaints, was on 2 L of oxygen this morning        Physical exam  Objective   Vitals: Blood pressure 128/75, pulse 68, temperature 98.2 °F (36.8 °C), resp. rate 19, height 5' 7\" (1.702 m), weight 72.8 kg (160 lb 7.9 oz), SpO2 93%.  Orthostatic Blood Pressures      Flowsheet Row Most Recent Value   Blood Pressure 128/75 filed at 02/04/2025 1025   Patient Position - Orthostatic VS Sitting filed at 02/04/2025 0715          General:  AO x3, no acute distress  Cardiac:  S1-S2 normal, 2/6 systolic murmur noted sternal incision noted JVP: normal  Lungs: Reduced air entry bilaterally  Abdomen:  Soft, nontender, nondistended.  Extremities:  Warm, well perfused, pulses palpable, Edema: Mild bilateral pitting edema  Neuro: Grossly nonfocal        ======================================================  TREADMILL STRESS  No results found for this or any previous visit.     ----------------------------------------------------------------------------------------------  NUCLEAR STRESS TEST: No results found for this or any previous visit.    No results found for this or any previous visit.      --------------------------------------------------------------------------------  CATH:  No results found for this or any previous visit.    --------------------------------------------------------------------------------  ECHO:   No results found for this or any previous visit.    No results found for this or any previous visit.    --------------------------------------------------------------------------------  HOLTER  No results found for this or any previous visit.    --------------------------------------------------------------------------------  CAROTIDS  Results for orders placed during the hospital encounter of 01/24/25    VAS carotid complete study    Narrative  [facility] REPORT  CLINICAL:  Indications:  Patient " presents for surgical clearance and general health evaluation secondary  to future open heart surgery.  Patient is asymptomatic at this time.  Operative History:  Coronary angioplasty with stent placement  Risk Factors  The patient has history of Hyperlipidemia.  Clinical  Right Pressure:  132/84 mm Hg, Left Pressure:  144/70 mm Hg.    FINDINGS:    Right        Impression  PSV  EDV (cm/s)  Direction of Flow  Ratio  Dist. ICA                 89          18                      1.22  Mid. ICA                  48           8                      0.66  Prox. ICA    1 - 49%      50           9                      0.69  Dist CCA                  64           0  Mid CCA                   73           0                      1.29  Prox CCA                  56          11                      0.97  Ext Carotid              144           0                      1.97  Prox Vert                 47           0  Antegrade  Subclavian               137          16  Innominate                58           7    Left         Impression  PSV  EDV (cm/s)  Direction of Flow  Ratio  Dist. ICA                 85          20                      1.32  Mid. ICA                  62          14                      0.96  Prox. ICA    1 - 49%      61          13                      0.94  Dist CCA                  54           0  Mid CCA                   64           0                      0.95  Prox CCA                  68           0  Ext Carotid               79           0                      1.23  Prox Vert                 35          12  Antegrade  Subclavian               150           0        CONCLUSION:    Impression:  RIGHT:  There is <50% stenosis noted in the internal carotid artery. Plaque is  heterogenous and irregular.  Vertebral artery flow is antegrade. There is no significant subclavian artery  disease.  LEFT:  There is <50% stenosis noted in the internal carotid artery. Plaque is  heterogenous and irregular.  Vertebral  artery flow is antegrade. There is no significant subclavian artery  disease.    No previous study for comparison.    SIGNATURE:  Electronically Signed by: ANGELITA LO MD on 2025-01-25 11:35:18 AM       [unfilled]   ======================================================          Review of Systems  ROS as noted above, otherwise 12 point review of systems was performed and is negative.     Historical Information   Past Medical History:   Diagnosis Date    Anxiety     Coronary artery disease     Depression     Diabetes mellitus (HCC)     MI, old     PTSD (post-traumatic stress disorder)      Past Surgical History:   Procedure Laterality Date    CARDIAC CATHETERIZATION Left 1/24/2025    Procedure: Cardiac Left Heart Cath;  Surgeon: Derick Hancock MD;  Location: [facility];  Service: Cardiology    CARPAL TUNNEL RELEASE Right     CORONARY ANGIOPLASTY WITH STENT PLACEMENT      IL CORONARY ARTERY BYP W/VEIN & ARTERY GRAFT 1 VEIN N/A 1/31/2025    Procedure: CORONARY ARTERY BYPASS GRAFT (CABG) 1 VESSELS, LIMA to LAD;  Surgeon: Jarrett Johnson DO;  Location: [facility];  Service: Cardiac Surgery    IL RPLCMT AORTIC VALVE OPN W/STENTLESS TISSUE VALVE N/A 1/31/2025    Procedure: REPLACEMENT VALVE AORTIC (AVR) MECHANICAL, 23 MM CARBOMEDIC TOPHAT;  Surgeon: Jarrett Johnson DO;  Location: [facility];  Service: Cardiac Surgery    ROTATOR CUFF REPAIR Right     TOOTH EXTRACTION Bilateral 1/29/2025    Procedure: EXTRACTION TOOTH #2, 12, 14, 18, 31;  Surgeon: Tha Hernandez DMD;  Location: [facility];  Service: Maxillofacial     Social History     Substance and Sexual Activity   Alcohol Use Not Currently     Social History     Substance and Sexual Activity   Drug Use Not Currently    Types: Marijuana     Social History     Tobacco Use   Smoking Status Never    Passive exposure: Never   Smokeless Tobacco Never     History reviewed. No pertinent family history.    Meds/Allergies   Hospital Medications:   Current  Facility-Administered Medications:     acetaminophen (TYLENOL) tablet 975 mg, Q6H While awake    albuterol (PROVENTIL HFA,VENTOLIN HFA) inhaler 2 puff, Q4H PRN    amiodarone tablet 200 mg, Q8H SONYA    aspirin (ECOTRIN LOW STRENGTH) EC tablet 81 mg, Daily    atorvastatin (LIPITOR) tablet 80 mg, Daily With Dinner    bisacodyl (DULCOLAX) rectal suppository 10 mg, Daily PRN    chlorhexidine (PERIDEX) 0.12 % oral rinse 15 mL, BID    docusate sodium (COLACE) capsule 100 mg, BID    FLUoxetine (PROzac) capsule 40 mg, Daily    guaiFENesin (MUCINEX) 12 hr tablet 600 mg, Q12H SONYA    heparin (porcine) 25,000 units in 0.45% NaCl 250 mL infusion (premix), Titrated, Last Rate: 18 Units/kg/hr (02/04/25 0620)    insulin glargine (LANTUS) subcutaneous injection 30 Units 0.3 mL, HS    insulin lispro (HumALOG/ADMELOG) 100 units/mL subcutaneous injection 1-6 Units, HS    insulin lispro (HumALOG/ADMELOG) 100 units/mL subcutaneous injection 10 Units, TID With Meals    insulin lispro (HumALOG/ADMELOG) 100 units/mL subcutaneous injection 2-12 Units, TID AC **AND** Fingerstick Glucose (POCT), TID AC    lidocaine (LIDODERM) 5 % patch 2 patch, Daily    lisinopril (ZESTRIL) tablet 2.5 mg, Daily    methocarbamol (ROBAXIN) tablet 500 mg, Q6H SONYA    metoprolol tartrate (LOPRESSOR) tablet 25 mg, Q12H SONYA    ondansetron (ZOFRAN) injection 4 mg, Q6H PRN    oxyCODONE (ROXICODONE) IR tablet 5 mg, Q4H PRN **OR** oxyCODONE (ROXICODONE) immediate release tablet 10 mg, Q4H PRN    pantoprazole (PROTONIX) EC tablet 40 mg, Daily    polyethylene glycol (MIRALAX) packet 17 g, Daily    potassium chloride (Klor-Con M20) CR tablet 20 mEq, Daily    temazepam (RESTORIL) capsule 15 mg, HS PRN    torsemide (DEMADEX) tablet 20 mg, Daily    umeclidinium 62.5 mcg/actuation inhaler AEPB 1 puff, Daily    warfarin (COUMADIN) tablet 3 mg, Once (warfarin)  Home Medications:   Medications Prior to Admission:     albuterol (PROVENTIL HFA,VENTOLIN HFA) 90 mcg/act inhaler     "aspirin (ECOTRIN LOW STRENGTH) 81 mg EC tablet    atorvastatin (LIPITOR) 80 mg tablet    diazepam (VALIUM) 5 mg tablet    Empagliflozin (Jardiance) 10 MG TABS tablet    FLUoxetine (PROzac) 40 MG capsule    fluticasone (FLONASE) 50 mcg/act nasal spray    ibuprofen (MOTRIN) 400 mg tablet    insulin glargine (LANTUS) 100 units/mL subcutaneous injection    Lantus SoloStar 100 units/mL SOPN    lisinopril (ZESTRIL) 2.5 mg tablet    lovastatin (MEVACOR) 20 mg tablet    metFORMIN (GLUCOPHAGE) 1000 MG tablet    metoprolol tartrate (LOPRESSOR) 25 mg tablet    NovoLOG FlexPen 100 units/mL injection pen    traZODone (DESYREL) 50 mg tablet    umeclidinium (Incruse Ellipta) 62.5 mcg/actuation AEPB inhaler    Allergies   Allergen Reactions    Bee Venom Anaphylaxis    Shellfish Allergy - Food Allergy Anaphylaxis         Portions of the record may have been created with voice recognition software.  Occasional wrong words or \"sound a like\" substitutions may have occurred due to the inherent limitations of voice recognition software.  Read the chart carefully and recognize, using context, where substitutions have occurred.                     "

## 2025-02-04 NOTE — PROGRESS NOTES
Progress Note - Cardiac Surgery   Larry Harper 60 y.o. male MRN: [id number]  Unit/Bed#: PPHP-324-01 Encounter: [id number]    Aortic stenosis, Non-Rheumatic, Coronary artery disease. S/P aortic valve replacement (mechanical) and coronary artery bypass grafting x 1; POD # 5      24 Hour Events: Continues on Heparin gtt. No events overnight.    Medications:   Scheduled Meds:  Current Facility-Administered Medications   Medication Dose Route Frequency Provider Last Rate    acetaminophen  975 mg Oral Q6H While awake Niyah White PA-C      albuterol  2 puff Inhalation Q4H PRN Niyah White PA-C      amiodarone  200 mg Oral Q8H SONYA Niyah White PA-C      aspirin  81 mg Oral Daily Niyah White PA-C      atorvastatin  80 mg Oral Daily With Dinner Niyah White PA-C      bisacodyl  10 mg Rectal Daily PRN Niyah White PA-C      chlorhexidine  15 mL Mouth/Throat BID Niyah White PA-C      docusate sodium  100 mg Oral BID Niyah White PA-C      FLUoxetine  40 mg Oral Daily Niyah White PA-C      guaiFENesin  600 mg Oral Q12H SONYA Harriet Gonzales PA-C      heparin (porcine)  3-20 Units/kg/hr (Order-Specific) Intravenous Titrated Niyah White PA-C 20 Units/kg/hr (02/05/25 0700)    insulin glargine  45 Units Subcutaneous HS Godfrey Echevarria MD      insulin lispro  1-6 Units Subcutaneous HS Julio Cesar Reeves MD      insulin lispro  15 Units Subcutaneous TID With Meals Godfrey Echevarria MD      insulin lispro  2-12 Units Subcutaneous TID AC Julio Cesar Reeves MD      lidocaine  2 patch Topical Daily Niyah White PA-C      lisinopril  2.5 mg Oral Daily Niyah White PA-C      methocarbamol  500 mg Oral Q6H SONYA Niyah White PA-C      metoprolol tartrate  25 mg Oral Q12H SONYA Niyah White PA-C      ondansetron  4 mg Intravenous Q6H PRN Niyah White PA-C      oxyCODONE  5 mg Oral Q4H PRN Niyah White PA-C      Or    oxyCODONE  10 mg Oral Q4H PRN Niyah White PA-C      pantoprazole  40 mg Oral Daily Niyah White PA-C       polyethylene glycol  17 g Oral Daily Niyah White PA-C      potassium chloride  20 mEq Oral Daily Fariba Kevin PA-C      temazepam  15 mg Oral HS PRN Niyah White PA-C      torsemide  20 mg Oral Daily Fariba Kevin PA-C      umeclidinium  1 puff Inhalation Daily Niyah White PA-C       Continuous Infusions:heparin (porcine), 3-20 Units/kg/hr (Order-Specific), Last Rate: 20 Units/kg/hr (02/05/25 0700)      PRN Meds:.  albuterol    bisacodyl    ondansetron    oxyCODONE **OR** oxyCODONE    temazepam    Vitals:   Vitals:    02/04/25 2306 02/05/25 0231 02/05/25 0600 02/05/25 0719   BP: 119/69 133/79  139/75   BP Location:       Pulse: 64 72  79   Resp:  18     Temp: 97.8 °F (36.6 °C) 98 °F (36.7 °C)  98.3 °F (36.8 °C)   TempSrc:  Oral     SpO2: 93% 92%  92%   Weight:   72.3 kg (159 lb 6.3 oz)    Height:           Telemetry: NSR; Heart Rate: 83    Respiratory:   SpO2: SpO2: 92 %; Room air    Intake/Output:      Intake/Output Summary (Last 24 hours) at 2/5/2025 0739  Last data filed at 2/5/2025 0700  Gross per 24 hour   Intake 1084.53 ml   Output 3225 ml   Net -2140.47 ml        Weights:   Weight (last 2 days)       Date/Time Weight    02/05/25 0600 72.3 (159.39)    02/04/25 0600 72.8 (160.5)    02/03/25 0600 75 (165.35)            Chest tube Output: removed 2/2        Results:   Results from last 7 days   Lab Units 02/03/25  0503 02/02/25  0419 02/01/25  0350   WBC Thousand/uL 14.12* 14.89* 19.73*   HEMOGLOBIN g/dL 11.1* 11.2* 13.8   HEMATOCRIT % 34.2* 33.7* 41.4   PLATELETS Thousands/uL 206 161 182     Results from last 7 days   Lab Units 02/04/25  0430 02/03/25  0503 02/02/25  0419 01/31/25  1300 01/31/25  1259   SODIUM mmol/L 134* 131* 130*   < >  --    POTASSIUM mmol/L 3.9 4.2 3.8   < >  --    CHLORIDE mmol/L 93* 97 97   < >  --    CO2 mmol/L 30 26 27   < >  --    CO2, I-STAT mmol/L  --   --   --   --  23   BUN mg/dL 17 20 18   < >  --    CREATININE mg/dL 0.81 0.73 0.65   < >  --    GLUCOSE, ISTAT mg/dl  --    --   --   --  248*   CALCIUM mg/dL 8.2* 8.1* 8.0*   < >  --     < > = values in this interval not displayed.     Recent Labs     02/03/25  0503   MG 1.8*     Results from last 7 days   Lab Units 02/05/25  0501 02/04/25  1959 02/04/25  1348 02/04/25  0430 02/03/25  0503   INR  1.69*  --   --  1.62* 1.51*   PTT seconds 48* 62* 66* 51* 75*         Date:   INR:  Coumadin Dose:  2/5  1.69  -  2/4  1.62  3  2/3  1.51  3  2/2  1.31  2.5  2/1  1.03  2.5  1/31  1.22  2.5      Point of care glucose:  - 357  Endo following    Studies:  No new studies past 24 hrs        Invasive Lines/Tubes:  Invasive Devices       Central Venous Catheter Line  Duration             CVC Central Lines 01/31/25 Triple Right Internal jugular 4 days                    Physical Exam:    General: No acute distress, Alert, Normal appearance, and sleepy  HEENT/NECK:  Normocephalic. Atraumatic.  No jugular venous distention.    Cardiac: Regular rate and rhythm and No murmurs/rubs/gallops  Pulmonary:  Breath sounds clear bilaterally and No rales/rhonchi/wheezes  Abdomen:  Non-tender, Non-distended, and Normal bowel sounds  Incisions: Sternum is stable.  Incision is clean, dry, and intact.  and Saphenectomy incison is clean, dry, and intact.   Extremities: Extremities warm/dry and No edema B/L  Neuro: Alert and oriented X 3, Sensation is grossly intact, No focal deficits, and somnolent  Skin: Warm/Dry, without rashes or lesions.       Assessment:  Principal Problem:    NSTEMI (non-ST elevated myocardial infarction) ([facility])  Active Problems:    CAD (coronary artery disease)    Diabetes mellitus ([facility])    PTSD (post-traumatic stress disorder)    Severe aortic stenosis    Primary hypertension    Dyslipidemia    COPD (chronic obstructive pulmonary disease) ([facility])    Periodontal disease    S/P AVR    S/P CABG (coronary artery bypass graft)    Anticoagulated on Coumadin    Leukocytosis       Aortic stenosis, Non-Rheumatic, Coronary artery disease. S/P aortic  valve replacement (mechanical) and coronary artery bypass grafting x 1; POD # 5    Plan:    Cardiac:     S/P acute preoperative NSTEMI  Normal ventricular systolic function, EF 65%    NSR; HR well-controlled  BP well-controlled    Continue Lopressor, 25mg PO BID    ACE inhibitor/ARB indicated; Continue Lisinopril, 2.5mg PO Daily    Continue prophylactic Amiodarone, 200 mg PO TID    Anticoagulated for mechanical aortic valve. Continue Heparin gtt until INR therapeutic.  INR 1.69, Dose Coumadin, 5 mg today    Continue ASA and Statin therapy    Epicardial pacing wires have been removed    Remove central IV access today     Continue Coumadin for DVT prophylaxis    Pulmonary:     Good Room air oxygen saturation; Continue incentive spirometry/Coughing/Deep breathing exercises    Chest tubes have been discontinued    Renal:     Normal preoperative renal function  Creatinine 0.73 today, from 0.65    Intake/Output net: -2140 mL/24 hours    Diuretic Regimen:  Continue PO Torsemide, 20 mg QD  Continue Potassium Chloride 20 mEq PO QD    Hyponatremia - improving. Continue diuresis, fluid restriction and monitor.    Neuro:    Neurologically intact; No active issues     Incisional pain well controlled   Continue tylenol, 975 mg PO q 8, standing dose   Continue oxycodone, 5 to 10 mg PO q 4 hours prn pain    Continue methocarbamol, 500 mg PO q 6 hours for pain/muscle spasm    GI:    Controlled carbohydrate diet level two/Cardiac diet, with 1800 mL fluid restriction    Tolerating diet without complaint  + BM postoperatively    Continue stool softeners and prn suppository    Continue GI prophylaxis    Endo:     Pre-Op Hgb A1C: 8.9    Patient has been transitioned from continuous insulin infusion to intermittent subcutaneous dosing  On injectable therapy, prior to surgical intervention  Recommendation for discharge diabetes regimen pending endocrinology follow up  Endocrinology following daily    7    Hematology:     Post-operative  blood count acceptable; Trend prn  Leukocytosis; Afebrile with no signs of infection; Trend CBC prn    8.   Disposition:        Following daily PT/OT recommendations regarding home vs. rehab when medically cleared for discharge    Social issues regarding placement post discharge - Case mgmt following.    Routine postoperative recovery to this point; Anticipated discharge date: once INR therapeutic      VTE Pharmacologic Prophylaxis: Warfarin (Coumadin)  VTE Mechanical Prophylaxis: sequential compression device    Collaborative rounds completed with supervising physician  Plan of care discussed with bedside nurse    SIGNATURE: Fariba Kevin PA-C  DATE: February 5, 2025  TIME: 7:39 AM

## 2025-02-04 NOTE — ASSESSMENT & PLAN NOTE
TTE 1/28/25 LVEF 60%, trileafet severe AS with mild to moderate AI.  Mean gradient 39 mmHg with GRAYSON 1.01 cm²

## 2025-02-05 LAB
APTT PPP: 48 SECONDS (ref 23–34)
APTT PPP: 78 SECONDS (ref 23–34)
APTT PPP: 83 SECONDS (ref 23–34)
GLUCOSE SERPL-MCNC: 105 MG/DL (ref 65–140)
GLUCOSE SERPL-MCNC: 168 MG/DL (ref 65–140)
GLUCOSE SERPL-MCNC: 191 MG/DL (ref 65–140)
GLUCOSE SERPL-MCNC: 226 MG/DL (ref 65–140)
INR PPP: 1.69 (ref 0.85–1.19)
PROTHROMBIN TIME: 20 SECONDS (ref 12.3–15)

## 2025-02-05 PROCEDURE — 97110 THERAPEUTIC EXERCISES: CPT

## 2025-02-05 PROCEDURE — 97116 GAIT TRAINING THERAPY: CPT

## 2025-02-05 PROCEDURE — 82948 REAGENT STRIP/BLOOD GLUCOSE: CPT

## 2025-02-05 PROCEDURE — 85610 PROTHROMBIN TIME: CPT | Performed by: PHYSICIAN ASSISTANT

## 2025-02-05 PROCEDURE — 99024 POSTOP FOLLOW-UP VISIT: CPT | Performed by: PHYSICIAN ASSISTANT

## 2025-02-05 PROCEDURE — 85730 THROMBOPLASTIN TIME PARTIAL: CPT | Performed by: THORACIC SURGERY (CARDIOTHORACIC VASCULAR SURGERY)

## 2025-02-05 PROCEDURE — 99233 SBSQ HOSP IP/OBS HIGH 50: CPT | Performed by: INTERNAL MEDICINE

## 2025-02-05 RX ORDER — WARFARIN SODIUM 5 MG/1
5 TABLET ORAL
Status: COMPLETED | OUTPATIENT
Start: 2025-02-05 | End: 2025-02-05

## 2025-02-05 RX ORDER — INSULIN GLARGINE 100 [IU]/ML
50 INJECTION, SOLUTION SUBCUTANEOUS
Status: DISCONTINUED | OUTPATIENT
Start: 2025-02-05 | End: 2025-02-07

## 2025-02-05 RX ADMIN — LIDOCAINE 5% 2 PATCH: 700 PATCH TOPICAL at 08:23

## 2025-02-05 RX ADMIN — LISINOPRIL 2.5 MG: 2.5 TABLET ORAL at 08:22

## 2025-02-05 RX ADMIN — DOCUSATE SODIUM 100 MG: 100 CAPSULE, LIQUID FILLED ORAL at 17:10

## 2025-02-05 RX ADMIN — INSULIN LISPRO 2 UNITS: 100 INJECTION, SOLUTION INTRAVENOUS; SUBCUTANEOUS at 22:05

## 2025-02-05 RX ADMIN — METOPROLOL TARTRATE 25 MG: 25 TABLET, FILM COATED ORAL at 08:22

## 2025-02-05 RX ADMIN — AMIODARONE HYDROCHLORIDE 200 MG: 200 TABLET ORAL at 22:02

## 2025-02-05 RX ADMIN — AMIODARONE HYDROCHLORIDE 200 MG: 200 TABLET ORAL at 05:00

## 2025-02-05 RX ADMIN — AMIODARONE HYDROCHLORIDE 200 MG: 200 TABLET ORAL at 13:30

## 2025-02-05 RX ADMIN — TEMAZEPAM 15 MG: 15 CAPSULE ORAL at 20:13

## 2025-02-05 RX ADMIN — POTASSIUM CHLORIDE 20 MEQ: 1500 TABLET, EXTENDED RELEASE ORAL at 08:22

## 2025-02-05 RX ADMIN — FLUOXETINE HYDROCHLORIDE 40 MG: 20 CAPSULE ORAL at 08:22

## 2025-02-05 RX ADMIN — ACETAMINOPHEN 975 MG: 325 TABLET, FILM COATED ORAL at 20:13

## 2025-02-05 RX ADMIN — METHOCARBAMOL 500 MG: 500 TABLET ORAL at 17:10

## 2025-02-05 RX ADMIN — POLYETHYLENE GLYCOL 3350 17 G: 17 POWDER, FOR SOLUTION ORAL at 08:21

## 2025-02-05 RX ADMIN — PANTOPRAZOLE SODIUM 40 MG: 40 TABLET, DELAYED RELEASE ORAL at 05:00

## 2025-02-05 RX ADMIN — GUAIFENESIN 600 MG: 600 TABLET, EXTENDED RELEASE ORAL at 20:13

## 2025-02-05 RX ADMIN — OXYCODONE HYDROCHLORIDE 10 MG: 10 TABLET ORAL at 20:13

## 2025-02-05 RX ADMIN — INSULIN LISPRO 2 UNITS: 100 INJECTION, SOLUTION INTRAVENOUS; SUBCUTANEOUS at 11:05

## 2025-02-05 RX ADMIN — GUAIFENESIN 600 MG: 600 TABLET, EXTENDED RELEASE ORAL at 08:22

## 2025-02-05 RX ADMIN — CHLORHEXIDINE GLUCONATE 0.12% ORAL RINSE 15 ML: 1.2 LIQUID ORAL at 17:10

## 2025-02-05 RX ADMIN — WARFARIN SODIUM 5 MG: 5 TABLET ORAL at 17:10

## 2025-02-05 RX ADMIN — OXYCODONE HYDROCHLORIDE 10 MG: 10 TABLET ORAL at 02:29

## 2025-02-05 RX ADMIN — METHOCARBAMOL 500 MG: 500 TABLET ORAL at 12:37

## 2025-02-05 RX ADMIN — ACETAMINOPHEN 975 MG: 325 TABLET, FILM COATED ORAL at 13:30

## 2025-02-05 RX ADMIN — UMECLIDINIUM 1 PUFF: 62.5 AEROSOL, POWDER ORAL at 08:22

## 2025-02-05 RX ADMIN — DOCUSATE SODIUM 100 MG: 100 CAPSULE, LIQUID FILLED ORAL at 08:40

## 2025-02-05 RX ADMIN — INSULIN LISPRO 15 UNITS: 100 INJECTION, SOLUTION INTRAVENOUS; SUBCUTANEOUS at 17:10

## 2025-02-05 RX ADMIN — INSULIN LISPRO 15 UNITS: 100 INJECTION, SOLUTION INTRAVENOUS; SUBCUTANEOUS at 12:35

## 2025-02-05 RX ADMIN — TORSEMIDE 20 MG: 20 TABLET ORAL at 08:22

## 2025-02-05 RX ADMIN — ACETAMINOPHEN 975 MG: 325 TABLET, FILM COATED ORAL at 08:21

## 2025-02-05 RX ADMIN — INSULIN GLARGINE 50 UNITS: 100 INJECTION, SOLUTION SUBCUTANEOUS at 22:03

## 2025-02-05 RX ADMIN — ASPIRIN 81 MG: 81 TABLET, COATED ORAL at 08:22

## 2025-02-05 RX ADMIN — METHOCARBAMOL 500 MG: 500 TABLET ORAL at 05:00

## 2025-02-05 RX ADMIN — HEPARIN SODIUM 20 UNITS/KG/HR: 10000 INJECTION, SOLUTION INTRAVENOUS at 18:43

## 2025-02-05 RX ADMIN — INSULIN LISPRO 15 UNITS: 100 INJECTION, SOLUTION INTRAVENOUS; SUBCUTANEOUS at 07:25

## 2025-02-05 RX ADMIN — CHLORHEXIDINE GLUCONATE 0.12% ORAL RINSE 15 ML: 1.2 LIQUID ORAL at 08:22

## 2025-02-05 RX ADMIN — ATORVASTATIN CALCIUM 80 MG: 80 TABLET, FILM COATED ORAL at 17:10

## 2025-02-05 RX ADMIN — OXYCODONE HYDROCHLORIDE 10 MG: 10 TABLET ORAL at 08:40

## 2025-02-05 RX ADMIN — INSULIN LISPRO 4 UNITS: 100 INJECTION, SOLUTION INTRAVENOUS; SUBCUTANEOUS at 06:00

## 2025-02-05 RX ADMIN — HEPARIN SODIUM 18 UNITS/KG/HR: 10000 INJECTION, SOLUTION INTRAVENOUS at 00:36

## 2025-02-05 RX ADMIN — METHOCARBAMOL 500 MG: 500 TABLET ORAL at 23:10

## 2025-02-05 RX ADMIN — METOPROLOL TARTRATE 25 MG: 25 TABLET, FILM COATED ORAL at 20:15

## 2025-02-05 NOTE — CASE MANAGEMENT
Case Management Discharge Planning Note    Patient name Larry Harper  [facility] [id number]  : 1965 Date 2025       Current Admission Date: 2025  Current Admission Diagnosis:NSTEMI (non-ST elevated myocardial infarction) ([facility])   Patient Active Problem List    Diagnosis Date Noted Date Diagnosed    Anticoagulated on Coumadin 2025     Leukocytosis 2025     S/P AVR 2025     S/P CABG (coronary artery bypass graft) 2025     Periodontal disease 2025     Dyslipidemia 2025     COPD (chronic obstructive pulmonary disease) ([facility]) 2025     Hypokalemia 2025     Primary hypertension 2025     Severe aortic stenosis 2025     NSTEMI (non-ST elevated myocardial infarction) ([facility])      CAD (coronary artery disease)      Diabetes mellitus ([facility])      Anxiety      Depression      PTSD (post-traumatic stress disorder)        LOS (days): 11  Geometric Mean LOS (GMLOS) (days): 8.5  Days to GMLOS:-2.1     OBJECTIVE:  Risk of Unplanned Readmission Score: 19.15         Current admission status: Inpatient   Preferred Pharmacy:   [facility]/pharmacy #[id number] [place] [address]  Phone: [phone] Fax: [phone]    Primary Care Provider: Efe Portillo DO    Primary Insurance: MEDICARE  Secondary Insurance: [facility]    DISCHARGE DETAILS:  Additional Comments: CM met with patient bedside to discuss DCP. CM inquired w/ patient if he has contacted [email or web address] to assist with housing, or any of the shelters or hotels with information given.    Patient states he called [employer] who reported they could support patient with the cost of his 1st month rent and security, as long as he finds the place. Patient states he has been trying but has been unsuccessful in finding a place.    Patient states he has called the hotels and he cannot  afford.     CM reached out to the  [facility]  as they provided information for an affordable living arrangement, $700/month with $175 security payment. They cannot accept service dog.     CM reviewed with patient who states he will go nowhere without service dog. CM reviewed that this may be the easiest option for patient to get a place to stay and patient adamantly refused. CM inquired with patient if he would be OK with boarding the dog if assistance was found to pay boarding fees, patient still refused    CM advised patient to call his local support and continue calling rooms for rent and hotels to find a place to go at discharge that will accommodate his dog as he refuses to board the dog. CM following.

## 2025-02-05 NOTE — ASSESSMENT & PLAN NOTE
Lab Results   Component Value Date    HGBA1C 8.9 (H) 01/24/2025     Recent Labs     02/04/25  1626 02/04/25  2105 02/05/25  0517 02/05/25  1044   POCGLU 124 161* 226* 168*     Uncontrolled type 2 diabetes mellitus on insulin therapy with hyperglycemia   Home regimen: Lantus 35 units in the morning, metformin 1000 mg twice daily, NovoLog 5 units before meals, Jardiance 10 mg daily  Current regimen: Lantus 30 units nightly, Humalog 10 units before meals, correctional scale insulin algorithm for before meals and 3 at bedtime.    24-hour blood sugars reviewed-noted to have fasting hyperglycemia with blood sugar of 226, otherwise blood sugars have been at goal.    Plan:  Increase lantus to 50units qhs  Continue Humalog 15 units before meals  Continue correctional scale algorithm 4 before meals and 3 at bedtime.  Monitor for hypoglycemia, treat per protocol  Change diet to consistent carbohydrate level 2, no juice  Goal blood sugar while in the [id number]-411-248 mg/dL  Discharge recommendations pending clinical course  Endocrinology will continue following

## 2025-02-05 NOTE — PLAN OF CARE
Problem: PAIN - ADULT  Goal: Verbalizes/displays adequate comfort level or baseline comfort level  Description: Interventions:  - Encourage patient to monitor pain and request assistance  - Assess pain using appropriate pain scale  - Administer analgesics based on type and severity of pain and evaluate response  - Implement non-pharmacological measures as appropriate and evaluate response  - Consider cultural and social influences on pain and pain management  - Notify physician/advanced practitioner if interventions unsuccessful or patient reports new pain  2/5/2025 0800 by Keisha Alfaro RN  Outcome: Progressing  2/5/2025 0759 by Keisha Alfaro, RN  Outcome: Progressing

## 2025-02-05 NOTE — ANESTHESIA POSTPROCEDURE EVALUATION
Post-Op Assessment Note    CV Status:  Stable  Pain Score: 1    Pain management: adequate       Mental Status:  Alert and lethargic   Hydration Status:  Stable   PONV Controlled:  None   Airway Patency:  Patent  Two or more mitigation strategies used for obstructive sleep apnea   Post Op Vitals Reviewed: Yes    No anethesia notable event occurred.    Staff: Anesthesiologist           Last Filed PACU Vitals:  Vitals Value Taken Time   Temp 98.5 °F (36.9 °C) 02/05/25 1514   Pulse 74 02/05/25 1720   /71 02/05/25 1514   Resp 18 02/05/25 1055   SpO2 92 % 02/05/25 1720   Vitals shown include unfiled device data.

## 2025-02-05 NOTE — PROGRESS NOTES
Pastoral Care Progress Note          Chaplaincy Interventions Utilized:   Empowerment: Clarified, confirmed, or reviewed information from treatment team , Encouraged focus on present, and Encouraged self-care    Exploration: Explored hope    Collaboration: Consulted with interdisciplinary team and Encouraged adherence to treatment plan     Relationship Building: Cultivated a relationship of care and support, Listened empathically, and Hospitality    Ritual:        02/05/25 1400   Clinical Encounter Type   Visited With Patient   Routine Visit Follow-up             Chaplaincy Outcomes Achieved:  Expressed gratitude and Expressed humor     met with the patient in his room where he was sitting in the recliner.  Patient appeared to be feeling much better today and was talkative.   listened and offered support.       Spiritual Coping Strategies Utilized:      remains available.

## 2025-02-05 NOTE — PROGRESS NOTES
Progress Note - Endocrinology   Name: Larry Harper 60 y.o. male I MRN: [id number]  Unit/Bed#: [facility]-324-01 I Date of Admission: 1/25/2025   Date of Service: 2/5/2025 I Hospital Day: 11    Assessment & Plan  Diabetes mellitus (HCC)  Lab Results   Component Value Date    HGBA1C 8.9 (H) 01/24/2025     Recent Labs     02/04/25  1626 02/04/25  2105 02/05/25  0517 02/05/25  1044   POCGLU 124 161* 226* 168*     Uncontrolled type 2 diabetes mellitus on insulin therapy with hyperglycemia   Home regimen: Lantus 35 units in the morning, metformin 1000 mg twice daily, NovoLog 5 units before meals, Jardiance 10 mg daily  Current regimen: Lantus 30 units nightly, Humalog 10 units before meals, correctional scale insulin algorithm for before meals and 3 at bedtime.    24-hour blood sugars reviewed-noted to have fasting hyperglycemia with blood sugar of 226, otherwise blood sugars have been at goal.    Plan:  Increase lantus to 50units qhs  Continue Humalog 15 units before meals  Continue correctional scale algorithm 4 before meals and 3 at bedtime.  Monitor for hypoglycemia, treat per protocol  Change diet to consistent carbohydrate level 2, no juice  Goal blood sugar while in the [id number]-576-753 mg/dL  Discharge recommendations pending clinical course  Endocrinology will continue following  NSTEMI (non-ST elevated myocardial infarction) (HCC)  Management per primary team  S/P AVR  Severe aortic stenosis-1/31 s/p AVR  Management per primary team  S/P CABG (coronary artery bypass graft)  MV-CAD-1/31 s/p CABG x 1  Management per primary team    24 Hour Events : No significant overnight events  Subjective : Patient seen and examined at the bedside, not in acute distress at the time of my evaluation.  Reports good appetite, denies nausea, vomiting, signs or symptoms of hypoglycemia.  Reports being able to walk up and down the hallway with PT today, after which she felt even better.    Objective :  Temp:  [97.8 °F (36.6 °C)-98.3  °F (36.8 °C)] 98 °F (36.7 °C)  HR:  [64-79] 68  BP: (117-139)/(68-83) 117/83  Resp:  [16-18] 18  SpO2:  [92 %-93 %] 93 %  O2 Device: None (Room air)    Physical Exam  Vitals and nursing note reviewed.   Constitutional:       General: He is not in acute distress.     Appearance: Normal appearance. He is not ill-appearing, toxic-appearing or diaphoretic.   HENT:      Head: Normocephalic and atraumatic.      Nose: Nose normal.      Mouth/Throat:      Pharynx: Oropharynx is clear.   Eyes:      General: No scleral icterus.        Right eye: No discharge.         Left eye: No discharge.      Extraocular Movements: Extraocular movements intact.      Conjunctiva/sclera: Conjunctivae normal.   Pulmonary:      Effort: Pulmonary effort is normal. No respiratory distress.   Abdominal:      General: There is no distension.   Musculoskeletal:         General: Normal range of motion.      Cervical back: Normal range of motion and neck supple.   Skin:     General: Skin is warm and dry.      Coloration: Skin is not jaundiced or pale.   Neurological:      Mental Status: He is alert and oriented to person, place, and time. Mental status is at baseline.   Psychiatric:         Mood and Affect: Mood normal.         Behavior: Behavior normal.         Thought Content: Thought content normal.         Judgment: Judgment normal.         Lab Results: I have reviewed the following results:CBC/BMP: No new results in last 24 hours. , Creatinine Clearance: Estimated Creatinine Clearance: 92.9 mL/min (by C-G formula based on SCr of 0.81 mg/dL)., LFTs: No new results in last 24 hours.     Imaging Results Review: No pertinent imaging studies reviewed.  Other Study Results Review: No additional pertinent studies reviewed.

## 2025-02-05 NOTE — PROGRESS NOTES
Progress Note - Cardiac Surgery   Larry Harper 60 y.o. male MRN: [id number]  Unit/Bed#: [facility]-324-01 Encounter: [id number]    Aortic stenosis, Non-Rheumatic, Coronary artery disease. S/P aortic valve replacement (mechanical) and coronary artery bypass grafting x 1; POD # 6      24 Hour Events: Continues on Heparin gtt. Was on 1L NC this morning, weaned off with good sats on RA. C/O incisional discomfort.    Medications:   Scheduled Meds:  Current Facility-Administered Medications   Medication Dose Route Frequency Provider Last Rate    acetaminophen  975 mg Oral Q6H While awake Niyah White PA-C      albuterol  2 puff Inhalation Q4H PRN Niyah White PA-C      amiodarone  200 mg Oral Q8H SONYA Niyah White PA-C      aspirin  81 mg Oral Daily Niyah White PA-C      atorvastatin  80 mg Oral Daily With Dinner Niyah White PA-C      bisacodyl  10 mg Rectal Daily PRN Niyah White PA-C      chlorhexidine  15 mL Mouth/Throat BID Niyah White PA-C      docusate sodium  100 mg Oral BID Niyah White PA-C      FLUoxetine  40 mg Oral Daily Niyah White PA-C      guaiFENesin  600 mg Oral Q12H SONYA Harriet Gonzales PA-C      heparin (porcine)  3-20 Units/kg/hr (Order-Specific) Intravenous Titrated Niyah White PA-C 20 Units/kg/hr (02/06/25 0701)    insulin glargine  50 Units Subcutaneous HS Godfrey Echevarria MD      insulin lispro  1-6 Units Subcutaneous HS Julio Cesar Reeves MD      insulin lispro  15 Units Subcutaneous TID With Meals Godfrey Echevarria MD      insulin lispro  2-12 Units Subcutaneous TID AC Julio Cesar Reeves MD      lidocaine  2 patch Topical Daily Niyah White PA-C      lisinopril  2.5 mg Oral Daily Niyah White PA-C      methocarbamol  500 mg Oral Q6H SONYA Niyah White PA-C      metoprolol tartrate  25 mg Oral Q12H SONYA Niyah White PA-C      ondansetron  4 mg Intravenous Q6H PRN Niyah White PA-C      oxyCODONE  5 mg Oral Q4H PRN Niyah White PA-C      Or    oxyCODONE  10 mg Oral Q4H PRN Niyah White PA-C       pantoprazole  40 mg Oral Daily Niyah White PA-C      polyethylene glycol  17 g Oral Daily Niyah White PA-C      potassium chloride  20 mEq Oral Daily Fariba Kevin PA-C      temazepam  15 mg Oral HS PRN Niyah White PA-C      torsemide  20 mg Oral Daily Fariba Kevin PA-C      umeclidinium  1 puff Inhalation Daily Niyah White PA-C       Continuous Infusions:heparin (porcine), 3-20 Units/kg/hr (Order-Specific), Last Rate: 20 Units/kg/hr (02/06/25 0701)      PRN Meds:.  albuterol    bisacodyl    ondansetron    oxyCODONE **OR** oxyCODONE    temazepam    Vitals:   Vitals:    02/05/25 2312 02/06/25 0239 02/06/25 0701 02/06/25 0735   BP: 118/68 122/71 136/69    BP Location:       Pulse: 64 74 80    Resp:  14     Temp:  97.9 °F (36.6 °C) 98 °F (36.7 °C)    TempSrc:       SpO2: 92% (!) 87% 92%    Weight:    71.3 kg (157 lb 3 oz)   Height:           Telemetry: NSR; Heart Rate: 73    Respiratory:   SpO2: SpO2: 92 %; room air    Intake/Output:      Intake/Output Summary (Last 24 hours) at 2/6/2025 0804  Last data filed at 2/6/2025 0749  Gross per 24 hour   Intake 1791 ml   Output 3885 ml   Net -2094 ml        Weights:   Weight (last 2 days)       Date/Time Weight    02/06/25 0735 71.3 (157.19)    02/05/25 0600 72.3 (159.39)    02/04/25 0600 72.8 (160.5)            Chest tube Output: removed 2/2        Results:   Results from last 7 days   Lab Units 02/03/25  0503 02/02/25  0419 02/01/25  0350   WBC Thousand/uL 14.12* 14.89* 19.73*   HEMOGLOBIN g/dL 11.1* 11.2* 13.8   HEMATOCRIT % 34.2* 33.7* 41.4   PLATELETS Thousands/uL 206 161 182     Results from last 7 days   Lab Units 02/04/25  0430 02/03/25  0503 02/02/25  0419 01/31/25  1300 01/31/25  1259   SODIUM mmol/L 134* 131* 130*   < >  --    POTASSIUM mmol/L 3.9 4.2 3.8   < >  --    CHLORIDE mmol/L 93* 97 97   < >  --    CO2 mmol/L 30 26 27   < >  --    CO2, I-STAT mmol/L  --   --   --   --  23   BUN mg/dL 17 20 18   < >  --    CREATININE mg/dL 0.81 0.73 0.65    "< >  --    GLUCOSE, ISTAT mg/dl  --   --   --   --  248*   CALCIUM mg/dL 8.2* 8.1* 8.0*   < >  --     < > = values in this interval not displayed.     No results for input(s): \"MG\" in the last 72 hours.    Results from last 7 days   Lab Units 02/06/25  0525 02/05/25  1941 02/05/25  1323 02/05/25  0501 02/04/25  1348 02/04/25  0430   INR  2.13*  --   --  1.69*  --  1.62*   PTT seconds 71* 83* 78* 48*   < > 51*    < > = values in this interval not displayed.         Date:   INR:  Coumadin Dose:  2/6  2.13  -  2/5  1.69  5  2/4  1.62  3  2/3  1.51  3  2/2  1.31  2.5  2/1  1.03  2.5  1/31  1.22  2.5      Point of care glucose:  - 191  Endo following    Studies:  No new studies past 24 hrs        Invasive Lines/Tubes:  Invasive Devices       Peripheral Intravenous Line  Duration             Peripheral IV 02/05/25 Distal;Dorsal (posterior);Left Forearm <1 day                    Physical Exam:    General: No acute distress, Alert, and Normal appearance  HEENT/NECK:  Normocephalic. Atraumatic.  No jugular venous distention.    Cardiac: Regular rate and rhythm and No murmurs/rubs/gallops  Pulmonary:  Breath sounds clear bilaterally, No rales/rhonchi/wheezes, and Poor inspiratory effort  Abdomen:  Non-tender, Non-distended, and Normal bowel sounds  Incisions: Sternum is stable.  Incision is clean, dry, and intact.   Extremities: Extremities warm/dry and No edema B/L  Neuro: Alert and oriented X 3, Sensation is grossly intact, and No focal deficits  Skin: Warm/Dry, without rashes or lesions.      Assessment:  Principal Problem:    NSTEMI (non-ST elevated myocardial infarction) ([facility])  Active Problems:    CAD (coronary artery disease)    Diabetes mellitus ([facility])    PTSD (post-traumatic stress disorder)    Severe aortic stenosis    Primary hypertension    Dyslipidemia    COPD (chronic obstructive pulmonary disease) ([facility])    Periodontal disease    S/P AVR    S/P CABG (coronary artery bypass graft)    Anticoagulated on " Coumadin    Leukocytosis       Aortic stenosis, Non-Rheumatic, Coronary artery disease. S/P aortic valve replacement (mechanical) and coronary artery bypass grafting x 1; POD # 6    Plan:    Cardiac:     S/P acute preoperative NSTEMI  Normal ventricular systolic function, EF 65%    NSR; HR well-controlled  BP well-controlled    Continue Lopressor, 25mg PO BID    ACE inhibitor/ARB indicated; Continue Lisinopril, 2.5mg PO Daily    Continue prophylactic Amiodarone, 200 mg PO TID    Anticoagulated for mechanical aortic valve. Continue Heparin gtt until INR therapeutic.  INR 2.13, Dose Coumadin 2.5 mg today    Continue ASA and Statin therapy    Epicardial pacing wires have been removed    Remove central IV access today     Continue Coumadin for DVT prophylaxis    Pulmonary:     ON 1L O2 via NC. Wean off. Continue incentive spirometry/Coughing/Deep breathing exercises    Chest tubes have been discontinued    Renal:     Normal preoperative renal function  Creatinine 0.73 today, from 0.65    Intake/Output net: -2034 mL/24 hours    Diuretic Regimen:  Continue PO Torsemide, 20 mg QD  Continue Potassium Chloride 20 mEq PO QD    Hyponatremia - improving. Continue diuresis, fluid restriction and monitor.    Neuro:    Neurologically intact; No active issues     Incisional pain well controlled   Continue tylenol, 975 mg PO q 8, standing dose   Continue oxycodone, 5 to 10 mg PO q 4 hours prn pain    Continue methocarbamol, 500 mg PO q 6 hours for pain/muscle spasm    GI:    Controlled carbohydrate diet level two/Cardiac diet, with 1800 mL fluid restriction    Tolerating diet without complaint  + BM postoperatively    Continue stool softeners and prn suppository    Continue GI prophylaxis    Endo:     Pre-Op Hgb A1C: 8.9    Patient has been transitioned from continuous insulin infusion to intermittent subcutaneous dosing  On injectable therapy, prior to surgical intervention  Recommendation for discharge diabetes regimen pending  endocrinology follow up  Endocrinology following daily    7    Hematology:     Post-operative blood count acceptable; Trend prn  Leukocytosis; Afebrile with no signs of infection; Trend CBC prn    8.   Disposition:        Following daily PT/OT recommendations regarding home vs. rehab when medically cleared for discharge    Social issues regarding placement post discharge - Case mgmt following.    Routine postoperative recovery to this point; INR therapeutic, doing well.  Discharge today.      VTE Pharmacologic Prophylaxis: Warfarin (Coumadin)  VTE Mechanical Prophylaxis: sequential compression device    Collaborative rounds completed with supervising physician  Plan of care discussed with bedside nurse    SIGNATURE: Fariba Kevin PA-C  DATE: February 6, 2025  TIME: 8:04 AM

## 2025-02-05 NOTE — PHYSICAL THERAPY NOTE
PHYSICAL THERAPY NOTE          Patient Name: Larry Harper  Today's Date: 2/5/2025 02/05/25 1453   PT Last Visit   PT Visit Date 02/05/25   Note Type   Note Type Treatment   Pain Assessment   Pain Assessment Tool 0-10   Pain Score No Pain   Restrictions/Precautions   Other Precautions Cardiac/sternal;Chair Alarm;Multiple lines;Telemetry   General   Chart Reviewed Yes   Additional Pertinent History cleared for Tx session by nsg   Response to Previous Treatment Patient with no complaints from previous session.   Cognition   Overall Cognitive Status WFL   Arousal/Participation Alert;Cooperative   Attention Attends with cues to redirect   Orientation Level Oriented to person;Oriented to place;Oriented to situation   Memory Decreased recall of precautions   Following Commands Follows one step commands without difficulty   Subjective   Subjective Alert; reclined in the chair; reports feeling dizzy and tired all day and initially declines mobilization; agreeable to chair level therex and try standing/balance activities at bedside and w/o AD; eventually states feeling better and not dizzy -> agreeable to amb w/o AD   Transfers   Sit to Stand 5  Supervision   Stand to Sit 5  Supervision   Ambulation/Elevation   Gait pattern Short stride;Inconsistent giacomo  (no overt LOB)   Gait Assistance 5  Supervision   Additional items Verbal cues   Assistive Device None   Distance 130 ft   Stair Management Assistance Not tested   Balance   Static Sitting Good   Dynamic Sitting Fair +   Static Standing Fair +   Dynamic Standing Fair   Ambulatory Fair -   Activity Tolerance   Activity Tolerance Patient tolerated treatment well   Nurse Made Aware spoke to JAMEY Mariscal   Exercises   Hip Abduction Sitting;20 reps;AAROM;Bilateral   Knee AROM Long Arc Quad Sitting;20 reps;AROM;Bilateral   Ankle Pumps Sitting;20 reps;AROM;Bilateral   Marching Standing;5  reps;Bilateral  (unsupported)   Assessment   Prognosis Good   Problem List Decreased strength;Decreased endurance   Assessment Pt demonstrated overall significant improvement in balance, endurance and all aspects of observed mobility progressing to (S) level on level surfaces incl amb w/o AD; no overt uncorrected LOB, gross knee buckling, or swaying observed during the session; no increased discomfort or excessive fatigue expressed; pt appeared to be comfortable at the end of session/reclined; will cont to follow pt in PT for progressive mobilization to max functional (I) and to address elevations prior to D/C;   Barriers to Discharge None   Goals   Patient Goals to figure out his D/C destination   STG Expiration Date 02/15/25   PT Treatment Day 2   Plan   Treatment/Interventions Functional transfer training;LE strengthening/ROM;Elevations;Therapeutic exercise;Endurance training;Bed mobility;Gait training;Spoke to nursing;Spoke to case management   Progress Improving as expected   PT Frequency 4-6x/wk   Discharge Recommendation   Rehab Resource Intensity Level, PT No post-acute rehabilitation needs   AM-PAC Basic Mobility Inpatient   Turning in Flat Bed Without Bedrails 4   Lying on Back to Sitting on Edge of Flat Bed Without Bedrails 4   Moving Bed to Chair 3   Standing Up From Chair Using Arms 3   Walk in Room 3   Climb 3-5 Stairs With Railing 3   Basic Mobility Inpatient Raw Score 20   Basic Mobility Standardized Score 43.99   [facility] Highest Level Of Mobility   -HLM Goal 6: Walk 10 steps or more   -HLM Achieved 7: Walk 25 feet or more   Education   Education Provided Mobility training   Patient Demonstrates verbal understanding   End of Consult   Patient Position at End of Consult Bedside chair;Bed/Chair alarm activated;All needs within reach     Kvng Gant

## 2025-02-05 NOTE — PLAN OF CARE
Problem: PHYSICAL THERAPY ADULT  Goal: Performs mobility at highest level of function for planned discharge setting.  See evaluation for individualized goals.  Description: Treatment/Interventions: ADL retraining, LE strengthening/ROM, Functional transfer training, Elevations, Therapeutic exercise, Endurance training, Patient/family training, Equipment eval/education, Bed mobility, Gait training, Compensatory technique education, Spoke to nursing, OT  Equipment Recommended: Walker       See flowsheet documentation for full assessment, interventions and recommendations.  Outcome: Progressing  Note: Prognosis: Good  Problem List: Decreased strength, Decreased endurance  Assessment: Pt demonstrated overall significant improvement in balance, endurance and all aspects of observed mobility progressing to (S) level on level surfaces incl amb w/o AD; no overt uncorrected LOB, gross knee buckling, or swaying observed during the session; no increased discomfort or excessive fatigue expressed; pt appeared to be comfortable at the end of session/reclined; will cont to follow pt in PT for progressive mobilization to max functional (I) and to address elevations prior to D/C;  Barriers to Discharge: None     Rehab Resource Intensity Level, PT: No post-acute rehabilitation needs    See flowsheet documentation for full assessment.

## 2025-02-06 LAB
APTT PPP: 71 SECONDS (ref 23–34)
GLUCOSE SERPL-MCNC: 143 MG/DL (ref 65–140)
GLUCOSE SERPL-MCNC: 170 MG/DL (ref 65–140)
GLUCOSE SERPL-MCNC: 204 MG/DL (ref 65–140)
GLUCOSE SERPL-MCNC: 78 MG/DL (ref 65–140)
INR PPP: 2.13 (ref 0.85–1.19)
PROTHROMBIN TIME: 23.8 SECONDS (ref 12.3–15)

## 2025-02-06 PROCEDURE — 97116 GAIT TRAINING THERAPY: CPT

## 2025-02-06 PROCEDURE — 82948 REAGENT STRIP/BLOOD GLUCOSE: CPT

## 2025-02-06 PROCEDURE — 99024 POSTOP FOLLOW-UP VISIT: CPT | Performed by: PHYSICIAN ASSISTANT

## 2025-02-06 PROCEDURE — 88305 TISSUE EXAM BY PATHOLOGIST: CPT | Performed by: PATHOLOGY

## 2025-02-06 PROCEDURE — 88311 DECALCIFY TISSUE: CPT | Performed by: PATHOLOGY

## 2025-02-06 PROCEDURE — 97530 THERAPEUTIC ACTIVITIES: CPT

## 2025-02-06 PROCEDURE — 85610 PROTHROMBIN TIME: CPT | Performed by: PHYSICIAN ASSISTANT

## 2025-02-06 PROCEDURE — 85730 THROMBOPLASTIN TIME PARTIAL: CPT | Performed by: THORACIC SURGERY (CARDIOTHORACIC VASCULAR SURGERY)

## 2025-02-06 RX ORDER — LISINOPRIL 2.5 MG/1
2.5 TABLET ORAL DAILY
Qty: 30 TABLET | Refills: 2 | Status: SHIPPED | OUTPATIENT
Start: 2025-02-06 | End: 2025-02-08

## 2025-02-06 RX ORDER — ACETAMINOPHEN 325 MG/1
650 TABLET ORAL EVERY 6 HOURS PRN
Qty: 180 TABLET | Refills: 0 | Status: SHIPPED | OUTPATIENT
Start: 2025-02-06

## 2025-02-06 RX ORDER — WARFARIN SODIUM 2.5 MG/1
2.5 TABLET ORAL
Status: COMPLETED | OUTPATIENT
Start: 2025-02-06 | End: 2025-02-06

## 2025-02-06 RX ORDER — ASPIRIN 81 MG/1
81 TABLET ORAL DAILY
Qty: 90 TABLET | Refills: 0 | Status: SHIPPED | OUTPATIENT
Start: 2025-02-06

## 2025-02-06 RX ORDER — OXYCODONE HYDROCHLORIDE 5 MG/1
TABLET ORAL
Qty: 30 TABLET | Refills: 0 | Status: SHIPPED | OUTPATIENT
Start: 2025-02-06 | End: 2025-02-08

## 2025-02-06 RX ORDER — BLOOD-GLUCOSE METER
KIT MISCELLANEOUS
Qty: 1 KIT | Refills: 0 | Status: SHIPPED | OUTPATIENT
Start: 2025-02-06

## 2025-02-06 RX ORDER — PEN NEEDLE, DIABETIC 32GX 5/32"
NEEDLE, DISPOSABLE MISCELLANEOUS
Qty: 100 EACH | Refills: 0 | Status: SHIPPED | OUTPATIENT
Start: 2025-02-06

## 2025-02-06 RX ORDER — POTASSIUM CHLORIDE 1500 MG/1
20 TABLET, EXTENDED RELEASE ORAL DAILY
Qty: 3 TABLET | Refills: 1 | Status: SHIPPED | OUTPATIENT
Start: 2025-02-07 | End: 2025-02-08

## 2025-02-06 RX ORDER — TORSEMIDE 10 MG/1
10 TABLET ORAL DAILY
Qty: 3 TABLET | Refills: 1 | Status: SHIPPED | OUTPATIENT
Start: 2025-02-06 | End: 2025-02-08

## 2025-02-06 RX ORDER — METOPROLOL TARTRATE 25 MG/1
25 TABLET, FILM COATED ORAL EVERY 12 HOURS SCHEDULED
Qty: 60 TABLET | Refills: 2 | Status: SHIPPED | OUTPATIENT
Start: 2025-02-06

## 2025-02-06 RX ORDER — DOCUSATE SODIUM 100 MG/1
100 CAPSULE, LIQUID FILLED ORAL 2 TIMES DAILY
Qty: 60 CAPSULE | Refills: 0 | Status: SHIPPED | OUTPATIENT
Start: 2025-02-06 | End: 2025-03-08

## 2025-02-06 RX ORDER — ATORVASTATIN CALCIUM 80 MG/1
80 TABLET, FILM COATED ORAL EVERY MORNING
Qty: 30 TABLET | Refills: 2 | Status: SHIPPED | OUTPATIENT
Start: 2025-02-06

## 2025-02-06 RX ORDER — LANCETS 33 GAUGE
EACH MISCELLANEOUS
Qty: 100 EACH | Refills: 0 | Status: SHIPPED | OUTPATIENT
Start: 2025-02-06

## 2025-02-06 RX ORDER — INSULIN GLARGINE 100 [IU]/ML
50 INJECTION, SOLUTION SUBCUTANEOUS DAILY
Qty: 15 ML | Refills: 2 | Status: SHIPPED | OUTPATIENT
Start: 2025-02-06 | End: 2025-02-08

## 2025-02-06 RX ORDER — POLYETHYLENE GLYCOL 3350 17 G/17G
17 POWDER, FOR SOLUTION ORAL DAILY PRN
Qty: 510 G | Refills: 0 | Status: SHIPPED | OUTPATIENT
Start: 2025-02-06

## 2025-02-06 RX ORDER — INSULIN ASPART 100 [IU]/ML
15 INJECTION, SOLUTION INTRAVENOUS; SUBCUTANEOUS
Qty: 15 ML | Refills: 2 | Status: SHIPPED | OUTPATIENT
Start: 2025-02-06 | End: 2025-02-08

## 2025-02-06 RX ORDER — GLUCOSAMINE HCL/CHONDROITIN SU 500-400 MG
CAPSULE ORAL
Qty: 100 EACH | Refills: 0 | Status: SHIPPED | OUTPATIENT
Start: 2025-02-06

## 2025-02-06 RX ORDER — WARFARIN SODIUM 2.5 MG/1
2.5 TABLET ORAL
Qty: 60 TABLET | Refills: 2 | Status: SHIPPED | OUTPATIENT
Start: 2025-02-06

## 2025-02-06 RX ORDER — BLOOD SUGAR DIAGNOSTIC
STRIP MISCELLANEOUS
Qty: 100 EACH | Refills: 0 | Status: SHIPPED | OUTPATIENT
Start: 2025-02-06

## 2025-02-06 RX ADMIN — INSULIN LISPRO 15 UNITS: 100 INJECTION, SOLUTION INTRAVENOUS; SUBCUTANEOUS at 11:44

## 2025-02-06 RX ADMIN — GUAIFENESIN 600 MG: 600 TABLET, EXTENDED RELEASE ORAL at 08:08

## 2025-02-06 RX ADMIN — ATORVASTATIN CALCIUM 80 MG: 80 TABLET, FILM COATED ORAL at 17:20

## 2025-02-06 RX ADMIN — UMECLIDINIUM 1 PUFF: 62.5 AEROSOL, POWDER ORAL at 08:20

## 2025-02-06 RX ADMIN — FLUOXETINE HYDROCHLORIDE 40 MG: 20 CAPSULE ORAL at 08:08

## 2025-02-06 RX ADMIN — AMIODARONE HYDROCHLORIDE 200 MG: 200 TABLET ORAL at 21:55

## 2025-02-06 RX ADMIN — CHLORHEXIDINE GLUCONATE 0.12% ORAL RINSE 15 ML: 1.2 LIQUID ORAL at 08:07

## 2025-02-06 RX ADMIN — INSULIN LISPRO 2 UNITS: 100 INJECTION, SOLUTION INTRAVENOUS; SUBCUTANEOUS at 21:58

## 2025-02-06 RX ADMIN — POTASSIUM CHLORIDE 20 MEQ: 1500 TABLET, EXTENDED RELEASE ORAL at 08:08

## 2025-02-06 RX ADMIN — AMIODARONE HYDROCHLORIDE 200 MG: 200 TABLET ORAL at 06:12

## 2025-02-06 RX ADMIN — ACETAMINOPHEN 975 MG: 325 TABLET, FILM COATED ORAL at 08:07

## 2025-02-06 RX ADMIN — ACETAMINOPHEN 975 MG: 325 TABLET, FILM COATED ORAL at 21:55

## 2025-02-06 RX ADMIN — OXYCODONE HYDROCHLORIDE 5 MG: 5 TABLET ORAL at 11:41

## 2025-02-06 RX ADMIN — OXYCODONE HYDROCHLORIDE 10 MG: 10 TABLET ORAL at 05:04

## 2025-02-06 RX ADMIN — TORSEMIDE 20 MG: 20 TABLET ORAL at 08:08

## 2025-02-06 RX ADMIN — INSULIN LISPRO 15 UNITS: 100 INJECTION, SOLUTION INTRAVENOUS; SUBCUTANEOUS at 17:20

## 2025-02-06 RX ADMIN — METHOCARBAMOL 500 MG: 500 TABLET ORAL at 05:04

## 2025-02-06 RX ADMIN — AMIODARONE HYDROCHLORIDE 200 MG: 200 TABLET ORAL at 13:56

## 2025-02-06 RX ADMIN — ACETAMINOPHEN 975 MG: 325 TABLET, FILM COATED ORAL at 13:55

## 2025-02-06 RX ADMIN — WARFARIN SODIUM 2.5 MG: 2.5 TABLET ORAL at 17:20

## 2025-02-06 RX ADMIN — CHLORHEXIDINE GLUCONATE 0.12% ORAL RINSE 15 ML: 1.2 LIQUID ORAL at 21:55

## 2025-02-06 RX ADMIN — LISINOPRIL 2.5 MG: 2.5 TABLET ORAL at 08:07

## 2025-02-06 RX ADMIN — PANTOPRAZOLE SODIUM 40 MG: 40 TABLET, DELAYED RELEASE ORAL at 05:04

## 2025-02-06 RX ADMIN — INSULIN LISPRO 15 UNITS: 100 INJECTION, SOLUTION INTRAVENOUS; SUBCUTANEOUS at 08:07

## 2025-02-06 RX ADMIN — POLYETHYLENE GLYCOL 3350 17 G: 17 POWDER, FOR SOLUTION ORAL at 08:07

## 2025-02-06 RX ADMIN — ASPIRIN 81 MG: 81 TABLET, COATED ORAL at 08:07

## 2025-02-06 RX ADMIN — METOPROLOL TARTRATE 25 MG: 25 TABLET, FILM COATED ORAL at 21:55

## 2025-02-06 RX ADMIN — TEMAZEPAM 15 MG: 15 CAPSULE ORAL at 21:56

## 2025-02-06 RX ADMIN — DOCUSATE SODIUM 100 MG: 100 CAPSULE, LIQUID FILLED ORAL at 08:08

## 2025-02-06 RX ADMIN — METOPROLOL TARTRATE 25 MG: 25 TABLET, FILM COATED ORAL at 08:08

## 2025-02-06 RX ADMIN — INSULIN LISPRO 2 UNITS: 100 INJECTION, SOLUTION INTRAVENOUS; SUBCUTANEOUS at 11:41

## 2025-02-06 RX ADMIN — INSULIN GLARGINE 50 UNITS: 100 INJECTION, SOLUTION SUBCUTANEOUS at 21:56

## 2025-02-06 RX ADMIN — METHOCARBAMOL 500 MG: 500 TABLET ORAL at 11:41

## 2025-02-06 RX ADMIN — METHOCARBAMOL 500 MG: 500 TABLET ORAL at 17:20

## 2025-02-06 RX ADMIN — LIDOCAINE 5% 2 PATCH: 700 PATCH TOPICAL at 08:07

## 2025-02-06 RX ADMIN — GUAIFENESIN 600 MG: 600 TABLET, EXTENDED RELEASE ORAL at 21:55

## 2025-02-06 NOTE — CASE MANAGEMENT
Case Management Discharge Planning Note    Patient name Larry Harper  [facility] [id number]  : 1965 Date 2025       Current Admission Date: 2025  Current Admission Diagnosis:NSTEMI (non-ST elevated myocardial infarction) (HCC)   Patient Active Problem List    Diagnosis Date Noted Date Diagnosed    Anticoagulated on Coumadin 2025     Leukocytosis 2025     S/P AVR 2025     S/P CABG (coronary artery bypass graft) 2025     Periodontal disease 2025     Dyslipidemia 2025     COPD (chronic obstructive pulmonary disease) (formerly [facility]) 2025     Hypokalemia 2025     Primary hypertension 2025     Severe aortic stenosis 2025     NSTEMI (non-ST elevated myocardial infarction) (formerly [facility])      CAD (coronary artery disease)      Diabetes mellitus (formerly [facility])      Anxiety      Depression      PTSD (post-traumatic stress disorder)        LOS (days): 12  Geometric Mean LOS (GMLOS) (days): 8.5  Days to GMLOS:-3.2     OBJECTIVE:  Risk of Unplanned Readmission Score: 20.13       Current admission status: Inpatient   Preferred Pharmacy:   [facility]/pharmacy #[id number] - [place], [place] - [address] [place] [address]  Phone: [phone] Fax: [phone]    [facility] - [place] - [address]  [address]  [place] [address]  Phone: [phone] Fax: [phone]    Primary Care Provider: Efe Portillo DO    Primary Insurance: MEDICARE  Secondary Insurance: [facility]    DISCHARGE DETAILS:  Additional Comments: Bedside RN came to notify CM that patient has filed [place] appeal, Case # [id number]. CM to await fax from [facility] confirming they have received appeal.

## 2025-02-06 NOTE — PLAN OF CARE
Problem: PHYSICAL THERAPY ADULT  Goal: Performs mobility at highest level of function for planned discharge setting.  See evaluation for individualized goals.  Description: Treatment/Interventions: ADL retraining, LE strengthening/ROM, Functional transfer training, Elevations, Therapeutic exercise, Endurance training, Patient/family training, Equipment eval/education, Bed mobility, Gait training, Compensatory technique education, Spoke to nursing, OT  Equipment Recommended: Walker       See flowsheet documentation for full assessment, interventions and recommendations.  Outcome: Adequate for Discharge  Note: Prognosis: Good  Problem List: Decreased strength, Decreased endurance  Assessment: Pt demonstrated mod (I) level on level surfaces (incl amb w/o AD) and on the steps w/ no overt uncorrected LOB, gross knee buckling, or swaying observed during the session; no increased discomfort or excessive fatigue expressed; pt appeared to be comfortable at the end of session/reclined; based on above, no other immediate PT needs identified while in the hospital; pt informed and concurred; will therefore sign off  Barriers to Discharge: None     Rehab Resource Intensity Level, PT: No post-acute rehabilitation needs    See flowsheet documentation for full assessment.

## 2025-02-06 NOTE — CASE MANAGEMENT
Case Management Discharge Planning Note    Patient name Larry Harper  [facility] [id number]  : 1965 Date 2025       Current Admission Date: 2025  Current Admission Diagnosis:NSTEMI (non-ST elevated myocardial infarction) ([facility])   Patient Active Problem List    Diagnosis Date Noted Date Diagnosed    Anticoagulated on Coumadin 2025     Leukocytosis 2025     S/P AVR 2025     S/P CABG (coronary artery bypass graft) 2025     Periodontal disease 2025     Dyslipidemia 2025     COPD (chronic obstructive pulmonary disease) ([facility]) 2025     Hypokalemia 2025     Primary hypertension 2025     Severe aortic stenosis 2025     NSTEMI (non-ST elevated myocardial infarction) ([facility])      CAD (coronary artery disease)      Diabetes mellitus ([facility])      Anxiety      Depression      PTSD (post-traumatic stress disorder)        LOS (days): 12  Geometric Mean LOS (GMLOS) (days): 8.5  Days to GMLOS:-3     OBJECTIVE:  Risk of Unplanned Readmission Score: 17.59       Current admission status: Inpatient   Preferred Pharmacy:   [facility]/pharmacy #[id number] [place] [address]  Phone: [phone] Fax: [phone]    Primary Care Provider: Efe Portillo DO    Primary Insurance: MEDICARE  Secondary Insurance: [facility]    DISCHARGE DETAILS:     IMM Given (Date):: 25  IMM Given to:: Patient (refused signature)     Additional Comments: CM met with patient bedside to discuss DCP. Patient shared he has called all hotels and cannot afford them. Patient shares he will go to shelter at [facility]. CM inquired if patient would be agreeable to low-income hotels/placement with temporary boarding of his dog, as these locations cannot accommodate a dog. Patient adamantly refused. Patient states he will immediately be getting his dog on discharge. Patient previously  told CM he was unaware of his dog's current location.    CM reviewed IMM with patient in detail. Patient verbalized awareness and refused to sign. When CM explained that an appeal could be started by calling the highlighted number on the 1st page of IMM, patient stated he would not be calling anyone.     CM inquired that since patient is adamant about discharging to go be with his dog, CM asked what the plan would be, where he would be going to get his dog, and who currently had his dog. At this point patient told CM to leave. Patient has been given Emergency Shelter list. Additional shelter list added to patient chart.     ADDENDUM  CM met with patient again bedside, redelivered affordable hotels list. Per CM management TM this patient reports he receives $860/month. Hotels included have nights for $55/night, can accommodate pets.     Pt still states cannot afford. Pt states will be calling 211 for entering shelter system. Provider team aware of plan and aware that pt cannot get C if he is unable to provide CM with hotel address. Pt verbalized awareness.     [facility] added to follow-up providers for patient to establish care, receive labwork for Coumadin, and establish with an Outpatient . Patient advised & verbalized awareness.

## 2025-02-06 NOTE — DISCHARGE INSTR - AVS FIRST PAGE
Instructions Following Open Heart Surgery      Protect your sternum (breast bone). Wires are placed during surgery to hold the sternum together. Do not lift anything heavier than 10 pounds for the first four weeks after surgery. (For example, a gallon of milk weighs 8 pounds) When you are seen for a routine postop check, you will be cleared to lift up to 25 lbs. Following three months from the time of surgery, you may lift without any restrictions.     Hug a pillow to your chest or cross your arms over your chest when you laugh, sneeze, or cough. You may resume sexual activity when you feel ready. Do not put any excessive pressure on your chest.    Be careful when you get into or out of a chair or bed.  Hug a pillow or cross your arms when you stand or sit. Do not twist as you move. Use only your legs to sit and stand. You may need a raised toilet seat if you have trouble standing up without using your arms.     No sleeping on side for the first 4 weeks. Limit daytime naps, to prevent difficulty sleeping at night.    Women: Wear a clean surgical bra every day.     Do not play sports that use your shoulder.  Examples include tennis and golf.    Do not drive until cleared by surgeon. Typically cleared to drive after one month, determination will be made at routine postop appointment. When a passenger in the car, wear a seat belt.    Continue you breathing exercises throughout the day with incentive spirometry    Activity: Your goal is to go on frequent walks, slowly increasing your activity level. Walking will help prevent leg swelling and prevent blood clots. When you start outpatient cardiac rehab in one month, you will be given additional instructions and a formal exercise plan. It is OK to go up steps, with help.     You may resume sexual activity when you are comfortable. Do not put excessive pressure on your chest.     Weigh yourself daily in the morning and keep of log of your weight. If your weight  increases more than 2 lbs per day or more than 5 lbs in a week, call your surgeon's office.    Keep your legs elevated when sitting. Pressure stockings may be worn to prevent significant leg swelling.     You may get routine vaccines any time after open heart surgery    Do not smoke:  Nicotine can damage blood vessels and make it more difficult to heal. Do not use e-cigarettes or smokeless tobacco in place of cigarettes or to help you quit. They still contain nicotine. Ask your primary care provider for information if you currently smoke and need help quitting.     Incision/Wound Care:    Shower daily. Gently wash your incision with warm water and mild soap. Do not scrub the area. Gently pat the area dry with a clean towel. If you have a bandage, dry the area and put on a new, clean bandage. Change your bandage at least daily, or if it gets wet or dirty. Always wash your hands before you care for your wound. Do not apply ointments, creams, lotions, powders, etc. If you develop signs of an infection (fever > 101, redness, warm skin, or drainage) please call your surgeon's office.     Do not pick at or touch puncture sites or incisions. Allow and scabs to come off on their own.     It is normal to have some drainage from the chest tube sites. Apply clean/dry dressings, until this resolves.    You may have discomfort or numbness along the incision for 3-4 weeks. It is normal to occasionally experience brief sharp shooing pains, tightness, or pulling sensations.    Do not take a bath or swim until cleared by surgeon.    As your incision heals, sometimes small tender lumps or pimple-like bumps develop which is due to your body attempting to “dissolve” the suture (stiches).     Call your local emergency number (354 in the [place]) or have someone call if:   You have squeezing, pressure, or pain in your chest or back, lightheadedness or sudden cold sweat  Short of breath that does not go away with rest  You cough up blood.  You  have a fast heartbeat that flutters. Or palpitations  You faint.  Signs of a stroke:  Numbness or drooping on one side of your face. Weakness in an arm or leg. Confusion or difficulty speaking. Dizziness, a severe headache, or vision loss    Call your surgeons office if:   You have bleeding that does not stop even after you apply pressure for 5 minutes.  You have redness, tenderness, or signs of infection at incision (pain, swelling, odor, or green/yellow discharge from incision site)  You have a severe headache.  You have a fever higher than 101°F (38.4°C).  You urinate less, or not at all.  You have persistent nausea, vomiting, or diarrhea  You hear persistent or worsening crunching or grinding in your sternum.  You have questions or concerns about your condition or care.      Diet:    Eat heart-healthy foods, low in unhealthy fats and sodium (salt). A heart healthy diet helps improve your cholesterol levels and lowers your risk for heart disease and stroke. You will receive formal education on healthy eating and label reading during your cardiac rehab in one month.   Choose foods that contain healthy fats, such as soybean, canola, olive, corn, and sunflower oils.  Limit unhealthy fats. Examples include:  Cholesterol  is found in animal foods, such as eggs and lobster, and in dairy products made from whole milk.    Saturated fat  is found in meats, such as carcamo and hamburger. It is also found in chicken or turkey skin, whole milk, and butter.     Trans fat  is found in packaged foods, such as potato chips and cookies. It is also in some fried foods, and shortening. Do not eat foods that contain trans fats.  Get 20 to 30 grams of fiber each day.  Fruits, vegetables, whole-grain foods, and legumes (cooked beans) are good sources of fiber.  Low Sodium: Limit to 2,000 mg or less each day, unless otherwise directed. Choose low-sodium or no-salt-added foods. Add little or no salt to food you prepare. Use herbs and  spices in place of salt.  Foods high in sodium include frozen dinners, macaroni and cheese, instant potatoes, canned vegetables, cured or smoked meats, hot dogs, carcamo and sausage, ketchup, barbecue sauce, salad dressing, pickles, olives, soy sauce, and miso.     Fluid Restriction: Fluid restriction after hospital discharge is advised. Limit your fluid intake to no more than 8 cups of liquid (8oz = 1cup) or 2000 mL per day.    Limit/Do not drink alcohol. Alcohol can damage heart and raise your blood pressure. The general recommended limit is 1 drink a day for women 21 or older and for men 65 or older. Do not have more than 3 drinks within 24 hours or 7 within a week. A drink of alcohol is 12 oz of beer, 5 oz of wine, or 1½ oz of liquor.        Narcotic Safety     What do I need to know about narcotic safety?    A narcotic is a type of medicine used to treat pain. When you are discharged from the hospital, you will be prescribed a narcotic called oxycodone. Pain control and management may help you rest, heal, and return to your daily activities. Do not take more than the recommended amount. Too much can cause a life-threatening overdose. Do not continue to take it after your pain stops.     How do I use narcotics safely?   Take prescribed narcotics exactly as directed.  You may develop tolerance. This means you keep needing higher doses to get the same effect. You may also develop narcotic use disorder. This means you are not able to control your narcotic use.  Do not give narcotics to others or take narcotics that belong to someone else.  Do not mix narcotics with other medicines or alcohol.  The combination can cause an overdose, or cause you to stop breathing.   Do not drive or operate heavy machinery after you use a narcotic.    Talk to your healthcare provider if you have any side effects.  Side effects include nausea, sleepiness, itching, and trouble thinking clearly.     What can I do to manage constipation?   Constipation is the most common side effect of narcotic medicine. Constipation is when you have hard, dry bowel movements, or you go longer than usual between bowel movements. The following are ways you can prevent or relieve constipation:  Drink liquids as allowed.  Drinking extra liquids will help soften and move your bowels. You should drink up to your maximum fluid allotment of 2 liters.    Eat high-fiber foods.  This may help decrease constipation by adding bulk to your bowel movements. High-fiber foods include fruits, vegetables, whole-grain breads and cereals, and beans  Supplements. You have been prescribed a fiber supplement called polyethylene glycol (Umm lax) and a stool softener called docusate sodium (Colace). You should take these for as long as you continue narcotic medications.  Exercise regularly.  Regular physical activity can help stimulate your intestines. Walking is a good exercise to prevent or relieve constipation. Ask which exercises are best for you.    How do I store narcotics safely?   Store narcotics where others cannot easily get them.  Keep them in a locked cabinet or secure area. Do not  keep them in a purse or other bag you carry with you. A person may be looking for something else and find the narcotics.    Make sure narcotics are stored out of the reach of children.  A child can easily overdose on narcotics. Narcotics may look like candy to a small child.               Blood Thinners    What you need to know:     You have been prescribed a blood thinner(s) to help prevent blood clots. Blood clots can cause strokes, heart attacks, and death. Examples of blood thinners include Aspirin, Plavix (clopidorel), Brilinta (ticagrelor), Xarelto (rivaroxaban), Pradaxa., (dabigatran), Eliquis (apixaban), and Coumadin (warfarin)    While taking any blood thinner, watch for bleeding and bruising. Watch for blood in your urine and bowel movements. Use a soft washcloth on your skin, and a soft  toothbrush to brush your teeth. If you shave, use an electric shaver. Do not play contact sports.    Do not start or stop any other medicines unless your healthcare provider tells you to do so. (Many medicines cannot be used with blood thinners)    Take your blood thinner exactly as prescribed by your healthcare provider. Do not skip a dose or take less than prescribed. Tell your provider right away if you forget to take your blood thinner, or if you took too much.    Warfarin (Coumadin):     You will need a regular blood test called a PT/INR. This test checks how thin your blood is. Your doctor may change your Coumadin dose, based on blood test (INR) results.    Following discharge from the hospital, you will be contacted by the [facility] to order these blood tests. If you do not hear from them within three days, contact your surgeon's office at [phone]  It is not safe to take this medicine during pregnancy. It could harm an unborn baby. Tell your doctor right away if you become pregnant. Use an effective form of birth control to keep from getting pregnant during treatment and for at least 1 month after your last dose.    If you miss a dose do not take extra medicine to make up for a missed dose. Inform your healthcare provider.     Foods and medicines can affect your body's response to Coumadin. Warfarin works best when you eat about the same amount of vitamin K every day. Vitamin K is found in green leafy vegetables and certain other foods. Do not make major changes to your diet while you take Coumadin. Do not eat grapefruit or drink grapefruit juice while you are using this medicine.

## 2025-02-06 NOTE — CASE MANAGEMENT
[facility] received a [employer] appeal.   Case Control ID: [id number]  EMR Key: [id number]  Clinicals attached via [employer] online portal. Appeal is pending.     Per CM, pt refused to sign IMM and can not be included if the clinicals sent to [employer].    Care Manager notified: Dominique PANTOJA    Please reach out to CM for updates on any clinical information.

## 2025-02-06 NOTE — PHYSICAL THERAPY NOTE
PHYSICAL THERAPY NOTE          Patient Name: Larry Harper  Today's Date: 2/6/2025 02/06/25 1100   PT Last Visit   PT Visit Date 02/06/25   Note Type   Note Type Treatment   Pain Assessment   Pain Assessment Tool 0-10   Pain Score No Pain   Restrictions/Precautions   Other Precautions Cardiac/sternal;Telemetry;Chair Alarm   General   Chart Reviewed Yes   Additional Pertinent History cleared for Tx session by nsg   Response to Previous Treatment Patient with no complaints from previous session.   Cognition   Overall Cognitive Status WFL   Arousal/Participation Alert;Cooperative   Attention Within functional limits   Orientation Level Oriented to person;Oriented to place;Oriented to situation   Memory Within functional limits   Following Commands Follows one step commands without difficulty   Subjective   Subjective Alert; in the chair; agreeable to amb and try steps after encouragement and re-assurance provided   Transfers   Sit to Stand 6  Modified independent   Stand to Sit 6  Modified independent   Ambulation/Elevation   Gait pattern Short stride;Inconsistent giacomo  (no overt LOB, knee buckling or excessive swaying)   Gait Assistance 6  Modified independent   Assistive Device None   Distance 180 ft + 2 x 15 ft + 180 ft w/ extended seated rest periods and steps negotiation in between   Stair Management Assistance 6  Modified independent   Additional items   (reviewed sequencing and UE precaution on the hand rail)   Stair Management Technique One rail R;Step to pattern;Nonreciprocal   Number of Stairs 7   Balance   Static Sitting Good   Static Standing Fair +   Ambulatory Fair   Activity Tolerance   Activity Tolerance Patient tolerated treatment well  (pulse ox stable)   Medical Staff Made Aware spoke to LEON Link w/ CT sx   Nurse Made Aware spoke to JAMEY Hunt   Assessment   Assessment Pt demonstrated mod (I) level  on level surfaces (incl amb w/o AD) and on the steps w/ no overt uncorrected LOB, gross knee buckling, or swaying observed during the session; no increased discomfort or excessive fatigue expressed; pt appeared to be comfortable at the end of session/reclined; based on above, no other immediate PT needs identified while in the hospital; pt informed and concurred; will therefore sign off   Goals   Patient Goals to reunite w/ his service dog   PT Treatment Day 3   Plan   Treatment/Interventions Spoke to nursing;Spoke to case management;Spoke to advanced practitioner   Progress Discontinue PT   PT Frequency Other (Comment)  (D/C PT)   Discharge Recommendation   Rehab Resource Intensity Level, PT No post-acute rehabilitation needs   AM-PAC Basic Mobility Inpatient   Turning in Flat Bed Without Bedrails 4   Lying on Back to Sitting on Edge of Flat Bed Without Bedrails 4   Moving Bed to Chair 4   Standing Up From Chair Using Arms 4   Walk in Room 4   Climb 3-5 Stairs With Railing 4   Basic Mobility Inpatient Raw Score 24   Basic Mobility Standardized Score 57.68   [facility] Highest Level Of Mobility   -HLM Goal 8: Walk 250 feet or more   -HLM Achieved 8: Walk 250 feet ot more   Education   Education Provided Mobility training   Patient Demonstrates verbal understanding   End of Consult   Patient Position at End of Consult Bedside chair;Bed/Chair alarm activated;All needs within reach     Kvng Gant

## 2025-02-06 NOTE — DISCHARGE SUMMARY
Discharge Summary - Cardiac Surgery   Larry Harper 60 y.o. male MRN: [id number]  Unit/Bed#: PPHP-324-01 Encounter: [id number]    Admission Date: 2025     Discharge Date: 25    Admitting Diagnosis: NSTEMI (non-ST elevated myocardial infarction) (HCC) [I21.4]    Primary Discharge Diagnosis:   Aortic stenosis, Non-Rheumatic, Coronary artery disease. S/P aortic valve replacement and coronary artery bypass grafting;    Secondary Discharge Diagnosis:   AS, DMII, PTSD (uses marijuana), HTN, CAD, H/o MI s/p KAEL RCA , Depression, FH CAD, COPD    Attending: Jarrett Johnson D.O.    Consulting Physician(s):   Cardiology  Endocrinology  Medical critical care  [facility] [place]    Procedures Performed:   Procedure(s):  CORONARY ARTERY BYPASS GRAFT (CABG) 1 VESSELS, LIMA to LAD  REPLACEMENT VALVE AORTIC (AVR) MECHANICAL, 23 MM CARBOMEDIC [facility] Course:   The patient was seen in consultation prior to this admission for evaluation of Aortic stenosis, Non-Rheumatic, Coronary artery disease.  Risks and benefits of aortic valve replacement and coronary artery bypass grafting were discussed in detail, and patient was agreeable.  Routine preoperative evaluation was completed and informed consent was obtained prior to admission.    : 60M who presented to the [facility] ED with chest pain. He is from [place] and has been in [place] for about 2 weeks because he attended his brother's  who recently passed from acute MI. For the 3 days prior to admission he developed intermittent episodes of midsternal chest pain. The pain is associated with SOB, nausea, diaphoresis and dizziness. He was taking Nitro SL tabs with relief. The chest pain continued which prompted him to be evaluated in the ED. Trop peaks in the 200s, ruling him in for an NSTEMI. ECG showed non specific ST changes. ECHO from 24 showed EF 65%, normal wall motion, severe AS (velocity 3.4 m/s, mean gradient 29 mmHg, DVI 0.25, GRAYSON 0.75 cm² ) and mild  mitral annular calcification. He was transferred to [facility] and underwent cardiac cath which revealed 2V CAD. He was then transferred to [facility] for CTS evaluation for AVR/CABG.     1/27: CT chest completed, official read pending, does show some plaque in ascending/root, emphysema changes/blebs. Vein mapping acceptable (bilateral thigh sizes okay) and carotid US < 50% bilaterally. Labs and vitals stable. Had brief CP last evening, no SOB, resolved with  nitro. On IV heparin. Stop ACE in anticipation of surgery. Consult endo for uncontrolled DM2 on insulin. Panorex pending. PFTs pending. Surgical timing TBD.      1/28: Endocrinology consult completed; Humalog and Lantus started. PFTs completed and showed FEV1 2.0 (64%), FEV/FVC 56%, DLCO 21 (81%), - mild obstruction. Awaiting Panorex (not yet completed)- notified radiology. P3 bed request placed.      1/29: OMFS consulted needs extractions - patient initially refusing - but ultimately agreeable - texted OMFS may be able to extract today. CTA TAVR ordered. Tentative OR Friday.     1/30: Dental extractions completed yest. Denies CP/SOB. On IV heparin. No complaints except some pain from dental extractions. Vitals stable. CTA TAVR completed for surgical planning. Consent obtained. OR orders placed. AVR/CABG tomorrow with Dr. Johnson.      1/31: to OR for AVR mechanical (23mm Carbomedics Top Hat), CABG x 1 (LIMA to LAD). Intraoperative blood products include: None. No significant postoperative bleeding.  Transferred to ICU supported with derrell at 80. SR 80s. Wean towards extubation. Low dose coumadin 2.5mg tonight.     2/1: Derrell weaned to off. Deliend. Extuabted to 8LNC, wean as tolerated. Start Lopressor 25mg BID. Discontinue EPWs, start heparin, INR 1.03. Start Lasix 40mg IV BID, discontinue andersen catheter. Transition to SSIC.      2/2: Intermittently on BiPAP, on 6LNC, wean as tolerated. INR 1.3, on heparin gtt, dose Coumadin 2.52mg tonight. Insulin gtt per endo.  Discontinue CTs. Transfer to telem.     2/3: NSR, RA. -512 ml/24 hrs. Transition to Demadex 20mg BID. Remains on Heparin gtt. INR 1.51, dose Coumadin 3mg tonight.      2/4: Continues on Heparin gtt. INR 1.62. Dose Coumadin 3mg tonight. Was on 4L NC this morning, now on RA. -2.9L/24 hrs. Decrease Demadex to 20mg daily.     2/5: Remains on Heparin gtt. INR 1.69. Dose Coumadin 5mg tonight. D/C TLC. Social work for discharge housing concerns.     2/6: Was on 1L NC overnight, weaned off. Good RA O2 sats. NSR. INR 2.13, dose Coumadin 2.5mg tonight. D/C Heparin gtt. Deemed stable for discharge.      Condition at Discharge:   good     Discharge Physical Exam:    Please see the documented physical exam from this morning's progress note for details.    Discharge Data:  Results from last 7 days   Lab Units 02/03/25  0503 02/02/25  0419 02/01/25  0350   WBC Thousand/uL 14.12* 14.89* 19.73*   HEMOGLOBIN g/dL 11.1* 11.2* 13.8   HEMATOCRIT % 34.2* 33.7* 41.4   PLATELETS Thousands/uL 206 161 182     Results from last 7 days   Lab Units 02/04/25  0430 02/03/25  0503 02/02/25  0419 01/31/25  1300 01/31/25  1259   POTASSIUM mmol/L 3.9 4.2 3.8   < >  --    CHLORIDE mmol/L 93* 97 97   < >  --    CO2 mmol/L 30 26 27   < >  --    CO2, I-STAT mmol/L  --   --   --   --  23   BUN mg/dL 17 20 18   < >  --    CREATININE mg/dL 0.81 0.73 0.65   < >  --    GLUCOSE, ISTAT mg/dl  --   --   --   --  248*   CALCIUM mg/dL 8.2* 8.1* 8.0*   < >  --     < > = values in this interval not displayed.     Results from last 7 days   Lab Units 02/06/25  0525 02/05/25  1941 02/05/25  1323 02/05/25  0501 02/04/25  1348 02/04/25  0430   INR  2.13*  --   --  1.69*  --  1.62*   PTT seconds 71* 83* 78* 48*   < > 51*    < > = values in this interval not displayed.       Discharge instructions/Information to patient and family:   See after visit summary for information provided to patient and family.      Larry Harper was educated on restrictions regarding driving  and lifting, and techniques of proper incisional care.  They were specifically counselled on signs and symptoms of an incisional infection, and advised to contact our service immediately should they develop fevers, sweats, chill, redness or drainage at the site of any incisions.    Provisions for Follow-Up Care:  See after visit summary for information related to follow-up care and any pertinent home health orders.      Disposition:  Discharged to home/self-care.     Planned Readmission:   No    Discharge Medications:  See after visit summary for reconciled discharge medications provided to patient and family.      Larry Harper was provided contact information and scheduled a follow up appointment with Jarrett Johnson D.O.  Additionally, follow up appointments have been scheduled for their primary care physician and primary cardiologist.  Contact information was provided.    Upon presentation, NSTEMI was identified.    Larry Harper was counseled on the importance of avoiding tobacco products.  As with all patients whom have undergone open heart surgery, tobacco cessation medication was contraindicated at the time of discharge.     ACE/ARB was Prescribed at discharge    Beta Blocker was Prescribed at discharge    Aspirin was Prescribed at discharge    Statin was Prescribed at discharge      The patient was discharged on ongoing diuretic therapy with Torsemide, 10 mg, PO QD and Potassium Chloride 20 mEq, PO QD.  They were advised to continue these medications for 3 days, unless otherwise directed.     Larry Harper  is being discharged on anticoagulation therapy for treatment of mechanical aortic valve.  As they are new to Coumadin therapy, arrangements have been made the [facility] to monitor INR (goal 2.0 to 3.0) and provide dosing instructions.  Their office was notified by Epic messaging which itemized the patient’s daily INR’s and correlating Coumadin doses during their hospitalization.      Larry Harper has been prescribed Coumadin, 2.5 mg tabs, with 60 tablets being dispensed.  They have been advised to take 2.5 mg daily, unless otherwise directed.  Follow up PT/INR will be ordered at the discretion of the Coumadin clinic.      Narcotic pain medication was prescribed in the form of Oxycodone.  Prior to prescribing, their prescription profile was reviewed on the [facility] of health prescription drug monitoring program.    The patient was informed that following their postoperative surgical evaluation, they will be referred to outpatient cardiac rehabilitation.  They were counseled that this program is run by specialists who will help them safely strengthen their heart and prevent more heart disease.  Cardiac rehabilitation will include exercise, relaxation, stress management, and heart-healthy nutrition.  Caregivers will also check to make sure their medication regimen is working.    During this admission, the patient was questioned on their use of tobacco, alcohol, and illicit/non-prescription drug use in the  previous 24 months. During this time frame they admit to using illicit/non-prescription drugs (marijuana). As such they have been counseled on the importance of cessation and abstinence.     I spent 30 minutes discharging the patient. This time was spent on the day of discharge. I had direct contact with the patient on the day of discharge. Additional documentation is required if more than 30 minutes were spent on discharge.     SIGNATURE: Fariba Kevin PA-C  DATE: February 6, 2025  TIME: 11:51 AM

## 2025-02-06 NOTE — PLAN OF CARE
Problem: PAIN - ADULT  Goal: Verbalizes/displays adequate comfort level or baseline comfort level  Description: Interventions:  - Encourage patient to monitor pain and request assistance  - Assess pain using appropriate pain scale  - Administer analgesics based on type and severity of pain and evaluate response  - Implement non-pharmacological measures as appropriate and evaluate response  - Consider cultural and social influences on pain and pain management  - Notify physician/advanced practitioner if interventions unsuccessful or patient reports new pain  Outcome: Progressing     Problem: INFECTION - ADULT  Goal: Absence or prevention of progression during hospitalization  Description: INTERVENTIONS:  - Assess and monitor for signs and symptoms of infection  - Monitor lab/diagnostic results  - Monitor all insertion sites, i.e. indwelling lines, tubes, and drains  - Monitor endotracheal if appropriate and nasal secretions for changes in amount and color  - [place] appropriate cooling/warming therapies per order  - Administer medications as ordered  - Instruct and encourage patient and family to use good hand hygiene technique  - Identify and instruct in appropriate isolation precautions for identified infection/condition  Outcome: Progressing  Goal: Absence of fever/infection during neutropenic period  Description: INTERVENTIONS:  - Monitor WBC    Outcome: Progressing     Problem: SAFETY ADULT  Goal: Patient will remain free of falls  Description: INTERVENTIONS:  - Educate patient/family on patient safety including physical limitations  - Instruct patient to call for assistance with activity   - Consult OT/PT to assist with strengthening/mobility   - Keep Call bell within reach  - Keep bed low and locked with side rails adjusted as appropriate  - Keep care items and personal belongings within reach  - Initiate and maintain comfort rounds  - Make Fall Risk Sign visible to staff  - Offer Toileting every 2 Hours,  in advance of need  - Initiate/Maintain bed alarm  - Obtain necessary fall risk management equipment: non skid socks  - Apply yellow socks and bracelet for high fall risk patients  - Consider moving patient to room near nurses station  Outcome: Progressing  Goal: Maintain or return to baseline ADL function  Description: INTERVENTIONS:  -  Assess patient's ability to carry out ADLs; assess patient's baseline for ADL function and identify physical deficits which impact ability to perform ADLs (bathing, care of mouth/teeth, toileting, grooming, dressing, etc.)  - Assess/evaluate cause of self-care deficits   - Assess range of motion  - Assess patient's mobility; develop plan if impaired  - Assess patient's need for assistive devices and provide as appropriate  - Encourage maximum independence but intervene and supervise when necessary  - Involve family in performance of ADLs  - Assess for home care needs following discharge   - Consider OT consult to assist with ADL evaluation and planning for discharge  - Provide patient education as appropriate  Outcome: Progressing  Goal: Maintains/Returns to pre admission functional level  Description: INTERVENTIONS:  - Perform AM-PAC 6 Click Basic Mobility/ Daily Activity assessment daily.  - Set and communicate daily mobility goal to care team and patient/family/caregiver.   - Collaborate with rehabilitation services on mobility goals if consulted  - Perform Range of Motion 3 times a day.  - Reposition patient every 2 hours.  - Dangle patient 3 times a day  - Stand patient 3 times a day  - Ambulate patient 3 times a day  - Out of bed to chair 3 times a day   - Out of bed for meals 3 times a day  - Out of bed for toileting  - Record patient progress and toleration of activity level   Outcome: Progressing     Problem: DISCHARGE PLANNING  Goal: Discharge to home or other facility with appropriate resources  Description: INTERVENTIONS:  - Identify barriers to discharge w/patient and  caregiver  - Arrange for needed discharge resources and transportation as appropriate  - Identify discharge learning needs (meds, wound care, etc.)  - Arrange for interpretive services to assist at discharge as needed  - Refer to Case Management Department for coordinating discharge planning if the patient needs post-hospital services based on physician/advanced practitioner order or complex needs related to functional status, cognitive ability, or social support system  Outcome: Progressing     Problem: Knowledge Deficit  Goal: Patient/family/caregiver demonstrates understanding of disease process, treatment plan, medications, and discharge instructions  Description: Complete learning assessment and assess knowledge base.  Interventions:  - Provide teaching at level of understanding  - Provide teaching via preferred learning methods  Outcome: Progressing     Problem: Prexisting or High Potential for Compromised Skin Integrity  Goal: Skin integrity is maintained or improved  Description: INTERVENTIONS:  - Identify patients at risk for skin breakdown  - Assess and monitor skin integrity  - Assess and monitor nutrition and hydration status  - Monitor labs   - Assess for incontinence   - Turn and reposition patient  - Assist with mobility/ambulation  - Relieve pressure over bony prominences  - Avoid friction and shearing  - Provide appropriate hygiene as needed including keeping skin clean and dry  - Evaluate need for skin moisturizer/barrier cream  - Collaborate with interdisciplinary team   - Patient/family teaching  - Consider wound care consult   Outcome: Progressing     Problem: Nutrition/Hydration-ADULT  Goal: Nutrient/Hydration intake appropriate for improving, restoring or maintaining nutritional needs  Description: Monitor and assess patient's nutrition/hydration status for malnutrition. Collaborate with interdisciplinary team and initiate plan and interventions as ordered.  Monitor patient's weight and  dietary intake as ordered or per policy. Utilize nutrition screening tool and intervene as necessary. Determine patient's food preferences and provide high-protein, high-caloric foods as appropriate.     INTERVENTIONS:  - Monitor oral intake, urinary output, labs, and treatment plans  - Assess nutrition and hydration status and recommend course of action  - Evaluate amount of meals eaten  - Assist patient with eating if necessary   - Allow adequate time for meals  - Recommend/ encourage appropriate diets, oral nutritional supplements, and vitamin/mineral supplements  - Order, calculate, and assess calorie counts as needed  - Recommend, monitor, and adjust tube feedings and TPN/PPN based on assessed needs  - Assess need for intravenous fluids  - Provide specific nutrition/hydration education as appropriate  - Include patient/family/caregiver in decisions related to nutrition  Outcome: Progressing

## 2025-02-06 NOTE — RESTORATIVE TECHNICIAN NOTE
Restorative Technician Note      Patient Name: Larry Harper     Restorative Tech Visit Date: 02/06/25  Note Type: Mobility  Patient Position Upon Consult: Supine  Activity Performed: Transferred  Assistive Device: Other (Comment) (none)  Patient Position at End of Consult: Bed/Chair alarm activated; All needs within reach; Bedside chair

## 2025-02-07 ENCOUNTER — PATIENT OUTREACH (OUTPATIENT)
Dept: OTHER | Facility: OTHER | Age: 60
End: 2025-02-07

## 2025-02-07 ENCOUNTER — TELEPHONE (OUTPATIENT)
Dept: FAMILY MEDICINE CLINIC | Facility: CLINIC | Age: 60
End: 2025-02-07

## 2025-02-07 ENCOUNTER — PATIENT OUTREACH (OUTPATIENT)
Dept: FAMILY MEDICINE CLINIC | Facility: CLINIC | Age: 60
End: 2025-02-07

## 2025-02-07 DIAGNOSIS — Z78.9 NEEDS ASSISTANCE WITH COMMUNITY RESOURCES: Primary | ICD-10-CM

## 2025-02-07 LAB
GLUCOSE SERPL-MCNC: 110 MG/DL (ref 65–140)
GLUCOSE SERPL-MCNC: 122 MG/DL (ref 65–140)
GLUCOSE SERPL-MCNC: 273 MG/DL (ref 65–140)
GLUCOSE SERPL-MCNC: 330 MG/DL (ref 65–140)
GLUCOSE SERPL-MCNC: 71 MG/DL (ref 65–140)
INR PPP: 2.22 (ref 0.85–1.19)
PROTHROMBIN TIME: 24.6 SECONDS (ref 12.3–15)

## 2025-02-07 PROCEDURE — 82948 REAGENT STRIP/BLOOD GLUCOSE: CPT

## 2025-02-07 PROCEDURE — 99024 POSTOP FOLLOW-UP VISIT: CPT | Performed by: PHYSICIAN ASSISTANT

## 2025-02-07 PROCEDURE — 99233 SBSQ HOSP IP/OBS HIGH 50: CPT | Performed by: INTERNAL MEDICINE

## 2025-02-07 PROCEDURE — 85610 PROTHROMBIN TIME: CPT | Performed by: PHYSICIAN ASSISTANT

## 2025-02-07 PROCEDURE — 97535 SELF CARE MNGMENT TRAINING: CPT

## 2025-02-07 RX ORDER — LISINOPRIL 2.5 MG/1
2.5 TABLET ORAL ONCE
Status: COMPLETED | OUTPATIENT
Start: 2025-02-07 | End: 2025-02-07

## 2025-02-07 RX ORDER — LISINOPRIL 5 MG/1
5 TABLET ORAL DAILY
Status: DISCONTINUED | OUTPATIENT
Start: 2025-02-07 | End: 2025-02-08 | Stop reason: HOSPADM

## 2025-02-07 RX ORDER — WARFARIN SODIUM 2.5 MG/1
2.5 TABLET ORAL
Status: COMPLETED | OUTPATIENT
Start: 2025-02-07 | End: 2025-02-07

## 2025-02-07 RX ORDER — INSULIN GLARGINE 100 [IU]/ML
45 INJECTION, SOLUTION SUBCUTANEOUS
Status: DISCONTINUED | OUTPATIENT
Start: 2025-02-07 | End: 2025-02-08

## 2025-02-07 RX ADMIN — AMIODARONE HYDROCHLORIDE 200 MG: 200 TABLET ORAL at 21:43

## 2025-02-07 RX ADMIN — METHOCARBAMOL 500 MG: 500 TABLET ORAL at 06:01

## 2025-02-07 RX ADMIN — UMECLIDINIUM 1 PUFF: 62.5 AEROSOL, POWDER ORAL at 08:27

## 2025-02-07 RX ADMIN — METOPROLOL TARTRATE 25 MG: 25 TABLET, FILM COATED ORAL at 21:44

## 2025-02-07 RX ADMIN — AMIODARONE HYDROCHLORIDE 200 MG: 200 TABLET ORAL at 06:01

## 2025-02-07 RX ADMIN — OXYCODONE HYDROCHLORIDE 10 MG: 10 TABLET ORAL at 21:43

## 2025-02-07 RX ADMIN — GUAIFENESIN 600 MG: 600 TABLET, EXTENDED RELEASE ORAL at 21:43

## 2025-02-07 RX ADMIN — METHOCARBAMOL 500 MG: 500 TABLET ORAL at 17:02

## 2025-02-07 RX ADMIN — ACETAMINOPHEN 975 MG: 325 TABLET, FILM COATED ORAL at 08:15

## 2025-02-07 RX ADMIN — INSULIN LISPRO 15 UNITS: 100 INJECTION, SOLUTION INTRAVENOUS; SUBCUTANEOUS at 17:02

## 2025-02-07 RX ADMIN — INSULIN GLARGINE 45 UNITS: 100 INJECTION, SOLUTION SUBCUTANEOUS at 21:44

## 2025-02-07 RX ADMIN — FLUOXETINE HYDROCHLORIDE 40 MG: 20 CAPSULE ORAL at 08:15

## 2025-02-07 RX ADMIN — WARFARIN SODIUM 2.5 MG: 2.5 TABLET ORAL at 17:02

## 2025-02-07 RX ADMIN — ATORVASTATIN CALCIUM 80 MG: 80 TABLET, FILM COATED ORAL at 17:02

## 2025-02-07 RX ADMIN — METOPROLOL TARTRATE 25 MG: 25 TABLET, FILM COATED ORAL at 08:15

## 2025-02-07 RX ADMIN — DOCUSATE SODIUM 100 MG: 100 CAPSULE, LIQUID FILLED ORAL at 08:15

## 2025-02-07 RX ADMIN — POTASSIUM CHLORIDE 20 MEQ: 1500 TABLET, EXTENDED RELEASE ORAL at 08:15

## 2025-02-07 RX ADMIN — POLYETHYLENE GLYCOL 3350 17 G: 17 POWDER, FOR SOLUTION ORAL at 08:15

## 2025-02-07 RX ADMIN — AMIODARONE HYDROCHLORIDE 200 MG: 200 TABLET ORAL at 14:09

## 2025-02-07 RX ADMIN — TORSEMIDE 20 MG: 20 TABLET ORAL at 08:14

## 2025-02-07 RX ADMIN — LISINOPRIL 2.5 MG: 2.5 TABLET ORAL at 08:15

## 2025-02-07 RX ADMIN — INSULIN LISPRO 6 UNITS: 100 INJECTION, SOLUTION INTRAVENOUS; SUBCUTANEOUS at 11:05

## 2025-02-07 RX ADMIN — INSULIN LISPRO 5 UNITS: 100 INJECTION, SOLUTION INTRAVENOUS; SUBCUTANEOUS at 21:44

## 2025-02-07 RX ADMIN — INSULIN LISPRO 15 UNITS: 100 INJECTION, SOLUTION INTRAVENOUS; SUBCUTANEOUS at 11:17

## 2025-02-07 RX ADMIN — GUAIFENESIN 600 MG: 600 TABLET, EXTENDED RELEASE ORAL at 08:15

## 2025-02-07 RX ADMIN — ASPIRIN 81 MG: 81 TABLET, COATED ORAL at 08:15

## 2025-02-07 RX ADMIN — OXYCODONE HYDROCHLORIDE 10 MG: 10 TABLET ORAL at 02:06

## 2025-02-07 RX ADMIN — PANTOPRAZOLE SODIUM 40 MG: 40 TABLET, DELAYED RELEASE ORAL at 06:01

## 2025-02-07 RX ADMIN — LISINOPRIL 2.5 MG: 2.5 TABLET ORAL at 09:10

## 2025-02-07 RX ADMIN — ACETAMINOPHEN 975 MG: 325 TABLET, FILM COATED ORAL at 14:09

## 2025-02-07 RX ADMIN — TEMAZEPAM 15 MG: 15 CAPSULE ORAL at 21:54

## 2025-02-07 RX ADMIN — INSULIN LISPRO 15 UNITS: 100 INJECTION, SOLUTION INTRAVENOUS; SUBCUTANEOUS at 08:14

## 2025-02-07 RX ADMIN — METHOCARBAMOL 500 MG: 500 TABLET ORAL at 11:17

## 2025-02-07 RX ADMIN — LIDOCAINE 5% 2 PATCH: 700 PATCH TOPICAL at 08:14

## 2025-02-07 RX ADMIN — ACETAMINOPHEN 975 MG: 325 TABLET, FILM COATED ORAL at 21:43

## 2025-02-07 RX ADMIN — CHLORHEXIDINE GLUCONATE 0.12% ORAL RINSE 15 ML: 1.2 LIQUID ORAL at 08:15

## 2025-02-07 NOTE — PROGRESS NOTES
Progress Note - Endocrinology   Name: Larry Harper 60 y.o. male I MRN: [id number]  Unit/Bed#: PPHP-324-01 I Date of Admission: 1/25/2025   Date of Service: 2/7/2025 I Hospital Day: 13    Assessment & Plan  Diabetes mellitus (HCC)  Lab Results   Component Value Date    HGBA1C 8.9 (H) 01/24/2025     Recent Labs     02/06/25  1558 02/06/25  2052 02/07/25  0539 02/07/25  1044   POCGLU 78 204* 122 273*     Uncontrolled type 2 diabetes mellitus on insulin therapy with hyperglycemia   Home regimen: Lantus 35 units in the morning, metformin 1000 mg twice daily, NovoLog 5 units before meals, Jardiance 10 mg daily  Current regimen: Lantus 50 units nightly, Humalog 15 units before meals, correctional scale insulin algorithm 4 before meals and 3 at bedtime.    24-hour blood sugars reviewed-patient noted to have fasting blood sugars in the low 100s this morning.  Additionally had sugar in the high 70s yesterday before dinner.    Plan:  Recommend decreasing Lantus to 45 units nightly.  Continue Humalog 15 units before meals  Continue correctional scale algorithm 4 before meals and 3 at bedtime.  Monitor for hypoglycemia, treat per protocol  Change diet to consistent carbohydrate level 2, no juice  Goal blood sugar while in the [id number] mg/dL  Discharge recommendations pending clinical course  Endocrinology will continue following  NSTEMI (non-ST elevated myocardial infarction) ([facility])  Management per primary team  S/P AVR  Severe aortic stenosis-1/31 s/p AVR  Management per primary team  S/P CABG (coronary artery bypass graft)  MV-CAD-1/31 s/p CABG x 1  Management per primary team    24 Hour Events : No significant overnight events.  Subjective : Patient seen and examined at the bedside, not in acute distress at the time of my evaluation.  Reports feeling better today, endorses good appetite, denies nausea, vomiting, signs or symptoms of hypoglycemia.    Objective :  Temp:  [97.7 °F (36.5 °C)-98.7 °F (37.1 °C)] 98.5 °F  Results   VITAMIN D,25 HYDROXY   25 Mar 2017 11:00 AM  -   VIT D,25 HYDROXY: 44.7 ng/ml  Reference Range: 30.0-100.0. COMP METABOLIC PANEL WITH CBCA, LIPID PANEL AND TSH (CMP,CBCA,LIPFA,TSH)   25 Mar 2017 11:00 AM  -   WHITE BLOOD COUNT: 9.9  Reference Range: 4.2-11.0  -   RED CELL COUNT: 4.29  Reference Range: 4.00-5.20  -   HEMOGLOBIN: 13.2  Reference Range: 12.0-15.5  -   HEMATOCRIT: 41.2  Reference Range: 36.0-46.5  -   MEAN CORPUSCULAR VOLUME: 96.0  Reference Range: 78.0-100.0  -   MEAN CORPUSCULAR HEMOGLOBIN: 30.8  Reference Range: 26.0-34.0  -   MEAN CORPUSCULAR HGB CONC: 32.0  Reference Range: 32.0-36.5  -   RDW-CV: 12.7 %  Reference Range: 11.0-15.0  -   PLATELET COUNT: 741  Reference Range: 140-450  -   MASSIEL%: 75 %  -   LYM%: 17 %  -   MON%: 7 %  -   EOS%: 1 %  -   BASO%: 0 %  -   MASSIEL ABS: 7.4 K/mcL  Reference Range: 1.8-7.7  -   LYM ABS: 1.7 K/mcL  Reference Range: 1.0-4.0  -   MON ABS: 0.7 K/mcL  Reference Range: 0.3-0.9  -   EOS ABS: 0.1 K/mcL  Reference Range: 0.1-0.5  -   BASO ABS: 0.0 K/mcL  Reference Range: 0.0-0.3  -   SODIUM: 144  Reference Range: 135-145  -   POTASSIUM: 4.0  Reference Range: 3.4-5.1  -   CHLORIDE: 106  Reference Range:   -   CARBON DIOXIDE: 30  Reference Range: 21-32  -   ANION GAP: 12 mmol/L  Reference Range: 10-20  -   GLUCOSE: 103  Reference Range: 65-99  Flag: H  -   BUN: 11  Reference Range: 6-20  -   CREATININE: 0.80  Reference Range: 0.51-0.95  -   GFR EST.  AMER: >90  -   GFR EST. NONAFRI AMER: 84   -   BUN/CREATININE RATIO: 14   Reference Range: 7-25  -   BILIRUBIN TOTAL: 0.5 mg/dl  Reference Range: 0.2-1.0  -   GOT/AST: 34 Units/L  Reference Range: <38  -   ALKALINE PHOSPHATASE: 84  Reference Range:   -   ALBUMIN: 3.6  Reference Range: 3.6-5.1  -   TOTAL PROTEIN: 6.9  Reference Range: 6.4-8.2  -   GLOBULIN (CALCULATED): 3.3  Reference Range: 2.0-4.0  -   A/G RATIO: 1.1   Reference Range: 1.0-2.4  -   CALCIUM: 9.0  Reference Range: 8.4-10.2  -   GPT/ALT: (36.9 °C)  HR:  [67-87] 79  BP: (110-151)/(69-80) 145/80  Resp:  [14-18] 18  SpO2:  [89 %-93 %] 92 %  O2 Device: None (Room air)    Physical Exam  Vitals and nursing note reviewed.   Constitutional:       General: He is not in acute distress.     Appearance: Normal appearance. He is not ill-appearing, toxic-appearing or diaphoretic.   HENT:      Head: Normocephalic and atraumatic.      Nose: Nose normal.      Mouth/Throat:      Pharynx: Oropharynx is clear.   Eyes:      General: No scleral icterus.        Right eye: No discharge.         Left eye: No discharge.      Extraocular Movements: Extraocular movements intact.      Conjunctiva/sclera: Conjunctivae normal.   Pulmonary:      Effort: Pulmonary effort is normal. No respiratory distress.   Abdominal:      General: There is no distension.   Musculoskeletal:         General: Normal range of motion.      Cervical back: Normal range of motion and neck supple.   Skin:     General: Skin is warm and dry.      Coloration: Skin is not jaundiced or pale.   Neurological:      Mental Status: He is alert and oriented to person, place, and time. Mental status is at baseline.   Psychiatric:         Mood and Affect: Mood normal.         Behavior: Behavior normal.         Thought Content: Thought content normal.         Judgment: Judgment normal.         Lab Results: I have reviewed the following results:CBC/BMP: No new results in last 24 hours. , Creatinine Clearance: Estimated Creatinine Clearance: 92.9 mL/min (by C-G formula based on SCr of 0.81 mg/dL)., LFTs: No new results in last 24 hours.     Imaging Results Review: No pertinent imaging studies reviewed.  Other Study Results Review: No additional pertinent studies reviewed.         52 Units/L  Reference Range: <79  -   FASTING STATUS: UNKNOWN  -   CHOLESTEROL: 132  Reference Range: <200  -   HDL CHOLESTEROL: 63  Reference Range: >59  -   TRIGLYCERIDES: 69  Reference Range: <150  -   LDL CHOLESTEROL (CALCULATED): 55  Reference Range: <130  -   NON-HDL CHOLESTEROL: 69 mg/dl  -   CHOLESTEROL/HDL RATIO: 2.1   Reference Range: <4.5  -   TSH: 1.092 mcUnits/mL  Reference Range: 0.350-5.000  -   DIFF TYPE: AUTOMATED DIFFERENTIAL  -   FASTING STATUS: UNKNOWN. Discussed   Normal blood test except slightly elevated sugar. Avoid sweets and starches.

## 2025-02-07 NOTE — CASE MANAGEMENT
Case Management Discharge Planning Note    Patient name Larry Harper  Location PPHP-324/PPHP-324-01 MRN [id number]  : 1965 Date 2025       Current Admission Date: 2025  Current Admission Diagnosis:NSTEMI (non-ST elevated myocardial infarction) (HCC)   Patient Active Problem List    Diagnosis Date Noted Date Diagnosed    Anticoagulated on Coumadin 2025     Leukocytosis 2025     S/P AVR 2025     S/P CABG (coronary artery bypass graft) 2025     Periodontal disease 2025     Dyslipidemia 2025     COPD (chronic obstructive pulmonary disease) ([facility]) 2025     Hypokalemia 2025     Primary hypertension 2025     Severe aortic stenosis 2025     NSTEMI (non-ST elevated myocardial infarction) ([facility])      CAD (coronary artery disease)      Diabetes mellitus ([facility])      Anxiety      Depression      PTSD (post-traumatic stress disorder)        LOS (days): 13  Geometric Mean LOS (GMLOS) (days): 8.5  Days to GMLOS:-4.1     OBJECTIVE:  Risk of Unplanned Readmission Score: 17.62       Current admission status: Inpatient   Preferred Pharmacy:   [id number] [place], [place] - [address] [place] [id number]  Phone: [phone] Fax: [phone]    [facility] - [place] - [address]  [address]  [place] [address]  Phone: [phone] Fax: [phone]    Primary Care Provider: Efe Portillo DO    Primary Insurance: MEDICARE  Secondary Insurance: [facility]    DISCHARGE DETAILS:  Additional Comments: CM notified that there are beds available [facility] and additional local shelters, but background check will need to be complete with patient's permission (patient previously refused this)    CM met with patient bedside. Patient agreeable to background check for shelters. CM inquired regarding if patient would be  comfortable with boarding his dog w/ hospital support for the cost. Patient now agreeable. CM notified  of Community [employer] who will contact shelters for background check. CM will be notified of result     CM inquired w/ bedside RN re: Insulin teaching. Per Bedside RN, patient reports has already been taking insulin at home and knows how to self-inject.    HINN documentation given to patient bedside indicating if he does not leave by 12pm 2/8/2025, the time he remains in hospital will be billed directly to patient.

## 2025-02-07 NOTE — CASE MANAGEMENT
[facility] received determination for [employer].   Med Director agrees with termination of services.   Beneficiary Liability starts: 2/8/25        Care Manager notified: Dominique PANTOJA     Please reach out to CM for updates on any clinical information.

## 2025-02-07 NOTE — TELEPHONE ENCOUNTER
Pt has been scheduled for 2/12/2025 at 10am.     Pt stated he will be needing transportation but did not know where exactly he will be staying, pt stated he will contact office to provide location information and lyft will be set up after.

## 2025-02-07 NOTE — PLAN OF CARE
Problem: OCCUPATIONAL THERAPY ADULT  Goal: Performs self-care activities at highest level of function for planned discharge setting.  See evaluation for individualized goals.  Description: Treatment Interventions: ADL retraining, Functional transfer training, UE strengthening/ROM, Endurance training, Patient/family training, Equipment evaluation/education, Compensatory technique education, Continued evaluation, Cardiac education, Energy conservation, Activityengagement          See flowsheet documentation for full assessment, interventions and recommendations.   Outcome: Completed  Note: Limitation: Decreased ADL status, Decreased endurance, Decreased self-care trans, Decreased high-level ADLs  Prognosis: Fair  Assessment: Pt seen for OT treatment session day 1 on this date focused on ADL retraining, functional transfers and mobility, energy conservation, functional endurance, recall of safety precautions, and patient education. Pt was greeted in chair and was cooperative throughout session. Following session, pt was left in chair with chair alarm on and all needs within reach. Pt  demonstrates improvements in standing grooming and LB dressing performing with MOD I, also progresses transfers and FM with MOD I. The patient's raw score on the -PAC Daily Activity Inpatient Short Form is 20. A raw score of greater than or equal to 19 suggests the patient may benefit from discharge to home. Please refer to the recommendation of the Occupational Therapist for safe discharge planning. Pt is likely close to functional baseline, indicating no further acute OT needs at this time, d/c acute OT. Recommend no further OT needs at d/c.     Rehab Resource Intensity Level, OT: No post-acute rehabilitation needs

## 2025-02-07 NOTE — OCCUPATIONAL THERAPY NOTE
Occupational Therapy Progress Note     Patient Name: Larry Harper  Today's Date: 2/7/2025  Problem List  Principal Problem:    NSTEMI (non-ST elevated myocardial infarction) ([facility])  Active Problems:    CAD (coronary artery disease)    Diabetes mellitus ([facility])    PTSD (post-traumatic stress disorder)    Severe aortic stenosis    Primary hypertension    Dyslipidemia    COPD (chronic obstructive pulmonary disease) ([facility])    Periodontal disease    S/P AVR    S/P CABG (coronary artery bypass graft)    Anticoagulated on Coumadin    Leukocytosis          02/07/25 1012   OT Last Visit   OT Visit Date 02/07/25   Note Type   Note Type Treatment   Pain Assessment   Pain Assessment Tool 0-10   Pain Score No Pain   Restrictions/Precautions   Other Precautions Cardiac/sternal;Telemetry;Chair Alarm   Lifestyle   Autonomy PTA, pt was independent in ADLs/IADLs and uses no DME for functional mobility; (+) driving   Reciprocal Relationships Reports that he has a service dog   Service to Others 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   LB Dressing Assistance 6  Modified independent   LB Dressing Comments Pt uses seated figure 4 technique to adjust socks.   Functional Standing Tolerance   Time 8 minutes   Activity standing grooming   Comments Pt stands for about 8 minutes to shave, brush teeth and wash face, performed with MOD I.   Bed Mobility   Additional Comments Pt greeted OOB in chair, left in chair with alarm on and all needs within reach.   Transfers   Sit to Stand 6  Modified independent   Stand to Sit 6  Modified independent   Additional Comments no AD   Functional Mobility   Functional Mobility 6  Modified independent   Additional  Comments Pt performs mobility to/from bathroom with MOD I without AD.   Additional items   (no AD)   Cognition   Overall Cognitive Status WFL   Arousal/Participation Cooperative;Alert   Attention Within functional limits   Orientation Level Oriented X4   Memory Within functional limits   Following Commands Follows all commands and directions without difficulty   Comments Pt cooperative to therapy, reports no further worries/concerns regarding ADLs.   Additional Activities   Additional Activities Other (Comment)  (Recovering After Cardiac Surgery education packet)   Additional Activities Comments Pt provided s/p cardiac sx education packet, reviewed w/ OT, discussed cardiac/sternal precautions, lifestyle modifications, post hospitalization IADL management, environmental temperature control, emotional regulation and management, lifting/driving restrictions, energy conservation techniques, mobility schedule, incisional management, VNA, and cardiac rehabilitation initiation upon clearance per physician at follow up. Pt reviewed education packet and acknowledged reception of such.   Activity Tolerance   Activity Tolerance Patient tolerated treatment well   Medical Staff Made Aware RN cleared for therapy   Assessment   Assessment Pt seen for OT treatment session day 1 on this date focused on ADL retraining, functional transfers and mobility, energy conservation, functional endurance, recall of safety precautions, and patient education. Pt was greeted in chair and was cooperative throughout session. Following session, pt was left in chair with chair alarm on and all needs within reach. Pt  demonstrates improvements in standing grooming and LB dressing performing with MOD I, also progresses transfers and FM with MOD I. The patient's raw score on the AM-PAC Daily Activity Inpatient Short Form is 20. A raw score of greater than or equal to 19 suggests the patient may benefit from discharge to home. Please refer to the  recommendation of the Occupational Therapist for safe discharge planning. Pt is likely close to functional baseline, indicating no further acute OT needs at this time, d/c acute OT. Recommend no further OT needs at d/c.   Plan   Treatment Interventions ADL retraining;Functional transfer training;Patient/family training;Continued evaluation;Cardiac education;Energy conservation   Goal Expiration Date 02/15/25   OT Treatment Day 1   OT Frequency Other (comment)  (d/c acute OT)   Discharge Recommendation   Rehab Resource Intensity Level, OT No post-acute rehabilitation needs   AM-PAC Daily Activity Inpatient   Lower Body Dressing 4   Bathing 3   Toileting 4   Upper Body Dressing 4   Grooming 4   Eating 4   Daily Activity Raw Score 23   Daily Activity Standardized Score (Calc for Raw Score >=11) 51.12   AM-PAC Applied Cognition Inpatient   Following a Speech/Presentation 4   Understanding Ordinary Conversation 4   Taking Medications 4   Remembering Where Things Are Placed or Put Away 4   Remembering List of 4-5 Errands 4   Taking Care of Complicated Tasks 4   Applied Cognition Raw Score 24   Applied Cognition Standardized Score 62.21   End of Consult   Education Provided Yes   Patient Position at End of Consult Bedside chair;Bed/Chair alarm activated;All needs within reach   Nurse Communication Nurse aware of consult         JUAN Chavez, OTR/L

## 2025-02-07 NOTE — PROGRESS NOTES
Progress Note - Cardiac Surgery   Larry Harper 60 y.o. male MRN: [id number]  Unit/Bed#: PPHP-324-01 Encounter: [id number]    Aortic stenosis, Non-Rheumatic, Coronary artery disease. S/P aortic valve replacement (mechanical) and coronary artery bypass grafting x 1; POD # 7      24 Hour Events: Pt provided multiple options for lodging upon discharge by case mgmt yesterday, but pt declined options and asked CM to leave his room. Pt appealed his discharge yesterday.     Heparin gtt discontinued yesterday. Hypertensive this morning. BM x 1 yesterday. Self-ambulating in halls without issue. Feels well today, offers no complaints.     Medications:   Scheduled Meds:  Current Facility-Administered Medications   Medication Dose Route Frequency Provider Last Rate    acetaminophen  975 mg Oral Q6H While awake Niyah White PA-C      albuterol  2 puff Inhalation Q4H PRN Niyah White PA-C      amiodarone  200 mg Oral Q8H SONYA Niyah White PA-C      aspirin  81 mg Oral Daily Niyah White PA-C      atorvastatin  80 mg Oral Daily With Dinner Niyah White PA-C      bisacodyl  10 mg Rectal Daily PRN Niyah White PA-C      chlorhexidine  15 mL Mouth/Throat BID Niyah hWite PA-C      docusate sodium  100 mg Oral BID Niyah White PA-C      FLUoxetine  40 mg Oral Daily Niyah White PA-C      guaiFENesin  600 mg Oral Q12H [facility] Harriet Gonzales PA-C      insulin glargine  50 Units Subcutaneous HS Godfrey Echevarria MD      insulin lispro  1-6 Units Subcutaneous HS Julio Cesar Reeves MD      insulin lispro  15 Units Subcutaneous TID With Meals Godfrey Echevarria MD      insulin lispro  2-12 Units Subcutaneous TID AC Julio Cesar Reeves MD      lidocaine  2 patch Topical Daily Niyah White PA-C      lisinopril  2.5 mg Oral Daily Niyah White PA-C      methocarbamol  500 mg Oral Q6H SONYA Niyah White PA-C      metoprolol tartrate  25 mg Oral Q12H SONYA Niyah White PA-C      ondansetron  4 mg Intravenous Q6H PRN Niyah White PA-C      oxyCODONE   5 mg Oral Q4H PRN Niyah White PA-C      Or    oxyCODONE  10 mg Oral Q4H PRN Niyah White PA-C      pantoprazole  40 mg Oral Daily Niyah White PA-C      polyethylene glycol  17 g Oral Daily Niyah White PA-C      potassium chloride  20 mEq Oral Daily Fariba Kevin PA-C      temazepam  15 mg Oral HS PRN Niyah White PA-C      torsemide  20 mg Oral Daily Fariba Kevin PA-C      umeclidinium  1 puff Inhalation Daily Niyah White PA-C       Continuous Infusions:     PRN Meds:.  albuterol    bisacodyl    ondansetron    oxyCODONE **OR** oxyCODONE    temazepam    Vitals:   Vitals:    02/07/25 0400 02/07/25 0500 02/07/25 0600 02/07/25 0721   BP:    145/80   BP Location:       Pulse: 72 71 76 79   Resp:    18   Temp:    98.5 °F (36.9 °C)   TempSrc:       SpO2: 90% 90% 91% 92%   Weight:   70 kg (154 lb 5.2 oz)    Height:           Telemetry: NSR; Heart Rate: 84    Respiratory:   SpO2: SpO2: 92 %; room air    Intake/Output:      Intake/Output Summary (Last 24 hours) at 2/7/2025 0830  Last data filed at 2/7/2025 0400  Gross per 24 hour   Intake 1300 ml   Output 1975 ml   Net -675 ml        Weights:   Weight (last 2 days)       Date/Time Weight    02/07/25 0600 70 (154.32)    02/06/25 0735 71.3 (157.19)    02/05/25 0600 72.3 (159.39)            Chest tube Output: removed 2/2        Results:   Results from last 7 days   Lab Units 02/03/25  0503 02/02/25  0419 02/01/25  0350   WBC Thousand/uL 14.12* 14.89* 19.73*   HEMOGLOBIN g/dL 11.1* 11.2* 13.8   HEMATOCRIT % 34.2* 33.7* 41.4   PLATELETS Thousands/uL 206 161 182     Results from last 7 days   Lab Units 02/04/25  0430 02/03/25  0503 02/02/25  0419 01/31/25  1300 01/31/25  1259   SODIUM mmol/L 134* 131* 130*   < >  --    POTASSIUM mmol/L 3.9 4.2 3.8   < >  --    CHLORIDE mmol/L 93* 97 97   < >  --    CO2 mmol/L 30 26 27   < >  --    CO2, I-STAT mmol/L  --   --   --   --  23   BUN mg/dL 17 20 18   < >  --    CREATININE mg/dL 0.81 0.73 0.65   < >  --    GLUCOSE, ISTAT  "mg/dl  --   --   --   --  248*   CALCIUM mg/dL 8.2* 8.1* 8.0*   < >  --     < > = values in this interval not displayed.     No results for input(s): \"MG\" in the last 72 hours.    Results from last 7 days   Lab Units 02/07/25  0453 02/06/25  0525 02/05/25  1941 02/05/25  1323 02/05/25  0501   INR  2.22* 2.13*  --   --  1.69*   PTT seconds  --  71* 83* 78* 48*         Date:   INR:  Coumadin Dose:  2/7  2.22  -  2/6  2.13  2.5  2/5  1.69  5  2/4  1.62  3  2/3  1.51  3  2/2  1.31  2.5  2/1  1.03  2.5  1/31  1.22  2.5      Point of care glucose: BS 78 - 204  Endo following    Studies:  No new studies past 24 hrs        Invasive Lines/Tubes:  Invasive Devices       Peripheral Intravenous Line  Duration             Peripheral IV 02/05/25 Distal;Dorsal (posterior);Left Forearm 1 day                    Physical Exam:    General: No acute distress, Alert, and Normal appearance  HEENT/NECK:  Normocephalic. Atraumatic.  NO jugular venous distention.    Cardiac: Regular rate and rhythm and No murmurs/rubs/gallops  Pulmonary:  Breath sounds clear bilaterally and No rales/rhonchi/wheezes  Abdomen:  Non-tender, Non-distended, and Normal bowel sounds  Incisions: Sternum is stable.  Incision dressed with Acticoat.  No erythema or drainage  Extremities: Extremities warm/dry and No edema B/L  Neuro: Alert and oriented X 3, Sensation is grossly intact, and No focal deficits  Skin: Warm/Dry, without rashes or lesions.         Assessment:  Principal Problem:    NSTEMI (non-ST elevated myocardial infarction) ([facility])  Active Problems:    CAD (coronary artery disease)    Diabetes mellitus ([facility])    PTSD (post-traumatic stress disorder)    Severe aortic stenosis    Primary hypertension    Dyslipidemia    COPD (chronic obstructive pulmonary disease) ([facility])    Periodontal disease    S/P AVR    S/P CABG (coronary artery bypass graft)    Anticoagulated on Coumadin    Leukocytosis       Aortic stenosis, Non-Rheumatic, Coronary artery disease. S/P " aortic valve replacement (mechanical) and coronary artery bypass grafting x 1; POD # 7    Plan:    Cardiac:     S/P acute preoperative NSTEMI  Normal ventricular systolic function, EF 65%    NSR; HR well-controlled  Hypertensive    Continue Lopressor, 25mg PO BID    ACE inhibitor/ARB indicated; Increase Lisinopril, 5mg PO Daily    Continue prophylactic Amiodarone, 200 mg PO TID    Anticoagulated for mechanical aortic valve. Discontinued Heparin gtt, as INR therapeutic.  INR 2.22.  Dose Coumadin 2.5 mg today    Continue ASA and Statin therapy    Epicardial pacing wires have been removed    Removed central IV access     Continue Coumadin for DVT prophylaxis    Pulmonary:     Good RA O2 saturations. Continue IS/pulm toilet    Chest tubes have been discontinued    Renal:     Normal preoperative renal function  Creatinine 0.73    Intake/Output net: -555 mL/24 hours    Diuretic Regimen:  Continue PO Torsemide, 20 mg QD  Continue Potassium Chloride 20 mEq PO QD    Hyponatremia - improving. Continue diuresis, fluid restriction and monitor.    Neuro:    Neurologically intact; No active issues     Incisional pain well controlled   Continue tylenol, 975 mg PO q 8, standing dose   Continue oxycodone, 5 to 10 mg PO q 4 hours prn pain    Continue methocarbamol, 500 mg PO q 6 hours for pain/muscle spasm    GI:    Controlled carbohydrate diet level two/Cardiac diet, with 1800 mL fluid restriction    Tolerating diet without complaint  + BM postoperatively    Continue stool softeners and prn suppository    Continue GI prophylaxis    Endo:     Pre-Op Hgb A1C: 8.9    Patient has been transitioned from continuous insulin infusion to intermittent subcutaneous dosing  On injectable therapy, prior to surgical intervention  Recommendation for discharge diabetes regimen pending endocrinology follow up  Endocrinology following daily    7    Hematology:     Post-operative blood count acceptable; Trend prn  Leukocytosis; Afebrile with no signs  of infection; Trend CBC prn    8.   Disposition:        Following daily PT/OT recommendations regarding home vs. rehab when medically cleared for discharge    Social issues regarding placement post discharge - Case mgmt following.    Routine postoperative recovery to this point; INR therapeutic, doing well.  Discharge today, pending appeal and case mgmt outcomes      VTE Pharmacologic Prophylaxis: Warfarin (Coumadin)  VTE Mechanical Prophylaxis: sequential compression device    Collaborative rounds completed with supervising physician  Plan of care discussed with bedside nurse    SIGNATURE: Fariba Kevin PA-C  DATE: February 7, 2025  TIME: 8:30 AM

## 2025-02-07 NOTE — CASE MANAGEMENT
At this time, [employer] appeal still shows pending. Medical Records show received as of 2/6 3:25 pm and physician review was completed, just not showing decision yet.    CM notified: Dominique PANTOJA

## 2025-02-07 NOTE — PLAN OF CARE
Problem: PAIN - ADULT  Goal: Verbalizes/displays adequate comfort level or baseline comfort level  Description: Interventions:  - Encourage patient to monitor pain and request assistance  - Assess pain using appropriate pain scale  - Administer analgesics based on type and severity of pain and evaluate response  - Implement non-pharmacological measures as appropriate and evaluate response  - Consider cultural and social influences on pain and pain management  - Notify physician/advanced practitioner if interventions unsuccessful or patient reports new pain  Outcome: Progressing     Problem: INFECTION - ADULT  Goal: Absence or prevention of progression during hospitalization  Description: INTERVENTIONS:  - Assess and monitor for signs and symptoms of infection  - Monitor lab/diagnostic results  - Monitor all insertion sites, i.e. indwelling lines, tubes, and drains  - Monitor endotracheal if appropriate and nasal secretions for changes in amount and color  - [place] appropriate cooling/warming therapies per order  - Administer medications as ordered  - Instruct and encourage patient and family to use good hand hygiene technique  - Identify and instruct in appropriate isolation precautions for identified infection/condition  Outcome: Progressing  Goal: Absence of fever/infection during neutropenic period  Description: INTERVENTIONS:  - Monitor WBC    Outcome: Progressing     Problem: SAFETY ADULT  Goal: Patient will remain free of falls  Description: INTERVENTIONS:  - Educate patient/family on patient safety including physical limitations  - Instruct patient to call for assistance with activity   - Consult OT/PT to assist with strengthening/mobility   - Keep Call bell within reach  - Keep bed low and locked with side rails adjusted as appropriate  - Keep care items and personal belongings within reach  - Initiate and maintain comfort rounds  - Make Fall Risk Sign visible to staff  - Offer Toileting every  Hours,  in advance of need  - Initiate/Maintain alarm  - Obtain necessary fall risk management equipment:   - Apply yellow socks and bracelet for high fall risk patients  - Consider moving patient to room near nurses station  Outcome: Progressing  Goal: Maintain or return to baseline ADL function  Description: INTERVENTIONS:  -  Assess patient's ability to carry out ADLs; assess patient's baseline for ADL function and identify physical deficits which impact ability to perform ADLs (bathing, care of mouth/teeth, toileting, grooming, dressing, etc.)  - Assess/evaluate cause of self-care deficits   - Assess range of motion  - Assess patient's mobility; develop plan if impaired  - Assess patient's need for assistive devices and provide as appropriate  - Encourage maximum independence but intervene and supervise when necessary  - Involve family in performance of ADLs  - Assess for home care needs following discharge   - Consider OT consult to assist with ADL evaluation and planning for discharge  - Provide patient education as appropriate  Outcome: Progressing  Goal: Maintains/Returns to pre admission functional level  Description: INTERVENTIONS:  - Perform AM-PAC 6 Click Basic Mobility/ Daily Activity assessment daily.  - Set and communicate daily mobility goal to care team and patient/family/caregiver.   - Collaborate with rehabilitation services on mobility goals if consulted  - Perform Range of Motion  times a day.  - Reposition patient every  hours.  - Dangle patient times a day  - Stand patient  times a day  - Ambulate patient  times a day  - Out of bed to chair  times a day   - Out of bed for meals  times a day  - Out of bed for toileting  - Record patient progress and toleration of activity level   Outcome: Progressing     Problem: DISCHARGE PLANNING  Goal: Discharge to home or other facility with appropriate resources  Description: INTERVENTIONS:  - Identify barriers to discharge w/patient and caregiver  - Arrange for  needed discharge resources and transportation as appropriate  - Identify discharge learning needs (meds, wound care, etc.)  - Arrange for interpretive services to assist at discharge as needed  - Refer to Case Management Department for coordinating discharge planning if the patient needs post-hospital services based on physician/advanced practitioner order or complex needs related to functional status, cognitive ability, or social support system  Outcome: Progressing     Problem: Knowledge Deficit  Goal: Patient/family/caregiver demonstrates understanding of disease process, treatment plan, medications, and discharge instructions  Description: Complete learning assessment and assess knowledge base.  Interventions:  - Provide teaching at level of understanding  - Provide teaching via preferred learning methods  Outcome: Progressing     Problem: Prexisting or High Potential for Compromised Skin Integrity  Goal: Skin integrity is maintained or improved  Description: INTERVENTIONS:  - Identify patients at risk for skin breakdown  - Assess and monitor skin integrity  - Assess and monitor nutrition and hydration status  - Monitor labs   - Assess for incontinence   - Turn and reposition patient  - Assist with mobility/ambulation  - Relieve pressure over bony prominences  - Avoid friction and shearing  - Provide appropriate hygiene as needed including keeping skin clean and dry  - Evaluate need for skin moisturizer/barrier cream  - Collaborate with interdisciplinary team   - Patient/family teaching  - Consider wound care consult   Outcome: Progressing     Problem: Nutrition/Hydration-ADULT  Goal: Nutrient/Hydration intake appropriate for improving, restoring or maintaining nutritional needs  Description: Monitor and assess patient's nutrition/hydration status for malnutrition. Collaborate with interdisciplinary team and initiate plan and interventions as ordered.  Monitor patient's weight and dietary intake as ordered or  per policy. Utilize nutrition screening tool and intervene as necessary. Determine patient's food preferences and provide high-protein, high-caloric foods as appropriate.     INTERVENTIONS:  - Monitor oral intake, urinary output, labs, and treatment plans  - Assess nutrition and hydration status and recommend course of action  - Evaluate amount of meals eaten  - Assist patient with eating if necessary   - Allow adequate time for meals  - Recommend/ encourage appropriate diets, oral nutritional supplements, and vitamin/mineral supplements  - Order, calculate, and assess calorie counts as needed  - Recommend, monitor, and adjust tube feedings and TPN/PPN based on assessed needs  - Assess need for intravenous fluids  - Provide specific nutrition/hydration education as appropriate  - Include patient/family/caregiver in decisions related to nutrition  Outcome: Progressing

## 2025-02-07 NOTE — PROGRESS NOTES
Progress Note - Cardiac Surgery   Larry Harper 60 y.o. male MRN: [id number]  Unit/Bed#: PPHP-324-01 Encounter: [id number]    Aortic stenosis, Non-Rheumatic, Coronary artery disease. S/P aortic valve replacement (mechanical) and coronary artery bypass grafting x 1; POD # 8      24 Hour Events: No events overnight. Feels well today.    Medications:   Scheduled Meds:  Current Facility-Administered Medications   Medication Dose Route Frequency Provider Last Rate    acetaminophen  975 mg Oral Q6H While awake Niyah White PA-C      albuterol  2 puff Inhalation Q4H PRN Niyah White PA-C      amiodarone  200 mg Oral Q8H SONYA Niyah White PA-C      aspirin  81 mg Oral Daily Niyah White PA-C      atorvastatin  80 mg Oral Daily With Dinner Niyah White PA-C      bisacodyl  10 mg Rectal Daily PRN Niyah White PA-C      chlorhexidine  15 mL Mouth/Throat BID Niyah White PA-C      docusate sodium  100 mg Oral BID Niyah White PA-C      FLUoxetine  40 mg Oral Daily Niyah White PA-C      guaiFENesin  600 mg Oral Q12H [facility]1983 Harriet Gonzales PA-C      insulin glargine  45 Units Subcutaneous HS Godfrey Echevarria MD      insulin lispro  1-6 Units Subcutaneous HS Julio Cesar Reeves MD      insulin lispro  15 Units Subcutaneous TID With Meals Godfrey Echevarria MD      insulin lispro  2-12 Units Subcutaneous TID AC Julio Cesar Reeves MD      lidocaine  2 patch Topical Daily Niyah White PA-C      lisinopril  5 mg Oral Daily Fariba Kevin PA-C      methocarbamol  500 mg Oral Q6H [facility] Niyah White PA-C      metoprolol tartrate  25 mg Oral Q12H [facility] Niyah White PA-C      ondansetron  4 mg Intravenous Q6H PRN Niyah White PA-C      oxyCODONE  5 mg Oral Q4H PRN Niyah White PA-C      Or    oxyCODONE  10 mg Oral Q4H PRN Niyah White PA-C      pantoprazole  40 mg Oral Daily Niyah White PA-C      polyethylene glycol  17 g Oral Daily Niyah White PA-C      potassium chloride  20 mEq Oral Daily Fariba Kevin PA-C      temazepam  15  "mg Oral HS PRN Niyah White PA-C      torsemide  20 mg Oral Daily Fariba Kevin PA-C      umeclidinium  1 puff Inhalation Daily Niyah White PA-C       Continuous Infusions:     PRN Meds:.  albuterol    bisacodyl    ondansetron    oxyCODONE **OR** oxyCODONE    temazepam    Vitals:   Vitals:    02/07/25 2328 02/08/25 0330 02/08/25 0600 02/08/25 0706   BP: 107/70 133/76  122/69   BP Location:       Pulse: 65 71  75   Resp:       Temp:  97.9 °F (36.6 °C)  98.1 °F (36.7 °C)   TempSrc:       SpO2: 95% 93%  93%   Weight:   70 kg (154 lb 5.2 oz)    Height:           Telemetry: NSR; Heart Rate: 73    Respiratory:   SpO2: SpO2: 93 %; room air    Intake/Output:      Intake/Output Summary (Last 24 hours) at 2/8/2025 0722  Last data filed at 2/8/2025 0330  Gross per 24 hour   Intake 760 ml   Output 1795 ml   Net -1035 ml        Weights:   Weight (last 2 days)       Date/Time Weight    02/08/25 0600 70 (154.32)    02/07/25 0600 70 (154.32)    02/06/25 0735 71.3 (157.19)            Chest tube Output: removed 2/2        Results:   Results from last 7 days   Lab Units 02/03/25  0503 02/02/25  0419   WBC Thousand/uL 14.12* 14.89*   HEMOGLOBIN g/dL 11.1* 11.2*   HEMATOCRIT % 34.2* 33.7*   PLATELETS Thousands/uL 206 161     Results from last 7 days   Lab Units 02/04/25  0430 02/03/25  0503 02/02/25  0419   SODIUM mmol/L 134* 131* 130*   POTASSIUM mmol/L 3.9 4.2 3.8   CHLORIDE mmol/L 93* 97 97   CO2 mmol/L 30 26 27   BUN mg/dL 17 20 18   CREATININE mg/dL 0.81 0.73 0.65   CALCIUM mg/dL 8.2* 8.1* 8.0*     No results for input(s): \"MG\" in the last 72 hours.    Results from last 7 days   Lab Units 02/08/25  0538 02/07/25  0453 02/06/25  0525 02/05/25  1941 02/05/25  1323   INR  2.17* 2.22* 2.13*  --   --    PTT seconds  --   --  71* 83* 78*         Date:   INR:  Coumadin Dose:  2/8  2.17  -  2/7  2.22  2.5  2/6  2.13  2.5  2/5  1.69  5  2/4  1.62  3  2/3  1.51  3  2/2  1.31  2.5  2/1  1.03  2.5  1/31  1.22  2.5      Point of care " glucose: BS 71 - 330  Endo following    Studies:  No new studies past 24 hrs        Invasive Lines/Tubes:  Invasive Devices       Peripheral Intravenous Line  Duration             Peripheral IV 02/05/25 Distal;Dorsal (posterior);Left Forearm 2 days                    Physical Exam:    General: No acute distress, Alert, and Normal appearance  HEENT/NECK:  Normocephalic. Atraumatic.  No jugular venous distention.    Cardiac: Regular rate and rhythm and No murmurs/rubs/gallops  Pulmonary:  Breath sounds clear bilaterally and No rales/rhonchi/wheezes  Abdomen:  Non-tender, Non-distended, and Normal bowel sounds  Incisions: Sternum is stable.  Incision is clean, dry, and intact.   Extremities: Extremities warm/dry and No edema B/L  Neuro: Alert and oriented X 3, Sensation is grossly intact, and No focal deficits  Skin: Warm/Dry, without rashes or lesions.        Assessment:  Principal Problem:    NSTEMI (non-ST elevated myocardial infarction) ([facility])  Active Problems:    CAD (coronary artery disease)    Diabetes mellitus ([facility])    PTSD (post-traumatic stress disorder)    Severe aortic stenosis    Primary hypertension    Dyslipidemia    COPD (chronic obstructive pulmonary disease) ([facility])    Periodontal disease    S/P AVR    S/P CABG (coronary artery bypass graft)    Anticoagulated on Coumadin    Leukocytosis       Aortic stenosis, Non-Rheumatic, Coronary artery disease. S/P aortic valve replacement (mechanical) and coronary artery bypass grafting x 1; POD # 8    Plan:    Cardiac:     S/P acute preoperative NSTEMI  Normal ventricular systolic function, EF 65%    NSR; HR well-controlled  BP well controlled    Continue Lopressor, 25mg PO BID    Continue Lisinopril, 5mg PO Daily    Continue prophylactic Amiodarone, 200 mg PO TID    Anticoagulated for mechanical aortic valve. Discontinued Heparin gtt, as INR therapeutic.  INR 2.17.  Dose Coumadin 2.5 mg today    Continue ASA and Statin therapy    Epicardial pacing wires have been  removed    Removed central IV access     Continue Coumadin for DVT prophylaxis    Pulmonary:     Good RA O2 saturations. Continue IS/pulm toilet    Chest tubes have been discontinued    Renal:     Normal preoperative renal function  Creatinine 0.73    Intake/Output net: -1035 mL/24 hours    Diuretic Regimen:  Continue PO Torsemide, 20 mg QD  Continue Potassium Chloride 20 mEq PO QD    Discontinue diuretics on discharge, as pt non-edematous and euvolemic on exam. Wt below preop wt    Hyponatremia - improving. Continue diuresis, fluid restriction and monitor.    Neuro:    Neurologically intact; No active issues     Incisional pain well controlled   Continue tylenol, 975 mg PO q 8, standing dose   Continue oxycodone, 5 to 10 mg PO q 4 hours prn pain    Continue methocarbamol, 500 mg PO q 6 hours for pain/muscle spasm    GI:    Controlled carbohydrate diet level two/Cardiac diet, with 1800 mL fluid restriction    Tolerating diet without complaint  + BM postoperatively    Continue stool softeners and prn suppository    Continue GI prophylaxis    Endo:     Pre-Op Hgb A1C: 8.9    Patient has been transitioned from continuous insulin infusion to intermittent subcutaneous dosing  On injectable therapy, prior to surgical intervention  Recommendation for discharge diabetes regimen pending endocrinology follow up  Endocrinology following daily    7    Hematology:     Post-operative blood count acceptable; Trend prn  Leukocytosis; Afebrile with no signs of infection; Trend CBC prn    8.   Disposition:        Following daily PT/OT recommendations regarding home vs. rehab when medically cleared for discharge    Social issues regarding placement post discharge - Case mgmt following.    Routine postoperative recovery to this point; INR therapeutic, doing well.    Medicare appeal upheld - Discharge today      VTE Pharmacologic Prophylaxis: Warfarin (Coumadin)  VTE Mechanical Prophylaxis: sequential compression device    Collaborative  rounds completed with supervising physician  Plan of care discussed with bedside nurse    SIGNATURE: Fariba Kevin PA-C  DATE: February 8, 2025  TIME: 7:22 AM

## 2025-02-07 NOTE — ASSESSMENT & PLAN NOTE
Lab Results   Component Value Date    HGBA1C 8.9 (H) 01/24/2025     Recent Labs     02/06/25  1558 02/06/25  2052 02/07/25  0539 02/07/25  1044   POCGLU 78 204* 122 273*     Uncontrolled type 2 diabetes mellitus on insulin therapy with hyperglycemia   Home regimen: Lantus 35 units in the morning, metformin 1000 mg twice daily, NovoLog 5 units before meals, Jardiance 10 mg daily  Current regimen: Lantus 50 units nightly, Humalog 15 units before meals, correctional scale insulin algorithm 4 before meals and 3 at bedtime.    24-hour blood sugars reviewed-patient noted to have fasting blood sugars in the low 100s this morning.  Additionally had sugar in the high 70s yesterday before dinner.    Plan:  Recommend decreasing Lantus to 45 units nightly.  Continue Humalog 15 units before meals  Continue correctional scale algorithm 4 before meals and 3 at bedtime.  Monitor for hypoglycemia, treat per protocol  Change diet to consistent carbohydrate level 2, no juice  Goal blood sugar while in the [id number] mg/dL  Discharge recommendations pending clinical course  Endocrinology will continue following

## 2025-02-07 NOTE — PROGRESS NOTES
АЛЕКСАНДР MILLARD did receive a message from Chacha Johnson stating that Pt is hospitalized and needs to schedule an appointment at [facility] for follow-up after discharged. АЛЕКСАНДР MILLARD replied to Chacha that АЛЕКСАНДР MILLARD will speak with clerical staff regarding Pt appointment.     АЛЕКСАНДР MILLARD called Tawanna Martinez () and both discussed about Pt appointment. Tawanna informed that she will assist Pt in scheduling new appointment. АЛЕКСАНДР MILLARD expressed thanked to Tawanna for her support.    АЛЕКСАНДР MILLARD received an IB message from Tawanna stating that Pt has been scheduled for 2/12/2025 at 10:00 AM. АЛЕКСАНДР MILLARD sent a message to Chacha and Linh Garay and provided the information above.     АЛЕКСАНДР MILLARD will close this referral today and is remain available for further assistance as needed.

## 2025-02-08 VITALS
SYSTOLIC BLOOD PRESSURE: 122 MMHG | OXYGEN SATURATION: 93 % | WEIGHT: 154.32 LBS | BODY MASS INDEX: 24.22 KG/M2 | HEIGHT: 67 IN | RESPIRATION RATE: 28 BRPM | HEART RATE: 75 BPM | TEMPERATURE: 98.1 F | DIASTOLIC BLOOD PRESSURE: 69 MMHG

## 2025-02-08 LAB
GLUCOSE SERPL-MCNC: 105 MG/DL (ref 65–140)
GLUCOSE SERPL-MCNC: 176 MG/DL (ref 65–140)
INR PPP: 2.17 (ref 0.85–1.19)
PROTHROMBIN TIME: 24.2 SECONDS (ref 12.3–15)

## 2025-02-08 PROCEDURE — 85610 PROTHROMBIN TIME: CPT | Performed by: PHYSICIAN ASSISTANT

## 2025-02-08 PROCEDURE — 99232 SBSQ HOSP IP/OBS MODERATE 35: CPT | Performed by: INTERNAL MEDICINE

## 2025-02-08 PROCEDURE — 99024 POSTOP FOLLOW-UP VISIT: CPT | Performed by: PHYSICIAN ASSISTANT

## 2025-02-08 PROCEDURE — 82948 REAGENT STRIP/BLOOD GLUCOSE: CPT

## 2025-02-08 RX ORDER — INSULIN GLARGINE 100 [IU]/ML
30 INJECTION, SOLUTION SUBCUTANEOUS DAILY
Qty: 15 ML | Refills: 2 | Status: SHIPPED | OUTPATIENT
Start: 2025-02-08

## 2025-02-08 RX ORDER — INSULIN GLARGINE 100 [IU]/ML
30 INJECTION, SOLUTION SUBCUTANEOUS
Status: DISCONTINUED | OUTPATIENT
Start: 2025-02-08 | End: 2025-02-08 | Stop reason: HOSPADM

## 2025-02-08 RX ORDER — LISINOPRIL 5 MG/1
5 TABLET ORAL DAILY
Qty: 30 TABLET | Refills: 2 | Status: SHIPPED | OUTPATIENT
Start: 2025-02-09

## 2025-02-08 RX ORDER — INSULIN ASPART 100 [IU]/ML
10 INJECTION, SOLUTION INTRAVENOUS; SUBCUTANEOUS
Qty: 15 ML | Refills: 2 | Status: SHIPPED | OUTPATIENT
Start: 2025-02-08

## 2025-02-08 RX ORDER — OXYCODONE HYDROCHLORIDE 5 MG/1
TABLET ORAL
Qty: 20 TABLET | Refills: 0 | Status: SHIPPED | OUTPATIENT
Start: 2025-02-08 | End: 2025-02-18

## 2025-02-08 RX ORDER — INSULIN LISPRO 100 [IU]/ML
10 INJECTION, SOLUTION INTRAVENOUS; SUBCUTANEOUS
Status: DISCONTINUED | OUTPATIENT
Start: 2025-02-08 | End: 2025-02-08 | Stop reason: HOSPADM

## 2025-02-08 RX ADMIN — UMECLIDINIUM 1 PUFF: 62.5 AEROSOL, POWDER ORAL at 08:21

## 2025-02-08 RX ADMIN — LIDOCAINE 5% 2 PATCH: 700 PATCH TOPICAL at 08:20

## 2025-02-08 RX ADMIN — LISINOPRIL 5 MG: 5 TABLET ORAL at 08:21

## 2025-02-08 RX ADMIN — METOPROLOL TARTRATE 25 MG: 25 TABLET, FILM COATED ORAL at 08:20

## 2025-02-08 RX ADMIN — CHLORHEXIDINE GLUCONATE 0.12% ORAL RINSE 15 ML: 1.2 LIQUID ORAL at 08:20

## 2025-02-08 RX ADMIN — AMIODARONE HYDROCHLORIDE 200 MG: 200 TABLET ORAL at 14:21

## 2025-02-08 RX ADMIN — TORSEMIDE 20 MG: 20 TABLET ORAL at 08:21

## 2025-02-08 RX ADMIN — ACETAMINOPHEN 975 MG: 325 TABLET, FILM COATED ORAL at 14:21

## 2025-02-08 RX ADMIN — METHOCARBAMOL 500 MG: 500 TABLET ORAL at 06:26

## 2025-02-08 RX ADMIN — POLYETHYLENE GLYCOL 3350 17 G: 17 POWDER, FOR SOLUTION ORAL at 08:21

## 2025-02-08 RX ADMIN — AMIODARONE HYDROCHLORIDE 200 MG: 200 TABLET ORAL at 06:26

## 2025-02-08 RX ADMIN — METHOCARBAMOL 500 MG: 500 TABLET ORAL at 11:58

## 2025-02-08 RX ADMIN — GUAIFENESIN 600 MG: 600 TABLET, EXTENDED RELEASE ORAL at 08:20

## 2025-02-08 RX ADMIN — OXYCODONE HYDROCHLORIDE 5 MG: 5 TABLET ORAL at 11:56

## 2025-02-08 RX ADMIN — FLUOXETINE HYDROCHLORIDE 40 MG: 20 CAPSULE ORAL at 08:21

## 2025-02-08 RX ADMIN — POTASSIUM CHLORIDE 20 MEQ: 1500 TABLET, EXTENDED RELEASE ORAL at 08:21

## 2025-02-08 RX ADMIN — ASPIRIN 81 MG: 81 TABLET, COATED ORAL at 08:20

## 2025-02-08 RX ADMIN — INSULIN LISPRO 2 UNITS: 100 INJECTION, SOLUTION INTRAVENOUS; SUBCUTANEOUS at 11:45

## 2025-02-08 RX ADMIN — INSULIN LISPRO 15 UNITS: 100 INJECTION, SOLUTION INTRAVENOUS; SUBCUTANEOUS at 08:19

## 2025-02-08 RX ADMIN — ACETAMINOPHEN 975 MG: 325 TABLET, FILM COATED ORAL at 08:21

## 2025-02-08 RX ADMIN — PANTOPRAZOLE SODIUM 40 MG: 40 TABLET, DELAYED RELEASE ORAL at 06:26

## 2025-02-08 RX ADMIN — INSULIN LISPRO 15 UNITS: 100 INJECTION, SOLUTION INTRAVENOUS; SUBCUTANEOUS at 11:54

## 2025-02-08 RX ADMIN — DOCUSATE SODIUM 100 MG: 100 CAPSULE, LIQUID FILLED ORAL at 08:21

## 2025-02-08 NOTE — PROGRESS NOTES
Progress Note - Endocrinology   Name: Larry Harper 60 y.o. male I MRN: [id number]  Unit/Bed#: PPHP-324-01 I Date of Admission: 1/25/2025   Date of Service: 2/8/2025 I Hospital Day: 14    Assessment & Plan  Diabetes mellitus (HCC)  Lab Results   Component Value Date    HGBA1C 8.9 (H) 01/24/2025     Recent Labs     02/07/25  1600 02/07/25  2132 02/08/25  0619 02/08/25  1106   POCGLU 110 330* 105 176*     Uncontrolled type 2 diabetes mellitus on insulin therapy with hyperglycemia   Home regimen: Lantus 35 units in the morning, metformin 1000 mg twice daily, NovoLog 5 units before meals, Jardiance 10 mg daily  Current regimen: Lantus 50 units nightly, Humalog 15 units before meals, correctional scale insulin algorithm 4 before meals and 3 at bedtime.    24-hour blood sugars reviewed-patient noted to have fasting blood sugars in the low 100s this morning.  Additionally had sugar in the high 70s yesterday before dinner.  Plan to discharge patient later today.    Plan:  Recommend continuing Jardiance at discharge.  Hold metformin.  Recommend discharging patient on Lantus 30 units nightly and Humalog 10 units before meals.  Monitor for hypoglycemia, treat per protocol  Change diet to consistent carbohydrate level 2, no juice  Recommend follow-up with PCP or endocrinology as an outpatient.  NSTEMI (non-ST elevated myocardial infarction) ([facility])  Management per primary team  S/P AVR  Severe aortic stenosis-1/31 s/p AVR  Management per primary team  S/P CABG (coronary artery bypass graft)  MV-CAD-1/31 s/p CABG x 1  Management per primary team    24 Hour Events : No significant overnight events  Subjective : Patient seen and examined at the bedside, not in acute distress at the time of my evaluation.  Reports feeling well, endorses good appetite, denies nausea or vomiting.    Objective :  Temp:  [97.9 °F (36.6 °C)-98.3 °F (36.8 °C)] 98.1 °F (36.7 °C)  HR:  [65-79] 75  BP: (107-133)/(69-76) 122/69  Resp:  [28] 28  SpO2:  [93  %-95 %] 93 %  O2 Device: None (Room air)    Physical Exam  Vitals and nursing note reviewed.   Constitutional:       General: He is not in acute distress.     Appearance: Normal appearance. He is not ill-appearing, toxic-appearing or diaphoretic.   HENT:      Head: Normocephalic and atraumatic.      Nose: Nose normal.      Mouth/Throat:      Pharynx: Oropharynx is clear.   Eyes:      General: No scleral icterus.        Right eye: No discharge.         Left eye: No discharge.      Extraocular Movements: Extraocular movements intact.      Conjunctiva/sclera: Conjunctivae normal.   Pulmonary:      Effort: Pulmonary effort is normal. No respiratory distress.   Abdominal:      General: There is no distension.   Musculoskeletal:         General: Normal range of motion.      Cervical back: Normal range of motion and neck supple.   Skin:     General: Skin is warm and dry.      Coloration: Skin is not jaundiced or pale.   Neurological:      Mental Status: He is alert and oriented to person, place, and time. Mental status is at baseline.   Psychiatric:         Mood and Affect: Mood normal.         Behavior: Behavior normal.         Thought Content: Thought content normal.         Judgment: Judgment normal.         Lab Results: I have reviewed the following results:CBC/BMP: No new results in last 24 hours.     Imaging Results Review: No pertinent imaging studies reviewed.  Other Study Results Review: No additional pertinent studies reviewed.

## 2025-02-08 NOTE — ASSESSMENT & PLAN NOTE
Lab Results   Component Value Date    HGBA1C 8.9 (H) 01/24/2025     Recent Labs     02/07/25  1600 02/07/25  2132 02/08/25  0619 02/08/25  1106   POCGLU 110 330* 105 176*     Uncontrolled type 2 diabetes mellitus on insulin therapy with hyperglycemia   Home regimen: Lantus 35 units in the morning, metformin 1000 mg twice daily, NovoLog 5 units before meals, Jardiance 10 mg daily  Current regimen: Lantus 50 units nightly, Humalog 15 units before meals, correctional scale insulin algorithm 4 before meals and 3 at bedtime.    24-hour blood sugars reviewed-patient noted to have fasting blood sugars in the low 100s this morning.  Additionally had sugar in the high 70s yesterday before dinner.  Plan to discharge patient later today.    Plan:  Recommend continuing Jardiance at discharge.  Hold metformin.  Recommend discharging patient on Lantus 30 units nightly and Humalog 10 units before meals.  Monitor for hypoglycemia, treat per protocol  Change diet to consistent carbohydrate level 2, no juice  Recommend follow-up with PCP or endocrinology as an outpatient.

## 2025-02-08 NOTE — CASE MANAGEMENT
Case Management Discharge Planning Note    Patient name Larry Harper  [facility] MRN [id number]  : 1965 Date 2025       Current Admission Date: 2025  Current Admission Diagnosis:NSTEMI (non-ST elevated myocardial infarction) ([facility])   Patient Active Problem List    Diagnosis Date Noted Date Diagnosed    Anticoagulated on Coumadin 2025     Leukocytosis 2025     S/P AVR 2025     S/P CABG (coronary artery bypass graft) 2025     Periodontal disease 2025     Dyslipidemia 2025     COPD (chronic obstructive pulmonary disease) ([facility]) 2025     Hypokalemia 2025     Primary hypertension 2025     Severe aortic stenosis 2025     NSTEMI (non-ST elevated myocardial infarction) ([facility])      CAD (coronary artery disease)      Diabetes mellitus ([facility])      Anxiety      Depression      PTSD (post-traumatic stress disorder)        LOS (days): 14  Geometric Mean LOS (GMLOS) (days): 8.5  Days to GMLOS:-5.2     OBJECTIVE:  Risk of Unplanned Readmission Score: 17.55         Current admission status: Inpatient   Preferred Pharmacy:   [facility]/pharmacy #[id number] - [place], [place] - [address] [place] [id number]  Phone: [phone] Fax: [phone]    [facility] [place]  [place], [place] - [address]  [address]  [place] [address]  Phone: [phone] Fax: [phone]    Primary Care Provider: Efe Portillo DO    Primary Insurance: MEDICARE  Secondary Insurance: [facility]    DISCHARGE DETAILS:                                               Would you like to participate in our [facility] service program?  : Yes    Treatment Team Recommendation: Other (Hospital ok'd [facility] 2 nights, [address] [place] VENCES)  Discharge Destination Plan::  ([facility] [place] [place]  [place])  Transport at Discharge : Other (Comment) (LYFT)                                      Additional Comments: Pt ready for discharge, Hospital  approved 2 nights at [facility] on [address] [place]. Pt's credit card was refused so sent Jacquelin Care form to [facility] to cover his medication total of $23.88. Will request Mora ride for transport to \[facility]\"".

## 2025-02-08 NOTE — CASE MANAGEMENT
Case Management Discharge Planning Note    Patient name Larry Harper  [facility] [id number]  : 1965 Date 2025       Current Admission Date: 2025  Current Admission Diagnosis:NSTEMI (non-ST elevated myocardial infarction) ([facility])   Patient Active Problem List    Diagnosis Date Noted Date Diagnosed    Anticoagulated on Coumadin 2025     Leukocytosis 2025     S/P AVR 2025     S/P CABG (coronary artery bypass graft) 2025     Periodontal disease 2025     Dyslipidemia 2025     COPD (chronic obstructive pulmonary disease) ([facility]) 2025     Hypokalemia 2025     Primary hypertension 2025     Severe aortic stenosis 2025     NSTEMI (non-ST elevated myocardial infarction) ([facility])      CAD (coronary artery disease)      Diabetes mellitus ([facility])      Anxiety      Depression      PTSD (post-traumatic stress disorder)        LOS (days): 14  Geometric Mean LOS (GMLOS) (days): 8.5  Days to GMLOS:-5.1     OBJECTIVE:  Risk of Unplanned Readmission Score: 18.06         Current admission status: Inpatient   Preferred Pharmacy:   [facility]/pharmacy #[id number] [place], [place] - [address] [place] [address]  Phone: [phone] Fax: [phone]    [facility] [place] - [place] - [address]  [address]  [place] [address]  Phone: [phone] Fax: [phone]    Primary Care Provider: Efe Portillo DO    Primary Insurance: MEDICARE  Secondary Insurance: [facility]    DISCHARGE DETAILS:                         TC to Linh RAMOS with [facility]. She contacted the [facility] this patient has warrents for assault. The rescue mission will not admit him, neither will the [facility].     Met with the patient he changed  his story again. He stated he only has a warrant for simlpe assult which his ex wife filed. He now says is dog is somewhere in  [place], but he does not know where.     He states he has no family or friends that he could stay with and the woman he was living with called the police on him.     Spoke to Case Management leadership. Booked two nights at [facility] for this patient.     Confirmation number [id number]    Pt aware on Monday he needs to follow up with [facility]

## 2025-02-08 NOTE — DISCHARGE SUMMARY
Discharge Summary - Cardiac Surgery   Larry Harper 60 y.o. male MRN: [id number]  Unit/Bed#: PPHP-324-01 Encounter: [id number]    Admission Date: 2025     Discharge Date: 25    Admitting Diagnosis: NSTEMI (non-ST elevated myocardial infarction) ([facility]) [I21.4]    Primary Discharge Diagnosis:   Aortic stenosis, Non-Rheumatic, Coronary artery disease. S/P aortic valve replacement and coronary artery bypass grafting;    Secondary Discharge Diagnosis:   AS, DMII, PTSD (uses marijuana), HTN, CAD, H/o MI s/p KAEL RCA , Depression, FH CAD, COPD    Attending: Jarrett Johnson D.O.    Consulting Physician(s):   Cardiology  Endocrinology  Medical critical care    Procedures Performed:   Procedure(s):  CORONARY ARTERY BYPASS GRAFT (CABG) 1 VESSELS, LIMA to LAD  REPLACEMENT VALVE AORTIC (AVR) MECHANICAL, 23 MM CARBOMEDIC [facility] Course:   The patient was seen in consultation prior to this admission for evaluation of Aortic stenosis, Non-Rheumatic, Coronary artery disease.  Risks and benefits of aortic valve replacement and coronary artery bypass grafting were discussed in detail, and patient was agreeable.  Routine preoperative evaluation was completed and informed consent was obtained prior to admission.    : 60M who presented to the [facility] ED with chest pain. He is from [place] and has been in [place] for about 2 weeks because he attended his brother's  who recently passed from acute MI. For the 3 days prior to admission he developed intermittent episodes of midsternal chest pain. The pain is associated with SOB, nausea, diaphoresis and dizziness. He was taking Nitro SL tabs with relief. The chest pain continued which prompted him to be evaluated in the ED. Trop peaks in the 200s, ruling him in for an NSTEMI. ECG showed non specific ST changes. ECHO from 24 showed EF 65%, normal wall motion, severe AS (velocity 3.4 m/s, mean gradient 29 mmHg, DVI 0.25, GRAYSON 0.75 cm² ) and mild mitral  annular calcification. He was transferred to [facility] and underwent cardiac cath which revealed 2V CAD. He was then transferred to [facility] for CTS evaluation for AVR/CABG.     1/27: CT chest completed, official read pending, does show some plaque in ascending/root, emphysema changes/blebs. Vein mapping acceptable (bilateral thigh sizes okay) and carotid US < 50% bilaterally. Labs and vitals stable. Had brief CP last evening, no SOB, resolved with  nitro. On IV heparin. Stop ACE in anticipation of surgery. Consult endo for uncontrolled DM2 on insulin. Panorex pending. PFTs pending. Surgical timing TBD.      1/28: Endocrinology consult completed; Humalog and Lantus started. PFTs completed and showed FEV1 2.0 (64%), FEV/FVC 56%, DLCO 21 (81%), - mild obstruction. Awaiting Panorex (not yet completed)- notified radiology. P3 bed request placed.      1/29: OMFS consulted needs extractions - patient initially refusing - but ultimately agreeable - texted OMFS may be able to extract today. CTA TAVR ordered. Tentative OR Friday.     1/30: Dental extractions completed yest. Denies CP/SOB. On IV heparin. No complaints except some pain from dental extractions. Vitals stable. CTA TAVR completed for surgical planning. Consent obtained. OR orders placed. AVR/CABG tomorrow with Dr. Johnson.      1/31: to OR for AVR mechanical (23mm Carbomedics Top Hat), CABG x 1 (LIMA to LAD). Intraoperative blood products include: None. No significant postoperative bleeding.  Transferred to ICU supported with derrell at 80. SR 80s. Wean towards extubation. Low dose coumadin 2.5mg tonight.     2/1: Derrell weaned to off. Deliend. Extuabted to 8LNC, wean as tolerated. Start Lopressor 25mg BID. Discontinue EPWs, start heparin, INR 1.03. Start Lasix 40mg IV BID, discontinue andersen catheter. Transition to SSIC.      2/2: Intermittently on BiPAP, on 6LNC, wean as tolerated. INR 1.3, on heparin gtt, dose Coumadin 2.52mg tonight. Insulin gtt per endo. Discontinue  CTs. Transfer to telem.     2/3: NSR, RA. -512 ml/24 hrs. Transition to Demadex 20mg BID. Remains on Heparin gtt. INR 1.51, dose Coumadin 3mg tonight.      2/4: Continues on Heparin gtt. INR 1.62. Dose Coumadin 3mg tonight. Was on 4L NC this morning, now on RA. -2.9L/24 hrs. Decrease Demadex to 20mg daily.     2/5: Remains on Heparin gtt. INR 1.69. Dose Coumadin 5mg tonight. D/C TLC. Social work for discharge housing concerns.     2/6: Was on 1L NC overnight, weaned off. Good RA O2 sats. NSR. INR 2.13, dose Coumadin 2.5mg tonight. D/C Heparin gtt. Deemed stable for discharge.      2/7: Pt appealed discharge yesterday. Discharge cancelled. Received notification today that Medicare upheld the discharge. Hypertensive - increase Lisinopril to 5mg daily. INR 2.22, dose Coumadin 2.5mg tonight. Plan to discharge tomorrow.     2/8: BP well controlled. INR 2.17, Coumadin 2.5mg tonight. NSR, RA. Discharge today.     Condition at Discharge:   good     Discharge Physical Exam:    Please see the documented physical exam from this morning's progress note for details.    Discharge Data:  Results from last 7 days   Lab Units 02/03/25  0503 02/02/25  0419   WBC Thousand/uL 14.12* 14.89*   HEMOGLOBIN g/dL 11.1* 11.2*   HEMATOCRIT % 34.2* 33.7*   PLATELETS Thousands/uL 206 161     Results from last 7 days   Lab Units 02/04/25  0430 02/03/25  0503 02/02/25  0419   POTASSIUM mmol/L 3.9 4.2 3.8   CHLORIDE mmol/L 93* 97 97   CO2 mmol/L 30 26 27   BUN mg/dL 17 20 18   CREATININE mg/dL 0.81 0.73 0.65   CALCIUM mg/dL 8.2* 8.1* 8.0*     Results from last 7 days   Lab Units 02/08/25  0538 02/07/25  0453 02/06/25  0525 02/05/25  1941 02/05/25  1323   INR  2.17* 2.22* 2.13*  --   --    PTT seconds  --   --  71* 83* 78*       Discharge instructions/Information to patient and family:   See after visit summary for information provided to patient and family.      Larry Harper was educated on restrictions regarding driving and lifting, and  techniques of proper incisional care.  They were specifically counselled on signs and symptoms of an incisional infection, and advised to contact our service immediately should they develop fevers, sweats, chill, redness or drainage at the site of any incisions.    Provisions for Follow-Up Care:  See after visit summary for information related to follow-up care and any pertinent home health orders.      Disposition:  Home/self care    Planned Readmission:   No    Discharge Medications:  See after visit summary for reconciled discharge medications provided to patient and family.      Larry Harper was provided contact information and scheduled a follow up appointment with Jarrett Johnson D.O.  Additionally, follow up appointments have been scheduled for their primary care physician and primary cardiologist.  Contact information was provided.    Upon presentation, NSTEMI was identified.    Larry Harper was counseled on the importance of avoiding tobacco products.  As with all patients whom have undergone open heart surgery, tobacco cessation medication was contraindicated at the time of discharge.     ACE/ARB was Prescribed at discharge    Beta Blocker was Prescribed at discharge    Aspirin was Prescribed at discharge    Statin was Prescribed at discharge      The patient was not discharged on ongoing diuretic therapy, as he completed his diuretic course and is euvolemic on exam.     Larry Harper  is being discharged on anticoagulation therapy for treatment of mechanical aortic valve.  As they are new to Coumadin therapy, arrangements have been made the [facility] to monitor INR (goal 2.0 to 3.0) and provide dosing instructions.  Their office was notified by Epic messaging which itemized the patient’s daily INR’s and correlating Coumadin doses during their hospitalization.     Larry Harper has been prescribed Coumadin, 2.5 mg tabs, with 60 tablets being dispensed.  They have been advised  to take 2.5 mg daily, unless otherwise directed.  Follow up PT/INR will be ordered at the discretion of the Coumadin clinic.      Larry Harper was prescribed injectable insulin for management of their pre-existing diabetes.  Insulin was prescribed as Lantus qHS and Humalog TID with meals, delivered via injectable pen system. Injectable insulin is not a new therapy for this patient and the patient was able to demonstrate competency with the pen needle system and felt comfortable doing so.       Narcotic pain medication was prescribed in the form of Oxycodone.  Prior to prescribing, their prescription profile was reviewed on the [facility] of health prescription drug monitoring program.    The patient was informed that following their postoperative surgical evaluation, they will be referred to outpatient cardiac rehabilitation.  They were counseled that this program is run by specialists who will help them safely strengthen their heart and prevent more heart disease.  Cardiac rehabilitation will include exercise, relaxation, stress management, and heart-healthy nutrition.  Caregivers will also check to make sure their medication regimen is working.    During this admission, the patient was questioned on their use of tobacco, alcohol, and illicit/non-prescription drug use in the  previous 24 months. During this time frame they admit to using illicit/non-prescription drugs. As such they have been counseled on the importance of cessation and abstinence.     I spent 30 minutes discharging the patient. This time was spent on the day of discharge. I had direct contact with the patient on the day of discharge. Additional documentation is required if more than 30 minutes were spent on discharge.     SIGNATURE: Fariba Kevin PA-C  DATE: February 8, 2025  TIME: 11:37 AM

## 2025-02-10 ENCOUNTER — PATIENT OUTREACH (OUTPATIENT)
Dept: FAMILY MEDICINE CLINIC | Facility: CLINIC | Age: 60
End: 2025-02-10

## 2025-02-10 ENCOUNTER — ANTICOAG VISIT (OUTPATIENT)
Dept: CARDIOLOGY CLINIC | Facility: CLINIC | Age: 60
End: 2025-02-10

## 2025-02-10 DIAGNOSIS — Z95.2 S/P AVR: Primary | ICD-10-CM

## 2025-02-10 NOTE — CASE MANAGEMENT
Case Management Discharge Planning Note    Patient name Larry Harper  [facility] MRN [id number]  : 1965 Date 2/10/2025       Current Admission Date: 2025  Current Admission Diagnosis:NSTEMI (non-ST elevated myocardial infarction) ([facility])   Patient Active Problem List    Diagnosis Date Noted Date Diagnosed    Anticoagulated on Coumadin 2025     Leukocytosis 2025     S/P AVR 2025     S/P CABG (coronary artery bypass graft) 2025     Periodontal disease 2025     Dyslipidemia 2025     COPD (chronic obstructive pulmonary disease) ([facility]) 2025     Hypokalemia 2025     Primary hypertension 2025     Severe aortic stenosis 2025     NSTEMI (non-ST elevated myocardial infarction) ([facility])      CAD (coronary artery disease)      Diabetes mellitus ([facility])      Anxiety      Depression      PTSD (post-traumatic stress disorder)        LOS (days): 14  Geometric Mean LOS (GMLOS) (days): 8.5  Days to GMLOS:-5.3     OBJECTIVE:  Risk of Unplanned Readmission Score: 17.6         Current admission status: Inpatient   Preferred Pharmacy:   [facility]/pharmacy #[id number] [place], [place] - [address] [place] [address]  Phone: [phone] Fax: [phone]    [facility] [place] - [place] - [address]  [address]  [place] [address]  Phone: [phone] Fax: [phone]    Primary Care Provider: Efe Portillo DO    Primary Insurance: MEDICARE  Secondary Insurance: [facility]    DISCHARGE DETAILS:     Late note 25    Notified that patient's prescriptions were not avaiable for him to  prior to his discharge.    Pt insisted on leaving hospital he told staff his dog was being delivered to the hotel.     Pt was told Saturday that he would be responsible for all incidental charges (Damage to the room, room service charges ETC and put his own  method of payment on file.) Pt verbalized understanding.     Out [facility].

## 2025-02-10 NOTE — PROGRESS NOTES
АЛЕКСАНДР MILLARD did receive an email from Ema Johnson manager of Case Management at [facility]. She stated that Pt was discharged and needs assistance with his current medications. АЛЕКСАНДР MILLARD replied to Ema that АЛЕКСАНДР MILLARD scheduled an appointment for Pt to see the provider at [facility] 2/12/2025. If Pt shows up АЛЕКСАНДР MILLARD can assist him as needed.     АЛЕКСАНДР MILLARD is remain available for further assistance as needed.

## 2025-02-10 NOTE — PROGRESS NOTES
Pt d/c s/p AVR  will be following.called patient to introduce self and review warfarin. Pt is living in a hotel at this time.  Will go to [facility] for labs on Wed.  Pt given our phone number. Will continue the 2.5 mg daily. Pt will get labs as requested.

## 2025-02-10 NOTE — PROGRESS NOTES
This writer spoke with Ema GARCIA, about patients discharge plan. Client has nowhere to discharge to. This writer called Rohit at [facility] and client is unable to stay there due to outstanding warrants. This writer called Dat at [facility]. Client is unable to stay there. This writer called the [facility] and they are full. This information was forwarded to Ema GARCIA. No further outreach scheduled.

## 2025-02-13 ENCOUNTER — TELEPHONE (OUTPATIENT)
Dept: CARDIAC SURGERY | Facility: CLINIC | Age: 60
End: 2025-02-13

## 2025-02-13 NOTE — TELEPHONE ENCOUNTER
"Called patient for routine postop follow up.   He was discharged to a hotel on 2/6 after undergoing mechanical AVR on 1/31.     Patient brought up multiple concerns regarding his social situation. He is in a hotel until tomorrow because he is almost out of money. He has been unable to  his medications, including Jardiance and a glucose monitor. He does not know what his blood sugars are, but states that his service dog woke him up when he was hypoglycemic this morning.   He was told that he cannot bring his service dog to a shelter, and he is not willing to give up his service dog.   He had an appointment with Family Medicine yesterday at [facility], but did not show up. He states that he was not aware of the appointment.     Of note, patient has a mechanical aortic valve, and was discharged on Coumadin, and has not had an INR drawn since discharge last week. He does report that he is taking \"all of his medications\" which includes Coumadin. He was supposed to get an INR yesterday, but states that he has no money for transportation. Will reach out to the [facility] for follow up.     Regarding his recovery, patient states that he is washing his incisions daily. He does not have access to a scale to weigh himself.     Patient is aware of his appointment with cardiology on 2/17, and his appointment with Dr. Johnson on 3/3.   "

## 2025-02-14 ENCOUNTER — PATIENT OUTREACH (OUTPATIENT)
Dept: FAMILY MEDICINE CLINIC | Facility: CLINIC | Age: 60
End: 2025-02-14

## 2025-02-14 DIAGNOSIS — Z95.2 S/P AVR: Primary | ICD-10-CM

## 2025-02-14 NOTE — PROGRESS NOTES
АЛЕКСАНДР MILLARD did receive an IB message from Soniya Carrillo PA-C stating that she called Pt for routine follow up after his surgery with Dr. Johnson. It appears that he had an appointment yesterday at [place], but patient states that he was unaware of the appointment and was not seen.     АЛЕКСАНДР MILLARD replied to Soniya and explained that АЛЕКСАНДР MILLARD assisted Pt in scheduling the appointment at [facility].  Tawanna Martinez contacted the Pt and explained him that if he needs a Lyft for his appointment 2/12/2025 we can assist with.     АЛЕКСАНДР MILLARD spoke with Tawanna today regarding Pt's visit. Tawanna is willing to contact Pt and reschedule the appointment. АЛЕКСАНДР MILLARD expressed thanked to Zoey for her assistance.    АЛЕКСАНДР MILLARD is remain available for further assistance as needed.    diarrhea

## 2025-02-17 ENCOUNTER — TELEPHONE (OUTPATIENT)
Dept: CARDIOLOGY CLINIC | Facility: CLINIC | Age: 60
End: 2025-02-17

## 2025-02-18 ENCOUNTER — TELEPHONE (OUTPATIENT)
Dept: CARDIOLOGY CLINIC | Facility: CLINIC | Age: 60
End: 2025-02-18

## 2025-02-18 NOTE — TELEPHONE ENCOUNTER
"Called patient this morning is returning to [facility].  His brother sent him a bus ticket .  Him and his dog are waiting for cab to take to bus station and states should be in [facility] by tomorrow 10 AM  The patient  states he will get follow up as soon as returns and stated will be using \"[facility] [place]\"He was once a again told needs immediate follow up for his warfarin.  He verbalized understanding.  Pt states he will contact for medical records if needed.   "

## 2025-02-18 NOTE — TELEPHONE ENCOUNTER
Reached out to patient yesterday was staying at [facility] off [facility] nathan has place to stay yet tonight unsure where he will be tomorrow. Pt did not have ride for appt today, Is unsure where he is going to go.  [facility] put him up for a few nights.  Pt has not gotten an INR since d/c....reviewed with him importance of needing labs done.  He states as of tomorrow will probably be on the streets.  Mckenzie made aware.

## (undated) DEVICE — CLIP SURGI PREMIUM III

## (undated) DEVICE — ALCON OPHTHALMIC KNIFE 15 °: Brand: ALCON

## (undated) DEVICE — SYRINGE 50ML LL

## (undated) DEVICE — SURGICEL 4 X 8IN

## (undated) DEVICE — LIGHT HANDLE COVER SLEEVE DISP BLUE STELLAR

## (undated) DEVICE — SUT SILK 0 CT-1 30 IN 424H

## (undated) DEVICE — SUT PROLENE 4-0 D-SPECIAL CUSTOM KIT D7160

## (undated) DEVICE — SUT POLYESTER TAPE D-G 8618-00

## (undated) DEVICE — RED RUBBER ROBINSON URETHRAL CATHETER, RADIOPAQUE, SMOOTH ROUNDED TIP, 20 FR (6.7 MM): Brand: DOVER

## (undated) DEVICE — SUT SILK 3-0 SH CR/8 18 IN C013D

## (undated) DEVICE — TUBING INSUFFLATION SET ISO CONNECTOR

## (undated) DEVICE — SILVER-COATED ANTIBACTERIAL BARRIER DRESSING: Brand: ACTICOAT SURGIC 10X12CM 5PK US

## (undated) DEVICE — TR BAND RADIAL ARTERY COMPRESSION DEVICE: Brand: TR BAND

## (undated) DEVICE — AORTIC PUNCH 5.2 MM DISP

## (undated) DEVICE — 3000CC GUARDIAN II: Brand: GUARDIAN

## (undated) DEVICE — PAD GROUNDING DUAL ADULT

## (undated) DEVICE — 40601 PROLONGED POSITIONING SYSTEM: Brand: 40601 PROLONGED POSITIONING SYSTEM

## (undated) DEVICE — BONE WAX WHITE: Brand: BONE WAX WHITE

## (undated) DEVICE — PACK CUSTOM PERFUSION PLEG PK

## (undated) DEVICE — FILTER SMOKE EVAC VIROSAFE

## (undated) DEVICE — STERILE MANDIBLE PACK: Brand: CARDINAL HEALTH

## (undated) DEVICE — SUT MONOCRYL 4-0 PS-2 18 IN Y496G

## (undated) DEVICE — LIGACLIP MCA MULTIPLE CLIP APPLIERS, 20 SMALL CLIPS: Brand: LIGACLIP

## (undated) DEVICE — SUT MONOCRYL PLUS 4-0 PS-2 18 IN MCP496G

## (undated) DEVICE — ANTIBACTERIAL UNDYED BRAIDED (POLYGLACTIN 910), SYNTHETIC ABSORBABLE SUTURE: Brand: COATED VICRYL

## (undated) DEVICE — PLEDGET CARDIO PTFE 9.5 X 4.8 SOFT LF (6EA/PK)

## (undated) DEVICE — VESSEL LOOPS X-RAY DETECTABLE: Brand: DEROYAL

## (undated) DEVICE — SUT PROLENE 5-0 RB-1/RB-1 36 IN 8556H

## (undated) DEVICE — ACE WRAP 6 IN UNSTERILE

## (undated) DEVICE — EVERGRIP INSERT SET 86MM: Brand: FOGARTY EVERGRIP

## (undated) DEVICE — VASOVIEW HEMOPRO 2: Brand: VASOVIEW HEMOPRO 2

## (undated) DEVICE — BLANKET HYPOTHERMIA ADULT GAYMAR

## (undated) DEVICE — INTENDED FOR TISSUE SEPARATION, AND OTHER PROCEDURES THAT REQUIRE A SHARP SURGICAL BLADE TO PUNCTURE OR CUT.: Brand: BARD-PARKER ® CARBON RIB-BACK BLADES

## (undated) DEVICE — PENCIL ELECTROSURG E-Z CLEAN -0035H

## (undated) DEVICE — 32 FR STRAIGHT – SOFT PVC CATHETER: Brand: PVC THORACIC CATHETERS

## (undated) DEVICE — SUCTION CATH 18 FR

## (undated) DEVICE — PACK CUSTOM PERFUSION AV LOOP

## (undated) DEVICE — DGW .035 FC STR 260CM TEF: Brand: EMERALD

## (undated) DEVICE — GLIDESHEATH BASIC HYDROPHILIC COATED INTRODUCER SHEATH: Brand: GLIDESHEATH

## (undated) DEVICE — GLOVE SRG BIOGEL ORTHOPEDIC 7.5

## (undated) DEVICE — RADIFOCUS OPTITORQUE ANGIOGRAPHIC CATHETER: Brand: OPTITORQUE

## (undated) DEVICE — SUT PROLENE 4-0 RB-1/RB-1 36 IN 8557H

## (undated) DEVICE — GAUZE SPONGES,16 PLY: Brand: CURITY

## (undated) DEVICE — SUTURE GUIDE

## (undated) DEVICE — RECIP.STERNUM SAW BLADE 34/7.5/0.7MM: Brand: AESCULAP

## (undated) DEVICE — SUT PROLENE 5-0 C-1/C-1 36 IN 8321H

## (undated) DEVICE — GLOVE SRG BIOGEL ECLIPSE 8

## (undated) DEVICE — ADHESIVE SKIN HIGH VISCOSITY EXOFIN 1ML

## (undated) DEVICE — OASIS DRAIN, SINGLE, INLINE & ATS COMPATIBLE: Brand: OASIS

## (undated) DEVICE — SUT PROLENE 4-0 BB 36 IN 8581H

## (undated) DEVICE — PACK VALVE PBDS

## (undated) DEVICE — CATH DIAG 5FR IMPULSE 110CM PIG

## (undated) DEVICE — RADIFOCUS GLIDEWIRE: Brand: GLIDEWIRE

## (undated) DEVICE — Device

## (undated) DEVICE — SUT PDS PLUS 1 CTB 36 IN PDPB359T

## (undated) DEVICE — CLIP APPLIER: Brand: PREMIUM SURGICLIP II

## (undated) DEVICE — SUT PROLENE 7-0 BV175-8/BV175-8 24 IN EPM8747

## (undated) DEVICE — SUT ETHIBOND 2-0 SH-1/SH-1 30 IN X763H

## (undated) DEVICE — SUT PROLENE 6-0 C-1/C-1 30 IN 8307H

## (undated) DEVICE — SUT FIBERTAPE STERNAL CLOSURE W/BLUNT NDL RADIOPAQUE AR-7289R

## (undated) DEVICE — BLADE BEAVER MINI SZ 69

## (undated) DEVICE — TRAY FOLEY 16FR SURESTEP TEMP SENS URIMETER STAT LOK

## (undated) DEVICE — CATH DIAG 5FR IMPULSE 100CM AL1

## (undated) DEVICE — INTENDED FOR TISSUE SEPARATION, AND OTHER PROCEDURES THAT REQUIRE A SHARP SURGICAL BLADE TO PUNCTURE OR CUT.: Brand: BARD-PARKER SAFETY BLADES SIZE 15, STERILE

## (undated) DEVICE — SUT MONOCRYL PLUS 3-0 PS-2 27 IN MCP427H

## (undated) DEVICE — EVERGRIP INSERT SET 61MM: Brand: FOGARTY EVERGRIP

## (undated) DEVICE — UMBILICAL TAPE: Brand: DEROYAL

## (undated) DEVICE — SILVER-COATED ANTIBACTERIAL BARRIER DRESSING: Brand: ACTICOAT SURGIC 10X25CM 5PK US

## (undated) DEVICE — SUT PROLENE 4-0 SH 36 IN 8521H

## (undated) DEVICE — NEEDLE INT MAMMARY ARTERIOTOMY CAN

## (undated) DEVICE — 32 FR RIGHT ANGLE – SOFT PVC CATHETER: Brand: PVC THORACIC CATHETERS

## (undated) DEVICE — THERMOFLECT BLANKET, L, 25EA                               TS THERMOFLECT BLANKET, 48" X 84", SILVER, 5/BG, 5 BG/CS NW: Brand: THERMOFLECT

## (undated) DEVICE — PLUMEPEN PRO 10FT

## (undated) DEVICE — DRESSING ALLEVYN LIFE HEEL 25 X 25.2CM

## (undated) DEVICE — PUMP TUBING FUSION PACK

## (undated) DEVICE — ELECTRODE BLADE E-Z CLEAN 4IN -0014A

## (undated) DEVICE — SUT FIBERTAPE STERNAL CLOSURE W/CUTTING NDL RADIOPAQUE AR-7288R

## (undated) DEVICE — JP CHANNEL DRAIN, 24FR HUBLESS: Brand: CARDINAL HEALTH

## (undated) DEVICE — SUT ETHIBOND 2-0 V-5/V-5 30 IN PXX52

## (undated) DEVICE — GLOVE INDICATOR PI UNDERGLOVE SZ 8 BLUE

## (undated) DEVICE — PACK CABG PBDS

## (undated) DEVICE — STERNAL WIRE

## (undated) DEVICE — SUT SILK 2 60 IN SA8H

## (undated) DEVICE — DGW .035 FC J3MM 260CM TEF: Brand: EMERALD

## (undated) DEVICE — GUIDEWIRE WHOLEY HI TORQUE INTERM MOD J .035 145CM

## (undated) DEVICE — CAUTERY HIGH TEMPERATURE 2" EXTENDED SHAFT LOOP TIP (10/BX): Brand: BOVIE